# Patient Record
Sex: FEMALE | Race: WHITE | NOT HISPANIC OR LATINO | Employment: UNEMPLOYED | URBAN - METROPOLITAN AREA
[De-identification: names, ages, dates, MRNs, and addresses within clinical notes are randomized per-mention and may not be internally consistent; named-entity substitution may affect disease eponyms.]

---

## 2017-01-18 RX ORDER — LEVOTHYROXINE SODIUM 25 UG/1
TABLET ORAL
Qty: 90 TABLET | Refills: 3 | Status: SHIPPED | OUTPATIENT
Start: 2017-01-18 | End: 2018-02-16 | Stop reason: SDUPTHER

## 2017-03-24 ENCOUNTER — PATIENT MESSAGE (OUTPATIENT)
Dept: CARDIOLOGY | Facility: CLINIC | Age: 82
End: 2017-03-24

## 2017-03-24 DIAGNOSIS — Z00.00 PHYSICAL EXAM: Primary | ICD-10-CM

## 2017-04-05 ENCOUNTER — TELEPHONE (OUTPATIENT)
Dept: CARDIOLOGY | Facility: CLINIC | Age: 82
End: 2017-04-05

## 2017-04-05 DIAGNOSIS — E03.9 HYPOTHYROIDISM, UNSPECIFIED TYPE: ICD-10-CM

## 2017-04-05 DIAGNOSIS — E78.5 HYPERLIPIDEMIA, UNSPECIFIED HYPERLIPIDEMIA TYPE: Primary | ICD-10-CM

## 2017-04-07 ENCOUNTER — LAB VISIT (OUTPATIENT)
Dept: LAB | Facility: HOSPITAL | Age: 82
End: 2017-04-07
Attending: INTERNAL MEDICINE
Payer: MEDICARE

## 2017-04-07 DIAGNOSIS — E03.9 HYPOTHYROIDISM, UNSPECIFIED TYPE: ICD-10-CM

## 2017-04-07 DIAGNOSIS — E78.5 HYPERLIPIDEMIA, UNSPECIFIED HYPERLIPIDEMIA TYPE: ICD-10-CM

## 2017-04-07 LAB
ALBUMIN SERPL BCP-MCNC: 3.8 G/DL
ALP SERPL-CCNC: 99 U/L
ALT SERPL W/O P-5'-P-CCNC: 6 U/L
ANION GAP SERPL CALC-SCNC: 8 MMOL/L
AST SERPL-CCNC: 14 U/L
BILIRUB SERPL-MCNC: 0.7 MG/DL
BUN SERPL-MCNC: 21 MG/DL
CALCIUM SERPL-MCNC: 9 MG/DL
CHLORIDE SERPL-SCNC: 99 MMOL/L
CHOLEST/HDLC SERPL: 4.1 {RATIO}
CO2 SERPL-SCNC: 24 MMOL/L
CREAT SERPL-MCNC: 0.8 MG/DL
EST. GFR  (AFRICAN AMERICAN): >60 ML/MIN/1.73 M^2
EST. GFR  (NON AFRICAN AMERICAN): >60 ML/MIN/1.73 M^2
GLUCOSE SERPL-MCNC: 88 MG/DL
HDL/CHOLESTEROL RATIO: 24.5 %
HDLC SERPL-MCNC: 204 MG/DL
HDLC SERPL-MCNC: 50 MG/DL
LDLC SERPL CALC-MCNC: 131.8 MG/DL
NONHDLC SERPL-MCNC: 154 MG/DL
POTASSIUM SERPL-SCNC: 4.3 MMOL/L
PROT SERPL-MCNC: 6.6 G/DL
SODIUM SERPL-SCNC: 131 MMOL/L
T4 FREE SERPL-MCNC: 1 NG/DL
TRIGL SERPL-MCNC: 111 MG/DL
TSH SERPL DL<=0.005 MIU/L-ACNC: 1.75 UIU/ML

## 2017-04-07 PROCEDURE — 84443 ASSAY THYROID STIM HORMONE: CPT

## 2017-04-07 PROCEDURE — 36415 COLL VENOUS BLD VENIPUNCTURE: CPT | Mod: PO

## 2017-04-07 PROCEDURE — 80061 LIPID PANEL: CPT

## 2017-04-07 PROCEDURE — 84439 ASSAY OF FREE THYROXINE: CPT

## 2017-04-07 PROCEDURE — 80053 COMPREHEN METABOLIC PANEL: CPT

## 2017-04-12 ENCOUNTER — PATIENT MESSAGE (OUTPATIENT)
Dept: CARDIOLOGY | Facility: CLINIC | Age: 82
End: 2017-04-12

## 2017-04-12 ENCOUNTER — OFFICE VISIT (OUTPATIENT)
Dept: CARDIOLOGY | Facility: CLINIC | Age: 82
End: 2017-04-12
Payer: MEDICARE

## 2017-04-12 ENCOUNTER — TELEPHONE (OUTPATIENT)
Dept: CARDIOLOGY | Facility: CLINIC | Age: 82
End: 2017-04-12

## 2017-04-12 VITALS
DIASTOLIC BLOOD PRESSURE: 60 MMHG | SYSTOLIC BLOOD PRESSURE: 170 MMHG | BODY MASS INDEX: 24.97 KG/M2 | WEIGHT: 111 LBS | HEIGHT: 56 IN | HEART RATE: 80 BPM

## 2017-04-12 DIAGNOSIS — E55.9 VITAMIN D INSUFFICIENCY: ICD-10-CM

## 2017-04-12 DIAGNOSIS — I25.10 CORONARY ARTERY DISEASE INVOLVING NATIVE CORONARY ARTERY OF NATIVE HEART WITHOUT ANGINA PECTORIS: Primary | ICD-10-CM

## 2017-04-12 DIAGNOSIS — I87.2 VENOUS INSUFFICIENCY OF LOWER EXTREMITY, UNSPECIFIED LATERALITY: ICD-10-CM

## 2017-04-12 DIAGNOSIS — F32.89 OTHER DEPRESSION: ICD-10-CM

## 2017-04-12 DIAGNOSIS — I10 ESSENTIAL HYPERTENSION: ICD-10-CM

## 2017-04-12 DIAGNOSIS — E03.4 HYPOTHYROIDISM DUE TO ACQUIRED ATROPHY OF THYROID: ICD-10-CM

## 2017-04-12 DIAGNOSIS — I95.1 ORTHOSTATIC HYPOTENSION: ICD-10-CM

## 2017-04-12 DIAGNOSIS — I67.9 CEREBRAL ARTERIAL DISEASE: ICD-10-CM

## 2017-04-12 DIAGNOSIS — I35.1 NONRHEUMATIC AORTIC VALVE INSUFFICIENCY: ICD-10-CM

## 2017-04-12 DIAGNOSIS — I73.9 PAD (PERIPHERAL ARTERY DISEASE): ICD-10-CM

## 2017-04-12 DIAGNOSIS — K21.9 GASTROESOPHAGEAL REFLUX DISEASE WITHOUT ESOPHAGITIS: ICD-10-CM

## 2017-04-12 DIAGNOSIS — Z00.00 PHYSICAL EXAM: ICD-10-CM

## 2017-04-12 DIAGNOSIS — Z87.891 FORMER SMOKER: ICD-10-CM

## 2017-04-12 DIAGNOSIS — E78.2 MIXED HYPERLIPIDEMIA: ICD-10-CM

## 2017-04-12 DIAGNOSIS — K55.1 MESENTERIC ARTERY STENOSIS: ICD-10-CM

## 2017-04-12 DIAGNOSIS — I44.7 LBBB (LEFT BUNDLE BRANCH BLOCK): ICD-10-CM

## 2017-04-12 DIAGNOSIS — M51.9 LUMBAR DISC DISEASE: ICD-10-CM

## 2017-04-12 PROCEDURE — 93005 ELECTROCARDIOGRAM TRACING: CPT | Mod: PBBFAC,PO | Performed by: INTERNAL MEDICINE

## 2017-04-12 PROCEDURE — 99215 OFFICE O/P EST HI 40 MIN: CPT | Mod: S$PBB,,, | Performed by: INTERNAL MEDICINE

## 2017-04-12 PROCEDURE — 99214 OFFICE O/P EST MOD 30 MIN: CPT | Mod: PBBFAC,PO,25 | Performed by: INTERNAL MEDICINE

## 2017-04-12 PROCEDURE — 93010 ELECTROCARDIOGRAM REPORT: CPT | Mod: ,,, | Performed by: INTERNAL MEDICINE

## 2017-04-12 PROCEDURE — 99999 PR PBB SHADOW E&M-EST. PATIENT-LVL IV: CPT | Mod: PBBFAC,,, | Performed by: INTERNAL MEDICINE

## 2017-04-12 RX ORDER — EZETIMIBE 10 MG/1
10 TABLET ORAL DAILY
Qty: 30 TABLET | Refills: 12 | Status: SHIPPED | OUTPATIENT
Start: 2017-04-12 | End: 2017-11-03

## 2017-04-12 RX ORDER — ATORVASTATIN CALCIUM 80 MG/1
TABLET, FILM COATED ORAL
Qty: 45 TABLET | Refills: 3 | Status: SHIPPED | OUTPATIENT
Start: 2017-04-12 | End: 2018-12-13 | Stop reason: SDUPTHER

## 2017-04-12 RX ORDER — CLONAZEPAM 0.5 MG/1
0.5 TABLET ORAL 2 TIMES DAILY PRN
COMMUNITY
End: 2019-10-11

## 2017-04-12 NOTE — TELEPHONE ENCOUNTER
Pt's daughter, Deyanira called stating that pt is not taking Atorvastatin 80mg. Dr. Saab aware. Per Dr. Saab pt is to take 1/2 of Atorvastatin 80mg once a day and not to start taking Zetia. Deyanira verbalized understanding.

## 2017-04-12 NOTE — MR AVS SNAPSHOT
[facility]   [facility]  [place] [address]  Phone: [phone]                  Arti Olsen   2017 9:30 AM   Office Visit    Description:  Female : 1928   Provider:  Andrew Saab MD   Department:  [facility]           Reason for Visit     Hypertension           Diagnoses this Visit        Comments    Coronary artery disease involving native coronary artery of native heart without angina pectoris    -  Primary     Cerebral arterial disease         Nonrheumatic aortic valve insufficiency         LBBB (left bundle branch block)         Mesenteric artery stenosis         Mixed hyperlipidemia         PAD (peripheral artery disease)         Venous insufficiency of lower extremity, unspecified laterality         Vitamin D insufficiency         Orthostatic hypotension         Lumbar disc disease         Hypothyroidism due to acquired atrophy of thyroid         Gastroesophageal reflux disease without esophagitis         Former smoker         Essential hypertension         Other depression         Physical exam                To Do List           Goals (5 Years of Data)     None      [facility]     [facility] Care [facility] -  Assistance  Unless otherwise directed by your provider, please contact [facility], our nurse care line that is available for  assistance.     Registered nurses in the [facility] provide: appointment scheduling, clinical advisement, health education, and other advisory services.  Call: [phone] (toll free)               Medications           Message regarding Medications     Verify the changes and/or additions to your medication regime listed below are the same as discussed with your clinician today.  If any of these changes or additions are incorrect, please notify your healthcare provider.        STOP taking these medications     ferrous sulfate 325 mg (65 mg iron) Tab tablet Take 1 tablet (325  "mg total) by mouth every 12 (twelve) hours.           Verify that the below list of medications is an accurate representation of the medications you are currently taking.  If none reported, the list may be blank. If incorrect, please contact your healthcare provider. Carry this list with you in case of emergency.           Current Medications     amlodipine (NORVASC) 5 MG tablet Take 1 tablet (5 mg total) by mouth once daily.    aspirin (ECOTRIN) 81 MG EC tablet Take 81 mg by mouth once daily.      atorvastatin (LIPITOR) 80 MG tablet TAKE ONE DAILY    celecoxib (CELEBREX) 200 MG capsule TAKE ONE DAILY    clonazePAM (KLONOPIN) 0.5 MG tablet Take 0.5 mg by mouth 2 (two) times daily as needed for Anxiety. Pt taking prn.    ergocalciferol (VITAMIN D2) 50,000 unit Cap Take 50,000 Units by mouth every 7 days.    FIBER CHOICE ORAL Take by mouth once daily.    gabapentin (NEURONTIN) 100 MG capsule TAKE ONE BY MOUTH THREE TIMES DAILY    irbesartan (AVAPRO) 300 MG tablet TAKE ONE BY MOUTH EVERY EVENING    levothyroxine (SYNTHROID) 25 MCG tablet TAKE ONE DAILY    nitroGLYCERIN (NITROSTAT) 0.4 MG SL tablet Place 1 tablet (0.4 mg total) under the tongue every 5 (five) minutes as needed.    omeprazole (PRILOSEC) 20 MG capsule Take 20 mg by mouth once daily.     oxycodone-acetaminophen (PERCOCET) 7.5-325 mg per tablet Take 1 tablet by mouth. Pt taking prn.    PREMARIN vaginal cream Pt using prn.    sertraline (ZOLOFT) 100 MG tablet Take 50 mg by mouth once daily.     zolpidem (AMBIEN) 5 MG Tab Take one half per night as needed           Clinical Reference Information           Your Vitals Were     BP Pulse Height Weight BMI    170/60 80 4' 8" (1.422 m) 50.4 kg (111 lb) 24.89 kg/m2      Blood Pressure          Most Recent Value    BP  (!)  170/60      Allergies as of 4/12/2017     Iodinated Contrast Media - Iv Dye    Augmentin [Amoxicillin-pot Clavulanate]    Reglan [Metoclopramide]    Sulfa (Sulfonamide Antibiotics)    "   Immunizations Administered on Date of Encounter - 4/12/2017     None      Orders Placed During Today's Visit      Normal Orders This Visit    EKG 12-lead       Instructions    Discussed diet , achieving and maintaining ideal body weight, and exercise.   We reviewed meds in detail.  Reassured  Discussed goals         Language Assistance Services     ATTENTION: Language assistance services are available, free of charge. Please call [phone].      ATENCIÓN: Si habla español, tiene a stinson disposición servicios gratuitos de asistencia lingüística. Llame al [phone].     CHÚ Ý: N?u b?n nói Ti?ng Vi?t, có các d?ch v? h? tr? ngôn ng? mi?n phí dành cho b?n. G?i s? [phone].         [facility] complies with applicable Federal civil rights laws and does not discriminate on the basis of race, color, national origin, age, disability, or sex.

## 2017-04-12 NOTE — PATIENT INSTRUCTIONS
Discussed diet , achieving and maintaining ideal body weight, and exercise.   We reviewed meds in detail.  Reassured  Discussed goals  If BP not typically < 140, need more meds so let me know  If on atorvastatin 80 mg, add Zetia half pill, but if not on this, will do differently  Cleared for the Neurosurgery

## 2017-04-12 NOTE — PROGRESS NOTES
Subjective:   Patient ID:  Arti Olsen is a 89 y.o. female who presents for follow-up of CVD    HPI: The patient is here for CAD/CVD.     The patient has no chest pain, SOB, TIA, palpitations, syncope or pre-syncope.Patient does not exercise.Pt did not take meds this am and thinks BP lower. Not sure about lipid med??        Review of Systems   Constitution: Positive for weakness and malaise/fatigue. Negative for chills, decreased appetite, diaphoresis, fever, night sweats, weight gain and weight loss.   HENT: Negative for congestion, headaches, hoarse voice, nosebleeds, sore throat and tinnitus.    Eyes: Negative for blurred vision, double vision, vision loss in left eye, vision loss in right eye, visual disturbance and visual halos.   Cardiovascular: Negative for chest pain, claudication, cyanosis, dyspnea on exertion, irregular heartbeat, leg swelling, near-syncope, orthopnea, palpitations, paroxysmal nocturnal dyspnea and syncope.   Respiratory: Negative for cough, hemoptysis, shortness of breath, sleep disturbances due to breathing, snoring, sputum production and wheezing.    Endocrine: Negative for cold intolerance, heat intolerance, polydipsia, polyphagia and polyuria.   Hematologic/Lymphatic: Negative for adenopathy and bleeding problem. Does not bruise/bleed easily.   Skin: Negative for color change, dry skin, flushing, itching, nail changes, poor wound healing, rash, skin cancer, suspicious lesions and unusual hair distribution.   Musculoskeletal: Positive for back pain. Negative for arthritis, falls, gout, joint pain, joint swelling, muscle cramps, muscle weakness, myalgias and stiffness.   Gastrointestinal: Negative for abdominal pain, anorexia, change in bowel habit, constipation, diarrhea, dysphagia, heartburn, hematemesis, hematochezia, melena and vomiting.   Genitourinary: Negative for decreased libido, dysuria, hematuria, hesitancy and urgency.   Neurological: Positive for light-headedness.  "Negative for excessive daytime sleepiness, dizziness, focal weakness, loss of balance, numbness, paresthesias, seizures, sensory change, tremors and vertigo.   Psychiatric/Behavioral: Negative for altered mental status, depression, hallucinations, memory loss, substance abuse and suicidal ideas. The patient does not have insomnia and is not nervous/anxious.    Allergic/Immunologic: Negative for environmental allergies and hives.       Objective: BP (!) 170/60  Pulse 80  Ht 4' 8" (1.422 m)  Wt 50.4 kg (111 lb)  BMI 24.89 kg/m2     Physical Exam   Constitutional: She is oriented to person, place, and time. She appears well-developed and well-nourished.   HENT:   Head: Normocephalic.   Eyes: EOM are normal. Pupils are equal, round, and reactive to light.   Neck: Normal range of motion. Normal carotid pulses, no hepatojugular reflux and no JVD present. Carotid bruit is not present. No thyromegaly present.   Cardiovascular: Normal rate, regular rhythm and intact distal pulses.  Exam reveals no gallop and no friction rub.    Murmur heard.   Systolic murmur is present with a grade of 2/6   Pulses:       Carotid pulses are 2+ on the right side, and 2+ on the left side.       Popliteal pulses are 2+ on the right side, and 2+ on the left side.        Dorsalis pedis pulses are 1+ on the right side, and 1+ on the left side.        Posterior tibial pulses are 1+ on the right side, and 1+ on the left side.   Pulmonary/Chest: Effort normal and breath sounds normal. No tachypnea. No respiratory distress. She has no wheezes. She has no rales. She exhibits no tenderness.   Abdominal: Soft. Bowel sounds are normal. She exhibits no distension and no mass. There is no tenderness. There is no rebound and no guarding.   Musculoskeletal: Normal range of motion. She exhibits no edema or tenderness.   Lymphadenopathy:     She has no cervical adenopathy.   Neurological: She is alert and oriented to person, place, and time. No cranial " nerve deficit. Coordination normal.   Skin: Skin is warm. No rash noted. No erythema.   Psychiatric: She has a normal mood and affect. Her behavior is normal. Judgment and thought content normal.       Assessment:     1. Coronary artery disease involving native coronary artery of native heart without angina pectoris    2. Cerebral arterial disease    3. Nonrheumatic aortic valve insufficiency    4. LBBB (left bundle branch block)    5. Mesenteric artery stenosis    6. Mixed hyperlipidemia    7. PAD (peripheral artery disease)    8. Venous insufficiency of lower extremity, unspecified laterality    9. Vitamin D insufficiency    10. Orthostatic hypotension    11. Lumbar disc disease    12. Hypothyroidism due to acquired atrophy of thyroid    13. Gastroesophageal reflux disease without esophagitis    14. Former smoker    15. Essential hypertension    16. Other depression    17. Physical exam        Plan:   Discussed diet , achieving and maintaining ideal body weight, and exercise.   We reviewed meds in detail.  Reassured  Discussed goals  If BP not typically < 140, need more meds so let me know  If on atorvastatin 80 mg, add Zetia half pill, but if not on this, will do differently  Cleared for the Neurosurgery    Arti was seen today for hypertension.    Diagnoses and all orders for this visit:    Coronary artery disease involving native coronary artery of native heart without angina pectoris  -     ezetimibe (ZETIA) 10 mg tablet; Take 1 tablet (10 mg total) by mouth once daily.  -     Lipid panel; Standing  -     Comprehensive metabolic panel; Standing  -     TSH; Future; Expected date: 4/12/18  -     EKG 12-lead; Future; Expected date: 4/12/18    Cerebral arterial disease  -     Comprehensive metabolic panel; Standing    Nonrheumatic aortic valve insufficiency    LBBB (left bundle branch block)  -     EKG 12-lead; Future; Expected date: 4/12/18    Mesenteric artery stenosis    Mixed hyperlipidemia  -     ezetimibe  (ZETIA) 10 mg tablet; Take 1 tablet (10 mg total) by mouth once daily.  -     Lipid panel; Standing  -     Comprehensive metabolic panel; Standing  -     TSH; Future; Expected date: 4/12/18    PAD (peripheral artery disease)    Venous insufficiency of lower extremity, unspecified laterality    Vitamin D insufficiency  -     Vitamin D; Future; Expected date: 4/12/18    Orthostatic hypotension    Lumbar disc disease    Hypothyroidism due to acquired atrophy of thyroid    Gastroesophageal reflux disease without esophagitis    Former smoker    Essential hypertension  -     Comprehensive metabolic panel; Standing  -     TSH; Future; Expected date: 4/12/18  -     EKG 12-lead; Future; Expected date: 4/12/18    Other depression  -     TSH; Future; Expected date: 4/12/18    Physical exam  -     EKG 12-lead    Other orders  -     clonazePAM (KLONOPIN) 0.5 MG tablet; Take 0.5 mg by mouth 2 (two) times daily as needed for Anxiety. Pt taking prn.            Return in about 1 year (around 4/12/2018) for with ECG and labs; comp and lipids in 4 months.

## 2017-05-04 RX ORDER — AMLODIPINE BESYLATE 5 MG/1
TABLET ORAL
Qty: 90 TABLET | Refills: 3 | Status: SHIPPED | OUTPATIENT
Start: 2017-05-04 | End: 2018-06-06 | Stop reason: SDUPTHER

## 2017-05-05 RX ORDER — GABAPENTIN 100 MG/1
CAPSULE ORAL
Qty: 90 CAPSULE | Refills: 3 | Status: SHIPPED | OUTPATIENT
Start: 2017-05-05 | End: 2018-07-06 | Stop reason: SDUPTHER

## 2017-05-11 ENCOUNTER — TELEPHONE (OUTPATIENT)
Dept: CARDIOLOGY | Facility: CLINIC | Age: 82
End: 2017-05-11

## 2017-05-11 NOTE — TELEPHONE ENCOUNTER
----- Message from Angelique Almonte sent at 5/11/2017  1:38 PM CDT -----  Contact: Cheryle [phone] [facility] Neuro Surgery Dept  Please call Cheryle at the number listed in ref to the pt listed, and she states the pt is at their office now.    Thanks

## 2017-05-11 NOTE — TELEPHONE ENCOUNTER
Returned call from Cheryle at [facility] neurosurgery. She wanted a fax on Dr. Saab's last note from 4/12/17 where he clears her for neurosurgery procedure. Notes were faxed.

## 2017-08-09 ENCOUNTER — LAB VISIT (OUTPATIENT)
Dept: LAB | Facility: HOSPITAL | Age: 82
End: 2017-08-09
Attending: INTERNAL MEDICINE
Payer: MEDICARE

## 2017-08-09 DIAGNOSIS — I25.10 CORONARY ARTERY DISEASE INVOLVING NATIVE CORONARY ARTERY OF NATIVE HEART WITHOUT ANGINA PECTORIS: ICD-10-CM

## 2017-08-09 DIAGNOSIS — I10 ESSENTIAL HYPERTENSION: ICD-10-CM

## 2017-08-09 DIAGNOSIS — E78.2 MIXED HYPERLIPIDEMIA: ICD-10-CM

## 2017-08-09 DIAGNOSIS — I67.9 CEREBRAL ARTERIAL DISEASE: ICD-10-CM

## 2017-08-09 LAB
ALBUMIN SERPL BCP-MCNC: 3.7 G/DL
ALP SERPL-CCNC: 106 U/L
ALT SERPL W/O P-5'-P-CCNC: 9 U/L
ANION GAP SERPL CALC-SCNC: 8 MMOL/L
AST SERPL-CCNC: 15 U/L
BILIRUB SERPL-MCNC: 0.5 MG/DL
BUN SERPL-MCNC: 18 MG/DL
CALCIUM SERPL-MCNC: 9.2 MG/DL
CHLORIDE SERPL-SCNC: 100 MMOL/L
CHOLEST/HDLC SERPL: 2.1 {RATIO}
CO2 SERPL-SCNC: 26 MMOL/L
CREAT SERPL-MCNC: 0.8 MG/DL
EST. GFR  (AFRICAN AMERICAN): >60 ML/MIN/1.73 M^2
EST. GFR  (NON AFRICAN AMERICAN): >60 ML/MIN/1.73 M^2
GLUCOSE SERPL-MCNC: 86 MG/DL
HDL/CHOLESTEROL RATIO: 47.8 %
HDLC SERPL-MCNC: 113 MG/DL
HDLC SERPL-MCNC: 54 MG/DL
LDLC SERPL CALC-MCNC: 45.2 MG/DL
NONHDLC SERPL-MCNC: 59 MG/DL
POTASSIUM SERPL-SCNC: 4.6 MMOL/L
PROT SERPL-MCNC: 6.5 G/DL
SODIUM SERPL-SCNC: 134 MMOL/L
TRIGL SERPL-MCNC: 69 MG/DL

## 2017-08-09 PROCEDURE — 36415 COLL VENOUS BLD VENIPUNCTURE: CPT | Mod: PO

## 2017-08-09 PROCEDURE — 80061 LIPID PANEL: CPT

## 2017-08-09 PROCEDURE — 80053 COMPREHEN METABOLIC PANEL: CPT

## 2017-08-11 ENCOUNTER — PATIENT MESSAGE (OUTPATIENT)
Dept: CARDIOLOGY | Facility: CLINIC | Age: 82
End: 2017-08-11

## 2017-11-03 ENCOUNTER — LAB VISIT (OUTPATIENT)
Dept: LAB | Facility: HOSPITAL | Age: 82
End: 2017-11-03
Attending: INTERNAL MEDICINE
Payer: MEDICARE

## 2017-11-03 ENCOUNTER — OFFICE VISIT (OUTPATIENT)
Dept: CARDIOLOGY | Facility: CLINIC | Age: 82
End: 2017-11-03
Payer: MEDICARE

## 2017-11-03 ENCOUNTER — TELEPHONE (OUTPATIENT)
Dept: CARDIOLOGY | Facility: CLINIC | Age: 82
End: 2017-11-03

## 2017-11-03 VITALS
WEIGHT: 111.31 LBS | BODY MASS INDEX: 23.37 KG/M2 | HEIGHT: 58 IN | DIASTOLIC BLOOD PRESSURE: 74 MMHG | HEART RATE: 80 BPM | SYSTOLIC BLOOD PRESSURE: 164 MMHG

## 2017-11-03 DIAGNOSIS — E87.1 HYPONATREMIA: ICD-10-CM

## 2017-11-03 DIAGNOSIS — D50.9 IRON DEFICIENCY ANEMIA, UNSPECIFIED IRON DEFICIENCY ANEMIA TYPE: ICD-10-CM

## 2017-11-03 DIAGNOSIS — R07.9 CHEST PAIN, UNSPECIFIED TYPE: ICD-10-CM

## 2017-11-03 DIAGNOSIS — I87.2 VENOUS INSUFFICIENCY OF BOTH LOWER EXTREMITIES: ICD-10-CM

## 2017-11-03 DIAGNOSIS — K55.1 MESENTERIC ARTERY STENOSIS: ICD-10-CM

## 2017-11-03 DIAGNOSIS — I73.9 PAD (PERIPHERAL ARTERY DISEASE): ICD-10-CM

## 2017-11-03 DIAGNOSIS — I25.119 CORONARY ARTERY DISEASE INVOLVING NATIVE CORONARY ARTERY OF NATIVE HEART WITH ANGINA PECTORIS: Primary | Chronic | ICD-10-CM

## 2017-11-03 DIAGNOSIS — I35.1 NONRHEUMATIC AORTIC VALVE INSUFFICIENCY: ICD-10-CM

## 2017-11-03 DIAGNOSIS — I10 ESSENTIAL HYPERTENSION: Chronic | ICD-10-CM

## 2017-11-03 DIAGNOSIS — I70.0 ATHEROSCLEROSIS OF AORTA: ICD-10-CM

## 2017-11-03 DIAGNOSIS — I44.7 LBBB (LEFT BUNDLE BRANCH BLOCK): ICD-10-CM

## 2017-11-03 DIAGNOSIS — E03.9 HYPOTHYROIDISM, UNSPECIFIED TYPE: ICD-10-CM

## 2017-11-03 DIAGNOSIS — E78.2 MIXED HYPERLIPIDEMIA: ICD-10-CM

## 2017-11-03 DIAGNOSIS — I25.119 CORONARY ARTERY DISEASE INVOLVING NATIVE CORONARY ARTERY OF NATIVE HEART WITH ANGINA PECTORIS: ICD-10-CM

## 2017-11-03 DIAGNOSIS — I70.1 RENAL ARTERY ATHEROSCLEROSIS: ICD-10-CM

## 2017-11-03 LAB — TROPONIN I SERPL DL<=0.01 NG/ML-MCNC: <0.006 NG/ML

## 2017-11-03 PROCEDURE — 99214 OFFICE O/P EST MOD 30 MIN: CPT | Mod: S$PBB,,, | Performed by: NURSE PRACTITIONER

## 2017-11-03 PROCEDURE — 84484 ASSAY OF TROPONIN QUANT: CPT

## 2017-11-03 PROCEDURE — 99213 OFFICE O/P EST LOW 20 MIN: CPT | Mod: PBBFAC,PO | Performed by: NURSE PRACTITIONER

## 2017-11-03 PROCEDURE — 93005 ELECTROCARDIOGRAM TRACING: CPT | Mod: PBBFAC,PO | Performed by: NURSE PRACTITIONER

## 2017-11-03 PROCEDURE — 36415 COLL VENOUS BLD VENIPUNCTURE: CPT | Mod: PO

## 2017-11-03 PROCEDURE — 99999 PR PBB SHADOW E&M-EST. PATIENT-LVL III: CPT | Mod: PBBFAC,,, | Performed by: NURSE PRACTITIONER

## 2017-11-03 PROCEDURE — 93010 ELECTROCARDIOGRAM REPORT: CPT | Mod: ,,, | Performed by: INTERNAL MEDICINE

## 2017-11-03 NOTE — PROGRESS NOTES
Ms. Olsen is a patient of Dr. Li and was last seen in [facility] Cardiology Visit     Subjective:   Patient ID:  Arti Olsen is a 89 y.o. female who presents for follow-up of Chest Pain    Problems:  CAD (non-obstructive per [facility] in 2012, mid LAD 40%)  HLD  HTN  PAD  Renal artery atherosclerosis  Mesenteric artery stenosis, 70% (US 6/2016)  29 pack year h/o smoking, quit in 1987  Diastolic dysfunction, EF 60-65% in 9/2013    HPI  Ms. Olsen is in clinic today for chest pain for the last 2 weeks with the last episode being last night.  The chest pressure is located on the left anterior chest wall that is not associated with exertion.  The pressure radiates to the shoulder.  Chest pressure not associated with sweating, nausea, or jaw pain.  Pressure lasts for hours at times.  The pressure makes her anxious and she took a Klonopin last night that helped some.  She did not take NTG because the last time she took it a number of years ago her pressure dropped and she passed out.  Ischemia can not be determined by ECG because of LBBB. Patient denies  palpitations, SOB, DOVER, dizziness, syncope, edema, orthopnea, PND, or claudication.  Reports routine exercise.  She is treated with low dose ASA and high intensity statin.  Patient is taking atorvastatin 80mg and zetia 10mg. LDL is 45.   SPECT 11/2015: Equivocal myocardial perfusion. A trivial to mild mostly reversible small apical wall defect associated with soft tissue shadow of uncertain significance.  Hypertension is treated with CCB (amlodipine 5mg) and ARB (irbesartan 300mg).  Reports following a low salt diet. Home blood pressure readings are 140-150s.    CT chest 11/2014: areas of air trapping, 3 stable pulmonary nodules    ECG today: SR with 1st degree AVB and LBBB.     Review of Systems   Constitution: Negative for decreased appetite, diaphoresis, weakness, malaise/fatigue, weight gain and weight loss.   Eyes: Negative for visual disturbance.   Cardiovascular:  "Negative for chest pain, claudication, dyspnea on exertion, irregular heartbeat, leg swelling, near-syncope, orthopnea, palpitations, paroxysmal nocturnal dyspnea and syncope.        Reports chest pressure   Respiratory: Negative for cough, hemoptysis, shortness of breath, sleep disturbances due to breathing and snoring.    Endocrine: Negative for cold intolerance and heat intolerance.   Hematologic/Lymphatic: Negative for bleeding problem. Does not bruise/bleed easily.   Musculoskeletal: Negative for myalgias.   Gastrointestinal: Negative for bloating, abdominal pain, anorexia, change in bowel habit, constipation, diarrhea, nausea and vomiting.   Neurological: Negative for difficulty with concentration, disturbances in coordination, excessive daytime sleepiness, dizziness, headaches, light-headedness, loss of balance and numbness.   Psychiatric/Behavioral: The patient does not have insomnia.      Allergies and current medications updated and reviewed:  Review of patient's allergies indicates:   Allergen Reactions    Iodinated contrast- oral and iv dye Swelling    Augmentin [amoxicillin-pot clavulanate] Diarrhea    Reglan [metoclopramide] Other (See Comments)     "Body shaking"    Sulfa (sulfonamide antibiotics)      Yrs ago     Current Outpatient Prescriptions   Medication Sig    amlodipine (NORVASC) 5 MG tablet TAKE 1 TO 2 PER DAY OR AS DIRECTED    aspirin (ECOTRIN) 81 MG EC tablet Take 81 mg by mouth once daily.      atorvastatin (LIPITOR) 80 MG tablet Pt to take 1/2 tablet once a day.    celecoxib (CELEBREX) 200 MG capsule TAKE ONE DAILY    clonazePAM (KLONOPIN) 0.5 MG tablet Take 0.5 mg by mouth 2 (two) times daily as needed for Anxiety. Pt taking prn.    ezetimibe (ZETIA) 10 mg tablet Take 1 tablet (10 mg total) by mouth once daily.    gabapentin (NEURONTIN) 100 MG capsule TAKE ONE BY MOUTH THREE TIMES DAILY    irbesartan (AVAPRO) 300 MG tablet TAKE ONE BY MOUTH EVERY EVENING    levothyroxine " "(SYNTHROID) 25 MCG tablet TAKE ONE DAILY    nitroGLYCERIN (NITROSTAT) 0.4 MG SL tablet Place 1 tablet (0.4 mg total) under the tongue every 5 (five) minutes as needed.    omeprazole (PRILOSEC) 20 MG capsule Take 20 mg by mouth once daily.     oxycodone-acetaminophen (PERCOCET) 7.5-325 mg per tablet Take 1 tablet by mouth. Pt taking prn.    PREMARIN vaginal cream Pt using prn.    sertraline (ZOLOFT) 100 MG tablet Take 50 mg by mouth once daily.     zolpidem (AMBIEN) 5 MG Tab Take one half per night as needed (Patient taking differently: every evening. )     No current facility-administered medications for this visit.      Objective:      BP (!) 164/74   Pulse 80   Ht 4' 10" (1.473 m)   Wt 50.5 kg (111 lb 5.3 oz)   BMI 23.27 kg/m²     Physical Exam   Constitutional: She is oriented to person, place, and time. Vital signs are normal. She appears well-developed and well-nourished. She is active. No distress.   HENT:   Head: Normocephalic and atraumatic.   Eyes: Conjunctivae and lids are normal. No scleral icterus.   Neck: Neck supple. Normal carotid pulses, no hepatojugular reflux and no JVD present. Carotid bruit is not present.   Cardiovascular: Normal rate, regular rhythm, S1 normal, S2 normal and intact distal pulses.  PMI is not displaced.  Exam reveals no gallop and no friction rub.    No murmur heard.  Pulses:       Carotid pulses are 2+ on the right side, and 2+ on the left side.       Radial pulses are 2+ on the right side, and 2+ on the left side.        Dorsalis pedis pulses are 2+ on the right side, and 2+ on the left side.        Posterior tibial pulses are 1+ on the right side, and 1+ on the left side.   Pulmonary/Chest: Effort normal and breath sounds normal. No respiratory distress. She has no decreased breath sounds. She has no wheezes. She has no rhonchi. She has no rales. She exhibits no tenderness.   Abdominal: Soft. Normal appearance and bowel sounds are normal. She exhibits no distension, " no fluid wave, no abdominal bruit, no ascites and no pulsatile midline mass. There is no hepatosplenomegaly. There is no tenderness.   Musculoskeletal: She exhibits no edema.   Neurological: She is alert and oriented to person, place, and time. Gait normal.   Skin: Skin is warm, dry and intact. No rash noted. She is not diaphoretic. Nails show no clubbing.   Psychiatric: She has a normal mood and affect. Her speech is normal and behavior is normal. Judgment and thought content normal. Cognition and memory are normal.   Nursing note and vitals reviewed.      Chemistry        Component Value Date/Time     (L) 08/09/2017 0754    K 4.6 08/09/2017 0754     08/09/2017 0754    CO2 26 08/09/2017 0754    BUN 18 08/09/2017 0754    CREATININE 0.8 08/09/2017 0754    GLU 86 08/09/2017 0754        Component Value Date/Time    CALCIUM 9.2 08/09/2017 0754    ALKPHOS 106 08/09/2017 0754    AST 15 08/09/2017 0754    ALT 9 (L) 08/09/2017 0754    BILITOT 0.5 08/09/2017 0754    ESTGFRAFRICA >60.0 08/09/2017 0754    EGFRNONAA >60.0 08/09/2017 0754          Recent Labs  Lab 11/09/16  1541  12/12/16  1659 04/07/17  0743 08/09/17  0754   WHITE BLOOD CELL COUNT 6.74  < > 5.64  --   --    HEMOGLOBIN 10.6 L  < > 11.0 L  --   --    HEMATOCRIT 31.6 L  < > 34.0 L  --   --    MCV 80 L  < > 84  --   --    PLATELETS 173  < > 178  --   --     H  --   --   --   --    TSH 1.230  --   --  1.750  --    CHOLESTEROL  --   --   --  204 H 113 L   HDL  --   --   --  50 54   LDL CHOLESTEROL  --   --   --  131.8 45.2 L   TRIGLYCERIDES  --   --   --  111 69   HDL/CHOLESTEROL RATIO  --   --   --  24.5 47.8   < > = values in this interval not displayed.  Lab Results   Component Value Date    IRON 95 11/19/2010    TIBC 347 11/19/2010    FERRITIN 20 12/12/2016       Recent Labs  Lab 11/09/16  1541   INR 1.0        Test(s) Reviewed  I have reviewed the following in detail:  [] Stress test   [] Angiography   [x] Echocardiogram   [x] Labs   [] Other:          Assessment/Plan:     Coronary artery disease involving native coronary artery of native heart with angina pectoris  Comments:  Symptomatic. Taking low ASA and high intensity statin therapy  Orders:  -     IN OFFICE EKG 12-LEAD (to Muse); Future  -     TROPONIN I; Future; Expected date: 11/17/2017    Mixed hyperlipidemia  Comments:  LDL at goal <70. Continue atorvastatin 80mg and Zetia 10mg    LBBB (left bundle branch block)  -     EKG 12-lead    PAD (peripheral artery disease)    Essential hypertension  Comments:  Not well controlled in office. Reports BP is better at home.  2gm sodium diet. Instructed to call [facility] with BP in 2 weeks.   Orders:  -     EKG 12-lead    Renal artery atherosclerosis    Nonrheumatic aortic valve insufficiency    Venous insufficiency of both lower extremities    Hyponatremia    Hypothyroidism, unspecified type  Comments:  TSH wnl 4/2017    Mesenteric artery stenosis    Iron deficiency anemia, unspecified iron deficiency anemia type    Atherosclerosis of aorta    Chest pain, unspecified type  -     IN OFFICE EKG 12-LEAD (to Muse); Future  -     TROPONIN I; Future; Expected date: 11/17/2017      Return in about 6 months (around 5/3/2018).

## 2017-11-03 NOTE — TELEPHONE ENCOUNTER
Returned message from patient's daughter Deyanira. She's concerned because patient said she's been having chest pains off and on which she thought was indigestion but is not relieved with antiacid. Has not taken NTG. The bottle she has is old , so we will call in fresh Rx to [facility]. Offered appt today with Hannah Patiño NP at [place] and daughter accepted.

## 2017-11-06 ENCOUNTER — PATIENT MESSAGE (OUTPATIENT)
Dept: CARDIOLOGY | Facility: CLINIC | Age: 82
End: 2017-11-06

## 2017-12-11 RX ORDER — CELECOXIB 200 MG/1
CAPSULE ORAL
Qty: 60 CAPSULE | Refills: 12 | Status: SHIPPED | OUTPATIENT
Start: 2017-12-11 | End: 2019-01-08 | Stop reason: SDUPTHER

## 2018-02-14 ENCOUNTER — TELEPHONE (OUTPATIENT)
Dept: INTERNAL MEDICINE | Facility: CLINIC | Age: 83
End: 2018-02-14

## 2018-02-14 NOTE — TELEPHONE ENCOUNTER
----- Message from Jackie Franz sent at 2/12/2018  3:54 PM CST -----  Contact: Pt [email or web address] portal  Appointment Request From: Arti Olsen    With Provider: Reji Arguello Jr, MD [-Primary Care Physician-]    Would Accept With:Only the person I've selected    Preferred Date Range: From 3/1/2018 To 8/31/2018    Preferred Times: Any    Reason for visit: Request an Appt    Comments:  We had to cancel an appointment with Dr. Arguello several weeks ago even though my mother is overdue seeing him. She received his letter and would like to have an appointment with him before he retires. Please contact me if you have any questions. Gratefully, Deyanira Olsen (daughter, [phone])

## 2018-02-15 ENCOUNTER — PATIENT MESSAGE (OUTPATIENT)
Dept: INTERNAL MEDICINE | Facility: CLINIC | Age: 83
End: 2018-02-15

## 2018-02-19 RX ORDER — LEVOTHYROXINE SODIUM 25 UG/1
TABLET ORAL
Qty: 90 TABLET | Refills: 3 | Status: SHIPPED | OUTPATIENT
Start: 2018-02-19 | End: 2019-09-20 | Stop reason: SDUPTHER

## 2018-03-08 ENCOUNTER — OFFICE VISIT (OUTPATIENT)
Dept: CARDIOLOGY | Facility: CLINIC | Age: 83
End: 2018-03-08
Payer: MEDICARE

## 2018-03-08 VITALS
WEIGHT: 111.31 LBS | HEIGHT: 59 IN | HEART RATE: 73 BPM | SYSTOLIC BLOOD PRESSURE: 176 MMHG | BODY MASS INDEX: 22.44 KG/M2 | DIASTOLIC BLOOD PRESSURE: 71 MMHG

## 2018-03-08 DIAGNOSIS — I10 ESSENTIAL HYPERTENSION: ICD-10-CM

## 2018-03-08 DIAGNOSIS — F32.89 OTHER DEPRESSION: ICD-10-CM

## 2018-03-08 DIAGNOSIS — E87.1 HYPONATREMIA: ICD-10-CM

## 2018-03-08 DIAGNOSIS — K55.1 MESENTERIC ARTERY STENOSIS: ICD-10-CM

## 2018-03-08 DIAGNOSIS — K21.9 GASTROESOPHAGEAL REFLUX DISEASE WITHOUT ESOPHAGITIS: ICD-10-CM

## 2018-03-08 DIAGNOSIS — E55.9 VITAMIN D INSUFFICIENCY: ICD-10-CM

## 2018-03-08 DIAGNOSIS — I67.9 CEREBRAL ARTERIAL DISEASE: ICD-10-CM

## 2018-03-08 DIAGNOSIS — I73.9 PAD (PERIPHERAL ARTERY DISEASE): ICD-10-CM

## 2018-03-08 DIAGNOSIS — E03.4 HYPOTHYROIDISM DUE TO ACQUIRED ATROPHY OF THYROID: ICD-10-CM

## 2018-03-08 DIAGNOSIS — I44.7 LBBB (LEFT BUNDLE BRANCH BLOCK): ICD-10-CM

## 2018-03-08 DIAGNOSIS — M51.9 LUMBAR DISC DISEASE: ICD-10-CM

## 2018-03-08 DIAGNOSIS — I70.1 RENAL ARTERY ATHEROSCLEROSIS: ICD-10-CM

## 2018-03-08 DIAGNOSIS — I25.10 CORONARY ARTERY DISEASE INVOLVING NATIVE CORONARY ARTERY OF NATIVE HEART WITHOUT ANGINA PECTORIS: Primary | ICD-10-CM

## 2018-03-08 DIAGNOSIS — Z87.891 HISTORY OF SMOKING 25-50 PACK YEARS: ICD-10-CM

## 2018-03-08 DIAGNOSIS — E78.2 MIXED HYPERLIPIDEMIA: ICD-10-CM

## 2018-03-08 DIAGNOSIS — I87.2 VENOUS INSUFFICIENCY OF BOTH LOWER EXTREMITIES: ICD-10-CM

## 2018-03-08 PROCEDURE — 99999 PR PBB SHADOW E&M-EST. PATIENT-LVL III: CPT | Mod: PBBFAC,,, | Performed by: INTERNAL MEDICINE

## 2018-03-08 PROCEDURE — 99213 OFFICE O/P EST LOW 20 MIN: CPT | Mod: PBBFAC | Performed by: INTERNAL MEDICINE

## 2018-03-08 PROCEDURE — 99215 OFFICE O/P EST HI 40 MIN: CPT | Mod: S$PBB,,, | Performed by: INTERNAL MEDICINE

## 2018-03-08 NOTE — PATIENT INSTRUCTIONS
Discussed diet , achieving and maintaining ideal body weight, and exercise.   We reviewed meds in detail.  Reassured-discussed goals, options plan   Increase amlodipine to twice but if bad ankle swelling , try to take whole 10 mg at nite  If BP still over 130-135, will add low dose diuretic or spironolactone

## 2018-03-16 RX ORDER — IRBESARTAN 300 MG/1
TABLET ORAL
Qty: 90 TABLET | Refills: 3 | Status: SHIPPED | OUTPATIENT
Start: 2018-03-16 | End: 2019-04-29 | Stop reason: SDUPTHER

## 2018-03-28 ENCOUNTER — OFFICE VISIT (OUTPATIENT)
Dept: INTERNAL MEDICINE | Facility: CLINIC | Age: 83
End: 2018-03-28
Payer: MEDICARE

## 2018-03-28 VITALS
OXYGEN SATURATION: 96 % | SYSTOLIC BLOOD PRESSURE: 142 MMHG | BODY MASS INDEX: 23.11 KG/M2 | HEART RATE: 73 BPM | DIASTOLIC BLOOD PRESSURE: 50 MMHG | HEIGHT: 59 IN | WEIGHT: 114.63 LBS

## 2018-03-28 DIAGNOSIS — L03.116 CELLULITIS OF LEFT LOWER EXTREMITY: ICD-10-CM

## 2018-03-28 DIAGNOSIS — L97.222 VENOUS STASIS ULCER OF LEFT CALF WITH FAT LAYER EXPOSED WITH VARICOSE VEINS: Primary | ICD-10-CM

## 2018-03-28 DIAGNOSIS — I83.022 VENOUS STASIS ULCER OF LEFT CALF WITH FAT LAYER EXPOSED WITH VARICOSE VEINS: Primary | ICD-10-CM

## 2018-03-28 PROCEDURE — 99999 PR PBB SHADOW E&M-EST. PATIENT-LVL V: CPT | Mod: PBBFAC,,, | Performed by: NURSE PRACTITIONER

## 2018-03-28 PROCEDURE — 99214 OFFICE O/P EST MOD 30 MIN: CPT | Mod: S$PBB,,, | Performed by: NURSE PRACTITIONER

## 2018-03-28 PROCEDURE — 99215 OFFICE O/P EST HI 40 MIN: CPT | Mod: PBBFAC | Performed by: NURSE PRACTITIONER

## 2018-03-28 RX ORDER — CEPHALEXIN 250 MG/1
250 CAPSULE ORAL 4 TIMES DAILY
Qty: 40 CAPSULE | Refills: 0 | Status: SHIPPED | OUTPATIENT
Start: 2018-03-28 | End: 2018-04-12 | Stop reason: ALTCHOICE

## 2018-03-28 NOTE — PATIENT INSTRUCTIONS
Schedule follow up with wound care, NP Luz Marina    Keep leg elevated    Wear compression stockings    Take antibiotic 4 times a day with food for 10 days    Dressing changes as discussed    Follow up for any worsening of redness, pain, fever , chills or worsening of any symptoms that concerns you

## 2018-03-28 NOTE — PROGRESS NOTES
Subjective:       Patient ID: Arti Olsen is a 89 y.o. female.    Chief Complaint: Leg Pain    Pt here with daughter .  Pt c/o ulcer to left lower leg x 2 weeks.  Pain started this week and redness.  Pt denies fever or chills, no purulent drainage.  Pt not wearing compression stockings.          Leg Pain    There was no injury mechanism.     Past Medical History:   Diagnosis Date    Acid reflux     chronic    Anemia 6/24/2014    Arthritis     osetoarthritis    Clotting disorder     Colon polyp     Coronary artery disease     Nonobstructive CAD per [facility]    History of colonoscopy     1 polyp noted in 2009    Hyperlipemia     Hypertension     Migraine headache     chronic    Peripheral vascular disease     PONV (postoperative nausea and vomiting)     Renal artery atherosclerosis 11/11/2012    Spinal stenosis     Thyroid disease      Past Surgical History:   Procedure Laterality Date    acf      ANTERIOR CERVICAL DISCECTOMY W/ FUSION      APPENDECTOMY  1942    BLADDER SURGERY  1958    suspension    COLONOSCOPY W/ POLYPECTOMY      CYST REMOVAL  1983    removed from scalp    DISC REMOVAL  1983    EYE SURGERY      cataracts removed    HYSTERECTOMY  1959    COMPLETE    JOINT REPLACEMENT  2006    repair 3rd toe    JOINT REPLACEMENT  2007    right/left rotator    SPINE SURGERY  02/2014    relieve pressure on nerves    TONSILLECTOMY, ADENOIDECTOMY  1930's    UTERINE FIBROID SURGERY  1958    VARICOSE VEIN SURGERY  1958     Social History     Social History Narrative    No narrative on file     Family History   Problem Relation Age of Onset    Heart disease Father      aorta burst    Heart disease Mother      stroke    Kidney disease Brother     Cancer Brother     Lung cancer Brother     Cancer Brother     Leukemia Brother     Heart disease Brother     Cancer Brother     Heart disease Maternal Grandmother     Kidney disease Maternal Grandfather     Uterine cancer Paternal Grandmother   "   Anesthesia problems Neg Hx     Clotting disorder Neg Hx      problems Neg Hx     Hypertension Neg Hx     Kidney cancer Neg Hx     Malignant hyperthermia Neg Hx     Prostate cancer Neg Hx     Sickle cell trait Neg Hx     Lupus Neg Hx     Sudden death Neg Hx     Urolithiasis Neg Hx     Colon cancer Neg Hx     Esophageal cancer Neg Hx     Irritable bowel syndrome Neg Hx     Rectal cancer Neg Hx     Stomach cancer Neg Hx     Ulcerative colitis Neg Hx     Celiac disease Neg Hx     Cystic fibrosis Neg Hx      Vitals:    03/28/18 0810   BP: (!) 142/50   Pulse: 73   SpO2: 96%   Weight: 52 kg (114 lb 10.2 oz)   Height: 4' 11" (1.499 m)   PainSc:   8   PainLoc: Leg     Outpatient Encounter Prescriptions as of 3/28/2018   Medication Sig Dispense Refill    amlodipine (NORVASC) 5 MG tablet TAKE 1 TO 2 PER DAY OR AS DIRECTED 90 tablet 3    aspirin (ECOTRIN) 81 MG EC tablet Take 81 mg by mouth once daily.        atorvastatin (LIPITOR) 80 MG tablet Pt to take 1/2 tablet once a day. (Patient taking differently: Pt to take 1/2 tablet 5 days per week.) 45 tablet 3    celecoxib (CELEBREX) 200 MG capsule TAKE ONE DAILY 60 capsule 12    clonazePAM (KLONOPIN) 0.5 MG tablet Take 0.5 mg by mouth 2 (two) times daily as needed for Anxiety. Pt taking prn.      ERGOCALCIFEROL, VITAMIN D2, (VITAMIN D ORAL) Take 2 tablets by mouth once daily.      gabapentin (NEURONTIN) 100 MG capsule TAKE ONE BY MOUTH THREE TIMES DAILY 90 capsule 3    irbesartan (AVAPRO) 300 MG tablet TAKE ONE BY MOUTH EVERY EVENING 90 tablet 3    levothyroxine (SYNTHROID) 25 MCG tablet TAKE ONE DAILY 90 tablet 3    nitroGLYCERIN (NITROSTAT) 0.4 MG SL tablet Place 1 tablet (0.4 mg total) under the tongue every 5 (five) minutes as needed. 25 tablet 4    omeprazole (PRILOSEC) 20 MG capsule Take 20 mg by mouth once daily.       oxycodone-acetaminophen (PERCOCET) 7.5-325 mg per tablet Take 1 tablet by mouth. Pt taking prn.      PREMARIN vaginal " "cream Pt using prn.      sertraline (ZOLOFT) 100 MG tablet Take 50 mg by mouth once daily.       VIT C/VIT E/LUTEIN/MIN/OMEGA-3 (OCUVITE ORAL) Take 1 tablet by mouth once daily.      zolpidem (AMBIEN) 5 MG Tab Take one half per night as needed (Patient taking differently: every evening. ) 30 tablet 0    cephALEXin (KEFLEX) 250 MG capsule Take 1 capsule (250 mg total) by mouth 4 (four) times daily. 40 capsule 0     No facility-administered encounter medications on file as of 3/28/2018.      Wt Readings from Last 3 Encounters:   03/28/18 52 kg (114 lb 10.2 oz)   03/08/18 50.5 kg (111 lb 5.3 oz)   11/03/17 50.5 kg (111 lb 5.3 oz)     Last 3 sets of Vitals    Vitals - 1 value per visit 11/3/2017 3/8/2018 3/28/2018   SYSTOLIC 164 176 142   DIASTOLIC 74 71 50   PULSE 80 73 73   TEMPERATURE - - -   RESPIRATIONS - - -   SPO2 - - 96   Weight (lb) 111.33 111.33 114.64   Weight (kg) 50.5 50.5 52   HEIGHT 4' 10" 4' 11" 4' 11"   BODY MASS INDEX 23.27 22.49 23.15   VISIT REPORT - - -   Pain Score  0 6 8   Some recent data might be hidden     No results found for: CBC  Review of Systems   Constitutional: Negative for activity change and unexpected weight change.   HENT: Negative for hearing loss, rhinorrhea and trouble swallowing.    Eyes: Negative for discharge and visual disturbance.   Respiratory: Negative for chest tightness and wheezing.    Cardiovascular: Negative for chest pain and palpitations.   Gastrointestinal: Negative for blood in stool, constipation, diarrhea and vomiting.   Endocrine: Negative for polydipsia and polyuria.   Genitourinary: Negative for difficulty urinating, dysuria, hematuria and menstrual problem.   Musculoskeletal: Negative for arthralgias, joint swelling and neck pain.   Neurological: Negative for weakness and headaches.   Psychiatric/Behavioral: Negative for confusion and dysphoric mood.       Objective:      Physical Exam   Constitutional: She is oriented to person, place, and time. She " appears well-developed and well-nourished. No distress.   Pulmonary/Chest: Effort normal.   Musculoskeletal: She exhibits tenderness. She exhibits no edema.   Neurological: She is alert and oriented to person, place, and time.   Skin: She is not diaphoretic.        Ulcer noted to left lower leg 4cm x 3cm irregular with surrounding erythema extending to medial malleolus.  NO purulent drainage or foul odor  See pictures under media tab     Nursing note and vitals reviewed.      Assessment:       1. Venous stasis ulcer of left calf with fat layer exposed with varicose veins    2. Cellulitis of left lower extremity        Plan:       Dressing applied to left lower leg- Xeroforn with telfa, bulky gauze and stockinet over .  Pictures in chart of wound see media tab  Arti was seen today for leg pain.    Diagnoses and all orders for this visit:    Venous stasis ulcer of left calf with fat layer exposed with varicose veins  -     Ambulatory consult to Wound Clinic    Cellulitis of left lower extremity  -     cephALEXin (KEFLEX) 250 MG capsule; Take 1 capsule (250 mg total) by mouth 4 (four) times daily.      Patient Instructions   Schedule follow up with wound care, NP Luz Marina    Keep leg elevated    Wear compression stockings    Take antibiotic 4 times a day with food for 10 days    Dressing changes as discussed    Follow up for any worsening of redness, pain, fever , chills or worsening of any symptoms that concerns you

## 2018-03-30 ENCOUNTER — OFFICE VISIT (OUTPATIENT)
Dept: URGENT CARE | Facility: CLINIC | Age: 83
End: 2018-03-30
Payer: MEDICARE

## 2018-03-30 VITALS
DIASTOLIC BLOOD PRESSURE: 77 MMHG | RESPIRATION RATE: 18 BRPM | OXYGEN SATURATION: 99 % | HEART RATE: 81 BPM | HEIGHT: 59 IN | TEMPERATURE: 98 F | BODY MASS INDEX: 22.98 KG/M2 | SYSTOLIC BLOOD PRESSURE: 181 MMHG | WEIGHT: 114 LBS

## 2018-03-30 DIAGNOSIS — L03.116 CELLULITIS OF LEFT LOWER EXTREMITY: Primary | ICD-10-CM

## 2018-03-30 PROCEDURE — 99213 OFFICE O/P EST LOW 20 MIN: CPT | Mod: S$GLB,,, | Performed by: NURSE PRACTITIONER

## 2018-03-30 RX ORDER — CLINDAMYCIN HYDROCHLORIDE 300 MG/1
300 CAPSULE ORAL 3 TIMES DAILY
Qty: 30 CAPSULE | Refills: 0 | Status: SHIPPED | OUTPATIENT
Start: 2018-03-30 | End: 2018-04-04

## 2018-03-30 NOTE — PATIENT INSTRUCTIONS
Cellulitis  Cellulitis is an infection of the deep layers of skin. A break in the skin, such as a cut or scratch, can let bacteria under the skin. If the bacteria get to deep layers of the skin, it can be serious. If not treated, cellulitis can get into the bloodstream and lymph nodes. The infection can then spread throughout the body. This causes serious illness.  Cellulitis causes the affected skin to become red, swollen, warm, and sore. The reddened areas have a visible border. An open sore may leak fluid (pus). You may have a fever, chills, and pain.  Cellulitis is treated with antibiotics taken for 7 to 10 days. An open sore may be cleaned and covered with cool wet gauze. Symptoms should get better 1 to 2 days after treatment is started. Make sure to take all the antibiotics for the full number of days until they are gone. Keep taking the medicine even if your symptoms go away.  Home care  Follow these tips:  · Limit the use of the part of your body with cellulitis.   · If the infection is on your leg, keep your leg raised while sitting. This will help to reduce swelling.  · Take all of the antibiotic medicine exactly as directed until it is gone. Do not miss any doses, especially during the first 7 days. Dont stop taking the medicine when your symptoms get better.  · Keep the affected area clean and dry.  · Wash your hands with soap and warm water before and after touching your skin. Anyone else who touches your skin should also wash his or her hands. Don't share towels.  Follow-up care  Follow up with your healthcare provider, or as advised. If your infection does not go away on the first antibiotic, your healthcare provider will prescribe a different one.  When to seek medical advice  Call your healthcare provider right away if any of these occur:  · Red areas that spread  · Swelling or pain that gets worse  · Fluid leaking from the skin (pus)  · Fever higher of 100.4º F (38.0º C) or higher after 2 days  on antibiotics  Date Last Reviewed: 9/1/2016  © 8202-1202 [employer] [address] [place] [address], [place] [address]. All rights reserved. This information is not intended as a substitute for professional medical care. Always follow your healthcare professional's instructions.

## 2018-03-30 NOTE — PROGRESS NOTES
"Subjective:       Patient ID: Arti Olsen is a 89 y.o. female.    Vitals:  height is 4' 11" (1.499 m) and weight is 51.7 kg (114 lb). Her temperature is 98 °F (36.7 °C). Her blood pressure is 181/77 (abnormal) and her pulse is 81. Her respiration is 18 and oxygen saturation is 99%.     Chief Complaint: Rash (left leg)    Pt states that she saw an NP at her PCP's office and was prescribed keflex      Rash   This is a new problem. The current episode started 1 to 4 weeks ago. The problem has been gradually worsening since onset. The affected locations include the left ankle, left lower leg and left foot. The rash is characterized by redness, swelling, blistering and pain. She was exposed to nothing. Pertinent negatives include no fever, joint pain, shortness of breath or sore throat. Past treatments include antibiotics and antibiotic cream. The treatment provided no relief.     Review of Systems   Constitution: Negative for chills and fever.   HENT: Negative for sore throat.    Respiratory: Negative for shortness of breath.    Skin: Positive for color change, poor wound healing and rash. Negative for itching.   Musculoskeletal: Positive for joint swelling. Negative for joint pain.       Objective:      Physical Exam   Constitutional: She is oriented to person, place, and time. She appears well-developed and well-nourished. She is cooperative.  Non-toxic appearance. She does not appear ill. No distress.   HENT:   Head: Normocephalic and atraumatic.   Right Ear: Hearing, tympanic membrane and ear canal normal.   Left Ear: Hearing, tympanic membrane and ear canal normal.   Nose: Nose normal. No mucosal edema, rhinorrhea or nasal deformity. No epistaxis. Right sinus exhibits no maxillary sinus tenderness and no frontal sinus tenderness. Left sinus exhibits no maxillary sinus tenderness and no frontal sinus tenderness.   Mouth/Throat: Uvula is midline and mucous membranes are normal. No trismus in the jaw. Normal " dentition. No uvula swelling. No posterior oropharyngeal erythema.   Eyes: Conjunctivae and lids are normal. Right eye exhibits no discharge. Left eye exhibits no discharge. No scleral icterus.   Sclera clear bilat   Neck: Trachea normal, normal range of motion, full passive range of motion without pain and phonation normal. Neck supple.   Cardiovascular: Normal pulses.    Pulmonary/Chest: Effort normal. No respiratory distress.   Abdominal: Soft. Normal appearance and bowel sounds are normal. She exhibits no distension, no pulsatile midline mass and no mass. There is no tenderness.   Musculoskeletal: Normal range of motion. She exhibits no edema or deformity.   Neurological: She is alert and oriented to person, place, and time. She exhibits normal muscle tone. Coordination normal.   Skin: Skin is warm, dry and intact. Rash noted. She is not diaphoretic. There is erythema. No pallor.        Psychiatric: She has a normal mood and affect. Her speech is normal and behavior is normal. Judgment and thought content normal. Cognition and memory are normal.   Nursing note and vitals reviewed.      Assessment:       1. Cellulitis of left lower extremity        Plan:     Stop Keflex--start Clindamycin take probiotic 2-3 hrs after each antibiotic dose--IF FOR ANY REASON YOU START WITH DIARRHEA GO TO ER  Patient Instructions     Cellulitis  Cellulitis is an infection of the deep layers of skin. A break in the skin, such as a cut or scratch, can let bacteria under the skin. If the bacteria get to deep layers of the skin, it can be serious. If not treated, cellulitis can get into the bloodstream and lymph nodes. The infection can then spread throughout the body. This causes serious illness.  Cellulitis causes the affected skin to become red, swollen, warm, and sore. The reddened areas have a visible border. An open sore may leak fluid (pus). You may have a fever, chills, and pain.  Cellulitis is treated with antibiotics taken for  7 to 10 days. An open sore may be cleaned and covered with cool wet gauze. Symptoms should get better 1 to 2 days after treatment is started. Make sure to take all the antibiotics for the full number of days until they are gone. Keep taking the medicine even if your symptoms go away.  Home care  Follow these tips:  · Limit the use of the part of your body with cellulitis.   · If the infection is on your leg, keep your leg raised while sitting. This will help to reduce swelling.  · Take all of the antibiotic medicine exactly as directed until it is gone. Do not miss any doses, especially during the first 7 days. Dont stop taking the medicine when your symptoms get better.  · Keep the affected area clean and dry.  · Wash your hands with soap and warm water before and after touching your skin. Anyone else who touches your skin should also wash his or her hands. Don't share towels.  Follow-up care  Follow up with your healthcare provider, or as advised. If your infection does not go away on the first antibiotic, your healthcare provider will prescribe a different one.  When to seek medical advice  Call your healthcare provider right away if any of these occur:  · Red areas that spread  · Swelling or pain that gets worse  · Fluid leaking from the skin (pus)  · Fever higher of 100.4º F (38.0º C) or higher after 2 days on antibiotics  Date Last Reviewed: 9/1/2016  © 7001-1204 [employer]. [address] [place] [address], [place] [address]. All rights reserved. This information is not intended as a substitute for professional medical care. Always follow your healthcare professional's instructions.              Cellulitis of left lower extremity    Other orders  -     clindamycin (CLEOCIN) 300 MG capsule; Take 1 capsule (300 mg total) by mouth 3 (three) times daily.  Dispense: 30 capsule; Refill: 0

## 2018-04-03 ENCOUNTER — LAB VISIT (OUTPATIENT)
Dept: LAB | Facility: HOSPITAL | Age: 83
End: 2018-04-03
Attending: INTERNAL MEDICINE
Payer: MEDICARE

## 2018-04-03 ENCOUNTER — OFFICE VISIT (OUTPATIENT)
Dept: INTERNAL MEDICINE | Facility: CLINIC | Age: 83
End: 2018-04-03
Payer: MEDICARE

## 2018-04-03 VITALS — HEART RATE: 80 BPM | SYSTOLIC BLOOD PRESSURE: 176 MMHG | DIASTOLIC BLOOD PRESSURE: 78 MMHG

## 2018-04-03 DIAGNOSIS — I10 ESSENTIAL HYPERTENSION: Primary | ICD-10-CM

## 2018-04-03 DIAGNOSIS — I10 ESSENTIAL HYPERTENSION: ICD-10-CM

## 2018-04-03 DIAGNOSIS — E78.2 MIXED HYPERLIPIDEMIA: ICD-10-CM

## 2018-04-03 DIAGNOSIS — I87.2 VENOUS INSUFFICIENCY OF LEFT LOWER EXTREMITY: ICD-10-CM

## 2018-04-03 DIAGNOSIS — I73.9 PAD (PERIPHERAL ARTERY DISEASE): ICD-10-CM

## 2018-04-03 LAB
ANION GAP SERPL CALC-SCNC: 7 MMOL/L
BUN SERPL-MCNC: 17 MG/DL
CALCIUM SERPL-MCNC: 9 MG/DL
CHLORIDE SERPL-SCNC: 98 MMOL/L
CO2 SERPL-SCNC: 26 MMOL/L
CREAT SERPL-MCNC: 0.7 MG/DL
EST. GFR  (AFRICAN AMERICAN): >60 ML/MIN/1.73 M^2
EST. GFR  (NON AFRICAN AMERICAN): >60 ML/MIN/1.73 M^2
GLUCOSE SERPL-MCNC: 101 MG/DL
POTASSIUM SERPL-SCNC: 4.6 MMOL/L
SODIUM SERPL-SCNC: 131 MMOL/L

## 2018-04-03 PROCEDURE — 99213 OFFICE O/P EST LOW 20 MIN: CPT | Mod: PBBFAC | Performed by: INTERNAL MEDICINE

## 2018-04-03 PROCEDURE — 36415 COLL VENOUS BLD VENIPUNCTURE: CPT

## 2018-04-03 PROCEDURE — 80048 BASIC METABOLIC PNL TOTAL CA: CPT

## 2018-04-03 PROCEDURE — 99213 OFFICE O/P EST LOW 20 MIN: CPT | Mod: S$PBB,,, | Performed by: INTERNAL MEDICINE

## 2018-04-03 PROCEDURE — 99999 PR PBB SHADOW E&M-EST. PATIENT-LVL III: CPT | Mod: PBBFAC,,, | Performed by: INTERNAL MEDICINE

## 2018-04-03 RX ORDER — SPIRONOLACTONE 25 MG/1
25 TABLET ORAL DAILY
Qty: 30 TABLET | Refills: 11 | Status: SHIPPED | OUTPATIENT
Start: 2018-04-03 | End: 2018-12-27 | Stop reason: SDUPTHER

## 2018-04-03 NOTE — PROGRESS NOTES
Subjective:       Patient ID: Arti Olsen is a 89 y.o. female.    Chief Complaint: Follow-up    HPI the patient is an 89-year-old female who comes in for follow-up a a venous ulcer her left lower extremity is also blood pressure.  She reports that she was intolerant of Cleocin.  This tended to cause some nausea and upset stomach.  She has resumed Keflex by her report.  She has noticed some improvement in the ulceration.  There is been a slight bit of redness around the ulcer.  The patient is somewhat unsure of her medical regimen.  She and her daughter will be contacting us once they get home to report with the medical regimen is after they look at the prescription bottles.  She is not noticing any chest pain or shortness of breath at this time.  Review of Systems   Constitutional: Positive for activity change. Negative for appetite change, fatigue and unexpected weight change.   HENT: Negative for hearing loss, rhinorrhea and trouble swallowing.    Eyes: Negative for discharge and visual disturbance.   Respiratory: Positive for chest tightness. Negative for cough, shortness of breath and wheezing.    Cardiovascular: Positive for chest pain. Negative for palpitations.   Gastrointestinal: Positive for constipation and diarrhea. Negative for blood in stool and vomiting.   Endocrine: Negative for polydipsia and polyuria.   Genitourinary: Positive for difficulty urinating. Negative for dysuria, hematuria and menstrual problem.   Musculoskeletal: Positive for arthralgias, joint swelling and neck pain.   Neurological: Positive for weakness and headaches.   Psychiatric/Behavioral: Positive for confusion. Negative for dysphoric mood.       Objective:      Physical Exam   Constitutional: She appears well-developed and well-nourished. No distress.   Cardiovascular: Normal rate, regular rhythm and normal heart sounds.  Exam reveals no gallop and no friction rub.    No murmur heard.  Pulmonary/Chest: Effort normal and breath  sounds normal. No respiratory distress. She has no wheezes. She has no rales.   Skin: She is not diaphoretic.   The wound in the patient's left lower extremity was undressed.  She has a superficial ulcer present with no signs of cellulitis.  There is minimal discharge present.  There is no significant tenderness.  The wound was redressed.  She was told to elevate the leg and perform flexion-extension of her ankle in order to help improve lymphatic circulation and venous circulation.       Assessment:       1. Essential hypertension    2. Mixed hyperlipidemia    3. PAD (peripheral artery disease)    4. Venous insufficiency of left lower extremity        Plan:       1.  The patient will contact us with the results of her medicines at home for further assessment of her medical regimen  2.  Add spironolactone 25 mg by mouth daily to her regimen  3.  Return to clinic in 2 weeks for follow-up of hypertension as well as for previsit BMP  4.  Continue Keflex 3 times a day

## 2018-04-04 ENCOUNTER — DOCUMENTATION ONLY (OUTPATIENT)
Dept: INTERNAL MEDICINE | Facility: CLINIC | Age: 83
End: 2018-04-04

## 2018-04-04 ENCOUNTER — PATIENT MESSAGE (OUTPATIENT)
Dept: INTERNAL MEDICINE | Facility: CLINIC | Age: 83
End: 2018-04-04

## 2018-04-12 ENCOUNTER — OFFICE VISIT (OUTPATIENT)
Dept: WOUND CARE | Facility: CLINIC | Age: 83
End: 2018-04-12
Payer: MEDICARE

## 2018-04-12 VITALS
HEIGHT: 59 IN | DIASTOLIC BLOOD PRESSURE: 65 MMHG | TEMPERATURE: 97 F | SYSTOLIC BLOOD PRESSURE: 132 MMHG | WEIGHT: 113.13 LBS | BODY MASS INDEX: 22.8 KG/M2 | HEART RATE: 77 BPM

## 2018-04-12 DIAGNOSIS — I87.2 VENOUS INSUFFICIENCY OF BOTH LOWER EXTREMITIES: ICD-10-CM

## 2018-04-12 DIAGNOSIS — L97.211 SKIN ULCER OF RIGHT CALF, LIMITED TO BREAKDOWN OF SKIN: Primary | ICD-10-CM

## 2018-04-12 PROBLEM — L97.221 SKIN ULCER OF LEFT CALF, LIMITED TO BREAKDOWN OF SKIN: Status: ACTIVE | Noted: 2018-04-12

## 2018-04-12 PROCEDURE — 99213 OFFICE O/P EST LOW 20 MIN: CPT | Mod: 25,S$PBB,, | Performed by: NURSE PRACTITIONER

## 2018-04-12 PROCEDURE — 99999 PR PBB SHADOW E&M-EST. PATIENT-LVL V: CPT | Mod: PBBFAC,,, | Performed by: NURSE PRACTITIONER

## 2018-04-12 PROCEDURE — 99215 OFFICE O/P EST HI 40 MIN: CPT | Mod: PBBFAC | Performed by: NURSE PRACTITIONER

## 2018-04-12 NOTE — PROGRESS NOTES
Subjective:       Patient ID: Arti Olsen is a 89 y.o. female.    Chief Complaint: Wound Check    HPI   This is a 89 year old female referred by Dr. Arguello for evaluation and management of an ulcer to the right medial lower leg.  On Good Friday she developed a wound to the left medial lower leg that has now healed.  A few days ago she noticed a new wound to the right medial leg.  She has been keeping it dry but not using anything topically. She has a history of venous insufficiency.  She is not using any form of compression. She is afebrile. She denies increased redness, swelling or purulent drainage. Her pain level is 4/10.   Review of Systems   Constitutional: Negative for chills, diaphoresis and fever.   HENT: Positive for sinus pressure. Negative for hearing loss, postnasal drip, rhinorrhea, sneezing, sore throat, tinnitus and trouble swallowing.    Eyes: Negative for visual disturbance.   Respiratory: Negative for apnea, cough, shortness of breath and wheezing.    Cardiovascular: Positive for palpitations. Negative for chest pain and leg swelling.   Gastrointestinal: Positive for constipation. Negative for diarrhea, nausea and vomiting.   Genitourinary: Negative for difficulty urinating, dysuria, frequency and hematuria.   Musculoskeletal: Positive for back pain and neck pain. Negative for arthralgias, joint swelling and neck stiffness.   Skin: Positive for wound.   Neurological: Positive for headaches. Negative for dizziness, weakness and light-headedness.   Hematological: Does not bruise/bleed easily.   Psychiatric/Behavioral: Positive for dysphoric mood and sleep disturbance. Negative for confusion and decreased concentration. The patient is nervous/anxious.        Objective:      Physical Exam   Constitutional: She is oriented to person, place, and time. She appears well-developed and well-nourished. No distress.   HENT:   Head: Normocephalic and atraumatic.   Mouth/Throat: Oropharynx is clear and  moist.   Eyes: Conjunctivae and EOM are normal. Pupils are equal, round, and reactive to light. Right eye exhibits no discharge. Left eye exhibits no discharge. No scleral icterus.   Neck: Neck supple. No JVD present. No tracheal deviation present. No thyromegaly present.   Cardiovascular: Normal rate, regular rhythm, normal heart sounds and intact distal pulses.  Exam reveals no gallop and no friction rub.    No murmur heard.  Pulmonary/Chest: Effort normal and breath sounds normal. No respiratory distress. She has no wheezes. She has no rales.   Abdominal: Soft. Bowel sounds are normal. She exhibits no distension. There is no tenderness.   Musculoskeletal: She exhibits no edema or tenderness.        Legs:  Neurological: She is alert and oriented to person, place, and time.   Skin: Skin is warm and dry. No rash noted. She is not diaphoretic. No erythema.   Psychiatric: She has a normal mood and affect. Her behavior is normal. Judgment and thought content normal.   Nursing note and vitals reviewed.      Arti was seen in the clinic room and placed in the supine position on the treatment table.  The right leg was cleansed with Easi-clense sponges and dried thoroughly.  Eucerin cream was applied to the lower legs. Medihoney gel and a hydrofiber dressing was applied to the wound.  The patient's foot was positioned at a 90 degree angle.  The coflex was applied per package instructions.  A two layered application was performed using a spiral technique beginning with the foam layer followed by the cohesive bandage avoiding creases or folds.  The wrap was started behind the first metatarsal and ended below the tibial tubercle of the knee.  There was overlap of each turn half the width of the previous turn.  The compression wrap will be changed every 7 days.    Assessment:       1. Skin ulcer of right calf, limited to breakdown of skin    2. Venous insufficiency of both lower extremities        Plan:           Coflex  compression wrap right lower leg as detailed above.  Patient was warned not to get the dressings wet and to use cast covers for showering.  Should the dressing become wet, she is to remove it, place a wet-to-dry dressing over the wound, cover with gauze and roll gauze and use ace wraps for compression and to secure bandages.  She should then notify this office as soon as possible to have a new dressing applied.  Return to clinic in one week.

## 2018-04-12 NOTE — LETTER
April 12, 2018      Nesha Alston, FNP-C  [address] [facility]  [address]  [facility] [address]           [facility]  [address]  [facility] [address]  Phone: [phone]          Patient: Arti Olsen   MR Number: [id number]   YOB: 1928   Date of Visit: 4/12/2018       Dear Nesha Alston:    Thank you for referring Arti Olsen to me for evaluation. Attached you will find relevant portions of my assessment and plan of care.    If you have questions, please do not hesitate to call me. I look forward to following Arti Olsen along with you.    Sincerely,    Kylah Raza, NP    Enclosure  CC:  No Recipients    If you would like to receive this communication electronically, please contact [email or web address] or [phone] to request more information on [employer] access.    For providers and/or their staff who would like to refer a patient to [facility], please contact us through our one-stop-shop provider referral line, [facility], at [phone].    If you feel you have received this communication in error or would no longer like to receive these types of communications, please e-mail [email or web address]

## 2018-04-17 ENCOUNTER — OFFICE VISIT (OUTPATIENT)
Dept: INTERNAL MEDICINE | Facility: CLINIC | Age: 83
End: 2018-04-17
Payer: MEDICARE

## 2018-04-17 ENCOUNTER — PATIENT MESSAGE (OUTPATIENT)
Dept: INTERNAL MEDICINE | Facility: CLINIC | Age: 83
End: 2018-04-17

## 2018-04-17 VITALS
SYSTOLIC BLOOD PRESSURE: 146 MMHG | DIASTOLIC BLOOD PRESSURE: 56 MMHG | OXYGEN SATURATION: 98 % | HEIGHT: 59 IN | HEART RATE: 69 BPM | WEIGHT: 113.31 LBS | BODY MASS INDEX: 22.84 KG/M2

## 2018-04-17 DIAGNOSIS — I10 ESSENTIAL HYPERTENSION: Primary | ICD-10-CM

## 2018-04-17 PROCEDURE — 99213 OFFICE O/P EST LOW 20 MIN: CPT | Mod: PBBFAC | Performed by: INTERNAL MEDICINE

## 2018-04-17 PROCEDURE — 99213 OFFICE O/P EST LOW 20 MIN: CPT | Mod: S$PBB,,, | Performed by: INTERNAL MEDICINE

## 2018-04-17 PROCEDURE — 99999 PR PBB SHADOW E&M-EST. PATIENT-LVL III: CPT | Mod: PBBFAC,,, | Performed by: INTERNAL MEDICINE

## 2018-04-17 NOTE — PROGRESS NOTES
Subjective:       Patient ID: Arti Olsen is a 89 y.o. female.    Chief Complaint: Follow-up    HPI the patient is a 89-year-old female comes in for follow-up of her hypertension.  She is tolerating spironolactone well.  She is not noticing any shortness of breath or chest pain.  Review of Systems   Constitutional: Negative.  Negative for activity change, appetite change and fatigue.   Respiratory: Negative for cough, chest tightness and shortness of breath.    Cardiovascular: Negative for chest pain and palpitations.   Musculoskeletal: Positive for arthralgias and myalgias.       Objective:      Physical Exam   Constitutional: She appears well-developed and well-nourished. No distress.   Cardiovascular: Normal rate and regular rhythm.  Exam reveals no gallop and no friction rub.    Murmur heard.  1/6 systolic murmur.   Pulmonary/Chest: Effort normal and breath sounds normal. No respiratory distress. She has no wheezes. She has no rales.   Skin: She is not diaphoretic.   Right lower extremity with a compression bandage in place for treatment of ulcer.       Assessment:       1. Essential hypertension      under adequate control at this time  Plan:       1.  BMP for further evaluation while on spironolactone  2.  Return to clinic in 3 months

## 2018-04-18 ENCOUNTER — OFFICE VISIT (OUTPATIENT)
Dept: WOUND CARE | Facility: CLINIC | Age: 83
End: 2018-04-18
Payer: MEDICARE

## 2018-04-18 VITALS
SYSTOLIC BLOOD PRESSURE: 137 MMHG | HEIGHT: 59 IN | BODY MASS INDEX: 23.16 KG/M2 | HEART RATE: 70 BPM | WEIGHT: 114.88 LBS | TEMPERATURE: 98 F | DIASTOLIC BLOOD PRESSURE: 60 MMHG

## 2018-04-18 DIAGNOSIS — I87.2 VENOUS INSUFFICIENCY OF BOTH LOWER EXTREMITIES: Primary | ICD-10-CM

## 2018-04-18 PROBLEM — L97.211: Status: RESOLVED | Noted: 2018-04-12 | Resolved: 2018-04-18

## 2018-04-18 PROCEDURE — 99214 OFFICE O/P EST MOD 30 MIN: CPT | Mod: PBBFAC | Performed by: NURSE PRACTITIONER

## 2018-04-18 PROCEDURE — 99999 PR PBB SHADOW E&M-EST. PATIENT-LVL IV: CPT | Mod: PBBFAC,,, | Performed by: NURSE PRACTITIONER

## 2018-04-18 PROCEDURE — 99211 OFF/OP EST MAY X REQ PHY/QHP: CPT | Mod: S$PBB,,, | Performed by: NURSE PRACTITIONER

## 2018-04-18 NOTE — PATIENT INSTRUCTIONS
Apply compression hose first thing in the morning and remove at bedtime.  Do not sleep in your stockings.

## 2018-04-18 NOTE — PROGRESS NOTES
Subjective:       Patient ID: Arti Olsen is a 89 y.o. female.    Chief Complaint: Wound Check    HPI   This patient is seen today for reevaluation of an ulcer to the right medial lower leg.  On Good Friday she developed a wound to the left medial lower leg that has now healed.  She then noticed a wound to the right medial leg.  A coflex compression wrap was placed on the leg last week and it is now healed. She has a history of venous insufficiency.  She does not use any form of compression. She is afebrile. She denies increased redness, swelling or purulent drainage. She has no pain.   Review of Systems   Constitutional: Negative for chills, diaphoresis and fever.   HENT: Positive for sinus pressure. Negative for hearing loss, postnasal drip, rhinorrhea, sneezing, sore throat, tinnitus and trouble swallowing.    Eyes: Negative for visual disturbance.   Respiratory: Negative for apnea, cough, shortness of breath and wheezing.    Cardiovascular: Positive for palpitations. Negative for chest pain and leg swelling.   Gastrointestinal: Positive for constipation. Negative for diarrhea, nausea and vomiting.   Genitourinary: Negative for difficulty urinating, dysuria, frequency and hematuria.   Musculoskeletal: Positive for back pain and neck pain. Negative for arthralgias, joint swelling and neck stiffness.   Skin: Negative for wound.   Neurological: Positive for headaches. Negative for dizziness, weakness and light-headedness.   Hematological: Does not bruise/bleed easily.   Psychiatric/Behavioral: Positive for dysphoric mood and sleep disturbance. Negative for confusion and decreased concentration. The patient is nervous/anxious.        Objective:      Physical Exam   Constitutional: She is oriented to person, place, and time. She appears well-developed and well-nourished. No distress.   HENT:   Head: Normocephalic and atraumatic.   Mouth/Throat: Oropharynx is clear and moist.   Eyes: Conjunctivae and EOM are normal.  Pupils are equal, round, and reactive to light. Right eye exhibits no discharge. Left eye exhibits no discharge. No scleral icterus.   Neck: Neck supple. No JVD present. No tracheal deviation present. No thyromegaly present.   Cardiovascular: Normal rate, regular rhythm, normal heart sounds and intact distal pulses.  Exam reveals no gallop and no friction rub.    No murmur heard.  Pulmonary/Chest: Effort normal and breath sounds normal. No respiratory distress. She has no wheezes. She has no rales.   Abdominal: Soft. Bowel sounds are normal. She exhibits no distension. There is no tenderness.   Musculoskeletal: She exhibits no edema or tenderness.        Legs:  Neurological: She is alert and oriented to person, place, and time.   Skin: Skin is warm and dry. No rash noted. She is not diaphoretic. No erythema.   Psychiatric: She has a normal mood and affect. Her behavior is normal. Judgment and thought content normal.   Nursing note and vitals reviewed.    Assessment:       1. Venous insufficiency of both lower extremities        Plan:           Over the counter knee high compression hose.  Return to this clinic as needed.

## 2018-04-22 ENCOUNTER — PATIENT MESSAGE (OUTPATIENT)
Dept: WOUND CARE | Facility: CLINIC | Age: 83
End: 2018-04-22

## 2018-05-30 ENCOUNTER — PATIENT MESSAGE (OUTPATIENT)
Dept: CARDIOLOGY | Facility: CLINIC | Age: 83
End: 2018-05-30

## 2018-06-05 ENCOUNTER — LAB VISIT (OUTPATIENT)
Dept: LAB | Facility: HOSPITAL | Age: 83
End: 2018-06-05
Attending: INTERNAL MEDICINE
Payer: MEDICARE

## 2018-06-05 DIAGNOSIS — E78.2 MIXED HYPERLIPIDEMIA: ICD-10-CM

## 2018-06-05 DIAGNOSIS — E87.1 HYPONATREMIA: ICD-10-CM

## 2018-06-05 DIAGNOSIS — I10 ESSENTIAL HYPERTENSION: ICD-10-CM

## 2018-06-05 DIAGNOSIS — I25.10 CORONARY ARTERY DISEASE INVOLVING NATIVE CORONARY ARTERY OF NATIVE HEART WITHOUT ANGINA PECTORIS: ICD-10-CM

## 2018-06-05 DIAGNOSIS — I67.9 CEREBRAL ARTERIAL DISEASE: ICD-10-CM

## 2018-06-05 DIAGNOSIS — I70.1 RENAL ARTERY ATHEROSCLEROSIS: ICD-10-CM

## 2018-06-05 DIAGNOSIS — E55.9 VITAMIN D INSUFFICIENCY: ICD-10-CM

## 2018-06-05 DIAGNOSIS — I73.9 PAD (PERIPHERAL ARTERY DISEASE): ICD-10-CM

## 2018-06-05 DIAGNOSIS — E03.4 HYPOTHYROIDISM DUE TO ACQUIRED ATROPHY OF THYROID: ICD-10-CM

## 2018-06-05 LAB
25(OH)D3+25(OH)D2 SERPL-MCNC: 83 NG/ML
25(OH)D3+25(OH)D2 SERPL-MCNC: 83 NG/ML
ALBUMIN SERPL BCP-MCNC: 3.9 G/DL
ALP SERPL-CCNC: 102 U/L
ALT SERPL W/O P-5'-P-CCNC: 10 U/L
ANION GAP SERPL CALC-SCNC: 7 MMOL/L
AST SERPL-CCNC: 15 U/L
BILIRUB SERPL-MCNC: 0.5 MG/DL
BUN SERPL-MCNC: 24 MG/DL
CALCIUM SERPL-MCNC: 9.6 MG/DL
CHLORIDE SERPL-SCNC: 103 MMOL/L
CHOLEST SERPL-MCNC: 130 MG/DL
CHOLEST/HDLC SERPL: 2.7 {RATIO}
CO2 SERPL-SCNC: 24 MMOL/L
CREAT SERPL-MCNC: 0.9 MG/DL
EST. GFR  (AFRICAN AMERICAN): >60 ML/MIN/1.73 M^2
EST. GFR  (NON AFRICAN AMERICAN): 56.5 ML/MIN/1.73 M^2
GLUCOSE SERPL-MCNC: 78 MG/DL
HDLC SERPL-MCNC: 48 MG/DL
HDLC SERPL: 36.9 %
LDLC SERPL CALC-MCNC: 59 MG/DL
NONHDLC SERPL-MCNC: 82 MG/DL
POTASSIUM SERPL-SCNC: 5.1 MMOL/L
PROT SERPL-MCNC: 6.9 G/DL
SODIUM SERPL-SCNC: 134 MMOL/L
T4 FREE SERPL-MCNC: 1 NG/DL
TRIGL SERPL-MCNC: 115 MG/DL
TSH SERPL DL<=0.005 MIU/L-ACNC: 3.35 UIU/ML

## 2018-06-05 PROCEDURE — 80053 COMPREHEN METABOLIC PANEL: CPT

## 2018-06-05 PROCEDURE — 84443 ASSAY THYROID STIM HORMONE: CPT

## 2018-06-05 PROCEDURE — 80061 LIPID PANEL: CPT

## 2018-06-05 PROCEDURE — 84439 ASSAY OF FREE THYROXINE: CPT

## 2018-06-05 PROCEDURE — 82306 VITAMIN D 25 HYDROXY: CPT

## 2018-06-05 PROCEDURE — 36415 COLL VENOUS BLD VENIPUNCTURE: CPT | Mod: PO

## 2018-06-06 RX ORDER — AMLODIPINE BESYLATE 5 MG/1
TABLET ORAL
Qty: 90 TABLET | Refills: 3 | Status: ON HOLD | OUTPATIENT
Start: 2018-06-06 | End: 2019-02-27 | Stop reason: HOSPADM

## 2018-06-08 ENCOUNTER — PATIENT MESSAGE (OUTPATIENT)
Dept: CARDIOLOGY | Facility: CLINIC | Age: 83
End: 2018-06-08

## 2018-06-11 ENCOUNTER — PATIENT MESSAGE (OUTPATIENT)
Dept: INTERNAL MEDICINE | Facility: CLINIC | Age: 83
End: 2018-06-11

## 2018-06-11 ENCOUNTER — OFFICE VISIT (OUTPATIENT)
Dept: INTERNAL MEDICINE | Facility: CLINIC | Age: 83
End: 2018-06-11
Payer: MEDICARE

## 2018-06-11 ENCOUNTER — HOSPITAL ENCOUNTER (OUTPATIENT)
Dept: RADIOLOGY | Facility: HOSPITAL | Age: 83
Discharge: HOME | End: 2018-06-11
Attending: INTERNAL MEDICINE
Payer: MEDICARE

## 2018-06-11 VITALS
SYSTOLIC BLOOD PRESSURE: 140 MMHG | DIASTOLIC BLOOD PRESSURE: 60 MMHG | BODY MASS INDEX: 23.14 KG/M2 | TEMPERATURE: 98 F | HEIGHT: 58 IN | HEART RATE: 86 BPM | OXYGEN SATURATION: 97 % | RESPIRATION RATE: 16 BRPM | WEIGHT: 110.25 LBS

## 2018-06-11 DIAGNOSIS — M25.551 PAIN OF RIGHT HIP JOINT: ICD-10-CM
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
## 2018-06-11 DIAGNOSIS — S05.11XA CONTUSION OF RIGHT ORBITAL TISSUES, INITIAL ENCOUNTER: ICD-10-CM
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
## 2018-06-11 PROCEDURE — 73502 X-RAY EXAM HIP UNI 2-3 VIEWS: CPT | Mod: TC,PO,RT

## 2018-06-11 PROCEDURE — 99999 PR PBB SHADOW E&M-EST. PATIENT-LVL V: CPT | Mod: PBBFAC,,, | Performed by: INTERNAL MEDICINE

## 2018-06-11 PROCEDURE — 99213 OFFICE O/P EST LOW 20 MIN: CPT | Mod: S$PBB,,, | Performed by: INTERNAL MEDICINE

## 2018-06-11 PROCEDURE — 99215 OFFICE O/P EST HI 40 MIN: CPT | Mod: PBBFAC,25,PO | Performed by: INTERNAL MEDICINE

## 2018-06-11 PROCEDURE — 73502 X-RAY EXAM HIP UNI 2-3 VIEWS: CPT | Mod: 26,RT,, | Performed by: RADIOLOGY

## 2018-06-11 RX ORDER — CEPHALEXIN 500 MG/1
CAPSULE ORAL
Refills: 0 | COMMUNITY
Start: 2018-06-01 | End: 2018-12-14

## 2018-06-11 RX ORDER — ISOSORBIDE MONONITRATE 30 MG/1
TABLET, EXTENDED RELEASE ORAL
COMMUNITY
Start: 2018-05-30 | End: 2018-06-26 | Stop reason: SDUPTHER

## 2018-06-11 NOTE — PATIENT INSTRUCTIONS
1. Xray of right hip and I will notify you of the results.  2. Continue pain med as now.  3. Will take 3-4 weeks for the bruising on the face to resolve.  4. Continue all regular meds.

## 2018-06-11 NOTE — PROGRESS NOTES
"Subjective:       Patient ID: Arti Olsen is a 89 y.o. female.    Chief Complaint: Fall (bruising after fall; pt slipped at residence; RT face severely bruised; RT side of body 8/10 pain; "delayed pain" -per pt )    Patient presents accompanied by her daughter for evaluation of bruising from recent fall patient had at her house.  The fall occurred on Thursday, 6/7/18.  Early morning, the patient was awaken by a noise and as she was going to investigate, she apparently tripped/slipped on carpet and fell hitting the right side of her face and the right hip/leg area.  She began to have obvious bruising of the right face within 24 hours.  No loss of consciousness.  No headaches; no dizziness.    She comes in today because of pain which is now bothering her more in the right hip/upper leg area.  No signs of bruising.  She has chronic back problems for which she sees a specialist at [facility].  The right hip/leg pain is new for her.  She can walk/stand and support her weight on the right hip/leg with no problem.  She has pain meds from Dr. Arceo at [facility] to use.      Fall   Pertinent negatives include no fever or headaches.     Review of Systems   Constitutional: Negative for chills and fever.   HENT: Negative for facial swelling.         Bruise on right side of face.   Respiratory: Negative for cough, choking, chest tightness and shortness of breath.    Cardiovascular: Negative for chest pain, palpitations and leg swelling.   Gastrointestinal: Negative.    Musculoskeletal: Positive for back pain.        Right hip/leg pain.   Skin:        Bruise on right side of face.   Neurological: Negative for dizziness, light-headedness and headaches.       Objective:      Physical Exam   Constitutional: She is oriented to person, place, and time. She appears well-developed and well-nourished. No distress.   HENT:   Head: Normocephalic.   Right Ear: External ear normal.   Left Ear: External ear normal.   Mouth/Throat: Oropharynx is " clear and moist.   Bruise from above right side of orbit now side of face to lower jaw.  Tender over the lateral aspect of the right orbital bone.     Eyes: Conjunctivae and EOM are normal. Pupils are equal, round, and reactive to light. No scleral icterus.   Cardiovascular: Normal rate, regular rhythm and normal heart sounds.    Pulmonary/Chest: Effort normal and breath sounds normal. No respiratory distress.   Musculoskeletal:   Ambulates with no problem.  Able to step up and sit on the exam table with no problem.  Complains of pain with direct palpation over the right SI joint and over the trochanter of the right hip.  No bruising to the skin.  Good strength/stability testing of the legs.   Neurological: She is alert and oriented to person, place, and time.   Skin:   Bruising of right side of face as above.   Vitals reviewed.      Assessment:       1. Fall in home, initial encounter    2. Contusion of right orbital tissues, initial encounter    3. Pain of right hip joint        Plan:   1. Will xray right hip and let patient know results via email.  2. Continue all regular meds.  3. Advised that it may take 3-4 weeks for the bruising to resolve on the face.  4. Followup with Dr. Arceo at  regarding the back pain.

## 2018-06-12 ENCOUNTER — PATIENT MESSAGE (OUTPATIENT)
Dept: INTERNAL MEDICINE | Facility: CLINIC | Age: 83
End: 2018-06-12

## 2018-06-17 ENCOUNTER — PATIENT MESSAGE (OUTPATIENT)
Dept: CARDIOLOGY | Facility: CLINIC | Age: 83
End: 2018-06-17

## 2018-06-25 ENCOUNTER — PATIENT MESSAGE (OUTPATIENT)
Dept: CARDIOLOGY | Facility: CLINIC | Age: 83
End: 2018-06-25

## 2018-06-26 ENCOUNTER — PATIENT MESSAGE (OUTPATIENT)
Dept: CARDIOLOGY | Facility: CLINIC | Age: 83
End: 2018-06-26

## 2018-06-27 RX ORDER — ISOSORBIDE MONONITRATE 30 MG/1
30 TABLET, EXTENDED RELEASE ORAL DAILY
Qty: 30 TABLET | Refills: 11 | Status: SHIPPED | OUTPATIENT
Start: 2018-06-27 | End: 2019-02-19

## 2018-07-09 RX ORDER — GABAPENTIN 100 MG/1
CAPSULE ORAL
Qty: 90 CAPSULE | Refills: 3 | Status: SHIPPED | OUTPATIENT
Start: 2018-07-09 | End: 2019-07-05

## 2018-07-31 ENCOUNTER — OFFICE VISIT (OUTPATIENT)
Dept: URGENT CARE | Facility: CLINIC | Age: 83
End: 2018-07-31
Payer: MEDICARE

## 2018-07-31 VITALS
SYSTOLIC BLOOD PRESSURE: 139 MMHG | HEIGHT: 58 IN | RESPIRATION RATE: 17 BRPM | BODY MASS INDEX: 23.09 KG/M2 | OXYGEN SATURATION: 98 % | HEART RATE: 81 BPM | TEMPERATURE: 99 F | WEIGHT: 110 LBS | DIASTOLIC BLOOD PRESSURE: 64 MMHG

## 2018-07-31 DIAGNOSIS — S51.012A SKIN TEAR OF LEFT ELBOW WITHOUT COMPLICATION, INITIAL ENCOUNTER: Primary | ICD-10-CM

## 2018-07-31 PROCEDURE — 99214 OFFICE O/P EST MOD 30 MIN: CPT | Mod: S$GLB,,, | Performed by: NURSE PRACTITIONER

## 2018-08-01 NOTE — PROGRESS NOTES
"Subjective:       Patient ID: Arti Olsen is a 89 y.o. female.    Vitals:  height is 4' 10" (1.473 m) and weight is 49.9 kg (110 lb). Her oral temperature is 98.6 °F (37 °C). Her blood pressure is 139/64 and her pulse is 81. Her respiration is 17 and oxygen saturation is 98%.     Chief Complaint: Skin tear    Patient to clinic complaining of a skin tear on left forearm. She states she fell this morning and hit her arm on the corner of the night stand. She went to [facility] and had areas steri-striped but this evening while doing dishes the steri-strips near her left wrist got wet & are now coming off      Review of Systems   Constitution: Negative for weakness and malaise/fatigue.   HENT: Negative for nosebleeds.    Cardiovascular: Negative for chest pain and syncope.   Respiratory: Negative for shortness of breath.    Musculoskeletal: Negative for back pain, joint pain and neck pain.   Gastrointestinal: Negative for abdominal pain.   Genitourinary: Negative for hematuria.   Neurological: Negative for dizziness and numbness.       Objective:      Physical Exam   Constitutional: She is oriented to person, place, and time. She appears well-developed and well-nourished. She is cooperative.  Non-toxic appearance. She does not appear ill. No distress.   HENT:   Head: Normocephalic and atraumatic.   Right Ear: Hearing, tympanic membrane and ear canal normal.   Left Ear: Hearing, tympanic membrane and ear canal normal.   Nose: No mucosal edema, rhinorrhea or nasal deformity. No epistaxis. Right sinus exhibits no maxillary sinus tenderness and no frontal sinus tenderness. Left sinus exhibits no maxillary sinus tenderness and no frontal sinus tenderness.   Mouth/Throat: Uvula is midline and mucous membranes are normal. No trismus in the jaw. Normal dentition. No uvula swelling. No posterior oropharyngeal erythema.   Eyes: Conjunctivae and lids are normal. Right eye exhibits no discharge. Left eye exhibits no discharge. No " scleral icterus.   Sclera clear bilat   Neck: Trachea normal, normal range of motion, full passive range of motion without pain and phonation normal. Neck supple.   Cardiovascular: Normal rate, regular rhythm and normal pulses.    Pulmonary/Chest: Effort normal. No respiratory distress.   Abdominal: Normal appearance. She exhibits no distension, no pulsatile midline mass and no mass. There is no tenderness.   Musculoskeletal: Normal range of motion. She exhibits no edema or deformity.        Arms:  Neurological: She is alert and oriented to person, place, and time. She exhibits normal muscle tone. Coordination normal.   Skin: Skin is warm, dry and intact. She is not diaphoretic. No pallor.   Psychiatric: She has a normal mood and affect. Her speech is normal and behavior is normal. Judgment and thought content normal. Cognition and memory are normal.   Nursing note and vitals reviewed.        Removed old dressing to left forearm--cleaned area with normal saline and applied new steri-strips to abrasion near left wrist, vaseline gauze & bactroban to small abrasion...then wrapped entire left forearm in gauze and kerlix  Assessment:       1. Skin tear of left elbow without complication, initial encounter        Plan:       Patient Instructions     Laceration: All Closures  A laceration is a cut through the skin. This will usually require stitches (sutures) or staples if it is deep. Minor cuts may be treated with a surgical tape closure or skin glue.    Home care  · Your healthcare provider may prescribe an antibiotic. This is to help prevent infection. Follow all instructions for taking this medicine. Take the medicine every day until it is gone or you are told to stop. You should not have any left over.  · The healthcare provider may prescribe medicines for pain. Follow instructions for taking them.  · Follow the healthcare providers instructions on how to care for the cut.  · Keep the wound clean and dry. Do not get  the wound wet until you are told it is okay to do so. If the area gets wet, gently pat it dry with a clean cloth. Replace the wet bandage with a dry one.  · If a bandage was applied and it becomes wet or dirty, replace it. Otherwise, leave it in place for the first 24 hours.  · Caring for sutures or staples: Once you no longer need to keep them dry, clean the wound daily. First, remove the bandage. Then wash the area gently with soap and warm water, or as directed by the health care provider. Use a wet cotton swab to loosen and remove any blood or crust that forms. After cleaning, apply a thin layer of antibiotic ointment if advised. Then put on a new bandage unless you are told not to.  · Caring for skin glue: Dont put apply liquid, ointment, or cream on the wound while the glue is in place. Avoid activities that cause heavy sweating. Protect the wound from sunlight. Do not scratch, rub, or pick at the adhesive film. Do not place tape directly over the film. The glue should peel off within 5 to 10 days.   · Caring for surgical tape: Keep the area dry. If it gets wet, blot it dry with a clean towel. Surgical tape usually falls off within 7 to 10 days. If it has not fallen off after 10 days, you can take it off yourself. Put mineral oil or petroleum jelly on a cotton ball and gently rub the tape until it is removed.  · Once you can get the wound wet, you may shower as usual but do not soak the wound in water (no tub baths or swimming)  · Even with proper treatment, a wound infection may sometimes occur. Check the wound daily for signs of infection listed below.  Scalp wounds  During the first two days, you may carefully rinse your hair in the shower to remove blood, glass or dirt particles. After two days, you may shower and shampoo your hair normally. Do not soak your scalp in the tub or go swimming until the stitches or staples have been removed. Talk with your healthcare provider before applying any antibiotic  ointment to the wound.  Mouth wounds  Eat soft foods to reduce pain. If the cut is inside of your mouth, clean by rinsing after each meal and at bedtime with a mixture of equal parts water and hydrogen peroxide (do not swallow!). Or, you can use a cotton swab to directly apply hydrogen peroxide onto the cut. Mouth wounds can be painful when eating. You may use an over-the-counter local numbing solution for pain relief. If this is not available, you may use any numbing solution intended for teething babies. You may apply this directly to the sores with a cotton-tip swab or with your finger.  Follow-up care  Follow up with your healthcare provider as advised. Ask your healthcare provider how long sutures should be left in place. Be sure to return for suture removal as directed. If dissolving stitches were used in the mouth, these should fall out or dissolve without the need for removal. If tape closures were used, remove them yourself when your provider recommends if they have not fallen off on their own. If skin glue was used, the film will wear off by itself.  When to seek medical advice  Call your healthcare provider right away if any of these occur:  · Wound bleeding not controlled by direct pressure  · Signs of infection, including increasing pain in the wound, increasing wound redness or swelling, or pus or bad odor coming from the wound  · Fever of 100.4°F (38.ºC) or higher or as directed by your healthcare provider  · Stitches or staples come apart or fall out or surgical tape falls off before 7 days  · Wound edges re-open  · Wound changes colors  · Numbness around the wound   · Decreased movement around the injured area  Date Last Reviewed: 6/14/2015 © 2000-2017 [employer]. [address] [place] [address], [place] [address]. All rights reserved. This information is not intended as a substitute for professional medical care. Always follow your healthcare professional's instructions.            Skin tear  of left elbow without complication, initial encounter

## 2018-08-01 NOTE — PATIENT INSTRUCTIONS
Laceration: All Closures  A laceration is a cut through the skin. This will usually require stitches (sutures) or staples if it is deep. Minor cuts may be treated with a surgical tape closure or skin glue.    Home care  · Your healthcare provider may prescribe an antibiotic. This is to help prevent infection. Follow all instructions for taking this medicine. Take the medicine every day until it is gone or you are told to stop. You should not have any left over.  · The healthcare provider may prescribe medicines for pain. Follow instructions for taking them.  · Follow the healthcare providers instructions on how to care for the cut.  · Keep the wound clean and dry. Do not get the wound wet until you are told it is okay to do so. If the area gets wet, gently pat it dry with a clean cloth. Replace the wet bandage with a dry one.  · If a bandage was applied and it becomes wet or dirty, replace it. Otherwise, leave it in place for the first 24 hours.  · Caring for sutures or staples: Once you no longer need to keep them dry, clean the wound daily. First, remove the bandage. Then wash the area gently with soap and warm water, or as directed by the health care provider. Use a wet cotton swab to loosen and remove any blood or crust that forms. After cleaning, apply a thin layer of antibiotic ointment if advised. Then put on a new bandage unless you are told not to.  · Caring for skin glue: Dont put apply liquid, ointment, or cream on the wound while the glue is in place. Avoid activities that cause heavy sweating. Protect the wound from sunlight. Do not scratch, rub, or pick at the adhesive film. Do not place tape directly over the film. The glue should peel off within 5 to 10 days.   · Caring for surgical tape: Keep the area dry. If it gets wet, blot it dry with a clean towel. Surgical tape usually falls off within 7 to 10 days. If it has not fallen off after 10 days, you can take it off yourself. Put mineral oil or  petroleum jelly on a cotton ball and gently rub the tape until it is removed.  · Once you can get the wound wet, you may shower as usual but do not soak the wound in water (no tub baths or swimming)  · Even with proper treatment, a wound infection may sometimes occur. Check the wound daily for signs of infection listed below.  Scalp wounds  During the first two days, you may carefully rinse your hair in the shower to remove blood, glass or dirt particles. After two days, you may shower and shampoo your hair normally. Do not soak your scalp in the tub or go swimming until the stitches or staples have been removed. Talk with your healthcare provider before applying any antibiotic ointment to the wound.  Mouth wounds  Eat soft foods to reduce pain. If the cut is inside of your mouth, clean by rinsing after each meal and at bedtime with a mixture of equal parts water and hydrogen peroxide (do not swallow!). Or, you can use a cotton swab to directly apply hydrogen peroxide onto the cut. Mouth wounds can be painful when eating. You may use an over-the-counter local numbing solution for pain relief. If this is not available, you may use any numbing solution intended for teething babies. You may apply this directly to the sores with a cotton-tip swab or with your finger.  Follow-up care  Follow up with your healthcare provider as advised. Ask your healthcare provider how long sutures should be left in place. Be sure to return for suture removal as directed. If dissolving stitches were used in the mouth, these should fall out or dissolve without the need for removal. If tape closures were used, remove them yourself when your provider recommends if they have not fallen off on their own. If skin glue was used, the film will wear off by itself.  When to seek medical advice  Call your healthcare provider right away if any of these occur:  · Wound bleeding not controlled by direct pressure  · Signs of infection, including  increasing pain in the wound, increasing wound redness or swelling, or pus or bad odor coming from the wound  · Fever of 100.4°F (38.ºC) or higher or as directed by your healthcare provider  · Stitches or staples come apart or fall out or surgical tape falls off before 7 days  · Wound edges re-open  · Wound changes colors  · Numbness around the wound   · Decreased movement around the injured area  Date Last Reviewed: 6/14/2015 © 2000-2017 [employer]. [address] [place] [address]. All rights reserved. This information is not intended as a substitute for professional medical care. Always follow your healthcare professional's instructions.

## 2018-08-02 ENCOUNTER — TELEPHONE (OUTPATIENT)
Dept: WOUND CARE | Facility: CLINIC | Age: 83
End: 2018-08-02

## 2018-08-02 NOTE — TELEPHONE ENCOUNTER
----- Message from Tony Tran sent at 8/2/2018  2:08 PM CDT -----  Contact: Deyanira Williamson stated her mom has wounds on left arm that are infected and would like to speak to nurse regarding this.     She says she's been to the ER and Urgent care and wounds has gotten worse.     Please call [phone] regarding this.    Thanks

## 2018-08-02 NOTE — TELEPHONE ENCOUNTER
----- Message from Etienne Cruz sent at 8/2/2018 11:17 AM CDT -----  Contact: Pt   Pt would like a call back regarding being seen today if possible pt has new wound that look infected pt care giver insist on message being sent     Pt can be reached at [phone]

## 2018-08-03 ENCOUNTER — OFFICE VISIT (OUTPATIENT)
Dept: WOUND CARE | Facility: CLINIC | Age: 83
End: 2018-08-03
Payer: MEDICARE

## 2018-08-03 VITALS
SYSTOLIC BLOOD PRESSURE: 126 MMHG | WEIGHT: 112.63 LBS | TEMPERATURE: 97 F | BODY MASS INDEX: 23.64 KG/M2 | HEIGHT: 58 IN | DIASTOLIC BLOOD PRESSURE: 62 MMHG | HEART RATE: 91 BPM

## 2018-08-03 DIAGNOSIS — T14.8XXA MULTIPLE SKIN TEARS: ICD-10-CM

## 2018-08-03 PROCEDURE — 99999 PR PBB SHADOW E&M-EST. PATIENT-LVL III: CPT | Mod: PBBFAC,,, | Performed by: NURSE PRACTITIONER

## 2018-08-03 PROCEDURE — 99212 OFFICE O/P EST SF 10 MIN: CPT | Mod: S$PBB,,, | Performed by: NURSE PRACTITIONER

## 2018-08-03 PROCEDURE — 99213 OFFICE O/P EST LOW 20 MIN: CPT | Mod: PBBFAC | Performed by: NURSE PRACTITIONER

## 2018-08-03 NOTE — PROGRESS NOTES
Subjective:       Patient ID: Arti Olsen is a 89 y.o. female.    Chief Complaint: Wound Check    Ms. Olsen presents today after a fall out of bed a week ago. She suffered multiple skin tears to the left arm with significant ecchymosis. Her daughter brought her to urgent care where the wounds were cleansed and re-approximated with steri-strips. She presents today for evaluation. She lives alone however her daughter is her primary caregiver. She presents today for wound care recommendations.       Review of Systems   Constitutional: Negative for chills, diaphoresis, fatigue and fever.   HENT: Negative for ear pain, nosebleeds, sinus pain, sore throat and trouble swallowing.    Eyes: Negative for pain and redness.   Respiratory: Negative for cough, shortness of breath and wheezing.    Cardiovascular: Positive for chest pain. Negative for palpitations and leg swelling.   Gastrointestinal: Negative for abdominal distention, abdominal pain, blood in stool, nausea and vomiting.   Endocrine: Negative for polydipsia, polyphagia and polyuria.   Genitourinary: Negative for flank pain and hematuria.        Urinary incontinence   Wears a pessary, follows GYN for removal and cleansing.   Musculoskeletal: Positive for back pain and neck pain. Negative for joint swelling and myalgias.   Skin: Positive for wound.   Neurological: Positive for headaches. Negative for seizures, facial asymmetry and weakness.   Hematological: Negative for adenopathy. Bruises/bleeds easily.   Psychiatric/Behavioral: Negative for agitation, dysphoric mood and sleep disturbance. The patient is not nervous/anxious.        Objective:      Physical Exam   Constitutional: She is oriented to person, place, and time.   HENT:   Head: Normocephalic.   Eyes: EOM are normal. Pupils are equal, round, and reactive to light.   Neck: Normal range of motion.   Cardiovascular:   Murmur heard.  Pulmonary/Chest: Effort normal. She has no wheezes. She has no rales.    Abdominal: Soft. Bowel sounds are normal. She exhibits no distension.   Musculoskeletal:        Arms:  Age related ROM changes   Neurological: She is alert and oriented to person, place, and time.   Skin: Skin is warm and dry. Capillary refill takes less than 2 seconds.       Left arm and wrist        Assessment:       1. Multiple skin tears        Plan:            Apply Medihoney gel to the wound, cover with vaseline gauze followed by cotton gauze,  and secure with roll gauze.  Use flex net to secure dressing.  Change dressing daily.  Follow up in three weeks or as needed if healed.

## 2018-08-03 NOTE — PATIENT INSTRUCTIONS
You may shower using a mild soap such as Dove.  Irrigate the wound with lukewarm water for 5 minutes and dry thoroughly.  Apply Medihoney gel to the wound, cover with vaseline gauze followed by cotton gauze,  and secure with roll gauze.  Use flex net to secure dressing.  Change dressing daily.  Report any signs of infection.

## 2018-08-06 ENCOUNTER — PATIENT MESSAGE (OUTPATIENT)
Dept: WOUND CARE | Facility: CLINIC | Age: 83
End: 2018-08-06

## 2018-08-07 ENCOUNTER — TELEPHONE (OUTPATIENT)
Dept: WOUND CARE | Facility: CLINIC | Age: 83
End: 2018-08-07

## 2018-08-12 ENCOUNTER — PATIENT MESSAGE (OUTPATIENT)
Dept: WOUND CARE | Facility: CLINIC | Age: 83
End: 2018-08-12

## 2018-08-13 ENCOUNTER — PATIENT MESSAGE (OUTPATIENT)
Dept: INTERNAL MEDICINE | Facility: CLINIC | Age: 83
End: 2018-08-13

## 2018-08-14 ENCOUNTER — PATIENT MESSAGE (OUTPATIENT)
Dept: WOUND CARE | Facility: CLINIC | Age: 83
End: 2018-08-14

## 2018-08-17 ENCOUNTER — PATIENT MESSAGE (OUTPATIENT)
Dept: WOUND CARE | Facility: CLINIC | Age: 83
End: 2018-08-17

## 2018-08-20 ENCOUNTER — PATIENT MESSAGE (OUTPATIENT)
Dept: WOUND CARE | Facility: CLINIC | Age: 83
End: 2018-08-20

## 2018-08-21 ENCOUNTER — TELEPHONE (OUTPATIENT)
Dept: WOUND CARE | Facility: CLINIC | Age: 83
End: 2018-08-21

## 2018-08-21 NOTE — TELEPHONE ENCOUNTER
Spoke with patient's daughter who advised mother fell last week at home and was admit to [facility] and is now going to the skilled nursing unit - she is requesting that we cancel her scheduled appointment for 8/24/18 with wound care clinic.  Appointment cancelled per request

## 2018-08-31 ENCOUNTER — PATIENT MESSAGE (OUTPATIENT)
Dept: INTERNAL MEDICINE | Facility: CLINIC | Age: 83
End: 2018-08-31

## 2018-09-04 ENCOUNTER — TELEPHONE (OUTPATIENT)
Dept: INTERNAL MEDICINE | Facility: CLINIC | Age: 83
End: 2018-09-04

## 2018-09-04 NOTE — TELEPHONE ENCOUNTER
Received records from outside hospital.  Patient was admitted for debility.  Patient is a 89-year-old female with medical history of chronic neck and back pain, hypertension, hyponatremia, and hypothyroidism.  Prior to admission patient was previously very functional and was living alone with assistance from family.  She presented to the emergency department on August 17, 2018 after mechanical fall in her bathroom.  It is reported she slipped and fell hitting her head in side of the bathtub on her way down.  She sustained 2 small lacerations 1 of which was sutured and the other staple to the right side of her head.  She was found to have a small subdural hematoma.  Initially, patient went to the intensive care unit repeat CT scans of her head were stable.  A consult was obtained for Neurosurgery.  No surgical intervention was warranted at that time.  After patient was stabilized in the acute care setting, she was transferred to skilled nursing unit for physical therapy, occupational therapy, and general medical management.

## 2018-09-05 ENCOUNTER — OFFICE VISIT (OUTPATIENT)
Dept: INTERNAL MEDICINE | Facility: CLINIC | Age: 83
End: 2018-09-05
Payer: MEDICARE

## 2018-09-05 VITALS
HEART RATE: 79 BPM | RESPIRATION RATE: 16 BRPM | SYSTOLIC BLOOD PRESSURE: 146 MMHG | TEMPERATURE: 98 F | HEIGHT: 59 IN | DIASTOLIC BLOOD PRESSURE: 51 MMHG | BODY MASS INDEX: 22.58 KG/M2 | WEIGHT: 112 LBS

## 2018-09-05 DIAGNOSIS — F32.89 OTHER DEPRESSION: Chronic | ICD-10-CM

## 2018-09-05 DIAGNOSIS — I25.10 CORONARY ARTERY DISEASE INVOLVING NATIVE CORONARY ARTERY OF NATIVE HEART WITHOUT ANGINA PECTORIS: Chronic | ICD-10-CM

## 2018-09-05 DIAGNOSIS — Z09 HOSPITAL DISCHARGE FOLLOW-UP: ICD-10-CM

## 2018-09-05 DIAGNOSIS — F41.9 ANXIETY: Chronic | ICD-10-CM

## 2018-09-05 DIAGNOSIS — I73.9 PAD (PERIPHERAL ARTERY DISEASE): ICD-10-CM

## 2018-09-05 DIAGNOSIS — R15.9 INCONTINENCE OF FECES, UNSPECIFIED FECAL INCONTINENCE TYPE: ICD-10-CM

## 2018-09-05 DIAGNOSIS — Z00.00 ANNUAL PHYSICAL EXAM: ICD-10-CM

## 2018-09-05 DIAGNOSIS — K21.9 GASTROESOPHAGEAL REFLUX DISEASE WITHOUT ESOPHAGITIS: Chronic | ICD-10-CM

## 2018-09-05 DIAGNOSIS — E03.4 HYPOTHYROIDISM DUE TO ACQUIRED ATROPHY OF THYROID: Chronic | ICD-10-CM

## 2018-09-05 DIAGNOSIS — E78.2 MIXED HYPERLIPIDEMIA: ICD-10-CM

## 2018-09-05 DIAGNOSIS — I10 ESSENTIAL HYPERTENSION: Primary | ICD-10-CM

## 2018-09-05 DIAGNOSIS — I70.1 RENAL ARTERY ATHEROSCLEROSIS: Chronic | ICD-10-CM

## 2018-09-05 DIAGNOSIS — R29.6 RECURRENT FALLS: Chronic | ICD-10-CM

## 2018-09-05 DIAGNOSIS — K59.00 CONSTIPATION, UNSPECIFIED CONSTIPATION TYPE: ICD-10-CM

## 2018-09-05 PROCEDURE — 99215 OFFICE O/P EST HI 40 MIN: CPT | Mod: S$PBB,,, | Performed by: INTERNAL MEDICINE

## 2018-09-05 PROCEDURE — 99213 OFFICE O/P EST LOW 20 MIN: CPT | Mod: PBBFAC,PO,25 | Performed by: INTERNAL MEDICINE

## 2018-09-05 PROCEDURE — 99999 PR PBB SHADOW E&M-EST. PATIENT-LVL III: CPT | Mod: PBBFAC,,, | Performed by: INTERNAL MEDICINE

## 2018-09-05 PROCEDURE — G0009 ADMIN PNEUMOCOCCAL VACCINE: HCPCS | Mod: PBBFAC,PO

## 2018-09-05 NOTE — PROGRESS NOTES
Subjective:       Patient ID: Arti Olsen is a 89 y.o. female.    Chief Complaint: Establish Care    HPI     89 y.o. female here for follow-up of chronic medical conditions.     HTN - Patient is currently on Norvasc 5 mg, Avapro 300 mg, Aldactone 25 mg (holding currently). She does not check her BP at home. Side effects of medications note: none. Denies headaches, blurred vision, chest pain, shortness of breath, nausea.    HLD - Patient is currently on lipitor 80 mg.  Her last lipid panel was   Cholesterol   Date Value Ref Range Status   06/05/2018 130 120 - 199 mg/dL Final     Comment:     The National Cholesterol Education Program (NCEP) has set the  following guidelines (reference ranges) for Cholesterol:  Optimal.....................<200 mg/dL  Borderline High.............200-239 mg/dL  High........................> or = 240 mg/dL       Triglycerides   Date Value Ref Range Status   06/05/2018 115 30 - 150 mg/dL Final     Comment:     The National Cholesterol Education Program (NCEP) has set the  following guidelines (reference values) for triglycerides:  Normal......................<150 mg/dL  Borderline High.............150-199 mg/dL  High........................200-499 mg/dL       HDL   Date Value Ref Range Status   06/05/2018 48 40 - 75 mg/dL Final     Comment:     The National Cholesterol Education Program (NCEP) has set the  following guidelines (reference values) for HDL Cholesterol:  Low...............<40 mg/dL  Optimal...........>60 mg/dL       LDL Cholesterol   Date Value Ref Range Status   06/05/2018 59.0 (L) 63.0 - 159.0 mg/dL Final     Comment:     The National Cholesterol Education Program (NCEP) has set the  following guidelines (reference values) for LDL Cholesterol:  Optimal.......................<130 mg/dL  Borderline High...............130-159 mg/dL  High..........................160-189 mg/dL  Very High.....................>190 mg/dL     .  Side effects of the medication: none.    Patient  has hypothyroid and is on Synthroid 25 mcg daily.    Patient has depression and takes Zoloft 100 mg daily.    Hospital discharge summary from :   Received records from outside hospital.  Patient was admitted for debility.  Patient is a 89-year-old female with medical history of chronic neck and back pain, hypertension, hyponatremia, and hypothyroidism.  Prior to admission patient was previously very functional and was living alone with assistance from family.  She presented to the emergency department on August 17, 2018 after mechanical fall in her bathroom.  It is reported she slipped and fell hitting her head in side of the bathtub on her way down.  She sustained 2 small lacerations 1 of which was sutured and the other staple to the right side of her head.  She was found to have a small subdural hematoma.  Initially, patient went to the intensive care unit repeat CT scans of her head were stable.  A consult was obtained for Neurosurgery.  No surgical intervention was warranted at that time.  After patient was stabilized in the acute care setting, she was transferred to skilled nursing unit for physical therapy, occupational therapy, and general medical management.    She is doing better with home health since she was discharged.  She has pain and uses ice to take care of this.  She had a safety assessment of her house and is following recommendations.  She does not have to follow-up with neurosurgery at .  There was no progression of the bleed after 8 hours.  She has a walker at home.    She has constipation and diarrhea all her life.  She has seen Dr. Mccoy for this.  She has fecal incontinence and this had resolved in the past on metamucil wafers two a day.    Cholesterol: needs  Vaccines: Influenza - not done; Tetanus - 2018; Pneumovax - needs; Prevnar - needs; Zoster - needs  Eye exam: done within the last year.  Mammogram: no longer indicated  Gyn exam: no longer indicated  Colonoscopy: no longer  indicated  DEXA: declined    Past Medical History:   Diagnosis Date    Acid reflux     chronic    Anemia 2014    Arthritis     osetoarthritis    Clotting disorder     Colon polyp     Coronary artery disease     Nonobstructive CAD per [facility]    History of colonoscopy     1 polyp noted in     Hyperlipemia     Hypertension     Migraine headache     chronic    Peripheral vascular disease     PONV (postoperative nausea and vomiting)     Renal artery atherosclerosis 2012    Spinal stenosis     Thyroid disease      Past Surgical History:   Procedure Laterality Date    acf      ANTERIOR CERVICAL DISCECTOMY W/ FUSION      APPENDECTOMY      BLADDER SURGERY      suspension    COLONOSCOPY W/ POLYPECTOMY      CYST REMOVAL      removed from scalp    DISC REMOVAL      EYE SURGERY      cataracts removed    HYSTERECTOMY      COMPLETE    JOINT REPLACEMENT  2006    repair 3rd toe    JOINT REPLACEMENT      right/left rotator    SPINE SURGERY  2014    relieve pressure on nerves    TONSILLECTOMY, ADENOIDECTOMY  0's    UTERINE FIBROID SURGERY      VARICOSE VEIN SURGERY       Social History     Socioeconomic History    Marital status:      Spouse name: Not on file    Number of children: Not on file    Years of education: Not on file    Highest education level: Not on file   Social Needs    Financial resource strain: Not on file    Food insecurity - worry: Not on file    Food insecurity - inability: Not on file    Transportation needs - medical: Not on file    Transportation needs - non-medical: Not on file   Occupational History    Not on file   Tobacco Use    Smoking status: Former Smoker     Packs/day: 1.00     Years: 29.00     Pack years: 29.00     Last attempt to quit: 9/10/1969     Years since quittin.0    Smokeless tobacco: Never Used    Tobacco comment: The patient lives alone and is able to accomplish a usual activities of daily  "living.  She is not that active due to the severity of her back pain.   Substance and Sexual Activity    Alcohol use: No    Drug use: No    Sexual activity: No   Other Topics Concern    Not on file   Social History Narrative    Not on file     Review of patient's allergies indicates:   Allergen Reactions    Iodinated contrast- oral and iv dye Swelling    Augmentin [amoxicillin-pot clavulanate] Diarrhea    Reglan [metoclopramide] Other (See Comments)     "Body shaking"    Sulfa (sulfonamide antibiotics)      Yrs ago     Arti Olsen had no medications administered during this visit.    Review of Systems   Constitutional: Negative for activity change and unexpected weight change.   HENT: Negative for hearing loss, rhinorrhea and trouble swallowing.    Eyes: Negative for discharge and visual disturbance.   Respiratory: Negative for chest tightness and wheezing.    Cardiovascular: Negative for chest pain and palpitations.   Gastrointestinal: Positive for constipation and diarrhea. Negative for blood in stool and vomiting.   Endocrine: Negative for polydipsia and polyuria.   Genitourinary: Negative for difficulty urinating, dysuria, hematuria and menstrual problem.   Musculoskeletal: Positive for arthralgias and neck pain. Negative for joint swelling.   Neurological: Positive for weakness and headaches.   Psychiatric/Behavioral: Positive for dysphoric mood. Negative for confusion.       Objective:      Physical Exam   Constitutional: She is oriented to person, place, and time. She appears well-developed and well-nourished.   HENT:   Head: Normocephalic and atraumatic.   Mouth/Throat: No oropharyngeal exudate.   Eyes: EOM are normal. Pupils are equal, round, and reactive to light. Right eye exhibits no discharge. Left eye exhibits no discharge. No scleral icterus.   Neck: Normal range of motion. Neck supple. No tracheal deviation present. No thyromegaly present.   Cardiovascular: Normal rate, regular rhythm " and normal heart sounds. Exam reveals no gallop and no friction rub.   No murmur heard.  Pulmonary/Chest: Effort normal and breath sounds normal. No respiratory distress. She has no wheezes. She has no rales. She exhibits no tenderness.   Abdominal: Soft. Bowel sounds are normal. She exhibits no distension and no mass. There is no tenderness. There is no rebound and no guarding.   Musculoskeletal: Normal range of motion. She exhibits no edema or tenderness.   Neurological: She is alert and oriented to person, place, and time.   Skin: Skin is warm and dry. No rash noted. No erythema. No pallor.   Psychiatric: She has a normal mood and affect. Her behavior is normal.   Vitals reviewed.      Assessment:       1. Essential hypertension    2. Mixed hyperlipidemia    3. PAD (peripheral artery disease)    4. Coronary artery disease involving native coronary artery of native heart without angina pectoris    5. Renal artery atherosclerosis    6. Anxiety    7. Other depression    8. Hypothyroidism due to acquired atrophy of thyroid    9. Gastroesophageal reflux disease without esophagitis    10. Recurrent falls    11. Incontinence of feces, unspecified fecal incontinence type    12. Constipation, unspecified constipation type    13. Hospital discharge follow-up    14. Annual physical exam        Plan:       1.  Continue Norvasc 5 mg, Avapro 300 mg, Aldactone 25 mg (holding currently)  2.  Continue Lipitor 80 mg daily.  3.  Continue Lipitor 80 mg daily, aspirin 81 mg daily.    4.  Continue aspirin 81 mg, Lipitor 80 mg  5. Continue Avapro 300 mg, Lipitor 80 mg, aspirin 81 mg.    6/7.  Continue Zoloft 100 mg.    8/9. Continue Prilosec 20 mg as needed.    10.  Patient to follow up with home health and use mobility aids to decrease risk of falls.  11/12.  Restart Metamucil wafers.  13.  No longer needs to follow with Neurosurgery, because they were told that the subdural was stable.    14. Check CBC and lipids.  Reviewed other lab  work.  Prevnar given today.  Discussed shingrix.      40 minute appointment (face to face time) with greater than half spent counseling patient on above.

## 2018-09-09 ENCOUNTER — PATIENT MESSAGE (OUTPATIENT)
Dept: GASTROENTEROLOGY | Facility: CLINIC | Age: 83
End: 2018-09-09

## 2018-09-10 ENCOUNTER — DOCUMENTATION ONLY (OUTPATIENT)
Dept: ENDOSCOPY | Facility: HOSPITAL | Age: 83
End: 2018-09-10

## 2018-09-10 NOTE — PROGRESS NOTES
Deyanira I Recommend your mom see her primary care MD Today for stool C-diff testing and to access if you need IVF or admission to observation. Please keep me posted.  ===View-only below this line===      ----- Message -----     From: Arti KINSEY Raúl     Sent: 9/9/2018  1:01 PM CDT       To: Deshaun Mccann MD  Subject: Non-Urgent Medical    Dr. Mccann, my mom was in [facility] a couple of weeks ago as the result of a fall. She went from ER to ICU to the floor to SNF. While in the hospital, she had not had a BM in 8 or 9+ days. In SNF, I know they tried Magnesia, stool softeners and miralax. They may have tried something else. It eventually worked but we now have the opposite issue. We have tried immodium, keopectate, pepto-bismal liquid and now pepto-bismal tablets. I think it is slowing down with the tablets but I am concerned about the loss of fluid. She passes stool without knowing it and the texture is more like paste than formed waste. Is there something else she could try or you could prescribe? I can bring her in to see you if you think that is best. It's getting her down and she wants to make the most of home therapy while it lasts. Thanks, Deyanira Olsen. My cell is [phone].

## 2018-10-02 ENCOUNTER — PATIENT MESSAGE (OUTPATIENT)
Dept: CARDIOLOGY | Facility: CLINIC | Age: 83
End: 2018-10-02

## 2018-10-11 ENCOUNTER — PATIENT MESSAGE (OUTPATIENT)
Dept: INTERNAL MEDICINE | Facility: CLINIC | Age: 83
End: 2018-10-11

## 2018-11-09 ENCOUNTER — TELEPHONE (OUTPATIENT)
Dept: ADMINISTRATIVE | Facility: CLINIC | Age: 83
End: 2018-11-09

## 2018-12-13 RX ORDER — ATORVASTATIN CALCIUM 80 MG/1
TABLET, FILM COATED ORAL
Qty: 90 TABLET | Refills: 3 | Status: SHIPPED | OUTPATIENT
Start: 2018-12-13 | End: 2019-10-11

## 2018-12-14 ENCOUNTER — OFFICE VISIT (OUTPATIENT)
Dept: INTERNAL MEDICINE | Facility: CLINIC | Age: 83
End: 2018-12-14
Payer: MEDICARE

## 2018-12-14 VITALS
HEIGHT: 58 IN | SYSTOLIC BLOOD PRESSURE: 140 MMHG | DIASTOLIC BLOOD PRESSURE: 48 MMHG | WEIGHT: 110 LBS | BODY MASS INDEX: 23.09 KG/M2 | HEART RATE: 66 BPM | OXYGEN SATURATION: 98 %

## 2018-12-14 DIAGNOSIS — I95.1 ORTHOSTATIC HYPOTENSION: ICD-10-CM

## 2018-12-14 DIAGNOSIS — Z76.89 ENCOUNTER TO ESTABLISH CARE WITH NEW DOCTOR: Primary | ICD-10-CM

## 2018-12-14 DIAGNOSIS — I70.0 ATHEROSCLEROSIS OF AORTA: ICD-10-CM

## 2018-12-14 DIAGNOSIS — I10 ESSENTIAL HYPERTENSION: Chronic | ICD-10-CM

## 2018-12-14 DIAGNOSIS — E03.4 HYPOTHYROIDISM DUE TO ACQUIRED ATROPHY OF THYROID: Chronic | ICD-10-CM

## 2018-12-14 DIAGNOSIS — I73.9 PAD (PERIPHERAL ARTERY DISEASE): Chronic | ICD-10-CM

## 2018-12-14 DIAGNOSIS — E78.2 MIXED HYPERLIPIDEMIA: Chronic | ICD-10-CM

## 2018-12-14 DIAGNOSIS — I70.1 RENAL ARTERY ATHEROSCLEROSIS: Chronic | ICD-10-CM

## 2018-12-14 PROCEDURE — 99214 OFFICE O/P EST MOD 30 MIN: CPT | Mod: PBBFAC | Performed by: FAMILY MEDICINE

## 2018-12-14 PROCEDURE — 99999 PR PBB SHADOW E&M-EST. PATIENT-LVL IV: CPT | Mod: PBBFAC,,, | Performed by: FAMILY MEDICINE

## 2018-12-14 PROCEDURE — 99214 OFFICE O/P EST MOD 30 MIN: CPT | Mod: S$PBB,,, | Performed by: FAMILY MEDICINE

## 2018-12-14 NOTE — PROGRESS NOTES
Subjective:       Patient ID: Arti Olsen is a 89 y.o. female.    Chief Complaint: Establish Care (establishing care as a new patient. transfer care patient of Dr Barrow. )    Patient is here to establish care with new PCP  89yo WF grew up in [place], is with her daughter who is a nun.  She has no living will, but is very clear in our conversation today that if anyhting bad happens to her with little chance of recovery to a good life she would want to allow a natural death/let nature take its course and not be aggressively kept alive with machines and interventions.  She sees some specialists (podiatry, wound care, cardiology, GI, Derm) but prefers not getting routine blood tests and flu shots. She is interested in the new shingles vaccine but we are out of stock. Earlier this year she had a DPT and Prevnar-13. Almost certainly she has the Pneumovax-23 when she was 65 or soon after 25 years ago but those records are gone.     She has fallen in the past including earlier this year that caused a subdural hematoma that did not require neurosurgical intervention. She does not remember feeling lightheaded as the cause of her fall.  Her BP today is 140/48 and she has arteriosclerosis of many vessels (aorta, renal, PCD) - so almost certainly the systolic BP is a false reading due to brachial artery arteriosclerosis. Her DBP=48 likely indicates that she is over medicated on hypertensive drugs.  She reports she is seieng her cardiologist soon, and will defer this issue to him. However we did discuss that one reasonable approach in this setting would be to titrate emds to aim for a DBP=65 as the risks of falls goes up as she gets hypotensive ,a nd thae BP cuff may not be accurate for SBP given her PVD - she will discuss with her cardiologist    Review of Systems   Constitutional: Negative for chills and fever.   HENT: Negative for ear pain.    Eyes: Negative for pain.   Respiratory: Negative for chest tightness.     Cardiovascular: Negative for chest pain.   Gastrointestinal: Negative for abdominal pain.   Genitourinary: Negative for flank pain.   Musculoskeletal: Positive for arthralgias and myalgias.   Neurological: Negative for syncope.   Psychiatric/Behavioral: Negative for behavioral problems.       Objective:      Physical Exam   Constitutional: She appears well-developed and well-nourished.   HENT:   Head: Normocephalic and atraumatic.   Eyes: EOM are normal.   Neck: Neck supple.   Cardiovascular: Normal rate.   Murmur heard.  Pulmonary/Chest: Breath sounds normal. No respiratory distress. She has no wheezes. She has no rales.   Abdominal: Soft.   Musculoskeletal: She exhibits no edema.   Neurological: She is alert.   Skin: Skin is dry.   Psychiatric: Her behavior is normal.   Nursing note and vitals reviewed.      Assessment:       1. Encounter to establish care with new doctor    2. Essential hypertension    3. Mixed hyperlipidemia    4. Atherosclerosis of aorta    5. Orthostatic hypotension    6. PAD (peripheral artery disease)    7. Renal artery atherosclerosis    8. 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    Diagnoses and all orders for this visit:    Encounter to establish care with new doctor    Essential hypertension  Orthostatic hypotension  -probably overmedicated given low DBP=48 and her brachial artery atherosclerosis - will defer to her cardiologist    Mixed hyperlipidemia  Atherosclerosis of aorta  PAD (peripheral artery disease)  Renal artery atherosclerosis  -continue lipitor 80    Hypothyroidism due to acquired atrophy of thyroid  continue synthroid    As she would like ot avoid routine testing unless essential and to focus on QoL, f/u with me annually and prn

## 2018-12-27 RX ORDER — SPIRONOLACTONE 25 MG/1
TABLET ORAL
Qty: 30 TABLET | Refills: 0 | Status: SHIPPED | OUTPATIENT
Start: 2018-12-27 | End: 2019-02-06 | Stop reason: SDUPTHER

## 2018-12-30 ENCOUNTER — PATIENT MESSAGE (OUTPATIENT)
Dept: CARDIOLOGY | Facility: CLINIC | Age: 83
End: 2018-12-30

## 2019-01-04 ENCOUNTER — LAB VISIT (OUTPATIENT)
Dept: LAB | Facility: HOSPITAL | Age: 84
End: 2019-01-04
Attending: INTERNAL MEDICINE
Payer: MEDICARE

## 2019-01-04 DIAGNOSIS — K21.9 GASTROESOPHAGEAL REFLUX DISEASE WITHOUT ESOPHAGITIS: Chronic | ICD-10-CM

## 2019-01-04 DIAGNOSIS — I67.9 CEREBRAL ARTERIAL DISEASE: ICD-10-CM

## 2019-01-04 DIAGNOSIS — I10 ESSENTIAL HYPERTENSION: ICD-10-CM

## 2019-01-04 DIAGNOSIS — R15.9 INCONTINENCE OF FECES, UNSPECIFIED FECAL INCONTINENCE TYPE: ICD-10-CM

## 2019-01-04 DIAGNOSIS — E87.1 HYPONATREMIA: ICD-10-CM

## 2019-01-04 DIAGNOSIS — Z00.00 ANNUAL PHYSICAL EXAM: ICD-10-CM

## 2019-01-04 DIAGNOSIS — I73.9 PAD (PERIPHERAL ARTERY DISEASE): ICD-10-CM

## 2019-01-04 DIAGNOSIS — E78.2 MIXED HYPERLIPIDEMIA: ICD-10-CM

## 2019-01-04 DIAGNOSIS — R29.6 RECURRENT FALLS: Chronic | ICD-10-CM

## 2019-01-04 DIAGNOSIS — E03.4 HYPOTHYROIDISM DUE TO ACQUIRED ATROPHY OF THYROID: ICD-10-CM

## 2019-01-04 DIAGNOSIS — I25.10 CORONARY ARTERY DISEASE INVOLVING NATIVE CORONARY ARTERY OF NATIVE HEART WITHOUT ANGINA PECTORIS: ICD-10-CM

## 2019-01-04 DIAGNOSIS — Z09 HOSPITAL DISCHARGE FOLLOW-UP: ICD-10-CM

## 2019-01-04 DIAGNOSIS — F41.9 ANXIETY: Chronic | ICD-10-CM

## 2019-01-04 DIAGNOSIS — K59.00 CONSTIPATION, UNSPECIFIED CONSTIPATION TYPE: ICD-10-CM

## 2019-01-04 DIAGNOSIS — F32.89 OTHER DEPRESSION: Chronic | ICD-10-CM

## 2019-01-04 DIAGNOSIS — I70.1 RENAL ARTERY ATHEROSCLEROSIS: ICD-10-CM

## 2019-01-04 LAB
ALBUMIN SERPL BCP-MCNC: 3.8 G/DL
ALP SERPL-CCNC: 84 U/L
ALT SERPL W/O P-5'-P-CCNC: 5 U/L
ANION GAP SERPL CALC-SCNC: 8 MMOL/L
AST SERPL-CCNC: 12 U/L
BILIRUB SERPL-MCNC: 0.6 MG/DL
BUN SERPL-MCNC: 30 MG/DL
CALCIUM SERPL-MCNC: 8.9 MG/DL
CHLORIDE SERPL-SCNC: 106 MMOL/L
CHOLEST SERPL-MCNC: 140 MG/DL
CHOLEST SERPL-MCNC: 140 MG/DL
CHOLEST/HDLC SERPL: 3 {RATIO}
CHOLEST/HDLC SERPL: 3 {RATIO}
CO2 SERPL-SCNC: 22 MMOL/L
CREAT SERPL-MCNC: 1.1 MG/DL
EST. GFR  (AFRICAN AMERICAN): 51.1 ML/MIN/1.73 M^2
EST. GFR  (NON AFRICAN AMERICAN): 44.3 ML/MIN/1.73 M^2
GLUCOSE SERPL-MCNC: 80 MG/DL
HDLC SERPL-MCNC: 46 MG/DL
HDLC SERPL-MCNC: 46 MG/DL
HDLC SERPL: 32.9 %
HDLC SERPL: 32.9 %
LDLC SERPL CALC-MCNC: 76 MG/DL
LDLC SERPL CALC-MCNC: 76 MG/DL
NONHDLC SERPL-MCNC: 94 MG/DL
NONHDLC SERPL-MCNC: 94 MG/DL
POTASSIUM SERPL-SCNC: 5.1 MMOL/L
PROT SERPL-MCNC: 6.7 G/DL
SODIUM SERPL-SCNC: 136 MMOL/L
T4 FREE SERPL-MCNC: 0.87 NG/DL
TRIGL SERPL-MCNC: 90 MG/DL
TRIGL SERPL-MCNC: 90 MG/DL
TSH SERPL DL<=0.005 MIU/L-ACNC: 2.22 UIU/ML

## 2019-01-04 PROCEDURE — 80053 COMPREHEN METABOLIC PANEL: CPT

## 2019-01-04 PROCEDURE — 84439 ASSAY OF FREE THYROXINE: CPT

## 2019-01-04 PROCEDURE — 80061 LIPID PANEL: CPT

## 2019-01-04 PROCEDURE — 36415 COLL VENOUS BLD VENIPUNCTURE: CPT | Mod: PO

## 2019-01-04 PROCEDURE — 84443 ASSAY THYROID STIM HORMONE: CPT

## 2019-01-08 ENCOUNTER — PATIENT MESSAGE (OUTPATIENT)
Dept: CARDIOLOGY | Facility: CLINIC | Age: 84
End: 2019-01-08

## 2019-01-08 RX ORDER — CELECOXIB 200 MG/1
CAPSULE ORAL
Qty: 30 CAPSULE | Refills: 6 | Status: SHIPPED | OUTPATIENT
Start: 2019-01-08 | End: 2019-02-19

## 2019-01-24 ENCOUNTER — PATIENT MESSAGE (OUTPATIENT)
Dept: CARDIOLOGY | Facility: CLINIC | Age: 84
End: 2019-01-24

## 2019-02-06 RX ORDER — SPIRONOLACTONE 25 MG/1
25 TABLET ORAL DAILY
Qty: 30 TABLET | Refills: 11 | Status: SHIPPED | OUTPATIENT
Start: 2019-02-06 | End: 2019-10-11 | Stop reason: SDUPTHER

## 2019-02-12 ENCOUNTER — PATIENT MESSAGE (OUTPATIENT)
Dept: CARDIOLOGY | Facility: CLINIC | Age: 84
End: 2019-02-12

## 2019-02-18 ENCOUNTER — PATIENT MESSAGE (OUTPATIENT)
Dept: CARDIOLOGY | Facility: CLINIC | Age: 84
End: 2019-02-18

## 2019-02-19 ENCOUNTER — HOSPITAL ENCOUNTER (OUTPATIENT)
Dept: CARDIOLOGY | Facility: CLINIC | Age: 84
Discharge: HOME | End: 2019-02-19
Payer: MEDICARE

## 2019-02-19 ENCOUNTER — OFFICE VISIT (OUTPATIENT)
Dept: CARDIOLOGY | Facility: CLINIC | Age: 84
End: 2019-02-19
Payer: MEDICARE

## 2019-02-19 VITALS
BODY MASS INDEX: 21.38 KG/M2 | SYSTOLIC BLOOD PRESSURE: 155 MMHG | HEIGHT: 59 IN | HEART RATE: 65 BPM | WEIGHT: 106.06 LBS | DIASTOLIC BLOOD PRESSURE: 65 MMHG

## 2019-02-19 DIAGNOSIS — K21.9 GASTROESOPHAGEAL REFLUX DISEASE WITHOUT ESOPHAGITIS: Chronic | ICD-10-CM

## 2019-02-19 DIAGNOSIS — F32.89 OTHER DEPRESSION: Chronic | ICD-10-CM

## 2019-02-19 DIAGNOSIS — I10 ESSENTIAL HYPERTENSION: Chronic | ICD-10-CM

## 2019-02-19 DIAGNOSIS — I70.0 ATHEROSCLEROSIS OF AORTA: ICD-10-CM

## 2019-02-19 DIAGNOSIS — E55.9 VITAMIN D INSUFFICIENCY: ICD-10-CM

## 2019-02-19 DIAGNOSIS — I70.1 RENAL ARTERY ATHEROSCLEROSIS: Chronic | ICD-10-CM

## 2019-02-19 DIAGNOSIS — M51.9 LUMBAR DISC DISEASE: ICD-10-CM

## 2019-02-19 DIAGNOSIS — I73.9 PAD (PERIPHERAL ARTERY DISEASE): Chronic | ICD-10-CM

## 2019-02-19 DIAGNOSIS — R79.89 ABNORMAL LIVER FUNCTION TESTS: ICD-10-CM

## 2019-02-19 DIAGNOSIS — E78.2 MIXED HYPERLIPIDEMIA: Chronic | ICD-10-CM

## 2019-02-19 DIAGNOSIS — I10 ESSENTIAL HYPERTENSION: ICD-10-CM

## 2019-02-19 DIAGNOSIS — I87.2 VENOUS INSUFFICIENCY OF BOTH LOWER EXTREMITIES: ICD-10-CM

## 2019-02-19 DIAGNOSIS — I44.7 LBBB (LEFT BUNDLE BRANCH BLOCK): ICD-10-CM

## 2019-02-19 DIAGNOSIS — K55.1 MESENTERIC ARTERY STENOSIS: ICD-10-CM

## 2019-02-19 DIAGNOSIS — I25.10 CORONARY ARTERY DISEASE INVOLVING NATIVE CORONARY ARTERY OF NATIVE HEART WITHOUT ANGINA PECTORIS: Primary | Chronic | ICD-10-CM

## 2019-02-19 DIAGNOSIS — I67.9 CEREBRAL ARTERIAL DISEASE: ICD-10-CM

## 2019-02-19 DIAGNOSIS — I25.10 CORONARY ARTERY DISEASE INVOLVING NATIVE CORONARY ARTERY OF NATIVE HEART WITHOUT ANGINA PECTORIS: ICD-10-CM

## 2019-02-19 DIAGNOSIS — I35.1 NONRHEUMATIC AORTIC VALVE INSUFFICIENCY: ICD-10-CM

## 2019-02-19 DIAGNOSIS — Z87.891 HISTORY OF SMOKING 25-50 PACK YEARS: ICD-10-CM

## 2019-02-19 PROCEDURE — 99999 PR PBB SHADOW E&M-EST. PATIENT-LVL III: ICD-10-PCS | Mod: PBBFAC,,, | Performed by: INTERNAL MEDICINE

## 2019-02-19 PROCEDURE — 93010 ELECTROCARDIOGRAM REPORT: CPT | Mod: S$PBB,,, | Performed by: INTERNAL MEDICINE

## 2019-02-19 PROCEDURE — 93010 EKG 12-LEAD: ICD-10-PCS | Mod: S$PBB,,, | Performed by: INTERNAL MEDICINE

## 2019-02-19 PROCEDURE — 99215 OFFICE O/P EST HI 40 MIN: CPT | Mod: S$PBB,,, | Performed by: INTERNAL MEDICINE

## 2019-02-19 PROCEDURE — 93005 ELECTROCARDIOGRAM TRACING: CPT | Mod: PBBFAC | Performed by: INTERNAL MEDICINE

## 2019-02-19 PROCEDURE — 99999 PR PBB SHADOW E&M-EST. PATIENT-LVL III: CPT | Mod: PBBFAC,,, | Performed by: INTERNAL MEDICINE

## 2019-02-19 PROCEDURE — 99215 PR OFFICE/OUTPT VISIT, EST, LEVL V, 40-54 MIN: ICD-10-PCS | Mod: S$PBB,,, | Performed by: INTERNAL MEDICINE

## 2019-02-19 PROCEDURE — 99213 OFFICE O/P EST LOW 20 MIN: CPT | Mod: PBBFAC,25 | Performed by: INTERNAL MEDICINE

## 2019-02-19 RX ORDER — NITROGLYCERIN 0.4 MG/1
0.4 TABLET SUBLINGUAL EVERY 5 MIN PRN
Qty: 25 TABLET | Refills: 4 | Status: SHIPPED | OUTPATIENT
Start: 2019-02-19 | End: 2019-10-11 | Stop reason: SDUPTHER

## 2019-02-19 RX ORDER — CELECOXIB 200 MG/1
CAPSULE ORAL
Qty: 60 CAPSULE | Refills: 12 | Status: SHIPPED | OUTPATIENT
Start: 2019-02-19 | End: 2019-10-11

## 2019-02-19 RX ORDER — ISOSORBIDE MONONITRATE 60 MG/1
60 TABLET, EXTENDED RELEASE ORAL DAILY
Qty: 90 TABLET | Refills: 3 | Status: SHIPPED | OUTPATIENT
Start: 2019-02-19 | End: 2019-10-11 | Stop reason: SDUPTHER

## 2019-02-19 RX ORDER — CELECOXIB 200 MG/1
200 CAPSULE ORAL 2 TIMES DAILY
Qty: 60 CAPSULE | Refills: 12 | Status: SHIPPED | OUTPATIENT
Start: 2019-02-19 | End: 2019-02-19 | Stop reason: SDUPTHER

## 2019-02-19 NOTE — PATIENT INSTRUCTIONS
Discussed diet , achieving and maintaining ideal body weight, and exercise.   We reviewed meds in detail.  Reassured-discussed goals, options plan  Try antacids for pain but NTG if not clear that it is GO-need to keep fresh and refill every 8-9 months  Increase isosorbide to 60 in am -2 30s or one 60  Increase amlodipine to 1.5 pill but if BP > 135-140, increase to one twice  OK to occasionally use a second Celecoxib (Celebrex)

## 2019-02-19 NOTE — PROGRESS NOTES
Subjective:   Patient ID:  Arti Olsen is a 89 y.o. female who presents for follow-up of CVD    HPI:The patient is here for CAD/PAD/VHD and other CVD.      The patient has no chest pain, SOB, TIA, palpitations, syncope or pre-syncope.Patient does not exercise.Occasionally BP low but most are high.Occasional upper abdomen or low chest pain but she thinks it is GI related.        Review of Systems   Constitution: Negative for chills, decreased appetite, diaphoresis, fever, weakness, malaise/fatigue, night sweats, weight gain and weight loss.   HENT: Negative for congestion, hoarse voice, nosebleeds, sore throat and tinnitus.    Eyes: Negative for blurred vision, double vision, vision loss in left eye, vision loss in right eye, visual disturbance and visual halos.   Cardiovascular: Negative for chest pain, claudication, cyanosis, dyspnea on exertion, irregular heartbeat, leg swelling, near-syncope, orthopnea, palpitations, paroxysmal nocturnal dyspnea and syncope.   Respiratory: Negative for cough, hemoptysis, shortness of breath, sleep disturbances due to breathing, snoring, sputum production and wheezing.    Endocrine: Negative for cold intolerance, heat intolerance, polydipsia, polyphagia and polyuria.   Hematologic/Lymphatic: Negative for adenopathy and bleeding problem. Does not bruise/bleed easily.   Skin: Negative for color change, dry skin, flushing, itching, nail changes, poor wound healing, rash, skin cancer, suspicious lesions and unusual hair distribution.   Musculoskeletal: Positive for arthritis, back pain, joint pain, myalgias, neck pain and stiffness. Negative for falls, gout, joint swelling, muscle cramps and muscle weakness.   Gastrointestinal: Negative for abdominal pain, anorexia, change in bowel habit, constipation, diarrhea, dysphagia, heartburn, hematemesis, hematochezia, melena and vomiting.   Genitourinary: Negative for decreased libido, dysuria, hematuria, hesitancy and urgency.  "  Neurological: Negative for excessive daytime sleepiness, dizziness, focal weakness, headaches, light-headedness, loss of balance, numbness, paresthesias, seizures, sensory change, tremors and vertigo.   Psychiatric/Behavioral: Negative for altered mental status, depression, hallucinations, memory loss, substance abuse and suicidal ideas. The patient does not have insomnia and is not nervous/anxious.    Allergic/Immunologic: Negative for environmental allergies and hives.       Objective: BP (!) 155/65 (BP Location: Left arm, Patient Position: Sitting, BP Method: Medium (Automatic))   Pulse 65   Ht 4' 11" (1.499 m)   Wt 48.1 kg (106 lb 0.7 oz)   BMI 21.42 kg/m²      Physical Exam   Constitutional: She is oriented to person, place, and time. She appears well-developed and well-nourished.   HENT:   Head: Normocephalic.   Eyes: EOM are normal. Pupils are equal, round, and reactive to light.   Neck: Normal range of motion. Normal carotid pulses, no hepatojugular reflux and no JVD present. Carotid bruit is not present. No thyromegaly present.   Cardiovascular: Normal rate, regular rhythm and intact distal pulses. Exam reveals no gallop and no friction rub.   Murmur heard.   Systolic murmur is present with a grade of 2/6.  Pulses:       Carotid pulses are 2+ on the right side, and 2+ on the left side.       Popliteal pulses are 2+ on the right side, and 2+ on the left side.        Dorsalis pedis pulses are 1+ on the right side, and 1+ on the left side.        Posterior tibial pulses are 1+ on the right side, and 1+ on the left side.   Pulmonary/Chest: Effort normal and breath sounds normal. No tachypnea. No respiratory distress. She has no wheezes. She has no rales. She exhibits no tenderness.   Abdominal: Soft. Bowel sounds are normal. She exhibits no distension and no mass. There is no tenderness. There is no rebound and no guarding.   Musculoskeletal: Normal range of motion. She exhibits no edema or tenderness. "   Lymphadenopathy:     She has no cervical adenopathy.   Neurological: She is alert and oriented to person, place, and time. No cranial nerve deficit. Coordination normal.   Skin: Skin is warm. No rash noted. No erythema.   Psychiatric: She has a normal mood and affect. Her behavior is normal. Judgment and thought content normal.       Assessment:     1. Coronary artery disease involving native coronary artery of native heart without angina pectoris    2. PAD (peripheral artery disease)    3. Renal artery atherosclerosis    4. Venous insufficiency of both lower extremities    5. Vitamin D insufficiency    6. Mixed hyperlipidemia    7. Mesenteric artery stenosis    8. Lumbar disc disease    9. LBBB (left bundle branch block)    10. History of smoking 25-50 pack years    11. Gastroesophageal reflux disease without esophagitis    12. Essential hypertension    13. Other depression    14. Atherosclerosis of aorta    15. Nonrheumatic aortic valve insufficiency    16. Abnormal liver function tests        Plan:   Discussed diet , achieving and maintaining ideal body weight, and exercise.   We reviewed meds in detail.  Reassured-discussed goals, options plan  Try antacids for pain but NTG if not clear that it is GO-need to keep fresh and refill every 8-9 months  Increase isosorbide to 60 in am -2 30s or one 60  Increase amlodipine to 1.5 pill but if BP > 135-140, increase to one twice  OK to occasionally use a second Celecoxib (Celebrex)  Arti was seen today for coronary artery disease and chest pain.    Diagnoses and all orders for this visit:    Coronary artery disease involving native coronary artery of native heart without angina pectoris  -     Lipid panel; Future; Expected date: 12/19/2019  -     Comprehensive metabolic panel; Future; Expected date: 12/19/2019  -     TSH; Future; Expected date: 12/19/2019  -     Transthoracic echo (TTE) complete (Cupid Only); Future; Expected date: 12/19/2019  -     nitroGLYCERIN  (NITROSTAT) 0.4 MG SL tablet; Place 1 tablet (0.4 mg total) under the tongue every 5 (five) minutes as needed.  -     isosorbide mononitrate (IMDUR) 60 MG 24 hr tablet; Take 1 tablet (60 mg total) by mouth once daily.    PAD (peripheral artery disease)  -     Lipid panel; Future; Expected date: 12/19/2019  -     Comprehensive metabolic panel; Future; Expected date: 12/19/2019  -     Transthoracic echo (TTE) complete (Cupid Only); Future; Expected date: 12/19/2019    Renal artery atherosclerosis  -     Lipid panel; Future; Expected date: 12/19/2019  -     Comprehensive metabolic panel; Future; Expected date: 12/19/2019  -     Transthoracic echo (TTE) complete (Cupid Only); Future; Expected date: 12/19/2019    Venous insufficiency of both lower extremities    Vitamin D insufficiency    Mixed hyperlipidemia  -     Lipid panel; Future; Expected date: 12/19/2019  -     Comprehensive metabolic panel; Future; Expected date: 12/19/2019  -     TSH; Future; Expected date: 12/19/2019    Mesenteric artery stenosis  -     Lipid panel; Future; Expected date: 12/19/2019    Lumbar disc disease  -     celecoxib (CELEBREX) 200 MG capsule; Take 1 capsule (200 mg total) by mouth 2 (two) times daily. TAKE ONE DAILY    LBBB (left bundle branch block)  -     Comprehensive metabolic panel; Future; Expected date: 12/19/2019  -     EKG 12-lead; Future; Expected date: 12/19/2019  -     Transthoracic echo (TTE) complete (Cupid Only); Future; Expected date: 12/19/2019    History of smoking 25-50 pack years    Gastroesophageal reflux disease without esophagitis    Essential hypertension  -     Comprehensive metabolic panel; Future; Expected date: 12/19/2019  -     TSH; Future; Expected date: 12/19/2019  -     EKG 12-lead; Future; Expected date: 12/19/2019  -     Transthoracic echo (TTE) complete (Cupid Only); Future; Expected date: 12/19/2019    Other depression    Atherosclerosis of aorta  -     Lipid panel; Future; Expected date: 12/19/2019  -      Comprehensive metabolic panel; Future; Expected date: 12/19/2019  -     TSH; Future; Expected date: 12/19/2019    Nonrheumatic aortic valve insufficiency  -     Transthoracic echo (TTE) complete (Cupid Only); Future; Expected date: 12/19/2019    Abnormal liver function tests  -     Comprehensive metabolic panel; Future; Expected date: 12/19/2019  -     TSH; Future; Expected date: 12/19/2019            Follow-up in about 10 months (around 12/19/2019) for with lab,ECG,CFD Katiana Saab to read.

## 2019-02-25 ENCOUNTER — PATIENT MESSAGE (OUTPATIENT)
Dept: WOUND CARE | Facility: CLINIC | Age: 84
End: 2019-02-25

## 2019-02-26 ENCOUNTER — TELEPHONE (OUTPATIENT)
Dept: CARDIOLOGY | Facility: CLINIC | Age: 84
End: 2019-02-26

## 2019-02-26 ENCOUNTER — HOSPITAL ENCOUNTER (INPATIENT)
Facility: HOSPITAL | Age: 84
LOS: 1 days | Discharge: HOME | DRG: 303 | End: 2019-02-27
Attending: EMERGENCY MEDICINE | Admitting: HOSPITALIST
Payer: MEDICARE

## 2019-02-26 DIAGNOSIS — R07.9 CHEST PAIN: Primary | ICD-10-CM

## 2019-02-26 DIAGNOSIS — E78.5 HYPERLIPIDEMIA, UNSPECIFIED HYPERLIPIDEMIA TYPE: ICD-10-CM

## 2019-02-26 DIAGNOSIS — L97.909 PERIPHERAL VASCULAR DISEASE OF LOWER EXTREMITY WITH ULCERATION: ICD-10-CM

## 2019-02-26 DIAGNOSIS — I21.4 NSTEMI (NON-ST ELEVATION MYOCARDIAL INFARCTION): ICD-10-CM

## 2019-02-26 DIAGNOSIS — I21.4 NON-ST ELEVATION MYOCARDIAL INFARCTION (NSTEMI): ICD-10-CM

## 2019-02-26 DIAGNOSIS — I20.0 UNSTABLE ANGINA: ICD-10-CM

## 2019-02-26 DIAGNOSIS — K21.00 GASTROESOPHAGEAL REFLUX DISEASE WITH ESOPHAGITIS: ICD-10-CM

## 2019-02-26 DIAGNOSIS — I25.10 CORONARY ARTERY DISEASE, ANGINA PRESENCE UNSPECIFIED, UNSPECIFIED VESSEL OR LESION TYPE, UNSPECIFIED WHETHER NATIVE OR TRANSPLANTED HEART: ICD-10-CM

## 2019-02-26 DIAGNOSIS — I73.9 PERIPHERAL VASCULAR DISEASE OF LOWER EXTREMITY WITH ULCERATION: ICD-10-CM

## 2019-02-26 PROBLEM — E87.5 HYPERKALEMIA: Status: ACTIVE | Noted: 2019-02-26

## 2019-02-26 LAB
ABO + RH BLD: NORMAL
ALBUMIN SERPL BCP-MCNC: 3.8 G/DL
ALP SERPL-CCNC: 89 U/L
ALT SERPL W/O P-5'-P-CCNC: 5 U/L
ANION GAP SERPL CALC-SCNC: 8 MMOL/L
APTT BLDCRRT: 118.6 SEC
AST SERPL-CCNC: 11 U/L
BASOPHILS # BLD AUTO: 0.02 K/UL
BASOPHILS # BLD AUTO: 0.03 K/UL
BASOPHILS NFR BLD: 0.4 %
BASOPHILS NFR BLD: 0.6 %
BILIRUB SERPL-MCNC: 0.4 MG/DL
BLD GP AB SCN CELLS X3 SERPL QL: NORMAL
BNP SERPL-MCNC: 118 PG/ML
BUN SERPL-MCNC: 29 MG/DL (ref 6–30)
BUN SERPL-MCNC: 30 MG/DL
CALCIUM SERPL-MCNC: 8.9 MG/DL
CHLORIDE SERPL-SCNC: 104 MMOL/L
CHLORIDE SERPL-SCNC: 104 MMOL/L (ref 95–110)
CO2 SERPL-SCNC: 22 MMOL/L
CREAT SERPL-MCNC: 0.9 MG/DL (ref 0.5–1.4)
CREAT SERPL-MCNC: 1.1 MG/DL
DIFFERENTIAL METHOD: ABNORMAL
DIFFERENTIAL METHOD: ABNORMAL
EOSINOPHIL # BLD AUTO: 0.1 K/UL
EOSINOPHIL # BLD AUTO: 0.2 K/UL
EOSINOPHIL NFR BLD: 1.1 %
EOSINOPHIL NFR BLD: 3 %
ERYTHROCYTE [DISTWIDTH] IN BLOOD BY AUTOMATED COUNT: 14.7 %
ERYTHROCYTE [DISTWIDTH] IN BLOOD BY AUTOMATED COUNT: 14.9 %
EST. GFR  (AFRICAN AMERICAN): 51.1 ML/MIN/1.73 M^2
EST. GFR  (NON AFRICAN AMERICAN): 44.3 ML/MIN/1.73 M^2
ESTIMATED AVG GLUCOSE: 117 MG/DL
GLUCOSE SERPL-MCNC: 131 MG/DL (ref 70–110)
GLUCOSE SERPL-MCNC: 93 MG/DL
HBA1C MFR BLD HPLC: 5.7 %
HCT VFR BLD AUTO: 27.8 %
HCT VFR BLD AUTO: 30.7 %
HCT VFR BLD CALC: 29 %PCV (ref 36–54)
HGB BLD-MCNC: 10.1 G/DL
HGB BLD-MCNC: 9.3 G/DL
IMM GRANULOCYTES # BLD AUTO: 0.01 K/UL
IMM GRANULOCYTES # BLD AUTO: 0.03 K/UL
IMM GRANULOCYTES NFR BLD AUTO: 0.2 %
IMM GRANULOCYTES NFR BLD AUTO: 0.5 %
INR PPP: 1.1
LYMPHOCYTES # BLD AUTO: 0.8 K/UL
LYMPHOCYTES # BLD AUTO: 0.9 K/UL
LYMPHOCYTES NFR BLD: 14.1 %
LYMPHOCYTES NFR BLD: 17.5 %
MCH RBC QN AUTO: 29.4 PG
MCH RBC QN AUTO: 29.8 PG
MCHC RBC AUTO-ENTMCNC: 32.9 G/DL
MCHC RBC AUTO-ENTMCNC: 33.5 G/DL
MCV RBC AUTO: 89 FL
MCV RBC AUTO: 89 FL
MONOCYTES # BLD AUTO: 0.2 K/UL
MONOCYTES # BLD AUTO: 0.4 K/UL
MONOCYTES NFR BLD: 3.4 %
MONOCYTES NFR BLD: 7 %
NEUTROPHILS # BLD AUTO: 3.8 K/UL
NEUTROPHILS # BLD AUTO: 4.4 K/UL
NEUTROPHILS NFR BLD: 71.7 %
NEUTROPHILS NFR BLD: 80.5 %
NRBC BLD-RTO: 0 /100 WBC
NRBC BLD-RTO: 0 /100 WBC
PLATELET # BLD AUTO: 142 K/UL
PLATELET # BLD AUTO: 159 K/UL
PMV BLD AUTO: 11.2 FL
PMV BLD AUTO: 11.3 FL
POC IONIZED CALCIUM: 1.14 MMOL/L (ref 1.06–1.42)
POC TCO2 (MEASURED): 22 MMOL/L (ref 23–29)
POCT GLUCOSE: 132 MG/DL (ref 70–110)
POTASSIUM BLD-SCNC: 5.7 MMOL/L (ref 3.5–5.1)
POTASSIUM SERPL-SCNC: 5.2 MMOL/L
PROT SERPL-MCNC: 6.5 G/DL
PROTHROMBIN TIME: 11.4 SEC
RBC # BLD AUTO: 3.12 M/UL
RBC # BLD AUTO: 3.44 M/UL
SAMPLE: ABNORMAL
SODIUM BLD-SCNC: 135 MMOL/L (ref 136–145)
SODIUM SERPL-SCNC: 134 MMOL/L
TROPONIN I SERPL DL<=0.01 NG/ML-MCNC: 0.01 NG/ML
TROPONIN I SERPL DL<=0.01 NG/ML-MCNC: <0.006 NG/ML
WBC # BLD AUTO: 5.32 K/UL
WBC # BLD AUTO: 5.52 K/UL

## 2019-02-26 PROCEDURE — 25000003 PHARM REV CODE 250: Performed by: PHYSICIAN ASSISTANT

## 2019-02-26 PROCEDURE — 99285 EMERGENCY DEPT VISIT HI MDM: CPT | Mod: ,,, | Performed by: PHYSICIAN ASSISTANT

## 2019-02-26 PROCEDURE — 84484 ASSAY OF TROPONIN QUANT: CPT | Mod: 91

## 2019-02-26 PROCEDURE — 85610 PROTHROMBIN TIME: CPT

## 2019-02-26 PROCEDURE — 99285 EMERGENCY DEPT VISIT HI MDM: CPT | Mod: 25

## 2019-02-26 PROCEDURE — 80053 COMPREHEN METABOLIC PANEL: CPT

## 2019-02-26 PROCEDURE — 99223 PR INITIAL HOSPITAL CARE,LEVL III: ICD-10-PCS | Mod: AI,,, | Performed by: HOSPITALIST

## 2019-02-26 PROCEDURE — 99285 PR EMERGENCY DEPT VISIT,LEVEL V: ICD-10-PCS | Mod: ,,, | Performed by: PHYSICIAN ASSISTANT

## 2019-02-26 PROCEDURE — 93005 ELECTROCARDIOGRAM TRACING: CPT

## 2019-02-26 PROCEDURE — 85025 COMPLETE CBC W/AUTO DIFF WBC: CPT

## 2019-02-26 PROCEDURE — 96375 TX/PRO/DX INJ NEW DRUG ADDON: CPT | Mod: 59

## 2019-02-26 PROCEDURE — 83036 HEMOGLOBIN GLYCOSYLATED A1C: CPT

## 2019-02-26 PROCEDURE — 93010 ELECTROCARDIOGRAM REPORT: CPT | Mod: 76,,, | Performed by: INTERNAL MEDICINE

## 2019-02-26 PROCEDURE — 63600175 PHARM REV CODE 636 W HCPCS: Performed by: HOSPITALIST

## 2019-02-26 PROCEDURE — 93010 EKG 12-LEAD: ICD-10-PCS | Mod: 76,,, | Performed by: INTERNAL MEDICINE

## 2019-02-26 PROCEDURE — 96374 THER/PROPH/DIAG INJ IV PUSH: CPT

## 2019-02-26 PROCEDURE — 85730 THROMBOPLASTIN TIME PARTIAL: CPT

## 2019-02-26 PROCEDURE — 85730 THROMBOPLASTIN TIME PARTIAL: CPT | Mod: 91

## 2019-02-26 PROCEDURE — 86901 BLOOD TYPING SEROLOGIC RH(D): CPT

## 2019-02-26 PROCEDURE — 83880 ASSAY OF NATRIURETIC PEPTIDE: CPT

## 2019-02-26 PROCEDURE — 20600001 HC STEP DOWN PRIVATE ROOM

## 2019-02-26 PROCEDURE — 93010 ELECTROCARDIOGRAM REPORT: CPT | Mod: ,,, | Performed by: INTERNAL MEDICINE

## 2019-02-26 PROCEDURE — 84484 ASSAY OF TROPONIN QUANT: CPT

## 2019-02-26 PROCEDURE — 96361 HYDRATE IV INFUSION ADD-ON: CPT

## 2019-02-26 PROCEDURE — 36415 COLL VENOUS BLD VENIPUNCTURE: CPT

## 2019-02-26 PROCEDURE — 82962 GLUCOSE BLOOD TEST: CPT

## 2019-02-26 PROCEDURE — 25000003 PHARM REV CODE 250: Performed by: HOSPITALIST

## 2019-02-26 PROCEDURE — 99223 1ST HOSP IP/OBS HIGH 75: CPT | Mod: AI,,, | Performed by: HOSPITALIST

## 2019-02-26 RX ORDER — CLONAZEPAM 0.5 MG/1
0.5 TABLET ORAL
Status: COMPLETED | OUTPATIENT
Start: 2019-02-26 | End: 2019-02-26

## 2019-02-26 RX ORDER — SERTRALINE HYDROCHLORIDE 50 MG/1
50 TABLET, FILM COATED ORAL DAILY
Status: DISCONTINUED | OUTPATIENT
Start: 2019-02-27 | End: 2019-02-27 | Stop reason: HOSPADM

## 2019-02-26 RX ORDER — METOPROLOL TARTRATE 25 MG/1
25 TABLET, FILM COATED ORAL 2 TIMES DAILY
Status: DISCONTINUED | OUTPATIENT
Start: 2019-02-26 | End: 2019-02-27

## 2019-02-26 RX ORDER — LIDOCAINE 50 MG/G
2 PATCH TOPICAL
Status: DISCONTINUED | OUTPATIENT
Start: 2019-02-27 | End: 2019-02-27 | Stop reason: HOSPADM

## 2019-02-26 RX ORDER — CELECOXIB 100 MG/1
100 CAPSULE ORAL 2 TIMES DAILY
Status: DISCONTINUED | OUTPATIENT
Start: 2019-02-26 | End: 2019-02-26

## 2019-02-26 RX ORDER — GABAPENTIN 100 MG/1
100 CAPSULE ORAL 3 TIMES DAILY
Status: DISCONTINUED | OUTPATIENT
Start: 2019-02-26 | End: 2019-02-27 | Stop reason: HOSPADM

## 2019-02-26 RX ORDER — ASPIRIN 81 MG/1
81 TABLET ORAL DAILY
Status: DISCONTINUED | OUTPATIENT
Start: 2019-02-27 | End: 2019-02-27 | Stop reason: HOSPADM

## 2019-02-26 RX ORDER — SODIUM CHLORIDE 0.9 % (FLUSH) 0.9 %
5 SYRINGE (ML) INJECTION
Status: DISCONTINUED | OUTPATIENT
Start: 2019-02-26 | End: 2019-02-27 | Stop reason: HOSPADM

## 2019-02-26 RX ORDER — LIDOCAINE 50 MG/G
1 PATCH TOPICAL
Status: COMPLETED | OUTPATIENT
Start: 2019-02-26 | End: 2019-02-27

## 2019-02-26 RX ORDER — CALCIUM CARBONATE 200(500)MG
500 TABLET,CHEWABLE ORAL 2 TIMES DAILY PRN
Status: DISCONTINUED | OUTPATIENT
Start: 2019-02-26 | End: 2019-02-27 | Stop reason: HOSPADM

## 2019-02-26 RX ORDER — ASPIRIN 325 MG
325 TABLET ORAL
Status: DISCONTINUED | OUTPATIENT
Start: 2019-02-26 | End: 2019-02-26

## 2019-02-26 RX ORDER — SPIRONOLACTONE 25 MG/1
25 TABLET ORAL EVERY OTHER DAY
Status: DISCONTINUED | OUTPATIENT
Start: 2019-02-27 | End: 2019-02-27 | Stop reason: HOSPADM

## 2019-02-26 RX ORDER — DIPHENHYDRAMINE HCL 25 MG
50 CAPSULE ORAL
Status: DISCONTINUED | OUTPATIENT
Start: 2019-02-26 | End: 2019-02-27 | Stop reason: HOSPADM

## 2019-02-26 RX ORDER — CELECOXIB 100 MG/1
100 CAPSULE ORAL DAILY
Status: DISCONTINUED | OUTPATIENT
Start: 2019-02-27 | End: 2019-02-27 | Stop reason: HOSPADM

## 2019-02-26 RX ORDER — ACETAMINOPHEN 500 MG
1000 TABLET ORAL
Status: COMPLETED | OUTPATIENT
Start: 2019-02-26 | End: 2019-02-26

## 2019-02-26 RX ORDER — IRBESARTAN 150 MG/1
300 TABLET ORAL NIGHTLY
Status: DISCONTINUED | OUTPATIENT
Start: 2019-02-26 | End: 2019-02-27 | Stop reason: HOSPADM

## 2019-02-26 RX ORDER — LEVOTHYROXINE SODIUM 25 UG/1
25 TABLET ORAL DAILY
Status: DISCONTINUED | OUTPATIENT
Start: 2019-02-27 | End: 2019-02-27 | Stop reason: HOSPADM

## 2019-02-26 RX ORDER — ISOSORBIDE MONONITRATE 30 MG/1
60 TABLET, EXTENDED RELEASE ORAL DAILY
Status: DISCONTINUED | OUTPATIENT
Start: 2019-02-27 | End: 2019-02-27 | Stop reason: HOSPADM

## 2019-02-26 RX ORDER — ATORVASTATIN CALCIUM 20 MG/1
80 TABLET, FILM COATED ORAL DAILY
Status: DISCONTINUED | OUTPATIENT
Start: 2019-02-27 | End: 2019-02-27

## 2019-02-26 RX ORDER — ATORVASTATIN CALCIUM 10 MG/1
40 TABLET, FILM COATED ORAL DAILY
Status: DISCONTINUED | OUTPATIENT
Start: 2019-02-26 | End: 2019-02-26

## 2019-02-26 RX ORDER — HEPARIN SODIUM,PORCINE/D5W 25000/250
12 INTRAVENOUS SOLUTION INTRAVENOUS CONTINUOUS
Status: DISCONTINUED | OUTPATIENT
Start: 2019-02-26 | End: 2019-02-27

## 2019-02-26 RX ORDER — DIPHENHYDRAMINE HYDROCHLORIDE 50 MG/ML
50 INJECTION INTRAMUSCULAR; INTRAVENOUS
Status: DISCONTINUED | OUTPATIENT
Start: 2019-02-26 | End: 2019-02-27 | Stop reason: HOSPADM

## 2019-02-26 RX ORDER — NAPROXEN SODIUM 220 MG/1
81 TABLET, FILM COATED ORAL DAILY
Status: DISCONTINUED | OUTPATIENT
Start: 2019-02-27 | End: 2019-02-26 | Stop reason: SDUPTHER

## 2019-02-26 RX ORDER — NITROGLYCERIN 0.4 MG/1
0.4 TABLET SUBLINGUAL EVERY 5 MIN PRN
Status: DISCONTINUED | OUTPATIENT
Start: 2019-02-26 | End: 2019-02-27 | Stop reason: HOSPADM

## 2019-02-26 RX ORDER — CLONAZEPAM 0.5 MG/1
0.5 TABLET ORAL 2 TIMES DAILY PRN
Status: DISCONTINUED | OUTPATIENT
Start: 2019-02-26 | End: 2019-02-27 | Stop reason: HOSPADM

## 2019-02-26 RX ORDER — PANTOPRAZOLE SODIUM 40 MG/1
40 TABLET, DELAYED RELEASE ORAL DAILY
Status: DISCONTINUED | OUTPATIENT
Start: 2019-02-27 | End: 2019-02-27 | Stop reason: HOSPADM

## 2019-02-26 RX ORDER — OXYCODONE AND ACETAMINOPHEN 5; 325 MG/1; MG/1
1 TABLET ORAL EVERY 4 HOURS PRN
Status: DISCONTINUED | OUTPATIENT
Start: 2019-02-26 | End: 2019-02-27 | Stop reason: HOSPADM

## 2019-02-26 RX ADMIN — OXYCODONE HYDROCHLORIDE AND ACETAMINOPHEN 1 TABLET: 5; 325 TABLET ORAL at 08:02

## 2019-02-26 RX ADMIN — SODIUM CHLORIDE 500 ML: 0.9 INJECTION, SOLUTION INTRAVENOUS at 02:02

## 2019-02-26 RX ADMIN — CLONAZEPAM 0.5 MG: 0.5 TABLET ORAL at 02:02

## 2019-02-26 RX ADMIN — TICAGRELOR 180 MG: 90 TABLET ORAL at 06:02

## 2019-02-26 RX ADMIN — DEXTROSE MONOHYDRATE 50 G: 500 INJECTION PARENTERAL at 07:02

## 2019-02-26 RX ADMIN — METOPROLOL TARTRATE 25 MG: 25 TABLET, FILM COATED ORAL at 06:02

## 2019-02-26 RX ADMIN — GABAPENTIN 100 MG: 100 CAPSULE ORAL at 09:02

## 2019-02-26 RX ADMIN — ACETAMINOPHEN 1000 MG: 500 TABLET ORAL at 02:02

## 2019-02-26 RX ADMIN — Medication 12 UNITS/KG/HR: at 06:02

## 2019-02-26 RX ADMIN — LIDOCAINE 1 PATCH: 50 PATCH CUTANEOUS at 02:02

## 2019-02-26 RX ADMIN — IRBESARTAN 300 MG: 300 TABLET ORAL at 09:02

## 2019-02-26 RX ADMIN — INSULIN HUMAN 10 UNITS: 100 INJECTION, SOLUTION PARENTERAL at 07:02

## 2019-02-26 NOTE — ED NOTES
LOC: The patient is awake, alert and aware of environment with an appropriate affect, the patient is oriented x 3 and speaking appropriately.  APPEARANCE: Patient resting comfortably and in no acute distress, patient is clean and well groomed, patient's clothing is properly fastened.  SKIN: The skin is warm and dry, patient has normal skin turgor and moist mucus membranes, skin intact, no breakdown or brusing noted.  MUSCULOSKELETAL: Patient moving all extremities well, no obvious swelling or deformities noted.  RESPIRATORY: Airway is open and patent, respirations are spontaneous, patient has a normal effort and rate. Breath sounds are clear and equal bilaterally. Denies shortness of breath or cough.   CARDIAC: Normal heart sounds. No peripheral edema. Patient denies chest pain at this time.   ABDOMEN: Soft and non tender to palpation, no distention noted. Bowel sounds present.

## 2019-02-26 NOTE — ED PROVIDER NOTES
"Encounter Date: 2/26/2019       History     Chief Complaint   Patient presents with    Chest Pain     left side chest pain that started one hour ago. SL nitro x 3 PTA with minimal relief.      Ms Olsen is a 89YOWF who presents for chest pain since this morning; pertinent PMHx GERD w/ esophagitis, CAD, HTN, HLD, PVD, spinal stenosis. Patient was dusting her windowsill around 1000 when she began experiencing left substernal chest pain described as "twisting". No radiation, no associated symptoms. Pain has been constant since and is present on arrival; unchanged with SL NG x 3. Patient had 325mg ASA total since this morning. Patient describes similar pain nightly on occasion when she lays down; this is sometimes alleviated with Pepto Bismol. Otherwise, she has been in usual state of health over the past few days and denies cough, fever/chills, abdominal pain, new back pain (experiences chronic back pain at baseline), dizziness, headache, confusion, chest trauma, leg swelling.           Review of patient's allergies indicates:   Allergen Reactions    Iodinated contrast- oral and iv dye Swelling    Augmentin [amoxicillin-pot clavulanate] Diarrhea    Reglan [metoclopramide] Other (See Comments)     "Body shaking"    Sulfa (sulfonamide antibiotics)      Yrs ago     Past Medical History:   Diagnosis Date    Acid reflux     chronic    Anemia 6/24/2014    Arthritis     osetoarthritis    Clotting disorder     Colon polyp     Coronary artery disease     Nonobstructive CAD per [facility]    History of colonoscopy     1 polyp noted in 2009    Hyperlipemia     Hypertension     Migraine headache     chronic    Peripheral vascular disease     PONV (postoperative nausea and vomiting)     Renal artery atherosclerosis 11/11/2012    Spinal stenosis     Thyroid disease      Past Surgical History:   Procedure Laterality Date    acf      ANTERIOR CERVICAL DISCECTOMY W/ FUSION      APPENDECTOMY  1942    BLADDER SURGERY  " 1958    suspension    COLONOSCOPY N/A 10/1/2014    Performed [facility]ann [facility] at [facility] (4TH FLR)    COLONOSCOPY W/ POLYPECTOMY      CYST REMOVAL  1983    removed from scalp    DISC REMOVAL  1983    ESOPHAGOGASTRODUODENOSCOPY (EGD) N/A 10/1/2014    Performed by [facility]un [facility]ccann [facility] at [facility] (4TH FLR)    EYE SURGERY      cataracts removed    HYSTERECTOMY  1959    COMPLETE    JOINT REPLACEMENT  2006    repair 3rd toe    JOINT REPLACEMENT  2007    right/left rotator    PLACEMENT, TRANSOBTURATOR TAPE N/A 11/20/2012    Performed by Concetta Kirk [facility] at [facility] OR 2ND FLR    REPAIR, CYSTOCELE N/A 11/20/2012    Performed [facility]etta Kirk [facility] at [facility] OR 2ND FLR    SPINE SURGERY  02/2014    relieve pressure on nerves    TONSILLECTOMY, ADENOIDECTOMY  1930's    UTERINE FIBROID SURGERY  1958    VARICOSE VEIN SURGERY  1958     Family History   Problem Relation Age of Onset    Heart disease Father         aorta burst    Heart disease Mother         stroke    Kidney disease Brother     Cancer Brother     Lung cancer Brother     Cancer Brother     Leukemia Brother     Heart disease Brother     Cancer Brother     Heart disease Maternal Grandmother     Kidney disease Maternal Grandfather     Uterine cancer Paternal Grandmother     Anesthesia problems Neg Hx     Clotting disorder Neg Hx      problems Neg Hx     Hypertension Neg Hx     Kidney cancer Neg Hx     Malignant hyperthermia Neg Hx     Prostate cancer Neg Hx     Sickle cell trait Neg Hx     Lupus Neg Hx     Sudden death Neg Hx     Urolithiasis Neg Hx     Colon cancer Neg Hx     Esophageal cancer Neg Hx     Irritable bowel syndrome Neg Hx     Rectal cancer Neg Hx     Stomach cancer Neg Hx     Ulcerative colitis Neg Hx     Celiac disease Neg Hx     Cystic fibrosis Neg Hx      Social History     Tobacco Use    Smoking status: Former Smoker     Packs/day: 1.00     Years: 29.00     Pack years: 29.00     Last attempt  to quit: 9/10/1969     Years since quittin.5    Smokeless tobacco: Never Used    Tobacco comment: The patient lives alone and is able to accomplish a usual activities of daily living.  She is not that active due to the severity of her back pain.   Substance Use Topics    Alcohol use: No    Drug use: No     Review of Systems   Constitutional: Negative for chills and fever.   HENT: Negative for congestion, sinus pressure, sore throat and trouble swallowing.    Eyes: Negative for visual disturbance.   Respiratory: Negative for cough and shortness of breath.    Cardiovascular: Positive for chest pain. Negative for palpitations and leg swelling.   Gastrointestinal: Negative for abdominal pain, constipation, diarrhea, nausea and vomiting.   Genitourinary: Negative for flank pain and pelvic pain.   Musculoskeletal: Negative for back pain and neck pain.   Skin: Negative for pallor and rash.   Neurological: Negative for dizziness, syncope, weakness, numbness and headaches.   Psychiatric/Behavioral: Negative for agitation and confusion.       Physical Exam     Initial Vitals [19 1132]   BP Pulse Resp Temp SpO2   (!) 160/70 76 14 98.4 °F (36.9 °C) 98 %      MAP       --         Physical Exam    Vitals reviewed.  Constitutional: She appears well-developed. She is not diaphoretic. No distress.   Non-toxic appearing female in NAD, VSS, afebrile, 98% on RA.     HENT:   Head: Normocephalic and atraumatic.   Right Ear: External ear normal.   Left Ear: External ear normal.   Nose: Nose normal.   Mouth/Throat: Oropharynx is clear and moist. No oropharyngeal exudate.   Eyes: Conjunctivae and EOM are normal. Pupils are equal, round, and reactive to light. No scleral icterus.   Neck: Normal range of motion. Neck supple. JVD present.   Cardiovascular: Normal rate, regular rhythm and intact distal pulses.   Distant heart sounds  Radial pulses equal B/L   Pulmonary/Chest: Breath sounds normal. No respiratory distress. She has  no wheezes. She has no rhonchi. She has no rales. She exhibits no tenderness (not worsened with palpation).   nitropatch applied to chest   Abdominal: Soft. She exhibits no distension. There is no tenderness. There is no rebound and no guarding.   Musculoskeletal: Normal range of motion. She exhibits no edema.   Neurological: She is alert and oriented to person, place, and time. She has normal strength. No cranial nerve deficit or sensory deficit.   No focal nor global neuro deficits, strength 5/5 throughout   Skin: Skin is warm and dry. Capillary refill takes less than 2 seconds. No rash noted. No erythema. No pallor.   Psychiatric: She has a normal mood and affect. Her behavior is normal. Judgment and thought content normal.         ED Course   Procedures  Labs Reviewed   CBC W/ AUTO DIFFERENTIAL - Abnormal; Notable for the following components:       Result Value    RBC 3.44 (*)     Hemoglobin 10.1 (*)     Hematocrit 30.7 (*)     RDW 14.9 (*)     Lymph # 0.9 (*)     Lymph% 17.5 (*)     All other components within normal limits   COMPREHENSIVE METABOLIC PANEL - Abnormal; Notable for the following components:    Sodium 134 (*)     Potassium 5.2 (*)     CO2 22 (*)     BUN, Bld 30 (*)     ALT 5 (*)     eGFR if  51.1 (*)     eGFR if non  44.3 (*)     All other components within normal limits   B-TYPE NATRIURETIC PEPTIDE - Abnormal; Notable for the following components:     (*)     All other components within normal limits   CBC W/ AUTO DIFFERENTIAL - Abnormal; Notable for the following components:    RBC 3.12 (*)     Hemoglobin 9.3 (*)     Hematocrit 27.8 (*)     RDW 14.7 (*)     Platelets 142 (*)     Lymph # 0.8 (*)     Mono # 0.2 (*)     Gran% 80.5 (*)     Lymph% 14.1 (*)     Mono% 3.4 (*)     All other components within normal limits    Narrative:     ADD-ON [facility] #[id number] PER HIEN MELVIN MD 19:25  02/26/2019    HEMOGLOBIN A1C - Abnormal; Notable for the following  components:    Hemoglobin A1C 5.7 (*)     All other components within normal limits    Narrative:     ADD-ON [facility] #[id number] PER HIEN MELVIN MD 19:25  02/26/2019    APTT - Abnormal; Notable for the following components:    aPTT 118.6 (*)     All other components within normal limits   ISTAT PROCEDURE - Abnormal; Notable for the following components:    POC Glucose 131 (*)     POC Sodium 135 (*)     POC Potassium 5.7 (*)     POC TCO2 (MEASURED) 22 (*)     POC Hematocrit 29 (*)     All other components within normal limits   POCT GLUCOSE - Abnormal; Notable for the following components:    POCT Glucose 132 (*)     All other components within normal limits   TROPONIN I   TROPONIN I   TROPONIN I    Narrative:     ADD-ON [facility] #[id number] PER HIEN MELVIN MD 19:25  02/26/2019    HEMOGLOBIN A1C   TROPONIN I    Narrative:     ADD-ON [facility] #[id number] PER HIEN MELVIN MD 19:25  02/26/2019    TROPONIN I    Narrative:     ADD-ON [facility] #[id number] PER HIEN MELVIN MD 19:25  02/26/2019    PROTIME-INR   TYPE & SCREEN     EKG Readings: (Independently Interpreted)   EKG:  Sinus rhythm at 73, first-degree AV block, left axis deviation, left bundle branch block, no ischemic changes       Imaging Results          X-Ray Chest PA And Lateral (Final result)  Result time 02/26/19 13:05:39    Final result by Adryan Oliva MD (02/26/19 13:05:39)                 Impression:      See above      Electronically signed by: Adryan Oliva MD  Date:    02/26/2019  Time:    13:05             Narrative:    EXAMINATION:  XR CHEST PA AND LATERAL    CLINICAL HISTORY:  Chest Pain;    TECHNIQUE:  PA and lateral views of the chest were performed.    COMPARISON:  Non 09/09/2015 CT scan of the chest e    FINDINGS:  Postoperative changes in the cervical spine.  Heart size normal.  The lungs are clear.  No pleural effusion..  Old fracture of the right humeral neck identified                              X-Rays:   Independently Interpreted Readings:    Chest X-Ray: Normal heart size.  No infiltrates.  No acute abnormalities.     Medical Decision Making:   History:   Old Medical Records: I decided to obtain old medical records.  Old Records Summarized: records from clinic visits and records from previous admission(s).  Initial Assessment:   Patient presents for exertional chest pain not relieved with SL NG x 3. VSS, afebrile, no concurrent symptoms.   Differential Diagnosis:   DDX includes ACS, MSK chest wall pain, GERD. Physical exam and history taking lower clinical suspicion for PNA, pleurisy, PE, pericarditis.   Independently Interpreted Test(s):   I have ordered and independently interpreted X-rays - see prior notes.  I have ordered and independently interpreted EKG Reading(s) - see prior notes  Clinical Tests:   Lab Tests: Ordered and Reviewed  Radiological Study: Ordered and Reviewed  Medical Tests: Ordered and Reviewed  ED Management:  EKG is largely unchanged from prior, LBBB still presents with downsloping ST depressions in lateral leads, present from prior. Initial trop negative, will trend. Labs are largely unchanged from baseline, mild hyperkalemia at 5.2, given 500NS bolus. BNP near baseline. [place] films of chest negative for acute findings. HEART score 7, I suspect patient should come in for observation for troponin trend with known 40% nonobstructive mid LAD stenosis seen on 2012 cath.     Dejah Patiño PA-C  02/26/2019    I discussed the following case, diagnosis and plan of care with attending physician.      Additional MDM:   Heart Score:    History:          Highly suspicious.  ECG:             Nonspecific repolarisation disturbance  Age:               >65 years  Risk factors: >= 3 risk factors or history of atherosclerotic disease  Troponin:       Less than or equal to normal limit  Final Score: 7               Attending Attestation:     Physician Attestation Statement for NP/PA:   I discussed this assessment and plan of this patient with  the NP/PA, but I did not personally examine the patient. The face to face encounter was performed by the NP/PA.    Other NP/PA Attestation Additions:    History of Present Illness: 89 F CAD, HTN, HLD, PVD here with left-sided chest pain on exertion.    Medical Decision Making: Initial EKG with left bundle, does not meet [place] criteria.  Initial troponin negative.  Will place in observation for continued troponin trend.  Heart score 7                    Clinical Impression:       ICD-10-CM ICD-9-CM   1. Chest pain R07.9 786.50   2. Coronary artery disease, angina presence unspecified, unspecified vessel or lesion type, unspecified whether native or transplanted heart I25.10 414.00   3. Hyperlipidemia, unspecified hyperlipidemia type E78.5 272.4   4. Gastroesophageal reflux disease with esophagitis K21.0 530.11   5. Unstable angina I20.0 411.1   6. Non-ST elevation myocardial infarction (NSTEMI) I21.4 410.70   7. NSTEMI (non-ST elevation myocardial infarction) I21.4 410.70   8. Peripheral vascular disease of lower extremity with ulceration I73.9 443.9    L97.909 707.10         Disposition:   Disposition: Placed in Observation  Condition: Stable                        Dejah Patiño PA-C  02/26/19 1418       Brigitte Calderón MD  02/26/19 1427       Brigitte Calderón MD  03/09/19 0104

## 2019-02-26 NOTE — TELEPHONE ENCOUNTER
----- Message from Thania Lambert sent at 2/26/2019 10:38 AM CST -----  Contact: Pt daughter Viviana   Pt daughter stated her mom is having chest pain and wondering  is she should continue taking nitroGLYCERIN (NITROSTAT) 0.4 MG SL tablet. Last visit 2/19/19 . Please call pt daughter Viviana @ [phone]. Thank you.

## 2019-02-26 NOTE — PROVIDER PROGRESS NOTES - EMERGENCY DEPT.
Encounter Date: 2/26/2019    ED Physician Progress Notes       SCRIBE NOTE: I, Yuri Mon, am scribing for, and in the presence of,  Dr. Neff.  Physician Statement: I, Dr. Neff, personally performed the services described in this documentation as scribed by Yuri Mon in my presence, and it is both accurate and complete.     Physician Note:   Left bundle branch block present on prior.    EKG - STEMI Decision  Initial Reading: No STEMI present.

## 2019-02-26 NOTE — TELEPHONE ENCOUNTER
Pt's daughter ,Viviana,called to report that her mother has been having c/p on and off this morning.She took 3 Ntg tabs and she reports she is still having the c/p.Instructed pt's daughter to call 911 and she will have her mother take her daily baby ASA.Pt and Viviana report understanding of these instructions.

## 2019-02-27 ENCOUNTER — CLINICAL SUPPORT (OUTPATIENT)
Dept: CARDIOLOGY | Facility: CLINIC | Age: 84
DRG: 303 | End: 2019-02-27
Payer: MEDICARE

## 2019-02-27 VITALS
DIASTOLIC BLOOD PRESSURE: 70 MMHG | RESPIRATION RATE: 16 BRPM | OXYGEN SATURATION: 98 % | SYSTOLIC BLOOD PRESSURE: 156 MMHG | HEIGHT: 59 IN | TEMPERATURE: 98 F | BODY MASS INDEX: 21.57 KG/M2 | WEIGHT: 107 LBS | HEART RATE: 60 BPM

## 2019-02-27 LAB
ANION GAP SERPL CALC-SCNC: 8 MMOL/L
APTT BLDCRRT: 52.7 SEC
APTT BLDCRRT: 57.8 SEC
BASOPHILS # BLD AUTO: 0.03 K/UL
BASOPHILS NFR BLD: 0.7 %
BUN SERPL-MCNC: 22 MG/DL
CALCIUM SERPL-MCNC: 8.9 MG/DL
CHLORIDE SERPL-SCNC: 107 MMOL/L
CHOLEST SERPL-MCNC: 125 MG/DL
CHOLEST/HDLC SERPL: 3 {RATIO}
CO2 SERPL-SCNC: 19 MMOL/L
CREAT SERPL-MCNC: 0.9 MG/DL
DIFFERENTIAL METHOD: ABNORMAL
EOSINOPHIL # BLD AUTO: 0.1 K/UL
EOSINOPHIL NFR BLD: 3.1 %
ERYTHROCYTE [DISTWIDTH] IN BLOOD BY AUTOMATED COUNT: 14.6 %
EST. GFR  (AFRICAN AMERICAN): >60 ML/MIN/1.73 M^2
EST. GFR  (NON AFRICAN AMERICAN): 56.5 ML/MIN/1.73 M^2
GLUCOSE SERPL-MCNC: 139 MG/DL
HCT VFR BLD AUTO: 29 %
HDLC SERPL-MCNC: 42 MG/DL
HDLC SERPL: 33.6 %
HGB BLD-MCNC: 9.3 G/DL
IMM GRANULOCYTES # BLD AUTO: 0.02 K/UL
IMM GRANULOCYTES NFR BLD AUTO: 0.4 %
LDLC SERPL CALC-MCNC: 71.6 MG/DL
LYMPHOCYTES # BLD AUTO: 1.3 K/UL
LYMPHOCYTES NFR BLD: 29.8 %
MCH RBC QN AUTO: 28.7 PG
MCHC RBC AUTO-ENTMCNC: 32.1 G/DL
MCV RBC AUTO: 90 FL
MONOCYTES # BLD AUTO: 0.3 K/UL
MONOCYTES NFR BLD: 6.5 %
NEUTROPHILS # BLD AUTO: 2.7 K/UL
NEUTROPHILS NFR BLD: 59.5 %
NONHDLC SERPL-MCNC: 83 MG/DL
NRBC BLD-RTO: 0 /100 WBC
PLATELET # BLD AUTO: 144 K/UL
PMV BLD AUTO: 11 FL
POTASSIUM SERPL-SCNC: 4.7 MMOL/L
RBC # BLD AUTO: 3.24 M/UL
SODIUM SERPL-SCNC: 134 MMOL/L
TRIGL SERPL-MCNC: 57 MG/DL
TROPONIN I SERPL DL<=0.01 NG/ML-MCNC: <0.006 NG/ML
WBC # BLD AUTO: 4.46 K/UL

## 2019-02-27 PROCEDURE — 99223 1ST HOSP IP/OBS HIGH 75: CPT | Mod: GC,,, | Performed by: INTERNAL MEDICINE

## 2019-02-27 PROCEDURE — 85730 THROMBOPLASTIN TIME PARTIAL: CPT

## 2019-02-27 PROCEDURE — 85025 COMPLETE CBC W/AUTO DIFF WBC: CPT

## 2019-02-27 PROCEDURE — 99239 HOSP IP/OBS DSCHRG MGMT >30: CPT | Mod: ,,, | Performed by: HOSPITALIST

## 2019-02-27 PROCEDURE — 99223 PR INITIAL HOSPITAL CARE,LEVL III: ICD-10-PCS | Mod: GC,,, | Performed by: INTERNAL MEDICINE

## 2019-02-27 PROCEDURE — 93010 EKG 12-LEAD: ICD-10-PCS | Mod: ,,, | Performed by: INTERNAL MEDICINE

## 2019-02-27 PROCEDURE — 93926 LOWER EXTREMITY STUDY: CPT | Mod: 26,LT,, | Performed by: INTERNAL MEDICINE

## 2019-02-27 PROCEDURE — 36415 COLL VENOUS BLD VENIPUNCTURE: CPT

## 2019-02-27 PROCEDURE — 93010 ELECTROCARDIOGRAM REPORT: CPT | Mod: ,,, | Performed by: INTERNAL MEDICINE

## 2019-02-27 PROCEDURE — 99239 PR HOSPITAL DISCHARGE DAY,>30 MIN: ICD-10-PCS | Mod: ,,, | Performed by: HOSPITALIST

## 2019-02-27 PROCEDURE — 25000003 PHARM REV CODE 250: Performed by: HOSPITALIST

## 2019-02-27 PROCEDURE — 80061 LIPID PANEL: CPT

## 2019-02-27 PROCEDURE — 93926 LOWER EXTREMITY STUDY: CPT | Mod: LT

## 2019-02-27 PROCEDURE — 93005 ELECTROCARDIOGRAM TRACING: CPT

## 2019-02-27 PROCEDURE — 80048 BASIC METABOLIC PNL TOTAL CA: CPT

## 2019-02-27 PROCEDURE — 93926 CV US DOPPLER ARTERIAL LEG LEFT (CUPID ONLY): ICD-10-PCS | Mod: 26,LT,, | Performed by: INTERNAL MEDICINE

## 2019-02-27 RX ORDER — ATORVASTATIN CALCIUM 20 MG/1
40 TABLET, FILM COATED ORAL DAILY
Status: DISCONTINUED | OUTPATIENT
Start: 2019-02-27 | End: 2019-02-27 | Stop reason: HOSPADM

## 2019-02-27 RX ORDER — METOPROLOL SUCCINATE 25 MG/1
25 TABLET, EXTENDED RELEASE ORAL DAILY
Qty: 30 TABLET | Refills: 11 | Status: SHIPPED | OUTPATIENT
Start: 2019-02-27 | End: 2019-10-11 | Stop reason: SDUPTHER

## 2019-02-27 RX ORDER — AMLODIPINE BESYLATE 10 MG/1
10 TABLET ORAL DAILY
Qty: 30 TABLET | Refills: 11 | Status: SHIPPED | OUTPATIENT
Start: 2019-02-28 | End: 2019-10-11 | Stop reason: SDUPTHER

## 2019-02-27 RX ORDER — AMLODIPINE BESYLATE 10 MG/1
10 TABLET ORAL DAILY
Status: DISCONTINUED | OUTPATIENT
Start: 2019-02-27 | End: 2019-02-27 | Stop reason: HOSPADM

## 2019-02-27 RX ADMIN — OXYCODONE HYDROCHLORIDE AND ACETAMINOPHEN 1 TABLET: 5; 325 TABLET ORAL at 02:02

## 2019-02-27 RX ADMIN — LIDOCAINE 2 PATCH: 50 PATCH TOPICAL at 02:02

## 2019-02-27 RX ADMIN — SERTRALINE HYDROCHLORIDE 50 MG: 50 TABLET ORAL at 09:02

## 2019-02-27 RX ADMIN — TICAGRELOR 90 MG: 90 TABLET ORAL at 06:02

## 2019-02-27 RX ADMIN — OXYCODONE HYDROCHLORIDE AND ACETAMINOPHEN 1 TABLET: 5; 325 TABLET ORAL at 07:02

## 2019-02-27 RX ADMIN — SPIRONOLACTONE 25 MG: 25 TABLET, FILM COATED ORAL at 10:02

## 2019-02-27 RX ADMIN — ISOSORBIDE MONONITRATE 60 MG: 30 TABLET, EXTENDED RELEASE ORAL at 09:02

## 2019-02-27 RX ADMIN — PANTOPRAZOLE SODIUM 40 MG: 40 TABLET, DELAYED RELEASE ORAL at 09:02

## 2019-02-27 RX ADMIN — ATORVASTATIN CALCIUM 40 MG: 20 TABLET, FILM COATED ORAL at 10:02

## 2019-02-27 RX ADMIN — GABAPENTIN 100 MG: 100 CAPSULE ORAL at 10:02

## 2019-02-27 RX ADMIN — CLONAZEPAM 0.5 MG: 0.5 TABLET ORAL at 12:02

## 2019-02-27 RX ADMIN — ASPIRIN 81 MG: 81 TABLET, COATED ORAL at 10:02

## 2019-02-27 RX ADMIN — CELECOXIB 100 MG: 100 CAPSULE ORAL at 11:02

## 2019-02-27 RX ADMIN — OXYCODONE HYDROCHLORIDE AND ACETAMINOPHEN 1 TABLET: 5; 325 TABLET ORAL at 01:02

## 2019-02-27 RX ADMIN — AMLODIPINE BESYLATE 10 MG: 10 TABLET ORAL at 09:02

## 2019-02-27 RX ADMIN — LEVOTHYROXINE SODIUM 25 MCG: 25 TABLET ORAL at 07:02

## 2019-02-27 NOTE — SUBJECTIVE & OBJECTIVE
"Past Medical History:   Diagnosis Date    Acid reflux     chronic    Anemia 6/24/2014    Arthritis     osetoarthritis    Clotting disorder     Colon polyp     Coronary artery disease     Nonobstructive CAD per [facility]    History of colonoscopy     1 polyp noted in 2009    Hyperlipemia     Hypertension     Migraine headache     chronic    Peripheral vascular disease     PONV (postoperative nausea and vomiting)     Renal artery atherosclerosis 11/11/2012    Spinal stenosis     Thyroid disease        Past Surgical History:   Procedure Laterality Date    acf      ANTERIOR CERVICAL DISCECTOMY W/ FUSION      APPENDECTOMY  1942    BLADDER SURGERY  1958    suspension    COLONOSCOPY N/A 10/1/2014    Performed by Deshaun Mccann MD at [facility] (4TH FLR)    COLONOSCOPY W/ POLYPECTOMY      CYST REMOVAL  1983    removed from scalp    DISC REMOVAL  1983    ESOPHAGOGASTRODUODENOSCOPY (EGD) N/A 10/1/2014    Per[facility] at [facility] (4TH FLR)    EYE SURGERY      cataracts removed    HYSTERECTOMY  1959    COMPLETE    JOINT REPLACEMENT  2006    repair 3rd toe    JOINT REPLACEMENT  2007    right/left rotator    PLACEMENT, TRANSOBTURATOR TAPE N/A 11/20/2012    Performed by Concetta Kirk MD at [facility] 2ND FLR    REPAIR, CYSTOCELE N/A 11/20/2012    Performed by Concetta Kirk MD at [facility] 2ND FLR    SPINE SURGERY  02/2014    relieve pressure on nerves    TONSILLECTOMY, ADENOIDECTOMY  1930's    UTERINE FIBROID SURGERY  1958    VARICOSE VEIN SURGERY  1958       Review of patient's allergies indicates:   Allergen Reactions    Iodinated contrast- oral and iv dye Swelling    Augmentin [amoxicillin-pot clavulanate] Diarrhea    Reglan [metoclopramide] Other (See Comments)     "Body shaking"    Sulfa (sulfonamide antibiotics)      Yrs ago       PTA Medications   Medication Sig    amLODIPine (NORVASC) 5 MG tablet TAKE 1 TO 2 PER DAY OR AS DIRECTED    aspirin (ECOTRIN) 81 MG EC " tablet Take 81 mg by mouth once daily.      atorvastatin (LIPITOR) 80 MG tablet TAKE (1/2) HALF BY MOUTH ONCE A DAY    celecoxib (CELEBREX) 200 MG capsule Take 1-2 capsules by mouth daily as needed.    clonazePAM (KLONOPIN) 0.5 MG tablet Take 0.5 mg by mouth 2 (two) times daily as needed for Anxiety. Pt taking prn.    ERGOCALCIFEROL, VITAMIN D2, (VITAMIN D ORAL) Take 6,000 mg by mouth once daily.     gabapentin (NEURONTIN) 100 MG capsule TAKE ONE BY MOUTH THREE TIMES DAILY    irbesartan (AVAPRO) 300 MG tablet TAKE ONE BY MOUTH EVERY EVENING    isosorbide mononitrate (IMDUR) 60 MG 24 hr tablet Take 1 tablet (60 mg total) by mouth once daily.    levothyroxine (SYNTHROID) 25 MCG tablet TAKE ONE DAILY    nitroGLYCERIN (NITROSTAT) 0.4 MG SL tablet Place 1 tablet (0.4 mg total) under the tongue every 5 (five) minutes as needed.    omeprazole (PRILOSEC) 20 MG capsule Take 20 mg by mouth once daily.     PREMARIN vaginal cream Pt using prn.    sertraline (ZOLOFT) 100 MG tablet Take 50 mg by mouth once daily.     spironolactone (ALDACTONE) 25 MG tablet Take 1 tablet (25 mg total) by mouth once daily.    VIT C/VIT E/LUTEIN/MIN/OMEGA-3 (OCUVITE ORAL) Take 1 tablet by mouth once daily.    zolpidem (AMBIEN) 5 MG Tab Take one half per night as needed (Patient taking differently: every evening. )     Family History     Problem Relation (Age of Onset)    Cancer Brother, Brother, Brother    Heart disease Father, Mother, Brother, Maternal Grandmother    Kidney disease Brother, Maternal Grandfather    Leukemia Brother    Lung cancer Brother    Uterine cancer Paternal Grandmother        Tobacco Use    Smoking status: Former Smoker     Packs/day: 1.00     Years: 29.00     Pack years: 29.00     Last attempt to quit: 9/10/1969     Years since quittin.4    Smokeless tobacco: Never Used    Tobacco comment: The patient lives alone and is able to accomplish a usual activities of daily living.  She is not that active due  to the severity of her back pain.   Substance and Sexual Activity    Alcohol use: No    Drug use: No    Sexual activity: No     Review of Systems   Constitution: Negative for chills and fever.   Cardiovascular: Negative for chest pain, dyspnea on exertion, palpitations and syncope.   Respiratory: Negative for shortness of breath.    Musculoskeletal: Positive for arthritis and back pain.   Gastrointestinal: Negative for abdominal pain, hematochezia, nausea and vomiting.   Genitourinary: Negative for dysuria.   Neurological: Positive for headaches.     Objective:     Vital Signs (Most Recent):  Temp: 97.7 °F (36.5 °C) (02/27/19 0455)  Pulse: 65 (02/27/19 0455)  Resp: 18 (02/27/19 0455)  BP: 135/62 (02/27/19 0455)  SpO2: 95 % (02/27/19 0455) Vital Signs (24h Range):  Temp:  [96.6 °F (35.9 °C)-98.5 °F (36.9 °C)] 97.7 °F (36.5 °C)  Pulse:  [62-84] 65  Resp:  [14-19] 18  SpO2:  [95 %-98 %] 95 %  BP: (135-171)/(62-79) 135/62     Weight: 48.1 kg (106 lb)  Body mass index is 21.41 kg/m².    SpO2: 95 %  O2 Device (Oxygen Therapy): room air      Intake/Output Summary (Last 24 hours) at 2/27/2019 0712  Last data filed at 2/27/2019 0000  Gross per 24 hour   Intake 30 ml   Output --   Net 30 ml       Lines/Drains/Airways     Peripheral Intravenous Line                 Peripheral IV - Single Lumen 02/26/19 Left Forearm 1 day         Peripheral IV - Single Lumen 02/26/19 1949 Left Antecubital less than 1 day                Physical Exam   Constitutional: She is oriented to person, place, and time. She appears well-developed and well-nourished.   HENT:   Head: Normocephalic and atraumatic.   Eyes: EOM are normal. Pupils are equal, round, and reactive to light.   Neck: Normal range of motion. Neck supple. No JVD present.   Cardiovascular: Normal rate, regular rhythm and normal heart sounds.   Right sided carotid bruits  DP on left foot is 2+, PT on left is 1+  On right PT is 1+ and DP 2+  Femoral and pop bilaterally 2+    Pulmonary/Chest: Effort normal and breath sounds normal.   Abdominal: Soft. Bowel sounds are normal.   Musculoskeletal: Normal range of motion. She exhibits no edema.   Has a anterior lesion noted on her left shin. Not infected   Neurological: She is alert and oriented to person, place, and time.   Vitals reviewed.      Significant Labs:   CMP   Recent Labs   Lab 02/26/19  1205   *   K 5.2*      CO2 22*   GLU 93   BUN 30*   CREATININE 1.1   CALCIUM 8.9   PROT 6.5   ALBUMIN 3.8   BILITOT 0.4   ALKPHOS 89   AST 11   ALT 5*   ANIONGAP 8   ESTGFRAFRICA 51.1*   EGFRNONAA 44.3*   , CBC   Recent Labs   Lab 02/26/19  1205 02/26/19  1829  02/27/19  0531   WBC 5.32 5.52  --  4.46   HGB 10.1* 9.3*  --  9.3*   HCT 30.7* 27.8*   < > 29.0*    142*  --  144*    < > = values in this interval not displayed.    and All pertinent lab results from the last 24 hours have been reviewed.    Significant Imaging: Echocardiogram: Transthoracic echo (TTE) complete (Cupid Only): No results found for this or any previous visit. and X-Ray: CXR: X-Ray Chest 1 View (CXR): No results found for this visit on 02/26/19.

## 2019-02-27 NOTE — DISCHARGE SUMMARY
[facility] Hospital Medicine  Discharge Summary      Patient Name: Arti Olsen  MRN: [id number]  Admission Date: 2/26/2019  Hospital Length of Stay: 1 days  Discharge Date and Time:  02/27/2019 4:36 PM  Attending Physician: Geoffrey Miner, *   Discharging Provider: Geoffrey Miner MD  Primary Care Provider: Wong Le MD  [facility] Medicine Team: [facility]  Geoffrey Miner MD    HPI:   Ms. Arti Olsen is a 89 y.o. female with CAD s/p PCI, hyperlipidemia, and GERD who presents the emergency department for evaluation of chest pain. She mentions that on the day of admission, she was cleaning her base boards when she developed sudden onset left chest pressure.  She took 3 nitroglycerin with no improvement in her pain.  She mentions that the pain radiated up to her neck, but did not go to her back or down her arm.  She specifically mentions that this pain is different than her pain from her acid reflux.  She denies any shortness of breath, nausea, vomiting, or dizziness with this episodes.  She called EMS, where she was given 3 Aspirin.  Despite this, her pain persisted.  She mentions that she has been having more episodes of chest pain over the last few months.  Since the beginning of the year, she endorses having a few episodes a week.  She saw her cardiologist Dr. Saab, on 2/19, where her Imdur was increased to 60 mg, from 30 mg.  However, she has not started the higher dose.  She is accompanied by her daughter, who mentions that the patient is extremely independent.  She lives alone and ambulates without DME.     In the emergency department, EKG showed a chronic left bundle and troponin was negative.  Her case was discussed with Cardiology, who suggested admission to [facility] for unstable angina - with Ticagrelor, Heparin drip, and Interventional Cardiology consult for the morning.    * No surgery found *      Hospital Course:   Ms. Olsen presented with episode of  atypical chest pain. Troponin negative and EKG without acute changes. Now chest pain free. Interventional cardiology consulted and recommend uptitrating current medical therapy with possible outpatient stress test if symptoms persist. Will increase amlodipine to 10mg daily and imdur 60mg daily. Metoprolol tartrate 25mg bid initiated for antianginal therapy. For problem based discussion, see below.      CAD with Unstable Angina  · EKG with chronic LBBB; troponins negative x3  · Initially started on heparin gtt and brilinta; now discontinued per Interventional Cards  · Continue Aspirin 81mg PO daily  · Continue Lipitor 40mg PO daily  · Increase home imdur to 60mg daily  · Increase home amlodipine to 10mg daily  · Add on metoprolol tartrate 25mg bid  · Prn Nitro for chest pain  · Interventional Cards consulted, appreciate assistance     CAD  · Cath 2012 with non-obstructive CAD  · Continue Aspirin 81mg PO daily and statin     PAD  · Chronic and stable  · Continue Aspirin 81mg PO daily  · Continue Lipitor 40mg PO daily  · Wound care clinic referral     HLD  · Chronic and stable  · Continue Lipitor 40mg PO daily     Essential HTN  · Hypertensive in the ED  · Increase amlodipine to 10mg daily  · Continue Avapro 300mg PO qHS  · Continue Aldactone 25mg PO daily  · Continue Imdur 60mg PO daily     Anxiety and Depression  · Chronic and stable  · Continue Zoloft 50mg PO daily  · Continue Klonopin 0.5mg PO BID prn     Hyperkalemia  · Resolved  · Continue to monitor     Hyponatremia  · Sodium 134, monitor     GERD  · Chronic and stable  · Continue PPI  · Tums prn     Lumbar Disc Disease  · Chronic and stable  · Continue Gabapentin 100mg PO TID  · Continue Percocet prn     Hypothyroidism  · Chronic and stable  · Continue Synthroid 25mcg PO daily    Vitals:    02/27/19 0739 02/27/19 0755 02/27/19 1130 02/27/19 1141   BP:  (!) 159/68 (!) 144/64    BP Location:  Right arm Right arm    Patient Position:  Lying Lying    Pulse: 68 60  65 63   Resp:  16 16    Temp:  97.7 °F (36.5 °C) 97.7 °F (36.5 °C)    TempSrc:  Oral Oral    SpO2:  99% 99%    Weight:       Height:         Constitutional: Appears stated age.  No acute distress.  Head: Normocephalic and atraumatic.   Mouth/Throat: Oropharynx is clear and moist.   Eyes: EOM are normal. Pupils are equal, round, and reactive to light. No scleral icterus.   Neck: Normal range of motion. Neck supple.   Cardiovascular: Normal heart rate.  Regular heart rhythm.  Systolic murmur.  Pulmonary/Chest: Effort normal. No respiratory distress. No wheezes, rales, or rhonchi  Abdominal: Soft. Bowel sounds are normal.  No distension.  No tenderness  Musculoskeletal: Normal range of motion. No edema.   Neurological: Alert and oriented to person, place, and time.   Skin: Skin is warm and dry.   Psychiatric: Normal mood and affect. Behavior is normal.     Consults:   Consults (From admission, onward)        Status Ordering Provider     Inpatient consult to Cardiac Rehab  Once     Provider:  (Not yet assigned)    Acknowledged HIEN MELVIN     Inpatient consult to Interventional Cardiology  Once     Provider:  (Not yet assigned)    Completed HIEN MELVIN     Inpatient consult to Registered Dietitian/Nutritionist  Once     Provider:  (Not yet assigned)    Completed HIEN MELVIN     Inpatient consult to Social Work/Case Management  Once     Provider:  (Not yet assigned)    Acknowledged HIEN MELVIN          No new Assessment & Plan notes have been filed under this hospital service since the last note was generated.  Service: Hospital Medicine    Final Active Diagnoses:    Diagnosis Date Noted POA    PRINCIPAL PROBLEM:  Unstable angina [I20.0] 02/26/2019 Yes    Hyperkalemia [E87.5] 02/26/2019 Yes    Essential hypertension [I10] 11/15/2016 Yes     Chronic    Mixed hyperlipidemia [E78.2] 11/15/2016 Yes     Chronic    Lumbar disc disease [M51.9] 01/20/2014 Yes    LBBB (left bundle branch block) [I44.7]  11/13/2012 Yes    CAD (coronary artery disease) [I25.10] 11/11/2012 Yes     Chronic    Depression [F32.9] 11/11/2012 Yes     Chronic    Anxiety [F41.9] 11/11/2012 Yes     Chronic    Gastroesophageal reflux disease without esophagitis [K21.9] 11/11/2012 Yes    Hyponatremia [E87.1] 11/11/2012 Yes    PAD (peripheral artery disease) [I73.9] 11/11/2012 Yes     Chronic    Hypothyroid [E03.9] 10/30/2012 Yes     Chronic      Problems Resolved During this Admission:       Discharged Condition: good    Disposition: Home or Self Care    Follow Up:  Follow-up Information     Andrew Saab MD In 1 week.    Specialty:  Cardiology  Contact information[facility]  [facility] [id number]  [phone]                 Patient Instructions:      Ambulatory Referral to Cardiology   Referral Priority: Routine Referral Type: Consultation   Referral Reason: Specialty Services Required   Requested Specialty: Cardiology   Number of Visits Requested: 1     Ambulatory consult to Wound Clinic   Referral Priority: Routine Referral Type: Consultation   Referral Reason: Specialty Services Required   Requested Specialty: Wound Care   Number of Visits Requested: 1     Diet Cardiac     Notify your health care provider if you experience any of the following:  difficulty breathing or increased cough     Notify your health care provider if you experience any of the following:  severe uncontrolled pain     Activity as tolerated       Significant Diagnostic Studies: Labs:   BMP:   Recent Labs   Lab 02/26/19  1205 02/27/19  0821   GLU 93 139*   * 134*   K 5.2* 4.7    107   CO2 22* 19*   BUN 30* 22   CREATININE 1.1 0.9   CALCIUM 8.9 8.9   , CMP   Recent Labs   Lab 02/26/19  1205 02/27/19  0821   * 134*   K 5.2* 4.7    107   CO2 22* 19*   GLU 93 139*   BUN 30* 22   CREATININE 1.1 0.9   CALCIUM 8.9 8.9   PROT 6.5  --    ALBUMIN 3.8  --    BILITOT 0.4  --    ALKPHOS 89  --    AST 11  --    ALT 5*  --    ANIONGAP 8 8    ESTGFRAFRICA 51.1* >60.0   EGFRNONAA 44.3* 56.5*   , CBC   Recent Labs   Lab 02/26/19  1205 02/26/19  1829  02/27/19  0531   WBC 5.32 5.52  --  4.46   HGB 10.1* 9.3*  --  9.3*   HCT 30.7* 27.8*   < > 29.0*    142*  --  144*    < > = values in this interval not displayed.   , INR   Lab Results   Component Value Date    INR 1.1 02/26/2019    INR 1.0 11/09/2016    INR 1.1 04/09/2012   , Lipid Panel   Lab Results   Component Value Date    CHOL 125 02/27/2019    HDL 42 02/27/2019    LDLCALC 71.6 02/27/2019    TRIG 57 02/27/2019    CHOLHDL 33.6 02/27/2019   , Troponin   Recent Labs   Lab 02/26/19  2344   TROPONINI <0.006   , A1C:   Recent Labs   Lab 02/26/19  1829   HGBA1C 5.7*    and All labs within the past 24 hours have been reviewed  Cardiac Graphics: Echocardiogram: Transthoracic echo (TTE) complete (Cupid Only): No results found for this or any previous visit.    Pending Diagnostic Studies:     None         Medications:  Reconciled Home Medications:      Medication List      START taking these medications    metoprolol succinate 25 MG 24 hr tablet  Commonly known as:  TOPROL-XL  Take 1 tablet (25 mg total) by mouth once daily.        CHANGE how you take these medications    amLODIPine 10 MG tablet  Commonly known as:  NORVASC  Take 1 tablet (10 mg total) by mouth once daily.  Start taking on:  2/28/2019  What changed:    · medication strength  · how much to take  · how to take this  · when to take this  · additional instructions     zolpidem 5 MG Tab  Commonly known as:  AMBIEN  Take one half per night as needed  What changed:    · when to take this  · additional instructions        CONTINUE taking these medications    aspirin 81 MG EC tablet  Commonly known as:  ECOTRIN  Take 81 mg by mouth once daily.     atorvastatin 80 MG tablet  Commonly known as:  LIPITOR  TAKE (1/2) HALF BY MOUTH ONCE A DAY     celecoxib 200 MG capsule  Commonly known as:  CeleBREX  Take 1-2 capsules by mouth daily as needed.      clonazePAM 0.5 MG tablet  Commonly known as:  KLONOPIN  Take 0.5 mg by mouth 2 (two) times daily as needed for Anxiety. Pt taking prn.     gabapentin 100 MG capsule  Commonly known as:  NEURONTIN  TAKE ONE BY MOUTH THREE TIMES DAILY     irbesartan 300 MG tablet  Commonly known as:  AVAPRO  TAKE ONE BY MOUTH EVERY EVENING     isosorbide mononitrate 60 MG 24 hr tablet  Commonly known as:  IMDUR  Take 1 tablet (60 mg total) by mouth once daily.     levothyroxine 25 MCG tablet  Commonly known as:  SYNTHROID  TAKE ONE DAILY     nitroGLYCERIN 0.4 MG SL tablet  Commonly known as:  NITROSTAT  Place 1 tablet (0.4 mg total) under the tongue every 5 (five) minutes as needed.     OCUVITE ORAL  Take 1 tablet by mouth once daily.     omeprazole 20 MG capsule  Commonly known as:  PRILOSEC  Take 20 mg by mouth once daily.     PREMARIN vaginal cream  Generic drug:  conjugated estrogens  Pt using prn.     sertraline 100 MG tablet  Commonly known as:  ZOLOFT  Take 50 mg by mouth once daily.     spironolactone 25 MG tablet  Commonly known as:  ALDACTONE  Take 1 tablet (25 mg total) by mouth once daily.     VITAMIN D ORAL  Take 6,000 mg by mouth once daily.            Indwelling Lines/Drains at time of discharge:   Lines/Drains/Airways          None          Time spent on the discharge of patient: 42 minutes  Patient was seen and examined on the date of discharge and determined to be suitable for discharge.         Geoffrey Miner MD  Department of Hospital Medicine  [facility]

## 2019-02-27 NOTE — NURSING
aPtt @ 2345 -57.8/no change  aPtt @ 0531 -52.7/no change    Therapeutic x2, next aPtt on  2/28/19 with am labs.

## 2019-02-27 NOTE — NURSING
Called in for aptt to be drawn 0100 but lab arrived about an hour and 30 min early.  Instructions were specific when called in that pt is on heparin drip so lab is strictly timed.  Was not able to put in order manually due to  Epic downtime.

## 2019-02-27 NOTE — NURSING
VSS. PIVs removed. Discharge instructions reviewed with pt and daughter. Verbalized understanding.

## 2019-02-27 NOTE — H&P
[facility] Medicine  History and Physical      Patient Name: Arti Olsen  MRN:  [id number]  [facility] Medicine Team: [facility] Domi Sumner MD  Date of Admission:  2/26/2019     Principal Problem:  Unstable angina   Primary Care Physician: Wong Le MD       History of Present Illness:    Ms. Arti Olsen is a 89 y.o. female with CAD s/p PCI, hyperlipidemia, and GERD who presents the emergency department for evaluation of chest pain. She mentions that on the day of admission, she was cleaning her base boards when she developed sudden onset left chest pressure.  She took 3 nitroglycerin with no improvement in her pain.  She mentions that the pain radiated up to her neck, but did not go to her back or down her arm.  She specifically mentions that this pain is different than her pain from her acid reflux.  She denies any shortness of breath, nausea, vomiting, or dizziness with this episodes.  She called EMS, where she was given 3 Aspirin.  Despite this, her pain persisted.  She mentions that she has been having more episodes of chest pain over the last few months.  Since the beginning of the year, she endorses having a few episodes a week.  She saw her cardiologist Dr. Saab, on 2/19, where her Imdur was increased to 60 mg, from 30 mg.  However, she has not started the higher dose.  She is accompanied by her daughter, who mentions that the patient is extremely independent.  She lives alone and ambulates without DME.    In the emergency department, EKG showed a chronic left bundle and troponin was negative.  Her case was discussed with Cardiology, who suggested admission to [facility] for unstable angina - with Ticagrelor, Heparin drip, and Interventional Cardiology consult for the morning.      Review of Systems:  Constitutional: Negative for chills, fatigue, fever.   HENT: Negative for sore throat, trouble swallowing.    Eyes: Negative for photophobia, visual disturbance.   Respiratory: Negative for cough,  shortness of breath.    Cardiovascular: + chest pain  Gastrointestinal: Negative for abdominal pain, constipation, diarrhea, nausea, vomiting.   Endocrine: Negative for cold intolerance, heat intolerance.   Genitourinary: Negative for dysuria, frequency.   Musculoskeletal: Negative for arthralgias, myalgias.   Skin: Negative for rash, wound, erythema   Neurological: Negative for dizziness, syncope, weakness, light-headedness.   Psychiatric/Behavioral: Negative for confusion, hallucinations, anxiety  All other systems reviewed and are negative.      Past Medical History: Patient has a past medical history of Acid reflux, Anemia (6/24/2014), Arthritis, Clotting disorder, Colon polyp, Coronary artery disease, History of colonoscopy, Hyperlipemia, Hypertension, Migraine headache, Peripheral vascular disease, PONV (postoperative nausea and vomiting), Renal artery atherosclerosis (11/11/2012), Spinal stenosis, and Thyroid disease.      Past Surgical History: Patient has a past surgical history that includes Appendectomy (1942); TONSILLECTOMY, ADENOIDECTOMY (1930's); Bladder surgery (1958); Uterine fibroid surgery (1958); Varicose vein surgery (1958); Disc removal (1983); Cyst Removal (1983); Joint replacement (2006); Joint replacement (2007); Colonoscopy w/ polypectomy; Hysterectomy (1959); acf; Anterior cervical discectomy w/ fusion; Eye surgery; and Spine surgery (02/2014).      Social History: Patient reports that she quit smoking about 49 years ago. She has a 29.00 pack-year smoking history. she has never used smokeless tobacco. She reports that she does not drink alcohol or use drugs.      Family History: Patient's family history includes Cancer in her brother, brother, and brother; Heart disease in her brother, father, maternal grandmother, and mother; Kidney disease in her brother and maternal grandfather; Leukemia in her brother; Lung cancer in her brother; Uterine cancer in her paternal  grandmother.      Medications: Scheduled Meds:   [START ON 2/27/2019] aspirin  81 mg Oral Daily    atorvastatin  40 mg Oral Daily    celecoxib  100 mg Oral BID    dextrose 50%  50 g Intravenous Once    gabapentin  100 mg Oral TID    heparin (PORCINE)  60 Units/kg (Order-Specific) Intravenous Once    insulin regular  10 Units Intravenous Once    irbesartan  300 mg Oral QHS    [START ON 2/27/2019] isosorbide mononitrate  60 mg Oral Daily    [START ON 2/27/2019] levothyroxine  25 mcg Oral Daily    lidocaine  1 patch Transdermal ED 1 Time    metoprolol tartrate  25 mg Oral BID    [START ON 2/27/2019] pantoprazole  40 mg Oral Daily    [START ON 2/27/2019] sertraline  50 mg Oral Daily    [START ON 2/27/2019] spironolactone  25 mg Oral Daily    ticagrelor  180 mg Oral Once    [START ON 2/27/2019] ticagrelor  90 mg Oral BID     Continuous Infusions:   heparin (porcine) in D5W       PRN Meds:.calcium carbonate, clonazePAM, heparin (PORCINE), heparin (PORCINE), nitroGLYCERIN, oxyCODONE-acetaminophen      Allergies: Patient is allergic to iodinated contrast- oral and iv dye; augmentin [amoxicillin-pot clavulanate]; reglan [metoclopramide]; and sulfa (sulfonamide antibiotics).      Physical Exam:    Temp:  [98.4 °F (36.9 °C)-98.5 °F (36.9 °C)]   Pulse:  [73-79]   Resp:  [14-18]   BP: (150-171)/(63-73)   SpO2:  [96 %-98 %]     Constitutional: Appears stated age.  No acute distress.  Head: Normocephalic and atraumatic.   Mouth/Throat: Oropharynx is clear and moist.   Eyes: EOM are normal. Pupils are equal, round, and reactive to light. No scleral icterus.   Neck: Normal range of motion. Neck supple.   Cardiovascular: Normal heart rate.  Regular heart rhythm.  Systolic murmur.  Pulmonary/Chest: Effort normal. No respiratory distress. No wheezes, rales, or rhonchi  Abdominal: Soft. Bowel sounds are normal.  No distension.  No tenderness  Musculoskeletal: Normal range of motion. No edema.   Neurological: Alert and  oriented to person, place, and time.   Skin: Skin is warm and dry.   Psychiatric: Normal mood and affect. Behavior is normal.       No intake or output data in the 24 hours ending 02/26/19 1819  Recent Labs   Lab 02/26/19  1205   WBC 5.32   HGB 10.1*   HCT 30.7*        Recent Labs   Lab 02/26/19  1205   *   K 5.2*      CO2 22*   BUN 30*   CREATININE 1.1   GLU 93   CALCIUM 8.9     Recent Labs   Lab 02/26/19  1205   ALKPHOS 89   ALT 5*   AST 11   ALBUMIN 3.8   PROT 6.5   BILITOT 0.4      No results for input(s): POCTGLUCOSE in the last 168 hours.  Recent Labs     02/26/19  1205 02/26/19  1517   TROPONINI 0.009 <0.006       No results for input(s): LACTATE in the last 72 hours.       Assessment and Plan:    Ms. Arti Olsen is a 89 y.o. female who presented to [facility] on 2/26/2019 with unstable angina.    Unstable Angina  · EKG with chronic LBBB  · Initial troponin negative, will trend until it peaks  · Start Heparin gtt  · S/p Aspirin 324mg x 1.  Continue Aspirin 81mg PO daily  · S/p Brilinta 180mg PO x 1.  Start Brilinta 90mg PO BID  · Continue Lipitor but will increase to 80mg PO daily  · Start Metoprolol 25mg PO BID  · Prn Nitro for chest pain  · Continue tele  · Check 2D echo with CFD  · Check lipid panel and HbA1c  · Interventional Cards consulted, appreciate assistance    CAD  · Cath 2012 with non-obstructive CAD  · Continue Aspirin 81mg PO daily    PAD  · Chronic and stable  · Continue Aspirin 81mg PO daily  · Continue Lipitor but will increase to 80mg PO daily    HLD  · Chronic and stable  · Continue Lipitor but will increase to 80mg PO daily    Essential HTN  · Hypertensive in the ED  · Hold Norvasc with initiation of Metoprolol 25mg PO BID  · Continue Avapro 300mg PO qHS  · Continue Aldactone 25mg PO daily  · Continue Imdur 60mg PO daily    Anxiety and Depression  · Chronic and stable  · Continue Zoloft 50mg PO daily  · Continue Klonopin 0.5mg PO BID prn    Hyperkalemia  · K 5.2 likely  2/2 Aldactone  · Shift with insulin/D50    Hyponatremia  · Sodium 134, monitor    GERD  · Chronic and stable  · Continue PPI  · Tums prn    Lumbar Disc Disease  · Chronic and stable  · Continue Gabapentin 100mg PO TID  · Continue Percocet prn    Hypothyroidism  · Chronic and stable  · Continue Synthroid 25mcg PO daily     Diet:  Cardiac then NPO at midnight  GI PPx:  PPI  DVT PPx:  Heparin gtt  Goals of Care:  Full    High Risk Conditions:   Patient is currently on drug therapy requiring intensive monitoring for toxicity: Heparin gtt       Disposition: Pending Cards recs    Discharge Needs:  TBRAFAEL Sumner MD  [facility] Medicine  Cell:  [phone]  Spectra:  [phone]  Pager:  [phone]

## 2019-02-27 NOTE — CONSULTS
"  Nutrition consult received regarding "NSTEMI". Do not feel it is appropriate to enforce diet restrictions given the patient's age, diet hx, labs WNL. If MD adamant for pt to be on restricted diet, please re-consult. All questions and concerns answered.    Thanks! Maryan, RD [id number]  "

## 2019-02-27 NOTE — ED NOTES
Assigned to care for patient at this time.  Report received from Melissa Navarro RN.  Patient does not appear to be in any acute distress.  Patient denies any chest pain, SOB, N/V/D or generalized pain.  Patient pending inpatient admission to inpatient (med-surgical) team.  Will continue to monitor patient for any acute changes or distress.

## 2019-02-27 NOTE — ASSESSMENT & PLAN NOTE
Patient is a 89 yo female with prior non-obstructive CAD on [facility] in 2012 here with atypical chest pain and not medically optimized as far as BP is concerned. Her chest pain is related to her diastolic dysfunction and would recommend medical optimization at this point    Recommendations:  - Please uptitrate her BP medications to keep her BP <130/80. Please restart her norvasc. Keep HR <70.  - If still have this pain after medically optimized then would recommend stress test at that point as outpatient.  - can discontinue ACS protocol.     Staff Attestation to follow,

## 2019-02-27 NOTE — HOSPITAL COURSE
Ms. Olsen presented with episode of atypical chest pain. Troponin negative and EKG without acute changes. Now chest pain free. Interventional cardiology consulted and recommend uptitrating current medical therapy with possible outpatient stress test if symptoms persist. Will increase amlodipine to 10mg daily and imdur 60mg daily. Metoprolol tartrate 25mg bid initiated for antianginal therapy. For problem based discussion, see below.      CAD with Unstable Angina  · EKG with chronic LBBB; troponins negative x3  · Initially started on heparin gtt and brilinta; now discontinued per Interventional Cards  · Continue Aspirin 81mg PO daily  · Continue Lipitor 40mg PO daily  · Increase home imdur to 60mg daily  · Increase home amlodipine to 10mg daily  · Add on metoprolol tartrate 25mg bid  · Prn Nitro for chest pain  · Interventional Cards consulted, appreciate assistance     CAD  · Cath 2012 with non-obstructive CAD  · Continue Aspirin 81mg PO daily and statin     PAD  · Chronic and stable  · Continue Aspirin 81mg PO daily  · Continue Lipitor 40mg PO daily  · Wound care clinic referral     HLD  · Chronic and stable  · Continue Lipitor 40mg PO daily     Essential HTN  · Hypertensive in the ED  · Increase amlodipine to 10mg daily  · Continue Avapro 300mg PO qHS  · Continue Aldactone 25mg PO daily  · Continue Imdur 60mg PO daily     Anxiety and Depression  · Chronic and stable  · Continue Zoloft 50mg PO daily  · Continue Klonopin 0.5mg PO BID prn     Hyperkalemia  · Resolved  · Continue to monitor     Hyponatremia  · Sodium 134, monitor     GERD  · Chronic and stable  · Continue PPI  · Tums prn     Lumbar Disc Disease  · Chronic and stable  · Continue Gabapentin 100mg PO TID  · Continue Percocet prn     Hypothyroidism  · Chronic and stable  · Continue Synthroid 25mcg PO daily    Vitals:    02/27/19 0739 02/27/19 0755 02/27/19 1130 02/27/19 1141   BP:  (!) 159/68 (!) 144/64    BP Location:  Right arm Right arm    Patient  Position:  Lying Lying    Pulse: 68 60 65 63   Resp:  16 16    Temp:  97.7 °F (36.5 °C) 97.7 °F (36.5 °C)    TempSrc:  Oral Oral    SpO2:  99% 99%    Weight:       Height:         Constitutional: Appears stated age.  No acute distress.  Head: Normocephalic and atraumatic.   Mouth/Throat: Oropharynx is clear and moist.   Eyes: EOM are normal. Pupils are equal, round, and reactive to light. No scleral icterus.   Neck: Normal range of motion. Neck supple.   Cardiovascular: Normal heart rate.  Regular heart rhythm.  Systolic murmur.  Pulmonary/Chest: Effort normal. No respiratory distress. No wheezes, rales, or rhonchi  Abdominal: Soft. Bowel sounds are normal.  No distension.  No tenderness  Musculoskeletal: Normal range of motion. No edema.   Neurological: Alert and oriented to person, place, and time.   Skin: Skin is warm and dry.   Psychiatric: Normal mood and affect. Behavior is normal.

## 2019-02-27 NOTE — HPI
Ms. Arti Olsen is a 89 y.o. female with CAD s/p PCI, hyperlipidemia, and GERD who presents the emergency department for evaluation of chest pain. She mentions that on the day of admission, she was cleaning her base boards when she developed sudden onset left chest pressure.  She took 3 nitroglycerin with no improvement in her pain.  She mentions that the pain radiated up to her neck, but did not go to her back or down her arm.  She specifically mentions that this pain is different than her pain from her acid reflux.  She denies any shortness of breath, nausea, vomiting, or dizziness with this episodes.  She called EMS, where she was given 3 Aspirin.  Despite this, her pain persisted.  She mentions that she has been having more episodes of chest pain over the last few months.  Since the beginning of the year, she endorses having a few episodes a week.  She saw her cardiologist Dr. Saab, on 2/19, where her Imdur was increased to 60 mg, from 30 mg.  However, she has not started the higher dose.  She is accompanied by her daughter, who mentions that the patient is extremely independent.  She lives alone and ambulates without DME.     In the emergency department, EKG showed a chronic left bundle and troponin was negative.  Her case was discussed with Cardiology, who suggested admission to [facility] for unstable angina - with Ticagrelor, Heparin drip, and Interventional Cardiology consult for the morning.

## 2019-02-27 NOTE — PLAN OF CARE
02/27/19 0929   Discharge Assessment   Assessment Type Discharge Planning Assessment   Confirmed/corrected address and phone number on facesheet? Yes   Assessment information obtained from? Patient;Medical Record   Expected Length of Stay (days) 2   Communicated expected length of stay with patient/caregiver yes   Prior to hospitilization cognitive status: Alert/Oriented   Prior to hospitalization functional status: Independent   Current cognitive status: Alert/Oriented   Current Functional Status: Independent   Lives With alone   Able to Return to Prior Arrangements yes   Is patient able to care for self after discharge? Yes   Patient's perception of discharge disposition home or selfcare   Readmission Within the Last 30 Days no previous admission in last 30 days   Patient currently being followed by outpatient case management? No   Patient currently receives any other outside agency services? No   Equipment Currently Used at Home walker, rolling;bedside commode;shower chair;cane, straight;grab bar   Do you have any problems affording any of your prescribed medications? TBD   Is the patient taking medications as prescribed? yes   Does the patient have transportation home? Yes   Transportation Anticipated family or friend will provide   Does the patient receive services at the Coumadin Clinic? No   Discharge Plan A Home   DME Needed Upon Discharge  none   Patient/Family in Agreement with Plan yes   Admitted with [facility]. Lives alone and is independent in her ADLs. Plan is to DC home. No DC needs identified.

## 2019-02-27 NOTE — HPI
CC: UA    Patient is a 89 female admitted here last night when she felt a chest pressure over her left chest which she states 1-3/10 in terms of pressure when she started to clean. No radiation was noted. States that her pressure over her chest was similar to her episodes previously at night (describes to Dr. Saab). She denies any SOB or diaphoresis afterwards. It stayed with rest. She took NTG x 3 without any relief. When EMS arrived 3 aspirin did not relieve her chest pain. She reports that it went away spontaneously in the afternoon. She reports that she has been having these episodes more frequently and she has not taken her increase in her Imdur from 30 to 60 mg daily. The patient is extremely independent.  She lives alone and ambulates without DME.     In the ED, her cardiac workup revealed a pre-existing LBBB and troponin x 3 were negative. Cr is 1.1 and her H/H is 9's (baseline 9-10). She was loaded with aspirin/brilinta and started on a heparin gtt.      Prior Ischemic Hx:  S/p LHC in 2012 which showed mid LAD stenosis of 40%. Had a SPECT in 2015 which showed a trivial to mild defect in the apex of her heart (which reported could have been artifactual due to overlying soft tissue).

## 2019-02-27 NOTE — CONSULTS
[facility]  Interventional Cardiology  Consult Note    Patient Name: Arti Olsen  MRN: [id number]  Admission Date: 2/26/2019  Hospital Length of Stay: 1 days  Code Status: Full Code   Attending Provider: Geoffrey Miner, *  Consulting Provider: Ricardo Davidson MD  Primary Care Physician: Wong Le MD  Principal Problem:Unstable angina    Patient information was obtained from patient, past medical records and ER records.     Inpatient consult to Interventional Cardiology  Consult performed by: Ricardo Davidson MD  Consult ordered by: Domi Sumner MD  Reason for consult: atypical chest pain        Subjective:     Chief Complaint:  Chest pressure    HPI:  CC: UA    Patient is a 89 female admitted here last night when she felt a chest pressure over her left chest which she states 1-3/10 in terms of pressure when she started to clean. No radiation was noted. States that her pressure over her chest was similar to her episodes previously at night (describes to Dr. Saab). She denies any SOB or diaphoresis afterwards. It stayed with rest. She took NTG x 3 without any relief. When EMS arrived 3 aspirin did not relieve her chest pain. She reports that it went away spontaneously in the afternoon. She reports that she has been having these episodes more frequently and she has not taken her increase in her Imdur from 30 to 60 mg daily. The patient is extremely independent.  She lives alone and ambulates without DME.     In the ED, her cardiac workup revealed a pre-existing LBBB and troponin x 3 were negative. Cr is 1.1 and her H/H is 9's (baseline 9-10). She was loaded with aspirin/brilinta and started on a heparin gtt.      Prior Ischemic Hx:  S/p LHC in 2012 which showed mid LAD stenosis of 40%. Had a SPECT in 2015 which showed a trivial to mild defect in the apex of her heart (which reported could have been artifactual due to overlying soft tissue).           Past Medical History:  "  Diagnosis Date    Acid reflux     chronic    Anemia 6/24/2014    Arthritis     osetoarthritis    Clotting disorder     Colon polyp     Coronary artery disease     Nonobstructive CAD [facility]    History of colonoscopy     1 polyp noted in 2009    Hyperlipemia     Hypertension     Migraine headache     chronic    Peripheral vascular disease     PONV (postoperative nausea and vomiting)     Renal artery atherosclerosis 11/11/2012    Spinal stenosis     Thyroid disease        Past Surgical History:   Procedure Laterality Date    acf      ANTERIOR CERVICAL DISCECTOMY W/ FUSION      APPENDECTOMY  1942    BLADDER SURGERY  1958    suspension    COLONOSCOPY N/A 10/1/2014    Performed by Deshaun [facility]ccann [facility] at [facility] ENDO (4TH FLR)    COLONOSCOPY W/ POLYPECTOMY      CYST REMOVAL  1983    removed from scalp    DISC REMOVAL  1983    ESOPHAGOGASTRODUODENOSCOPY (EGD) N/A 10/1/2014    Performed by [facility] at [facility] (4TH FLR)    EYE SURGERY      cataracts removed    HYSTERECTOMY  1959    COMPLETE    JOINT REPLACEMENT  2006    repair 3rd toe    JOINT REPLACEMENT  2007    right/left rotator    PLACEMENT, TRANSOBTURATOR TAPE N/A 11/20/2012    Performed by Concetta Kirk MD at [facility] OR 2ND FLR    REPAIR, CYSTOCELE N/A 11/20/2012    Performed by Con[facility]etta Kirk [facility] at [facility] OR 2ND FLR    SPINE SURGERY  02/2014    relieve pressure on nerves    TONSILLECTOMY, ADENOIDECTOMY  1930's    UTERINE FIBROID SURGERY  1958    VARICOSE VEIN SURGERY  1958       Review of patient's allergies indicates:   Allergen Reactions    Iodinated contrast- oral and iv dye Swelling    Augmentin [amoxicillin-pot clavulanate] Diarrhea    Reglan [metoclopramide] Other (See Comments)     "Body shaking"    Sulfa (sulfonamide antibiotics)      Yrs ago       PTA Medications   Medication Sig    amLODIPine (NORVASC) 5 MG tablet TAKE 1 TO 2 PER DAY OR AS DIRECTED    aspirin (ECOTRIN) 81 MG EC tablet Take 81 mg by " mouth once daily.      atorvastatin (LIPITOR) 80 MG tablet TAKE (1/2) HALF BY MOUTH ONCE A DAY    celecoxib (CELEBREX) 200 MG capsule Take 1-2 capsules by mouth daily as needed.    clonazePAM (KLONOPIN) 0.5 MG tablet Take 0.5 mg by mouth 2 (two) times daily as needed for Anxiety. Pt taking prn.    ERGOCALCIFEROL, VITAMIN D2, (VITAMIN D ORAL) Take 6,000 mg by mouth once daily.     gabapentin (NEURONTIN) 100 MG capsule TAKE ONE BY MOUTH THREE TIMES DAILY    irbesartan (AVAPRO) 300 MG tablet TAKE ONE BY MOUTH EVERY EVENING    isosorbide mononitrate (IMDUR) 60 MG 24 hr tablet Take 1 tablet (60 mg total) by mouth once daily.    levothyroxine (SYNTHROID) 25 MCG tablet TAKE ONE DAILY    nitroGLYCERIN (NITROSTAT) 0.4 MG SL tablet Place 1 tablet (0.4 mg total) under the tongue every 5 (five) minutes as needed.    omeprazole (PRILOSEC) 20 MG capsule Take 20 mg by mouth once daily.     PREMARIN vaginal cream Pt using prn.    sertraline (ZOLOFT) 100 MG tablet Take 50 mg by mouth once daily.     spironolactone (ALDACTONE) 25 MG tablet Take 1 tablet (25 mg total) by mouth once daily.    VIT C/VIT E/LUTEIN/MIN/OMEGA-3 (OCUVITE ORAL) Take 1 tablet by mouth once daily.    zolpidem (AMBIEN) 5 MG Tab Take one half per night as needed (Patient taking differently: every evening. )     Family History     Problem Relation (Age of Onset)    Cancer Brother, Brother, Brother    Heart disease Father, Mother, Brother, Maternal Grandmother    Kidney disease Brother, Maternal Grandfather    Leukemia Brother    Lung cancer Brother    Uterine cancer Paternal Grandmother        Tobacco Use    Smoking status: Former Smoker     Packs/day: 1.00     Years: 29.00     Pack years: 29.00     Last attempt to quit: 9/10/1969     Years since quittin.4    Smokeless tobacco: Never Used    Tobacco comment: The patient lives alone and is able to accomplish a usual activities of daily living.  She is not that active due to the severity of  her back pain.   Substance and Sexual Activity    Alcohol use: No    Drug use: No    Sexual activity: No     Review of Systems   Constitution: Negative for chills and fever.   Cardiovascular: Negative for chest pain, dyspnea on exertion, palpitations and syncope.   Respiratory: Negative for shortness of breath.    Musculoskeletal: Positive for arthritis and back pain.   Gastrointestinal: Negative for abdominal pain, hematochezia, nausea and vomiting.   Genitourinary: Negative for dysuria.   Neurological: Positive for headaches.     Objective:     Vital Signs (Most Recent):  Temp: 97.7 °F (36.5 °C) (02/27/19 0455)  Pulse: 65 (02/27/19 0455)  Resp: 18 (02/27/19 0455)  BP: 135/62 (02/27/19 0455)  SpO2: 95 % (02/27/19 0455) Vital Signs (24h Range):  Temp:  [96.6 °F (35.9 °C)-98.5 °F (36.9 °C)] 97.7 °F (36.5 °C)  Pulse:  [62-84] 65  Resp:  [14-19] 18  SpO2:  [95 %-98 %] 95 %  BP: (135-171)/(62-79) 135/62     Weight: 48.1 kg (106 lb)  Body mass index is 21.41 kg/m².    SpO2: 95 %  O2 Device (Oxygen Therapy): room air      Intake/Output Summary (Last 24 hours) at 2/27/2019 0712  Last data filed at 2/27/2019 0000  Gross per 24 hour   Intake 30 ml   Output --   Net 30 ml       Lines/Drains/Airways     Peripheral Intravenous Line                 Peripheral IV - Single Lumen 02/26/19 Left Forearm 1 day         Peripheral IV - Single Lumen 02/26/19 1949 Left Antecubital less than 1 day                Physical Exam   Constitutional: She is oriented to person, place, and time. She appears well-developed and well-nourished.   HENT:   Head: Normocephalic and atraumatic.   Eyes: EOM are normal. Pupils are equal, round, and reactive to light.   Neck: Normal range of motion. Neck supple. No JVD present.   Cardiovascular: Normal rate, regular rhythm and normal heart sounds.   Right sided carotid bruits  DP on left foot is 2+, PT on left is 1+  On right PT is 1+ and DP 2+  Femoral and pop bilaterally 2+   Pulmonary/Chest: Effort  normal and breath sounds normal.   Abdominal: Soft. Bowel sounds are normal.   Musculoskeletal: Normal range of motion. She exhibits no edema.   Has a anterior lesion noted on her left shin. Not infected   Neurological: She is alert and oriented to person, place, and time.   Vitals reviewed.      Significant Labs:   CMP   Recent Labs   Lab 02/26/19  1205   *   K 5.2*      CO2 22*   GLU 93   BUN 30*   CREATININE 1.1   CALCIUM 8.9   PROT 6.5   ALBUMIN 3.8   BILITOT 0.4   ALKPHOS 89   AST 11   ALT 5*   ANIONGAP 8   ESTGFRAFRICA 51.1*   EGFRNONAA 44.3*   , CBC   Recent Labs   Lab 02/26/19  1205 02/26/19  1829  02/27/19  0531   WBC 5.32 5.52  --  4.46   HGB 10.1* 9.3*  --  9.3*   HCT 30.7* 27.8*   < > 29.0*    142*  --  144*    < > = values in this interval not displayed.    and All pertinent lab results from the last 24 hours have been reviewed.    Significant Imaging: Echocardiogram: Transthoracic echo (TTE) complete (Cupid Only): No results found for this or any previous visit. and X-Ray: CXR: X-Ray Chest 1 View (CXR): No results found for this visit on 02/26/19.    Assessment and Plan:     * Unstable angina    Patient is a 89 yo female with prior non-obstructive CAD on [facility] in 2012 here with atypical chest pain and not medically optimized as far as BP is concerned. Her chest pain is related to her diastolic dysfunction and would recommend medical optimization at this point    Recommendations:  - Please uptitrate her BP medications to keep her BP <130/80. Please restart her norvasc. Keep HR <70.  - If still have this pain after medically optimized then would recommend stress test at that point as outpatient.  - can discontinue ACS protocol.     Staff Attestation to follow,         VTE Risk Mitigation (From admission, onward)        Ordered     heparin 25,000 units in dextrose 5% (100 units/ml) IV bolus from bag - ADDITIONAL PRN BOLUS - 60 units/kg (max bolus 4000 units)  As needed (PRN)      02/26/19  1719     heparin 25,000 units in dextrose 5% (100 units/ml) IV bolus from bag - ADDITIONAL PRN BOLUS - 30 units/kg (max bolus 4000 units)  As needed (PRN)      02/26/19 1719     IP VTE HIGH RISK PATIENT  Once      02/26/19 1910     Place sequential compression device  Until discontinued      02/26/19 1910     Place CUONG hose  Until discontinued      02/26/19 1910     heparin 25,000 units in dextrose 5% 250 mL (100 units/mL) infusion LOW INTENSITY nomogram - [facility]  Continuous      02/26/19 1719          Thank you for your consult. I will sign off. Please contact us if you have any additional questions.    Ricardo Davidson MD  Interventional Cardiology   [facility]

## 2019-02-28 LAB
LEFT ANT TIBIAL SYS PSV: 95 CM/S
LEFT CFA PSV: 139 CM/S
LEFT EXTERNAL ILIAC PSV: 211 CM/S
LEFT PERONEAL SYS PSV: 48 CM/S
LEFT POPLITEAL PSV: 90 CM/S
LEFT POST TIBIAL SYS PSV: 42 CM/S
LEFT PROFUNDA SYS PSV: 146 CM/S
LEFT SUPER FEMORAL DIST SYS PSV: 119 CM/S
LEFT SUPER FEMORAL MID SYS PSV: 128 CM/S
LEFT SUPER FEMORAL OSTIAL SYS PSV: 153 CM/S
LEFT SUPER FEMORAL PROX SYS PSV: 169 CM/S
LEFT TIB/PER TRUNK SYS PSV: 156 CM/S
OHS CV LEFT LOWER EXTREMITY ABI (NO CALC): 1.7

## 2019-02-28 NOTE — PLAN OF CARE
02/28/19 0709   Final Note   Assessment Type Final Discharge Note   Anticipated Discharge Disposition Home   Hospital Follow Up  Appt(s) scheduled? Yes

## 2019-04-02 PROBLEM — R07.89 OTHER CHEST PAIN: Status: ACTIVE | Noted: 2019-04-02

## 2019-04-03 ENCOUNTER — PATIENT MESSAGE (OUTPATIENT)
Dept: CARDIOLOGY | Facility: CLINIC | Age: 84
End: 2019-04-03

## 2019-04-29 ENCOUNTER — PES CALL (OUTPATIENT)
Dept: ADMINISTRATIVE | Facility: CLINIC | Age: 84
End: 2019-04-29

## 2019-04-29 RX ORDER — IRBESARTAN 300 MG/1
TABLET ORAL
Qty: 90 TABLET | Refills: 0 | Status: SHIPPED | OUTPATIENT
Start: 2019-04-29 | End: 2019-08-31 | Stop reason: SDUPTHER

## 2019-05-13 NOTE — PROGRESS NOTES
Subjective:      Patient ID: Josafat Patel is a 61 y.o. male. Visit Information    Have you changed or started any medications since your last visit including any over-the-counter medicines, vitamins, or herbal medicines? no   Are you having any side effects from any of your medications? -  no  Have you stopped taking any of your medications? Is so, why? -  no    Have you seen any other physician or provider since your last visit? No  Have you had any other diagnostic tests since your last visit? No  Have you been seen in the emergency room and/or had an admission to a hospital since we last saw you? No  Have you had your routine dental cleaning in the past 6 months? no    Have you activated your [employer] account? If not, what are your barriers?  Yes     Patient Care Team:  Chiara Casey MD as PCP - Lorena Daniels MD as PCP - S Attributed Provider  Jovanny Torres MD as Consulting Physician (Infectious Diseases)    Medical History Review  Past Medical, Family, and Social History reviewed and does not contribute to the patient presenting condition    Health Maintenance   Topic Date Due    HIV screen  01/07/1975    Hepatitis B Vaccine (1 of 3 - Risk 3-dose series) 01/07/1979    DTaP/Tdap/Td vaccine (1 - Tdap) 01/07/1979    Shingles Vaccine (1 of 2) 01/07/2010    Diabetic retinal exam  01/04/2017    Lipid screen  03/09/2019    Potassium monitoring  10/18/2019    Creatinine monitoring  10/18/2019    Low dose CT lung screening  11/01/2019    Diabetic microalbuminuria test  01/15/2020    TSH testing  01/16/2020    Diabetic foot exam  04/26/2020    A1C test (Diabetic or Prediabetic)  05/01/2020    Colon cancer screen colonoscopy  01/23/2027    Flu vaccine  Completed    Pneumococcal 0-64 years Vaccine  Completed    Hepatitis C screen  Completed     Chief Complaint   Patient presents with    Hypertension     managment    Diabetes     05/01/19 7.1     Other     has infectious lump that Subjective:   Patient ID:  Arti Olsen is a 89 y.o. female who presents for follow-up of CAD/CVD    HPI:The patient is here for CAD/PAD/LBBB etc.        The patient has no chest pain, SOB, TIA, palpitations, syncope or pre-syncope.Patient does not exercise.Has not been taking BP much but thinks it is 140-160        Review of Systems   Constitution: Positive for malaise/fatigue. Negative for chills, decreased appetite, diaphoresis, fever, weakness, night sweats, weight gain and weight loss.   HENT: Negative for congestion, hoarse voice, nosebleeds, sore throat and tinnitus.    Eyes: Negative for blurred vision, double vision, vision loss in left eye, vision loss in right eye, visual disturbance and visual halos.   Cardiovascular: Negative for chest pain, claudication, cyanosis, dyspnea on exertion, irregular heartbeat, leg swelling, near-syncope, orthopnea, palpitations, paroxysmal nocturnal dyspnea and syncope.   Respiratory: Negative for cough, hemoptysis, shortness of breath, sleep disturbances due to breathing, snoring, sputum production and wheezing.    Endocrine: Negative for cold intolerance, heat intolerance, polydipsia, polyphagia and polyuria.   Hematologic/Lymphatic: Negative for adenopathy and bleeding problem. Does not bruise/bleed easily.   Skin: Negative for color change, dry skin, flushing, itching, nail changes, poor wound healing, rash, skin cancer, suspicious lesions and unusual hair distribution.   Musculoskeletal: Positive for arthritis, back pain, myalgias, neck pain and stiffness. Negative for falls, gout, joint pain, joint swelling, muscle cramps and muscle weakness.   Gastrointestinal: Negative for abdominal pain, anorexia, change in bowel habit, constipation, diarrhea, dysphagia, heartburn, hematemesis, hematochezia, melena and vomiting.   Genitourinary: Negative for decreased libido, dysuria, hematuria, hesitancy and urgency.   Neurological: Negative for excessive daytime sleepiness,  "dizziness, focal weakness, headaches, light-headedness, loss of balance, numbness, paresthesias, seizures, sensory change, tremors and vertigo.   Psychiatric/Behavioral: Negative for altered mental status, depression, hallucinations, memory loss, substance abuse and suicidal ideas. The patient does not have insomnia and is not nervous/anxious.    Allergic/Immunologic: Negative for environmental allergies and hives.       Objective: BP (!) 176/71 (BP Location: Left arm, Patient Position: Sitting, BP Method: Pediatric (Automatic))   Pulse 73   Ht 4' 11" (1.499 m)   Wt 50.5 kg (111 lb 5.3 oz)   BMI 22.49 kg/m²      Physical Exam   Constitutional: She is oriented to person, place, and time. She appears well-developed and well-nourished.   HENT:   Head: Normocephalic.   Eyes: EOM are normal. Pupils are equal, round, and reactive to light.   Neck: Normal range of motion. Normal carotid pulses, no hepatojugular reflux and no JVD present. Carotid bruit is not present. No thyromegaly present.   Cardiovascular: Normal rate, regular rhythm and intact distal pulses.  Exam reveals no gallop and no friction rub.    Murmur heard.   Systolic murmur is present with a grade of 1/6   Pulses:       Popliteal pulses are 2+ on the right side, and 2+ on the left side.        Dorsalis pedis pulses are 1+ on the right side, and 1+ on the left side.        Posterior tibial pulses are 1+ on the right side, and 1+ on the left side.   Pulmonary/Chest: Effort normal and breath sounds normal. No tachypnea. No respiratory distress. She has no wheezes. She has no rales. She exhibits no tenderness.   Abdominal: Soft. Bowel sounds are normal. She exhibits no distension and no mass. There is no tenderness. There is no rebound and no guarding.   Musculoskeletal: Normal range of motion. She exhibits no edema or tenderness.   Lymphadenopathy:     She has no cervical adenopathy.   Neurological: She is alert and oriented to person, place, and time. No " cranial nerve deficit. Coordination normal.   Skin: Skin is warm. No rash noted. No erythema.   Psychiatric: She has a normal mood and affect. Her behavior is normal. Judgment and thought content normal.       Assessment:     1. Coronary artery disease involving native coronary artery of native heart without angina pectoris    2. Cerebral arterial disease    3. Renal artery atherosclerosis    4. PAD (peripheral artery disease)    5. Venous insufficiency of both lower extremities    6. Vitamin D insufficiency    7. Mixed hyperlipidemia    8. Mesenteric artery stenosis    9. Lumbar disc disease    10. LBBB (left bundle branch block)    11. Hypothyroidism due to acquired atrophy of thyroid    12. Hyponatremia    13. History of smoking 25-50 pack years    14. Gastroesophageal reflux disease without esophagitis    15. Essential hypertension    16. Other depression        Plan:   Discussed diet , achieving and maintaining ideal body weight, and exercise.   We reviewed meds in detail.  Reassured-discussed goals, options plan   Increase amlodipine to twice but if bad ankle swelling , try to take whole 10 mg at nite  If BP still over 130-135, will add low dose diuretic or spironolactone  Arti was seen today for coronary artery disease and essential hypertension.    Diagnoses and all orders for this visit:    Coronary artery disease involving native coronary artery of native heart without angina pectoris  -     Lipid panel; Standing  -     Comprehensive metabolic panel; Standing  -     TSH; Standing  -     EKG 12-lead; Future; Expected date: 01/08/2019    Cerebral arterial disease  -     Lipid panel; Standing  -     EKG 12-lead; Future; Expected date: 01/08/2019    Renal artery atherosclerosis  -     Lipid panel; Standing  -     Comprehensive metabolic panel; Standing    PAD (peripheral artery disease)  -     Lipid panel; Standing    Venous insufficiency of both lower extremities    Vitamin D insufficiency  -     Vitamin D;  present. No thyroid mass and no thyromegaly present. Cardiovascular: Normal rate and regular rhythm. Exam reveals no friction rub. No murmur heard. Pulmonary/Chest: Effort normal and breath sounds normal. No accessory muscle usage. No tachypnea and no bradypnea. No respiratory distress. He has no wheezes. He has no rales. Abdominal: Soft. Bowel sounds are normal. He exhibits no distension. There is no tenderness. There is no rebound. Musculoskeletal: Normal range of motion. He exhibits no edema or tenderness. Left bka     Lymphadenopathy:        Head (right side): No submental and no submandibular adenopathy present. Head (left side): No submental and no submandibular adenopathy present. He has no cervical adenopathy. Neurological: He is alert and oriented to person, place, and time. He displays no atrophy. No cranial nerve deficit or sensory deficit. He exhibits normal muscle tone. Coordination normal.   Skin: Skin is warm. No bruising, no ecchymosis and no rash noted. He is not diaphoretic. No pallor. Psychiatric: He has a normal mood and affect. His behavior is normal. His mood appears not anxious. His affect is not angry. His speech is not slurred. He is not aggressive. Cognition and memory are not impaired. He expresses no homicidal ideation. I have personally reviewed and agree with the patient GUIDO, HPI and Ul. Maddie Chinchilla is a 61 y.o. male who presents today for follow up on his chronic medical conditions as noted below.   Mae Duane is c/o of   Chief Complaint   Patient presents with    Hypertension     managment    Diabetes     05/01/19 7.1     Other     has infectious lump that come out woundering if hydronitis        Patient Active Problem List:     Anemia     Neuropathy in diabetes ([address]     Charcot ankle     Hypothyroid     PVD (peripheral vascular disease) ([address]     Below knee amputation status ([facility]     Type 2 diabetes mellitus with diabetic Future; Expected date: 06/08/2018    Mixed hyperlipidemia  -     Vitamin D; Future; Expected date: 06/08/2018    Mesenteric artery stenosis    Lumbar disc disease    LBBB (left bundle branch block)  -     EKG 12-lead; Future; Expected date: 01/08/2019    Hypothyroidism due to acquired atrophy of thyroid  -     TSH; Standing  -     T4, free; Standing    Hyponatremia  -     Comprehensive metabolic panel; Standing    History of smoking 25-50 pack years    Gastroesophageal reflux disease without esophagitis    Essential hypertension  -     Lipid panel; Standing  -     Comprehensive metabolic panel; Standing  -     EKG 12-lead; Future; Expected date: 01/08/2019  -     Vitamin D; Future; Expected date: 06/08/2018    Other depression            Follow-up in about 10 months (around 1/8/2019) for with ECG and labs; labs 3 months.   Gerson Carpio MD at [address] Bilateral     TOE AMPUTATION      Right 2 and 4     Family History   Problem Relation Age of Onset    Diabetes Mother     Hypertension Mother     Cancer Mother         Stomach    Hypertension Father     Cancer Father         Kidney    Alcohol Abuse Father     Diabetes Brother      Current Outpatient Medications   Medication Sig Dispense Refill    glucosamine-chondroitin 500-400 MG tablet Take 1 tablet by mouth      pregabalin (LYRICA) 100 MG capsule Take 1 capsule by mouth 3 times daily for 120 days. 90 capsule 3    gemfibrozil (LOPID) 600 MG tablet TAKE 1 TABLET BY MOUTH ONCE DAILY 30 tablet 10    rOPINIRole (REQUIP) 2 MG tablet TAKE 1 TABLET BY MOUTH ONCE DAILY 30 tablet 10    losartan (COZAAR) 100 MG tablet TAKE 1 TABLET BY MOUTH ONCE DAILY 30 tablet 10    JANUMET  MG per tablet TAKE (1) TABLET TWICE A DAY WITH MEALS 60 tablet 11    HUMULIN N 100 UNIT/ML injection vial INJECT 50 UNITS EVERY MORNING AND 70 UNITS AT BEDTIME 30 mL 11    ULTICARE INSULIN SYRINGE 31G X 5/16\" 1 ML MISC USE AS DIRECTED WITH HUMULIN AND HUMALOG INSULIN 5 TIMES DAILY 100 each 3    isosorbide mononitrate (IMDUR) 30 MG extended release tablet Take 1 tablet by mouth daily 90 tablet 3    tamsulosin (FLOMAX) 0.4 MG capsule Take 1 capsule by mouth daily 90 capsule 3    atorvastatin (LIPITOR) 20 MG tablet Take 1 tablet by mouth daily 90 tablet 3    budesonide-formoterol (SYMBICORT) 160-4.5 MCG/ACT AERO Inhale 2 puffs into the lungs 2 times daily 3 Inhaler 3    blood glucose test strips (ASCENSIA AUTODISC VI;ONE TOUCH ULTRA TEST VI) strip 1 each by In Vitro route 4 times daily As needed.  400 each 3    HUMALOG 100 UNIT/ML injection vial INJECT 12 UNITS UNDER THE SKIN 3 TIMES DAILY + SLIDING SCALE (TAKE 5 UNITS IF SUGAR IS OVER 150) 20 mL 3    insulin NPH (HUMULIN N;NOVOLIN N) 100 UNIT/ML injection vial Inject 70 Units into the skin nightly      insulin lispro (HUMALOG KWIKPEN) 100 UNIT/ML pen Blood Sugar - 151 to 199 = 2 Units , Blood Sugar - 200 to 249 = 4 units , Blood Sugar - 250  to 299 = 6 Units , Blood Sugar - 300 to 349 = 8 Units , Blood Sugar - 350 to 399 = 10 Units , Blood Sugar - 400 to 449 = 12 Units , Blood Sugar - > 450 - 15 Units and call Physician or go to ER 5 pen 3    insulin NPH (HUMULIN N) 100 UNIT/ML injection vial Inject 20 Units into the skin 2 times daily (before meals) (Patient taking differently: Inject 50 Units into the skin every morning Taking 60 units qam and 70 unites qpm) 30 mL 1    urea (CARMOL) 20 % cream Apply topically as needed. 480 g 3    Omega-3 Fatty Acids (FISH OIL CONCENTRATE) 300 MG CAPS Take 300 mg by mouth daily      Glucosamine Sulfate 500 MG TABS Take 500 mg by mouth 3 times daily      aspirin 81 MG tablet Take 81 mg by mouth daily      albuterol (PROVENTIL HFA;VENTOLIN HFA) 108 (90 BASE) MCG/ACT inhaler Inhale 2 puffs into the lungs every 6 hours as needed.  levothyroxine (SYNTHROID) 150 MCG tablet Take 1 tablet by mouth daily 90 tablet 1     No current facility-administered medications for this visit. ALLERGIES:  No Known Allergies    Social History     Tobacco Use    Smoking status: Former Smoker     Packs/day: 2.00     Years: 25.00     Pack years: 50.00     Types: Cigars     Last attempt to quit: 2011     Years since quittin.9    Smokeless tobacco: Never Used   Substance Use Topics    Alcohol use: Yes     Alcohol/week: 0.0 oz     Comment: social and not that often      Body mass index is 33.85 kg/m².   /70   Ht 6' 3.98\" (1.93 m)   Wt 278 lb (126.1 kg)   BMI 33.85 kg/m²     Lab Results   Component Value Date     10/18/2018    K 4.9 10/18/2018     10/18/2018    CO2 20 10/18/2018    BUN 23 10/18/2018    CREATININE 1.18 10/18/2018    GLUCOSE 236 10/18/2018    CALCIUM 8.9 10/18/2018    PROT 7.5 2016    LABALBU 4.0 2016    BILITOT 0.41 2016    ALKPHOS 166 09/19/2016    AST 21 09/19/2016    ALT 19 09/19/2016       Lab Results   Component Value Date    WBC 6.6 06/01/2018    RBC 4.67 06/01/2018    HGB 14.4 06/01/2018    HCT 42.5 06/01/2018    MCV 91.1 06/01/2018    MCH 30.9 06/01/2018    MCHC 33.9 06/01/2018    RDW 15.1 06/01/2018     06/01/2018    MPV 9.7 06/01/2018       Lab Results   Component Value Date    LABA1C 7.0 05/01/2019       Lab Results   Component Value Date    HDL 47 03/09/2018    LDLCHOLESTEROL 56 03/09/2018       Assessment / Plan:      Diagnosis Orders   1. Type 2 diabetes mellitus with left diabetic foot infection (HCC)  VL DUP CAROTID BILATERAL   2. Pure hypercholesterolemia  VL DUP CAROTID BILATERAL    Lipid Panel   3. Diabetic polyneuropathy associated with diabetes mellitus due to underlying condition ([facility] [address]  VL DUP CAROTID BILATERAL   4. Acquired hypothyroidism  TSH With Reflex Ft4     No follow-ups on file. Orders Placed This Encounter   Procedures    VL DUP CAROTID BILATERAL     Standing Status:   Future     Standing Expiration Date:   5/13/2020    TSH With Reflex Ft4     Standing Status:   Future     Standing Expiration Date:   5/13/2020    Lipid Panel     Standing Status:   Future     Standing Expiration Date:   8/11/2019     Order Specific Question:   Is Patient Fasting?/# of Hours     Answer:   10 to 12     No orders of the defined types were placed in this encounter.      Carotid scan  screeing  Risk factor  Dm ,bka  Ex smoker    Asa   lipitr on  check tsh last one less than  0.7      Catalina;ck ldl

## 2019-06-23 ENCOUNTER — PATIENT MESSAGE (OUTPATIENT)
Dept: INTERNAL MEDICINE | Facility: CLINIC | Age: 84
End: 2019-06-23

## 2019-06-24 NOTE — TELEPHONE ENCOUNTER
Let daughter know that yes, I can see her and evaluate her. Please se tup appt and best if her daughter can come with her    Would recommend getting off all opioids such as percocet - try to taper off them, Can use tylenol per bottle instructions

## 2019-06-24 NOTE — TELEPHONE ENCOUNTER
From: Arti Olsen     Sent: 6/23/2019  4:59 PM CDT       To: Wong Le MD  Subject: Non-Urgent Medical    My mom Arti Olsen, had an appointment with you on  Dec 14. I would like to schedule another appointment. She is 89 and still lives alone. She refuses to have someone assist her in the home and [facility] [place] is out of the question. I honestly don't think an assessment would place her in independent/assisted living. I prepare her medications for the week. She doesn't always take them. She is also on Percocet for back/head/neck, etc. pain. I am also concerned about her mental state. Would you be able to do some assessment of her? I find her weak, unsteady, disoriented, confused. Please feel free to call me for more information. Deyanira Olsen [phone]. Thank you.      Please advise  Thank you

## 2019-06-25 ENCOUNTER — PATIENT MESSAGE (OUTPATIENT)
Dept: INTERNAL MEDICINE | Facility: CLINIC | Age: 84
End: 2019-06-25

## 2019-07-01 ENCOUNTER — PATIENT OUTREACH (OUTPATIENT)
Dept: ADMINISTRATIVE | Facility: HOSPITAL | Age: 84
End: 2019-07-01

## 2019-07-05 ENCOUNTER — OFFICE VISIT (OUTPATIENT)
Dept: INTERNAL MEDICINE | Facility: CLINIC | Age: 84
End: 2019-07-05
Payer: MEDICARE

## 2019-07-05 VITALS
BODY MASS INDEX: 20.84 KG/M2 | OXYGEN SATURATION: 98 % | HEIGHT: 59 IN | DIASTOLIC BLOOD PRESSURE: 50 MMHG | WEIGHT: 103.38 LBS | HEART RATE: 52 BPM | SYSTOLIC BLOOD PRESSURE: 140 MMHG

## 2019-07-05 DIAGNOSIS — M20.10 VALGUS DEFORMITY OF GREAT TOE, UNSPECIFIED LATERALITY: Primary | ICD-10-CM

## 2019-07-05 DIAGNOSIS — M20.40 HAMMER TOE, UNSPECIFIED LATERALITY: ICD-10-CM

## 2019-07-05 DIAGNOSIS — G89.29 CHRONIC RIGHT-SIDED LOW BACK PAIN WITH RIGHT-SIDED SCIATICA: ICD-10-CM

## 2019-07-05 DIAGNOSIS — M54.41 CHRONIC RIGHT-SIDED LOW BACK PAIN WITH RIGHT-SIDED SCIATICA: ICD-10-CM

## 2019-07-05 DIAGNOSIS — M54.2 NECK PAIN ON RIGHT SIDE: ICD-10-CM

## 2019-07-05 PROCEDURE — 99214 PR OFFICE/OUTPT VISIT, EST, LEVL IV, 30-39 MIN: ICD-10-PCS | Mod: S$PBB,,, | Performed by: FAMILY MEDICINE

## 2019-07-05 PROCEDURE — 99999 PR PBB SHADOW E&M-EST. PATIENT-LVL V: CPT | Mod: PBBFAC,,, | Performed by: FAMILY MEDICINE

## 2019-07-05 PROCEDURE — 99999 PR PBB SHADOW E&M-EST. PATIENT-LVL V: ICD-10-PCS | Mod: PBBFAC,,, | Performed by: FAMILY MEDICINE

## 2019-07-05 PROCEDURE — 99215 OFFICE O/P EST HI 40 MIN: CPT | Mod: PBBFAC | Performed by: FAMILY MEDICINE

## 2019-07-05 PROCEDURE — 99214 OFFICE O/P EST MOD 30 MIN: CPT | Mod: S$PBB,,, | Performed by: FAMILY MEDICINE

## 2019-07-05 NOTE — PROGRESS NOTES
"S/  89yo WF here with daughter to get podiatry referral. SHe wa stold her insurance reqires seeing PCP within 3 months of the podiatry referral that is coming up soon.     They are also very concerned about her neck and back pain problems. She used ot use ice with great benefit. In the past tried gabapentin but it made her too sleepy. Her  neurologist is giving her percocet for it, and she also has klonopin form him for occasional use.   She was recently brought to  ER by daughter when she was found on floor poorly responsive with pinpoint pupils. The bottle of 6 klonopin was empty & she has some percocet for pain, but does not remember anything what pills she took.  They wanted to discuss options for her back and neck pain    We discussed the following at length and 100% of the 25 min visit was spent in pt counseling  1. Best to cut back and taper off opioids used for necka nd back pain problems  2. Continue ice, and also get [employer] Treat Your Own Back Book. Daughter took photo of it on [employer]'s site and will get it for advice. Could also consider PT or healthy back program, but since it seems so severe use next step instead  3. Pain clinic for further evaluation & advice, possible injecitons    O/  BP (!) 140/50 (BP Location: Right arm, Patient Position: Sitting, BP Method: Small (Manual))   Pulse (!) 52   Ht 4' 11" (1.499 m)   Wt 46.9 kg (103 lb 6.3 oz)   LMP  (LMP Unknown)   SpO2 98%   BMI 20.88 kg/m²   No exam today    A/P  Arti was seen today for follow-up.    Diagnoses and all orders for this visit:    Valgus deformity of great toe, unspecified laterality  Hammer toe, unspecified laterality  -     Ambulatory consult to Podiatry    Chronic right-sided low back pain with right-sided sciatica  Neck pain on right side  -     Ambulatory consult to Pain Clinic  - ice prn  - [employer] treat your own back book for further home exercise and lifestyle changes        "

## 2019-07-09 ENCOUNTER — TELEPHONE (OUTPATIENT)
Dept: WOUND CARE | Facility: CLINIC | Age: 84
End: 2019-07-09

## 2019-07-09 NOTE — TELEPHONE ENCOUNTER
Patient's daughter will send photo through the [facility] portal for Raysa to review and I will discuss with Raysa about possibly placing patient on for Friday 7/12/19 or Wednesday, 7/17/19 after 4pm per request of daughter.

## 2019-07-10 ENCOUNTER — PATIENT MESSAGE (OUTPATIENT)
Dept: WOUND CARE | Facility: CLINIC | Age: 84
End: 2019-07-10

## 2019-07-16 ENCOUNTER — PATIENT MESSAGE (OUTPATIENT)
Dept: WOUND CARE | Facility: CLINIC | Age: 84
End: 2019-07-16

## 2019-07-21 ENCOUNTER — PATIENT MESSAGE (OUTPATIENT)
Dept: WOUND CARE | Facility: CLINIC | Age: 84
End: 2019-07-21

## 2019-07-24 ENCOUNTER — PATIENT MESSAGE (OUTPATIENT)
Dept: WOUND CARE | Facility: CLINIC | Age: 84
End: 2019-07-24

## 2019-07-26 ENCOUNTER — TELEPHONE (OUTPATIENT)
Dept: PAIN MEDICINE | Facility: CLINIC | Age: 84
End: 2019-07-26

## 2019-07-26 NOTE — TELEPHONE ENCOUNTER
Staff contacted the patient to confirm her 7/29/19 3 pm Consult appointment with Dr. Gómez and review IPM and insurance. Patient can contact our office at [phone] to reschedule or cancel if needed.     Patient did not answer therefore staff left a detailed voice message informing the patient of the above information and instructed the patient to give our office a call back at [phone] to go over IPM and verify insurance.

## 2019-09-01 RX ORDER — IRBESARTAN 300 MG/1
TABLET ORAL
Qty: 90 TABLET | Refills: 0 | Status: SHIPPED | OUTPATIENT
Start: 2019-09-01 | End: 2019-10-11 | Stop reason: SDUPTHER

## 2019-09-04 ENCOUNTER — TELEPHONE (OUTPATIENT)
Dept: CARDIOLOGY | Facility: CLINIC | Age: 84
End: 2019-09-04

## 2019-09-05 ENCOUNTER — TELEPHONE (OUTPATIENT)
Dept: CARDIOLOGY | Facility: CLINIC | Age: 84
End: 2019-09-05

## 2019-09-05 NOTE — TELEPHONE ENCOUNTER
----- Message from Angelique Almonte sent at 9/4/2019  8:41 AM CDT -----  Regarding: Call about her daughter  Contact: [phone]  Pt would like a call from Dr. Saab to talk about her daughter's illness. She states she had stroke and it's not looking good for her. I offered her an appt but she refused stating he would know her from coming with her for her visits.    Thanks

## 2019-09-05 NOTE — TELEPHONE ENCOUNTER
Called patient again today to check on her and to let her know we got her message about her daughter and that Dr. saab [place] but again got no answer. Left another message on her recorder (left two yesterday ).    From: Angelique Almonte   Sent: 9/4/2019   8:41 AM   To: Katiana YEUNG Staff   Subject: Call about her daughter                           Pt would like a call from Dr. Saab to talk about her daughter's illness. She states she had stroke and it's not looking good for her. I offered her an appt but she refused stating he would know her from coming with her for her visits.     Thanks

## 2019-09-05 NOTE — TELEPHONE ENCOUNTER
Tried calling patient twice but no answer. Left messag on recorder.    Angelique YEUNG Staff   Phone Number: [phone]             Pt would like a call from Dr. Saab to talk about her daughter's illness. She states she had stroke and it's not looking good for her. I offered her an appt but she refused stating he would know her from coming with her for her visits.     Thanks

## 2019-09-10 ENCOUNTER — TELEPHONE (OUTPATIENT)
Dept: INTERNAL MEDICINE | Facility: CLINIC | Age: 84
End: 2019-09-10

## 2019-09-10 ENCOUNTER — TELEPHONE (OUTPATIENT)
Dept: CARDIOLOGY | Facility: CLINIC | Age: 84
End: 2019-09-10

## 2019-09-10 RX ORDER — ALPRAZOLAM 0.25 MG/1
0.12 TABLET ORAL DAILY PRN
COMMUNITY
Start: 2019-09-10 | End: 2019-10-11 | Stop reason: SDUPTHER

## 2019-09-10 NOTE — TELEPHONE ENCOUNTER
----- Message from Dejah Hu MA sent at 9/10/2019  5:54 PM CDT -----  Regarding: FW:  - Please advise      ----- Message -----  From: Andrew Saab MD  Sent: 9/10/2019   5:34 PM  To: Dejah Hu MA  Subject: RE:  - Please advise                     We can give 15 generic Xanax .25 mg and just take half ,CJL  ----- Message -----  From: Dejah Hu MA  Sent: 9/10/2019   5:18 PM  To: Andrew Saab MD  Subject: FW:  - Please advise                     Patient hasn't tried anything and her PCP is out.  ----- Message -----  From: Andrew Saab MD  Sent: 9/10/2019   4:57 PM  To: Dejah Hu MA  Subject: RE:  - Please advise                     Normally they do not want us in cardiology prescribing these kinds of meds but should be the PCP-However, if she has taken something before and she knows the dose and name , I could give a few but these meds are not supposed to come from cardiology,CJL  ----- Message -----  From: Dejah Hu MA  Sent: 9/10/2019   3:40 PM  To: Andrew Saab MD  Subject:  - Please advise                         Mariza Meyers MA 2 hours ago (1:38 PM)     Spoke with patient and she really meant for her message to be sent to Dr. Saab's office. Patient is requesting some medication for calm her upset nerves, and insomnia. Patient stated that her daughter is in a coma and she is only allowed to visit or see her once a week.   Informed patient that I have forwarded a message to Dr. Saab's office requesting the backup physician or the nurse please return call to the patient. She will be home the remainder of the day.

## 2019-09-10 NOTE — TELEPHONE ENCOUNTER
----- Message from Concetta Patel sent at 9/10/2019 12:26 PM CDT -----  Contact: [phone]  Patient is requesting a call from the office. She stated her daughter is very ill and she is requesting for a prescription to take to relax her. She stated she is to worried about her daughter.  Please advise, thanks .

## 2019-09-10 NOTE — TELEPHONE ENCOUNTER
Patient advised and prescription called into [facility] as per Dr. Saab.

## 2019-09-10 NOTE — TELEPHONE ENCOUNTER
Spoke with patient and she really meant for her message to be sent to Dr. Saab's office. Patient is requesting some medication for calm her upset nerves, and insomnia. Patient stated that her daughter is in a coma and she is only allowed to visit or see her once a week.   Informed patient that I have forwarded a message to Dr. Saab's office requesting the backup physician or the nurse please return call to the patient. She will be home the remainder of the day.

## 2019-09-11 ENCOUNTER — CLINICAL SUPPORT (OUTPATIENT)
Dept: URGENT CARE | Facility: CLINIC | Age: 84
End: 2019-09-11

## 2019-09-11 DIAGNOSIS — Z11.1 ENCOUNTER FOR PPD TEST: Primary | ICD-10-CM

## 2019-09-11 PROCEDURE — 86580 TB INTRADERMAL TEST: CPT | Mod: S$GLB,,, | Performed by: NURSE PRACTITIONER

## 2019-09-11 PROCEDURE — 86580 POCT TB SKIN TEST: ICD-10-PCS | Mod: S$GLB,,, | Performed by: NURSE PRACTITIONER

## 2019-09-13 LAB
TB INDURATION - 48 HR READ: 0 MM
TB INDURATION - 72 HR READ: NORMAL MM
TB SKIN TEST - 48 HR READ: NEGATIVE
TB SKIN TEST - 72 HR READ: NORMAL

## 2019-09-20 RX ORDER — LEVOTHYROXINE SODIUM 25 UG/1
25 TABLET ORAL DAILY
Qty: 90 TABLET | Refills: 3 | Status: SHIPPED | OUTPATIENT
Start: 2019-09-20 | End: 2019-10-11 | Stop reason: SDUPTHER

## 2019-09-20 NOTE — TELEPHONE ENCOUNTER
----- Message from Ninfa Orellana sent at 9/20/2019 12:26 PM CDT -----  Contact: /edward/  Pt son called in regards to getting a new Rx for     levothyroxine (SYNTHROID) 25 MCG tablet 90 tablet 3 2/19/2018  No TAKE ONE DAILY    Due to she was given this by dr Reji Arguello and would like to start getting it from her primary.     [facility]  [phone]   Please advise

## 2019-10-11 ENCOUNTER — OFFICE VISIT (OUTPATIENT)
Dept: INTERNAL MEDICINE | Facility: CLINIC | Age: 84
End: 2019-10-11
Payer: MEDICARE

## 2019-10-11 ENCOUNTER — LAB VISIT (OUTPATIENT)
Dept: LAB | Facility: HOSPITAL | Age: 84
End: 2019-10-11
Attending: FAMILY MEDICINE
Payer: MEDICARE

## 2019-10-11 ENCOUNTER — TELEPHONE (OUTPATIENT)
Dept: INTERNAL MEDICINE | Facility: CLINIC | Age: 84
End: 2019-10-11

## 2019-10-11 VITALS
BODY MASS INDEX: 20.13 KG/M2 | HEART RATE: 70 BPM | OXYGEN SATURATION: 98 % | HEIGHT: 59 IN | DIASTOLIC BLOOD PRESSURE: 56 MMHG | SYSTOLIC BLOOD PRESSURE: 138 MMHG | WEIGHT: 99.88 LBS

## 2019-10-11 DIAGNOSIS — E55.9 VITAMIN D INSUFFICIENCY: ICD-10-CM

## 2019-10-11 DIAGNOSIS — I70.0 ATHEROSCLEROSIS OF AORTA: ICD-10-CM

## 2019-10-11 DIAGNOSIS — E78.2 MIXED HYPERLIPIDEMIA: Chronic | ICD-10-CM

## 2019-10-11 DIAGNOSIS — I10 ESSENTIAL HYPERTENSION: Chronic | ICD-10-CM

## 2019-10-11 DIAGNOSIS — D64.9 ANEMIA, UNSPECIFIED TYPE: ICD-10-CM

## 2019-10-11 DIAGNOSIS — K31.83 ACHLORHYDRIA: ICD-10-CM

## 2019-10-11 DIAGNOSIS — F41.9 ANXIETY: ICD-10-CM

## 2019-10-11 DIAGNOSIS — E03.4 HYPOTHYROIDISM DUE TO ACQUIRED ATROPHY OF THYROID: Chronic | ICD-10-CM

## 2019-10-11 DIAGNOSIS — D50.9 IRON DEFICIENCY ANEMIA, UNSPECIFIED IRON DEFICIENCY ANEMIA TYPE: Primary | ICD-10-CM

## 2019-10-11 DIAGNOSIS — K92.2 GASTROINTESTINAL HEMORRHAGE, UNSPECIFIED GASTROINTESTINAL HEMORRHAGE TYPE: ICD-10-CM

## 2019-10-11 DIAGNOSIS — I70.1 RENAL ARTERY ATHEROSCLEROSIS: Chronic | ICD-10-CM

## 2019-10-11 DIAGNOSIS — F32.A DEPRESSION, UNSPECIFIED DEPRESSION TYPE: ICD-10-CM

## 2019-10-11 DIAGNOSIS — G47.00 INSOMNIA, UNSPECIFIED TYPE: ICD-10-CM

## 2019-10-11 DIAGNOSIS — I25.10 CORONARY ARTERY DISEASE INVOLVING NATIVE CORONARY ARTERY OF NATIVE HEART WITHOUT ANGINA PECTORIS: Chronic | ICD-10-CM

## 2019-10-11 DIAGNOSIS — I73.9 PAD (PERIPHERAL ARTERY DISEASE): Chronic | ICD-10-CM

## 2019-10-11 DIAGNOSIS — D50.9 IRON DEFICIENCY ANEMIA, UNSPECIFIED IRON DEFICIENCY ANEMIA TYPE: ICD-10-CM

## 2019-10-11 DIAGNOSIS — G89.29 OTHER CHRONIC PAIN: ICD-10-CM

## 2019-10-11 DIAGNOSIS — I20.0 UNSTABLE ANGINA: ICD-10-CM

## 2019-10-11 LAB
ALBUMIN SERPL BCP-MCNC: 4.1 G/DL (ref 3.5–5.2)
ALP SERPL-CCNC: 74 U/L (ref 55–135)
ALT SERPL W/O P-5'-P-CCNC: 24 U/L (ref 10–44)
ANION GAP SERPL CALC-SCNC: 8 MMOL/L (ref 8–16)
AST SERPL-CCNC: 11 U/L (ref 10–40)
BASOPHILS # BLD AUTO: 0.03 K/UL (ref 0–0.2)
BASOPHILS NFR BLD: 0.6 % (ref 0–1.9)
BILIRUB SERPL-MCNC: 0.4 MG/DL (ref 0.1–1)
BUN SERPL-MCNC: 34 MG/DL (ref 10–30)
CALCIUM SERPL-MCNC: 8.8 MG/DL (ref 8.7–10.5)
CHLORIDE SERPL-SCNC: 111 MMOL/L (ref 95–110)
CO2 SERPL-SCNC: 20 MMOL/L (ref 23–29)
CREAT SERPL-MCNC: 1.1 MG/DL (ref 0.5–1.4)
DIFFERENTIAL METHOD: ABNORMAL
EOSINOPHIL # BLD AUTO: 0.2 K/UL (ref 0–0.5)
EOSINOPHIL NFR BLD: 3.6 % (ref 0–8)
ERYTHROCYTE [DISTWIDTH] IN BLOOD BY AUTOMATED COUNT: 14.7 % (ref 11.5–14.5)
EST. GFR  (AFRICAN AMERICAN): 51 ML/MIN/1.73 M^2
EST. GFR  (NON AFRICAN AMERICAN): 44 ML/MIN/1.73 M^2
GLUCOSE SERPL-MCNC: 98 MG/DL (ref 70–110)
HCT VFR BLD AUTO: 28.7 % (ref 37–48.5)
HGB BLD-MCNC: 9 G/DL (ref 12–16)
LYMPHOCYTES # BLD AUTO: 1.1 K/UL (ref 1–4.8)
LYMPHOCYTES NFR BLD: 21 % (ref 18–48)
MCH RBC QN AUTO: 28 PG (ref 27–31)
MCHC RBC AUTO-ENTMCNC: 31.4 G/DL (ref 32–36)
MCV RBC AUTO: 89 FL (ref 82–98)
MONOCYTES # BLD AUTO: 0.3 K/UL (ref 0.3–1)
MONOCYTES NFR BLD: 6.2 % (ref 4–15)
NEUTROPHILS # BLD AUTO: 3.4 K/UL (ref 1.8–7.7)
NEUTROPHILS NFR BLD: 68.6 % (ref 38–73)
PLATELET # BLD AUTO: 168 K/UL (ref 150–350)
PMV BLD AUTO: 11.4 FL (ref 9.2–12.9)
POTASSIUM SERPL-SCNC: 5.3 MMOL/L (ref 3.5–5.1)
PROT SERPL-MCNC: 6.7 G/DL (ref 6–8.4)
RBC # BLD AUTO: 3.21 M/UL (ref 4–5.4)
SODIUM SERPL-SCNC: 139 MMOL/L (ref 136–145)
TSH SERPL DL<=0.005 MIU/L-ACNC: 2.29 UIU/ML (ref 0.4–4)
VIT B12 SERPL-MCNC: 367 PG/ML (ref 210–950)
WBC # BLD AUTO: 4.99 K/UL (ref 3.9–12.7)

## 2019-10-11 PROCEDURE — 85025 COMPLETE CBC W/AUTO DIFF WBC: CPT

## 2019-10-11 PROCEDURE — 99214 OFFICE O/P EST MOD 30 MIN: CPT | Mod: PBBFAC | Performed by: FAMILY MEDICINE

## 2019-10-11 PROCEDURE — 99214 PR OFFICE/OUTPT VISIT, EST, LEVL IV, 30-39 MIN: ICD-10-PCS | Mod: S$PBB,,, | Performed by: FAMILY MEDICINE

## 2019-10-11 PROCEDURE — 82607 VITAMIN B-12: CPT

## 2019-10-11 PROCEDURE — 99999 PR PBB SHADOW E&M-EST. PATIENT-LVL IV: ICD-10-PCS | Mod: PBBFAC,,, | Performed by: FAMILY MEDICINE

## 2019-10-11 PROCEDURE — 99999 PR PBB SHADOW E&M-EST. PATIENT-LVL IV: CPT | Mod: PBBFAC,,, | Performed by: FAMILY MEDICINE

## 2019-10-11 PROCEDURE — 99214 OFFICE O/P EST MOD 30 MIN: CPT | Mod: S$PBB,,, | Performed by: FAMILY MEDICINE

## 2019-10-11 PROCEDURE — 84443 ASSAY THYROID STIM HORMONE: CPT

## 2019-10-11 PROCEDURE — 36415 COLL VENOUS BLD VENIPUNCTURE: CPT

## 2019-10-11 PROCEDURE — 80053 COMPREHEN METABOLIC PANEL: CPT

## 2019-10-11 RX ORDER — VIT C/E/ZN/COPPR/LUTEIN/ZEAXAN 250MG-90MG
1000 CAPSULE ORAL DAILY
Qty: 90 CAPSULE | Refills: 3 | Status: SHIPPED | OUTPATIENT
Start: 2019-10-11

## 2019-10-11 RX ORDER — AMLODIPINE BESYLATE 10 MG/1
10 TABLET ORAL DAILY
Qty: 90 TABLET | Refills: 3 | Status: SHIPPED | OUTPATIENT
Start: 2019-10-11 | End: 2019-11-12

## 2019-10-11 RX ORDER — ISOSORBIDE MONONITRATE 60 MG/1
60 TABLET, EXTENDED RELEASE ORAL DAILY
Qty: 90 TABLET | Refills: 3 | Status: SHIPPED | OUTPATIENT
Start: 2019-10-11 | End: 2020-01-01

## 2019-10-11 RX ORDER — SERTRALINE HYDROCHLORIDE 100 MG/1
100 TABLET, FILM COATED ORAL DAILY
Qty: 90 TABLET | Refills: 3 | Status: SHIPPED | OUTPATIENT
Start: 2019-10-11 | End: 2020-01-01

## 2019-10-11 RX ORDER — PANTOPRAZOLE SODIUM 40 MG/1
40 TABLET, DELAYED RELEASE ORAL DAILY
Qty: 90 TABLET | Refills: 3 | Status: SHIPPED | OUTPATIENT
Start: 2019-10-11 | End: 2020-01-01

## 2019-10-11 RX ORDER — GABAPENTIN 300 MG/1
300 CAPSULE ORAL NIGHTLY
Qty: 90 CAPSULE | Refills: 3 | Status: SHIPPED | OUTPATIENT
Start: 2019-10-11 | End: 2020-01-01

## 2019-10-11 RX ORDER — QUETIAPINE FUMARATE 25 MG/1
25 TABLET, FILM COATED ORAL 2 TIMES DAILY PRN
Qty: 180 TABLET | Refills: 3 | Status: SHIPPED | OUTPATIENT
Start: 2019-10-11 | End: 2020-01-01

## 2019-10-11 RX ORDER — ATORVASTATIN CALCIUM 40 MG/1
40 TABLET, FILM COATED ORAL NIGHTLY
Qty: 90 TABLET | Refills: 3 | Status: SHIPPED | OUTPATIENT
Start: 2019-10-11

## 2019-10-11 RX ORDER — ALPRAZOLAM 0.25 MG/1
0.12 TABLET ORAL NIGHTLY PRN
Qty: 90 TABLET | Refills: 1 | Status: SHIPPED | OUTPATIENT
Start: 2019-10-11 | End: 2020-01-01

## 2019-10-11 RX ORDER — IRBESARTAN 300 MG/1
300 TABLET ORAL NIGHTLY
Qty: 90 TABLET | Refills: 3 | Status: SHIPPED | OUTPATIENT
Start: 2019-10-11 | End: 2021-01-01

## 2019-10-11 RX ORDER — LEVOTHYROXINE SODIUM 25 UG/1
25 TABLET ORAL DAILY
Qty: 90 TABLET | Refills: 3 | Status: SHIPPED | OUTPATIENT
Start: 2019-10-11 | End: 2020-01-01

## 2019-10-11 RX ORDER — SPIRONOLACTONE 25 MG/1
25 TABLET ORAL DAILY
Qty: 90 TABLET | Refills: 3 | Status: SHIPPED | OUTPATIENT
Start: 2019-10-11 | End: 2019-11-12

## 2019-10-11 RX ORDER — METOPROLOL SUCCINATE 25 MG/1
25 TABLET, EXTENDED RELEASE ORAL DAILY
Qty: 90 TABLET | Refills: 3 | Status: ON HOLD | OUTPATIENT
Start: 2019-10-11 | End: 2021-01-01 | Stop reason: HOSPADM

## 2019-10-11 RX ORDER — NITROGLYCERIN 0.4 MG/1
0.4 TABLET SUBLINGUAL EVERY 5 MIN PRN
Qty: 25 TABLET | Refills: 4 | Status: SHIPPED | OUTPATIENT
Start: 2019-10-11

## 2019-10-11 NOTE — TELEPHONE ENCOUNTER
Please call pt/caretake with lab report - she is in the [facility] I think - so may need to talk to charge nurse there    Lab reports mostly as expected except K=5.3 is a little elevated for potassium. She does have CKD3 which can raise potassium, and is on some meds that increase potasium (ibesartan 300, spironolactone 25).    Let's HOLD spironolactone for now. She has an appt with the cardiologist on 11/20/19 and they can decide on long term plan for spironolactone

## 2019-10-11 NOTE — MEDICAL/APP STUDENT
Subjective:       Patient ID: Arti Olsen is a 89 y.o. female.    Chief Complaint: Hospital Follow Up and Back Pain (pain rate 8)    Ms. Olsen is a 89yo F with PMHx HTN, chronic pain, and chronic anemia who presents for a hospital follow up. She presented to [facility] on 10/3/19 with left sided sharp chest pain that radiated to her left shoulder. She was noted to have a Hgb 9.8 and was diagnosed with PUD. She denies receiving a blood transfusion however her Hgb came up to 11 the following day. She denies receiving an EGD at [facility]. She was D/C on 10/4 and given Protonix which improved the pain. She denies any melena, hematochezia, nausea, vomiting, abdominal pain, chest pain, or SOB.  She was recently moved into a nursing home 3 weeks ago and has not been sleeping well since the transition. Her carer states she sleeps 30-40min/night. She has also been have tooth pain in her left upper back molar. She was due to see a dentist but that dentist moved and she has not yet scheduled a new appointment.     Review of Systems   Constitutional: Negative for diaphoresis, fatigue and fever.   HENT: Positive for dental problem (left upper molar pain).    Respiratory: Negative for cough, chest tightness and shortness of breath.    Cardiovascular: Negative for chest pain and leg swelling.   Gastrointestinal: Negative for abdominal pain, blood in stool, constipation, diarrhea, nausea and vomiting.   Musculoskeletal: Positive for back pain (chronic).   Neurological: Negative for light-headedness.       Objective:      Physical Exam   Constitutional: No distress.   HENT:   Head: Normocephalic and atraumatic.   Eyes: Conjunctivae and EOM are normal.   Cardiovascular: Normal rate, regular rhythm, normal heart sounds and intact distal pulses.   No murmur heard.  Pulmonary/Chest: Effort normal and breath sounds normal.   Abdominal: Soft. Bowel sounds are normal. There is no tenderness. There is no guarding.   Neurological: She is  alert.   Skin: Skin is warm and dry. She is not diaphoretic.       Assessment:       1. Iron deficiency anemia, unspecified iron deficiency anemia type    2. Anemia, unspecified type    3. Essential hypertension    4. Hypothyroidism due to acquired atrophy of thyroid    5. Achlorhydria    6. Gastrointestinal hemorrhage, unspecified gastrointestinal hemorrhage type      7. Insomnia  Plan:       Arti was seen today for hospital follow up and back pain.    Diagnoses and all orders for this visit:      Anemia, unspecified type  -     CBC auto differential; Future  -     Comprehensive metabolic panel; Future    Essential hypertension  -     Comprehensive metabolic panel; Future  -     irbesartan (AVAPRO) 300 MG tablet; Take 1 tablet (300 mg total) by mouth every evening.    Hypothyroidism due to acquired atrophy of thyroid  -     TSH; Future  -     levothyroxine (SYNTHROID) 25 MCG tablet; Take 1 tablet (25 mcg total) by mouth once daily.    Achlorhydria  -     Vitamin B12; Future    Gastrointestinal hemorrhage, unspecified gastrointestinal hemorrhage type  -     Ambulatory consult to Gastroenterology    Insomnia  -Gabapentin at night

## 2019-10-11 NOTE — PROGRESS NOTES
Subjective:       Patient ID: Arti Olsen is a 89 y.o. female.     Chief Complaint: Hospital Follow Up and Back Pain (pain rate 8)     Ms. Olsen is a 89yo F with PMHx HTN, chronic pain, and chronic anemia who presents for a hospital follow up. She presented to [facility] on 10/3/19 with left sided sharp chest pain that radiated to her left shoulder. She was noted to have a Hgb 9.8 and was diagnosed with PUD. She denies receiving a blood transfusion however her Hgb came up to 11 the following day. She denies receiving an EGD at [facility]. She was D/C on 10/4 and given Protonix which improved the pain. She denies any melena, hematochezia, nausea, vomiting, abdominal pain, chest pain, or SOB.  She was recently moved into a nursing home 3 weeks ago and has not been sleeping well since the transition. Her carer states she sleeps 30-40min/night. She has also been have tooth pain in her left upper back molar. She was due to see a dentist but that dentist moved and she has not yet scheduled a new appointment.      Review of Systems   Constitutional: Negative for diaphoresis, fatigue and fever.   HENT: Positive for dental problem (left upper molar pain).    Respiratory: Negative for cough, chest tightness and shortness of breath.    Cardiovascular: Negative for chest pain and leg swelling.   Gastrointestinal: Negative for abdominal pain, blood in stool, constipation, diarrhea, nausea and vomiting.   Musculoskeletal: Positive for back pain (chronic).   Neurological: Negative for light-headedness.       Objective:   Physical Exam   Constitutional: No distress.   HENT:   Head: Normocephalic and atraumatic.   Eyes: Conjunctivae and EOM are normal.   Cardiovascular: Normal rate, regular rhythm, normal heart sounds and intact distal pulses.   No murmur heard.  Pulmonary/Chest: Effort normal and breath sounds normal.   Abdominal: Soft. Bowel sounds are normal. There is no tenderness. There is no guarding.   Neurological: She is  "alert.   Skin: Skin is warm and dry. She is not diaphoretic.       Assessment:         Plan:          Arti was seen today for hospital follow up and back pain.    Diagnoses and all orders for this visit:    Iron deficiency anemia, unspecified iron deficiency anemia type  Anemia, unspecified type  -     CBC auto differential; Future  -     Comprehensive metabolic panel; Future    Essential hypertension  BP (!) 138/56 (BP Location: Left arm, Patient Position: Sitting, BP Method: Small (Manual))   Pulse 70   Ht 4' 11" (1.499 m)   Wt 45.3 kg (99 lb 13.9 oz)   LMP  (LMP Unknown)   SpO2 98%   BMI 20.17 kg/m²    -controlled, stable, no change in meds   -     Comprehensive metabolic panel; Future  -     irbesartan (AVAPRO) 300 MG tablet; Take 1 tablet (300 mg total) by mouth every evening.  -     metoprolol succinate (TOPROL-XL) 25 MG 24 hr tablet; Take 1 tablet (25 mg total) by mouth once daily.  -     amLODIPine (NORVASC) 10 MG tablet; Take 1 tablet (10 mg total) by mouth once daily.  -     spironolactone (ALDACTONE) 25 MG tablet; Take 1 tablet (25 mg total) by mouth once daily. [NOTE: K returned elevated, so will have nurse call her SNF to HOLD spironolactone for now. 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    Achlorhydria  -     Vitamin B12; Future    Gastrointestinal hemorrhage, unspecified gastrointestinal hemorrhage type  -     Ambulatory consult to Gastroenterology  -     pantoprazole (PROTONIX) 40 MG tablet; Take 1 tablet (40 mg total) by mouth once daily.    Atherosclerosis of aorta  -     atorvastatin (LIPITOR) 40 MG tablet; Take 1 tablet (40 mg total) by mouth every evening. TAKE (1/2) HALF BY MOUTH ONCE A DAY    Coronary artery disease involving native coronary artery of native " heart without angina pectoris  -     atorvastatin (LIPITOR) 40 MG tablet; Take 1 tablet (40 mg total) by mouth every evening. TAKE (1/2) HALF BY MOUTH ONCE A DAY  -     metoprolol succinate (TOPROL-XL) 25 MG 24 hr tablet; Take 1 tablet (25 mg total) by mouth once daily.  -     isosorbide mononitrate (IMDUR) 60 MG 24 hr tablet; Take 1 tablet (60 mg total) by mouth once daily.  -     nitroGLYCERIN (NITROSTAT) 0.4 MG SL tablet; Place 1 tablet (0.4 mg total) under the tongue every 5 (five) minutes as needed for Chest pain.    Mixed hyperlipidemia  -     atorvastatin (LIPITOR) 40 MG tablet; Take 1 tablet (40 mg total) by mouth every evening. TAKE (1/2) HALF BY MOUTH ONCE A DAY    PAD (peripheral artery disease)  -     atorvastatin (LIPITOR) 40 MG tablet; Take 1 tablet (40 mg total) by mouth every evening. TAKE (1/2) HALF BY MOUTH ONCE A DAY    Renal artery atherosclerosis  -     atorvastatin (LIPITOR) 40 MG tablet; Take 1 tablet (40 mg total) by mouth every evening. TAKE (1/2) HALF BY MOUTH ONCE A DAY    Unstable angina  -     atorvastatin (LIPITOR) 40 MG tablet; Take 1 tablet (40 mg total) by mouth every evening. TAKE (1/2) HALF BY MOUTH ONCE A DAY  -     metoprolol succinate (TOPROL-XL) 25 MG 24 hr tablet; Take 1 tablet (25 mg total) by mouth once daily.    Depression, unspecified depression type  -     sertraline (ZOLOFT) 100 MG tablet; Take 1 tablet (100 mg total) by mouth once daily.    Vitamin D insufficiency  -     cholecalciferol, vitamin D3, (VITAMIN D3) 1,000 unit capsule; Take 1 capsule (1,000 Units total) by mouth once daily.    Insomnia, unspecified type  -     ALPRAZolam (XANAX) 0.25 MG tablet; Take 0.5 tablets (0.125 mg total) by mouth nightly as needed for Insomnia or Anxiety.  -     gabapentin (NEURONTIN) 300 MG capsule; Take 1 capsule (300 mg total) by mouth every evening.  -     QUEtiapine (SEROQUEL) 25 MG Tab; Take 1 tablet (25 mg total) by mouth 2 (two) times daily as needed (anxiety,  insomnia).    Anxiety  -     ALPRAZolam (XANAX) 0.25 MG tablet; Take 0.5 tablets (0.125 mg total) by mouth nightly as needed for Insomnia or Anxiety.  -     QUEtiapine (SEROQUEL) 25 MG Tab; Take 1 tablet (25 mg total) by mouth 2 (two) times daily as needed (anxiety, insomnia).    Other chronic pain  -     gabapentin (NEURONTIN) 300 MG capsule; Take 1 capsule (300 mg total) by mouth every evening.      I hereby acknowledge that I am relying upon documentation authored by a medical student working under my supervision and further I hereby attest that I have verified the student documentation or findings by personally performing the physical exam and medical decision making activities of the Evaluation and Management service to be billed.  Wong Le

## 2019-10-14 NOTE — TELEPHONE ENCOUNTER
I do not see a direct number to [facility]. I left a message on her home recorder. The second number provided, did not have a message system set up?

## 2019-10-15 NOTE — TELEPHONE ENCOUNTER
Pt answered the phone through her home number. She was informed. Ace [facility]co [phone]. Message left on the nursing station/[place] recorder to return call.

## 2019-10-16 NOTE — TELEPHONE ENCOUNTER
Message left on resident clinic service for [facility] [place].  She may be in th assistant living portion.

## 2019-10-16 NOTE — TELEPHONE ENCOUNTER
Angely from [facility] called back. [phone]. She informed me that they do not accept verbal orders over the phone they need a written as to the exact medication change.  Faxed to [phone].

## 2019-10-28 ENCOUNTER — PATIENT OUTREACH (OUTPATIENT)
Dept: ADMINISTRATIVE | Facility: OTHER | Age: 84
End: 2019-10-28

## 2019-10-29 ENCOUNTER — OFFICE VISIT (OUTPATIENT)
Dept: CARDIOLOGY | Facility: CLINIC | Age: 84
End: 2019-10-29
Payer: MEDICARE

## 2019-10-29 VITALS
WEIGHT: 111.56 LBS | HEART RATE: 64 BPM | SYSTOLIC BLOOD PRESSURE: 156 MMHG | OXYGEN SATURATION: 92 % | DIASTOLIC BLOOD PRESSURE: 67 MMHG | HEIGHT: 59 IN | BODY MASS INDEX: 22.49 KG/M2

## 2019-10-29 DIAGNOSIS — I25.119 CORONARY ARTERY DISEASE INVOLVING NATIVE CORONARY ARTERY OF NATIVE HEART WITH ANGINA PECTORIS: ICD-10-CM

## 2019-10-29 DIAGNOSIS — I50.9 ACUTE DECOMPENSATED HEART FAILURE: Primary | ICD-10-CM

## 2019-10-29 DIAGNOSIS — I73.9 PAD (PERIPHERAL ARTERY DISEASE): ICD-10-CM

## 2019-10-29 DIAGNOSIS — E78.5 HYPERLIPIDEMIA, UNSPECIFIED HYPERLIPIDEMIA TYPE: ICD-10-CM

## 2019-10-29 DIAGNOSIS — I70.1 RENAL ARTERY ATHEROSCLEROSIS: ICD-10-CM

## 2019-10-29 PROCEDURE — 99214 OFFICE O/P EST MOD 30 MIN: CPT | Mod: PBBFAC | Performed by: INTERNAL MEDICINE

## 2019-10-29 PROCEDURE — 99214 OFFICE O/P EST MOD 30 MIN: CPT | Mod: S$PBB,GC,, | Performed by: INTERNAL MEDICINE

## 2019-10-29 PROCEDURE — 99999 PR PBB SHADOW E&M-EST. PATIENT-LVL IV: CPT | Mod: PBBFAC,GC,, | Performed by: INTERNAL MEDICINE

## 2019-10-29 PROCEDURE — 99214 PR OFFICE/OUTPT VISIT, EST, LEVL IV, 30-39 MIN: ICD-10-PCS | Mod: S$PBB,GC,, | Performed by: INTERNAL MEDICINE

## 2019-10-29 PROCEDURE — 99999 PR PBB SHADOW E&M-EST. PATIENT-LVL IV: ICD-10-PCS | Mod: PBBFAC,GC,, | Performed by: INTERNAL MEDICINE

## 2019-10-29 RX ORDER — FUROSEMIDE 40 MG/1
40 TABLET ORAL DAILY
Qty: 7 TABLET | Refills: 0 | Status: SHIPPED | OUTPATIENT
Start: 2019-10-29 | End: 2019-11-12

## 2019-10-29 NOTE — PROGRESS NOTES
Cardiology Clinic Note  Reason for Visit: bilateral edema; Fatigue; and Palpitations    HPI:   Ms. Arti Olsen is a 89 y.o. woman with history of CAD, resistant htn, hld, renal artery stenosis, mesenteric artery stenosis presenting with bilateral LE swelling worsening over the last week.  She notes no PND, orthopnea, weight gain, or decreased exercise tolerance.  Patient does not take a loop or thiazide diuretic.  No chest pain, fevers, chills, abdominal pain, nausea, vomiting, or early satiety.  No dietary restrictions followed.    ROS:    Review of Systems   Constitution: Negative.   HENT: Negative.    Eyes: Negative.    Cardiovascular: Positive for leg swelling.   Respiratory: Negative.    Endocrine: Negative.    Skin: Negative.    Musculoskeletal: Negative.    Gastrointestinal: Negative.    Genitourinary: Negative.    Neurological: Negative.      PMH:     Past Medical History:   Diagnosis Date    Acid reflux     chronic    Anemia 6/24/2014    Arthritis     osetoarthritis    Clotting disorder     Colon polyp     Coronary artery disease     Nonobstructive CAD per [facility]    History of colonoscopy     1 polyp noted in 2009    Hyperlipemia     Hypertension     Migraine headache     chronic    Peripheral vascular disease     PONV (postoperative nausea and vomiting)     Renal artery atherosclerosis 11/11/2012    Spinal stenosis     Thyroid disease      Past Surgical History:   Procedure Laterality Date    acf      ANTERIOR CERVICAL DISCECTOMY W/ FUSION      APPENDECTOMY  1942    BLADDER SURGERY  1958    suspension    COLONOSCOPY W/ POLYPECTOMY      CYST REMOVAL  1983    removed from scalp    DISC REMOVAL  1983    EYE SURGERY      cataracts removed    HYSTERECTOMY  1959    COMPLETE    JOINT REPLACEMENT  2006    repair 3rd toe    JOINT REPLACEMENT  2007    right/left rotator    SPINE SURGERY  02/2014    relieve pressure on nerves    TONSILLECTOMY, ADENOIDECTOMY  1930's    UTERINE FIBROID  "SURGERY  1958    VARICOSE VEIN SURGERY  1958     Allergies:     Review of patient's allergies indicates:   Allergen Reactions    Iodinated contrast media Swelling    Reglan [metoclopramide] Other (See Comments)     "Body shaking"    Sulfa (sulfonamide antibiotics)      Yrs ago    Augmentin [amoxicillin-pot clavulanate] Diarrhea     Medications:     Current Outpatient Medications on File Prior to Visit   Medication Sig Dispense Refill    ALPRAZolam (XANAX) 0.25 MG tablet Take 0.5 tablets (0.125 mg total) by mouth nightly as needed for Insomnia or Anxiety. 90 tablet 1    amLODIPine (NORVASC) 10 MG tablet Take 1 tablet (10 mg total) by mouth once daily. 90 tablet 3    atorvastatin (LIPITOR) 40 MG tablet Take 1 tablet (40 mg total) by mouth every evening. TAKE (1/2) HALF BY MOUTH ONCE A DAY 90 tablet 3    cholecalciferol, vitamin D3, (VITAMIN D3) 1,000 unit capsule Take 1 capsule (1,000 Units total) by mouth once daily. 90 capsule 3    gabapentin (NEURONTIN) 300 MG capsule Take 1 capsule (300 mg total) by mouth every evening. 90 capsule 3    irbesartan (AVAPRO) 300 MG tablet Take 1 tablet (300 mg total) by mouth every evening. 90 tablet 3    isosorbide mononitrate (IMDUR) 60 MG 24 hr tablet Take 1 tablet (60 mg total) by mouth once daily. 90 tablet 3    levothyroxine (SYNTHROID) 25 MCG tablet Take 1 tablet (25 mcg total) by mouth once daily. 90 tablet 3    metoprolol succinate (TOPROL-XL) 25 MG 24 hr tablet Take 1 tablet (25 mg total) by mouth once daily. 90 tablet 3    nitroGLYCERIN (NITROSTAT) 0.4 MG SL tablet Place 1 tablet (0.4 mg total) under the tongue every 5 (five) minutes as needed for Chest pain. 25 tablet 4    pantoprazole (PROTONIX) 40 MG tablet Take 1 tablet (40 mg total) by mouth once daily. 90 tablet 3    QUEtiapine (SEROQUEL) 25 MG Tab Take 1 tablet (25 mg total) by mouth 2 (two) times daily as needed (anxiety, insomnia). 180 tablet 3    sertraline (ZOLOFT) 100 MG tablet Take 1 tablet " (100 mg total) by mouth once daily. 90 tablet 3    spironolactone (ALDACTONE) 25 MG tablet Take 1 tablet (25 mg total) by mouth once daily. 90 tablet 3    VIT C/VIT E/LUTEIN/MIN/OMEGA-3 (OCUVITE ORAL) Take 1 tablet by mouth once daily.       No current facility-administered medications on file prior to visit.      Social History:     Social History     Tobacco Use    Smoking status: Former Smoker     Packs/day: 1.00     Years: 29.00     Pack years: 29.00     Last attempt to quit: 9/10/1969     Years since quittin.1    Smokeless tobacco: Never Used    Tobacco comment: The patient lives alone and is able to accomplish a usual activities of daily living.  She is not that active due to the severity of her back pain.   Substance Use Topics    Alcohol use: No     Frequency: Never     Drinks per session: Patient refused     Binge frequency: Never     Family History:     Family History   Problem Relation Age of Onset    Heart disease Father         aorta burst    Heart disease Mother         stroke    Kidney disease Brother     Cancer Brother     Lung cancer Brother     Cancer Brother     Leukemia Brother     Heart disease Brother     Cancer Brother     Heart disease Maternal Grandmother     Kidney disease Maternal Grandfather     Uterine cancer Paternal Grandmother     Anesthesia problems Neg Hx     Clotting disorder Neg Hx      problems Neg Hx     Hypertension Neg Hx     Kidney cancer Neg Hx     Malignant hyperthermia Neg Hx     Prostate cancer Neg Hx     Sickle cell trait Neg Hx     Lupus Neg Hx     Sudden death Neg Hx     Urolithiasis Neg Hx     Colon cancer Neg Hx     Esophageal cancer Neg Hx     Irritable bowel syndrome Neg Hx     Rectal cancer Neg Hx     Stomach cancer Neg Hx     Ulcerative colitis Neg Hx     Celiac disease Neg Hx     Cystic fibrosis Neg Hx      Physical Exam:   BP (!) 156/67 (BP Location: Left arm, Patient Position: Sitting, BP Method: Small (Automatic))   " Pulse 64   Ht 4' 11" (1.499 m)   Wt 50.6 kg (111 lb 8.8 oz)   LMP  (LMP Unknown)   SpO2 (!) 92%   BMI 22.53 kg/m²    Physical Exam   Constitutional: She is oriented to person, place, and time. She appears well-developed and well-nourished.   HENT:   Head: Normocephalic and atraumatic.   Eyes: Pupils are equal, round, and reactive to light. Conjunctivae and EOM are normal.   Neck: Normal range of motion. Neck supple. JVD (mid neck at 45 degrees) present.   Cardiovascular: Normal rate, regular rhythm and normal heart sounds. Exam reveals no gallop and no friction rub.   No murmur heard.  Pulmonary/Chest: Effort normal and breath sounds normal. No respiratory distress. She has no wheezes. She has no rales. She exhibits no tenderness.   Abdominal: Soft. Bowel sounds are normal. She exhibits no distension. There is no tenderness.   Musculoskeletal: Normal range of motion. She exhibits edema (bilateral, pitting to thigh, 3+). She exhibits no tenderness.   Neurological: She is alert and oriented to person, place, and time.   Skin: Skin is warm and dry. There is erythema (bilateral along shins). No pallor.       Labs:     Lab Results   Component Value Date     10/11/2019    K 5.3 (H) 10/11/2019     (H) 10/11/2019    CO2 20 (L) 10/11/2019    BUN 34 (H) 10/11/2019    CREATININE 1.1 10/11/2019    ANIONGAP 8 10/11/2019     Lab Results   Component Value Date    HGBA1C 5.7 (H) 02/26/2019     Lab Results   Component Value Date     (H) 02/26/2019     (H) 11/09/2016    BNP 61 04/08/2012    Lab Results   Component Value Date    WBC 4.99 10/11/2019    HGB 9.0 (L) 10/11/2019    HCT 28.7 (L) 10/11/2019    HCT 29 (L) 02/26/2019     10/11/2019    GRAN 3.4 10/11/2019    GRAN 68.6 10/11/2019     Lab Results   Component Value Date    CHOL 125 02/27/2019    HDL 42 02/27/2019    LDLCALC 71.6 02/27/2019    TRIG 57 02/27/2019          Imaging:   No recent echo noted  Arterial Ultrasound of legs: report " reviewed    EKG reviewed: sinus, 1st degree avb with LBBB  Assessment:     1. Acute decompensated heart failure    2. PAD (peripheral artery disease)    3. Renal artery atherosclerosis    4. Hyperlipidemia, unspecified hyperlipidemia type    5. Coronary artery disease involving native coronary artery of native heart with angina pectoris          Plan:   Acute decompensated heart failure  Unknown systolic/diastolic function  Ordered Echo Color Flow Doppler? Yes; Future  Lasix 40mg po daily x 7 days and then order repeat BMP to assess renal fctn  Blood pressure is currently uncontrolled likely in the setting of adhf and decreased gut absorption  Daily weights, daily bp, and 2gm na diet    PAD (peripheral artery disease)  Continue atorvastatin  No current symptoms    Renal artery atherosclerosis  Continue atorvastatin    Hyperlipidemia, unspecified hyperlipidemia type  Continue atorvastatin    Coronary artery disease involving native coronary artery of native heart with angina pectoris  Non-obstructive on last cath  No current anginal symptoms  Continue imdur, atorvastatin, irbesartan, toprol xl, aldactone, nitro sl prn      Discussed with Dr. Negro. RTC in 2 weeks with Dr Saab if possible.     Signed:  Greg Dempsey MD  10/29/2019 10:19 AM

## 2019-11-06 ENCOUNTER — HOSPITAL ENCOUNTER (OUTPATIENT)
Dept: CARDIOLOGY | Facility: CLINIC | Age: 84
Discharge: HOME | End: 2019-11-06
Attending: INTERNAL MEDICINE
Payer: MEDICARE

## 2019-11-06 VITALS
BODY MASS INDEX: 23.51 KG/M2 | WEIGHT: 112 LBS | HEART RATE: 63 BPM | HEIGHT: 58 IN | DIASTOLIC BLOOD PRESSURE: 85 MMHG | SYSTOLIC BLOOD PRESSURE: 145 MMHG

## 2019-11-06 DIAGNOSIS — I73.9 PAD (PERIPHERAL ARTERY DISEASE): ICD-10-CM

## 2019-11-06 DIAGNOSIS — I35.1 NONRHEUMATIC AORTIC VALVE INSUFFICIENCY: ICD-10-CM

## 2019-11-06 DIAGNOSIS — I44.7 LBBB (LEFT BUNDLE BRANCH BLOCK): ICD-10-CM

## 2019-11-06 DIAGNOSIS — I10 ESSENTIAL HYPERTENSION: ICD-10-CM

## 2019-11-06 DIAGNOSIS — I70.1 RENAL ARTERY ATHEROSCLEROSIS: ICD-10-CM

## 2019-11-06 DIAGNOSIS — I25.10 CORONARY ARTERY DISEASE INVOLVING NATIVE CORONARY ARTERY OF NATIVE HEART WITHOUT ANGINA PECTORIS: ICD-10-CM

## 2019-11-06 LAB
ASCENDING AORTA: 3.08 CM
AV INDEX (PROSTH): 0.81
AV MEAN GRADIENT: 5 MMHG
AV PEAK GRADIENT: 8 MMHG
AV VALVE AREA: 2.61 CM2
AV VELOCITY RATIO: 0.77
BSA FOR ECHO PROCEDURE: 1.44 M2
CV ECHO LV RWT: 0.45 CM
DOP CALC AO PEAK VEL: 1.42 M/S
DOP CALC AO VTI: 36.1 CM
DOP CALC LVOT AREA: 3.2 CM2
DOP CALC LVOT DIAMETER: 2.02 CM
DOP CALC LVOT PEAK VEL: 1.09 M/S
DOP CALC LVOT STROKE VOLUME: 94.08 CM3
DOP CALCLVOT PEAK VEL VTI: 29.37 CM
E WAVE DECELERATION TIME: 233.94 MSEC
E/A RATIO: 0.54
E/E' RATIO: 18 M/S
ECHO LV POSTERIOR WALL: 0.98 CM (ref 0.6–1.1)
FRACTIONAL SHORTENING: 36 % (ref 28–44)
INTERVENTRICULAR SEPTUM: 1.08 CM (ref 0.6–1.1)
LA MAJOR: 5.01 CM
LA MINOR: 5.06 CM
LA WIDTH: 3.8 CM
LEFT ATRIUM SIZE: 4.1 CM
LEFT ATRIUM VOLUME INDEX: 46.8 ML/M2
LEFT ATRIUM VOLUME: 66.68 CM3
LEFT INTERNAL DIMENSION IN SYSTOLE: 2.8 CM (ref 2.1–4)
LEFT VENTRICLE DIASTOLIC VOLUME INDEX: 61.26 ML/M2
LEFT VENTRICLE DIASTOLIC VOLUME: 87.21 ML
LEFT VENTRICLE MASS INDEX: 108 G/M2
LEFT VENTRICLE SYSTOLIC VOLUME INDEX: 20.8 ML/M2
LEFT VENTRICLE SYSTOLIC VOLUME: 29.66 ML
LEFT VENTRICULAR INTERNAL DIMENSION IN DIASTOLE: 4.39 CM (ref 3.5–6)
LEFT VENTRICULAR MASS: 153.46 G
LV LATERAL E/E' RATIO: 15 M/S
LV SEPTAL E/E' RATIO: 22.5 M/S
MV PEAK A VEL: 1.67 M/S
MV PEAK E VEL: 0.9 M/S
PISA TR MAX VEL: 3.07 M/S
PULM VEIN S/D RATIO: 1.98
PV PEAK D VEL: 0.47 M/S
PV PEAK S VEL: 0.93 M/S
RA MAJOR: 4.11 CM
RA PRESSURE: 8 MMHG
RA WIDTH: 3.25 CM
RV TISSUE DOPPLER FREE WALL SYSTOLIC VELOCITY 1 (APICAL 4 CHAMBER VIEW): 15.55 CM/S
SINUS: 3.05 CM
STJ: 2.81 CM
TDI LATERAL: 0.06 M/S
TDI SEPTAL: 0.04 M/S
TDI: 0.05 M/S
TR MAX PG: 38 MMHG
TRICUSPID ANNULAR PLANE SYSTOLIC EXCURSION: 1.89 CM
TV REST PULMONARY ARTERY PRESSURE: 46 MMHG

## 2019-11-06 PROCEDURE — 93306 TTE W/DOPPLER COMPLETE: CPT | Mod: PBBFAC | Performed by: INTERNAL MEDICINE

## 2019-11-06 PROCEDURE — 93306 TRANSTHORACIC ECHO (TTE) COMPLETE (CUPID ONLY): ICD-10-PCS | Mod: 26,S$PBB,, | Performed by: INTERNAL MEDICINE

## 2019-11-10 ENCOUNTER — PATIENT OUTREACH (OUTPATIENT)
Dept: ADMINISTRATIVE | Facility: OTHER | Age: 84
End: 2019-11-10

## 2019-11-12 ENCOUNTER — OFFICE VISIT (OUTPATIENT)
Dept: CARDIOLOGY | Facility: CLINIC | Age: 84
End: 2019-11-12
Payer: MEDICARE

## 2019-11-12 VITALS
WEIGHT: 103.38 LBS | SYSTOLIC BLOOD PRESSURE: 128 MMHG | DIASTOLIC BLOOD PRESSURE: 60 MMHG | HEIGHT: 59 IN | HEART RATE: 64 BPM | BODY MASS INDEX: 20.84 KG/M2

## 2019-11-12 DIAGNOSIS — I25.10 CORONARY ARTERY DISEASE INVOLVING NATIVE CORONARY ARTERY OF NATIVE HEART, ANGINA PRESENCE UNSPECIFIED: ICD-10-CM

## 2019-11-12 DIAGNOSIS — I73.9 PAD (PERIPHERAL ARTERY DISEASE): ICD-10-CM

## 2019-11-12 DIAGNOSIS — I10 HYPERTENSION, UNSPECIFIED TYPE: ICD-10-CM

## 2019-11-12 DIAGNOSIS — I70.1 RAS (RENAL ARTERY STENOSIS): ICD-10-CM

## 2019-11-12 DIAGNOSIS — I50.30 HEART FAILURE WITH PRESERVED EJECTION FRACTION, UNSPECIFIED HF CHRONICITY: Primary | ICD-10-CM

## 2019-11-12 PROCEDURE — 99999 PR PBB SHADOW E&M-EST. PATIENT-LVL III: ICD-10-PCS | Mod: PBBFAC,GC,, | Performed by: INTERNAL MEDICINE

## 2019-11-12 PROCEDURE — 99214 PR OFFICE/OUTPT VISIT, EST, LEVL IV, 30-39 MIN: ICD-10-PCS | Mod: S$PBB,GC,, | Performed by: INTERNAL MEDICINE

## 2019-11-12 PROCEDURE — 99999 PR PBB SHADOW E&M-EST. PATIENT-LVL III: CPT | Mod: PBBFAC,GC,, | Performed by: INTERNAL MEDICINE

## 2019-11-12 PROCEDURE — 99214 OFFICE O/P EST MOD 30 MIN: CPT | Mod: S$PBB,GC,, | Performed by: INTERNAL MEDICINE

## 2019-11-12 PROCEDURE — 99213 OFFICE O/P EST LOW 20 MIN: CPT | Mod: PBBFAC | Performed by: INTERNAL MEDICINE

## 2019-11-12 RX ORDER — OXYCODONE AND ACETAMINOPHEN 7.5; 325 MG/1; MG/1
1 TABLET ORAL EVERY 4 HOURS PRN
COMMUNITY
Start: 2019-09-20 | End: 2020-01-01

## 2019-11-12 RX ORDER — ZOLPIDEM TARTRATE 5 MG/1
5 TABLET ORAL NIGHTLY PRN
COMMUNITY
Start: 2013-05-03 | End: 2019-11-20

## 2019-11-12 RX ORDER — LOPERAMIDE HCL 2 MG
4 TABLET ORAL 3 TIMES DAILY PRN
Status: ON HOLD | COMMUNITY
Start: 2019-10-03 | End: 2020-01-26 | Stop reason: HOSPADM

## 2019-11-12 RX ORDER — BISACODYL 5 MG
5 TABLET, DELAYED RELEASE (ENTERIC COATED) ORAL DAILY PRN
COMMUNITY
Start: 2019-10-03 | End: 2019-11-20

## 2019-11-12 RX ORDER — DOCUSATE SODIUM 100 MG/1
100 CAPSULE, LIQUID FILLED ORAL 2 TIMES DAILY PRN
COMMUNITY
Start: 2019-10-03 | End: 2019-11-20

## 2019-11-12 RX ORDER — ACETAMINOPHEN 500 MG
500 TABLET ORAL DAILY PRN
COMMUNITY
Start: 2019-10-03

## 2019-11-12 RX ORDER — DEXTROMETHORPHAN HBR. AND GUAIFENESIN 10; 100 MG/5ML; MG/5ML
5 SOLUTION ORAL DAILY PRN
Status: ON HOLD | COMMUNITY
Start: 2019-10-03 | End: 2020-01-26 | Stop reason: HOSPADM

## 2019-11-12 NOTE — PROGRESS NOTES
I have personally taken the history and examined this patient and agree with the fellow's note as stated above. Left leg larger than the right. Obtaining venous doppler study discussed.

## 2019-11-12 NOTE — PROGRESS NOTES
Cardiology Clinic Note  Reason for Visit: Acute decompensated heart failure (2 weeks fu)    HPI:   Ms. Arti Olsen is a 89 y.o. woman with history of  CAD, resistant htn, hld, renal artery stenosis, mesenteric artery stenosis presenting as follow up from her last visit 11/6/19 when she presented with with bilateral LE swelling worsening over the prior week.  She noted no PND, orthopnea, weight gain, or decreased exercise tolerance.  She was not on any diuretics at that time.  No chest pain, fevers, chills, abdominal pain, nausea, vomiting, or early satiety.  No dietary restrictions followed.    We started her on lasix 40mg po daily x 7 days with repeat BMP done prior to her follow up with me this week.  We also got an echo which showed:  · Concentric left ventricular hypertrophy. Normal left ventricular systolic function. The estimated ejection fraction is 65%  · Moderate left atrial enlargement.  · Mild right atrial enlargement.  · Grade I (mild) left ventricular diastolic dysfunction consistent with impaired relaxation.  · Normal right ventricular systolic function.  · Mild tricuspid regurgitation.  · Mild-to-moderate mitral regurgitation.  · Mild aortic regurgitation.  · Intermediate central venous pressure (8 mm Hg).  · The estimated PA systolic pressure is 46 mm Hg  · Pulmonary hypertension present.      Today she reports no change in lower extremity swelling or redness but now with some mild scaling skin changes and continued tenderness to palpation.    ROS:    Review of Systems   Constitution: Negative.   HENT: Negative.    Eyes: Negative.    Cardiovascular: Negative.    Respiratory: Negative.    Endocrine: Negative.    Skin: Negative.    Musculoskeletal:        Bilateral le redness, tenderness, skin changes unchanged from prior in the area of redness   Gastrointestinal: Negative.    Genitourinary: Negative.    Neurological: Negative.      PMH:     Past Medical History:   Diagnosis Date    Acid reflux  "    chronic    Anemia 6/24/2014    Arthritis     osetoarthritis    Clotting disorder     Colon polyp     Coronary artery disease     Nonobstructive CAD per [facility]    History of colonoscopy     1 polyp noted in 2009    Hyperlipemia     Hypertension     Migraine headache     chronic    Peripheral vascular disease     PONV (postoperative nausea and vomiting)     Renal artery atherosclerosis 11/11/2012    Spinal stenosis     Thyroid disease      Past Surgical History:   Procedure Laterality Date    acf      ANTERIOR CERVICAL DISCECTOMY W/ FUSION      APPENDECTOMY  1942    BLADDER SURGERY  1958    suspension    COLONOSCOPY W/ POLYPECTOMY      CYST REMOVAL  1983    removed from scalp    DISC REMOVAL  1983    EYE SURGERY      cataracts removed    HYSTERECTOMY  1959    COMPLETE    JOINT REPLACEMENT  2006    repair 3rd toe    JOINT REPLACEMENT  2007    right/left rotator    SPINE SURGERY  02/2014    relieve pressure on nerves    TONSILLECTOMY, ADENOIDECTOMY  1930's    UTERINE FIBROID SURGERY  1958    VARICOSE VEIN SURGERY  1958     Allergies:     Review of patient's allergies indicates:   Allergen Reactions    Iodinated contrast media Swelling    Reglan [metoclopramide] Other (See Comments)     "Body shaking"    Sulfa (sulfonamide antibiotics)      Yrs ago    Augmentin [amoxicillin-pot clavulanate] Diarrhea     Medications:     Current Outpatient Medications on File Prior to Visit   Medication Sig Dispense Refill    acetaminophen (TYLENOL) 500 MG tablet Take 500 mg by mouth daily as needed.       ALPRAZolam (XANAX) 0.25 MG tablet Take 0.5 tablets (0.125 mg total) by mouth nightly as needed for Insomnia or Anxiety. 90 tablet 1    amLODIPine (NORVASC) 10 MG tablet Take 1 tablet (10 mg total) by mouth once daily. 90 tablet 3    atorvastatin (LIPITOR) 40 MG tablet Take 1 tablet (40 mg total) by mouth every evening. TAKE (1/2) HALF BY MOUTH ONCE A DAY 90 tablet 3    bisacodyl (DULCOLAX, " BISACODYL,) 5 mg EC tablet Take 5 mg by mouth daily as needed.       cholecalciferol, vitamin D3, (VITAMIN D3) 1,000 unit capsule Take 1 capsule (1,000 Units total) by mouth once daily. 90 capsule 3    dextromethorphan-guaifenesin (ADULT ROBITUSSIN PEAK COLD DM)  mg/5 mL Liqd Take 5 mLs by mouth daily as needed.       docusate sodium (COLACE) 100 MG capsule Take 100 mg by mouth 2 (two) times daily as needed.       gabapentin (NEURONTIN) 300 MG capsule Take 1 capsule (300 mg total) by mouth every evening. 90 capsule 3    irbesartan (AVAPRO) 300 MG tablet Take 1 tablet (300 mg total) by mouth every evening. 90 tablet 3    isosorbide mononitrate (IMDUR) 60 MG 24 hr tablet Take 1 tablet (60 mg total) by mouth once daily. 90 tablet 3    levothyroxine (SYNTHROID) 25 MCG tablet Take 1 tablet (25 mcg total) by mouth once daily. 90 tablet 3    loperamide (IMODIUM A-D) 2 mg Tab Take 4 mg by mouth 3 (three) times daily as needed.       mag hydrox/aluminum hyd/simeth (ALUM-MAG HYDROXIDE-SIMETH ORAL) Take by mouth daily as needed.       metoprolol succinate (TOPROL-XL) 25 MG 24 hr tablet Take 1 tablet (25 mg total) by mouth once daily. 90 tablet 3    nitroGLYCERIN (NITROSTAT) 0.4 MG SL tablet Place 1 tablet (0.4 mg total) under the tongue every 5 (five) minutes as needed for Chest pain. 25 tablet 4    oxyCODONE-acetaminophen (PERCOCET) 7.5-325 mg per tablet 1 tablet every 4 (four) hours as needed.       pantoprazole (PROTONIX) 40 MG tablet Take 1 tablet (40 mg total) by mouth once daily. 90 tablet 3    QUEtiapine (SEROQUEL) 25 MG Tab Take 1 tablet (25 mg total) by mouth 2 (two) times daily as needed (anxiety, insomnia). 180 tablet 3    sertraline (ZOLOFT) 100 MG tablet Take 1 tablet (100 mg total) by mouth once daily. 90 tablet 3    VIT C/VIT E/LUTEIN/MIN/OMEGA-3 (OCUVITE ORAL) Take 1 tablet by mouth once daily.      zolpidem (AMBIEN) 5 MG Tab Take 5 mg by mouth nightly as needed.       [DISCONTINUED]  furosemide (LASIX) 40 MG tablet Take 1 tablet (40 mg total) by mouth once daily. for 7 days 7 tablet 0    [DISCONTINUED] spironolactone (ALDACTONE) 25 MG tablet Take 1 tablet (25 mg total) by mouth once daily. 90 tablet 3     No current facility-administered medications on file prior to visit.      Social History:     Social History     Tobacco Use    Smoking status: Former Smoker     Packs/day: 1.00     Years: 29.00     Pack years: 29.00     Last attempt to quit: 9/10/1969     Years since quittin.2    Smokeless tobacco: Never Used    Tobacco comment: The patient lives alone and is able to accomplish a usual activities of daily living.  She is not that active due to the severity of her back pain.   Substance Use Topics    Alcohol use: No     Frequency: Never     Drinks per session: Patient refused     Binge frequency: Never     Family History:     Family History   Problem Relation Age of Onset    Heart disease Father         aorta burst    Heart disease Mother         stroke    Kidney disease Brother     Cancer Brother     Lung cancer Brother     Cancer Brother     Leukemia Brother     Heart disease Brother     Cancer Brother     Heart disease Maternal Grandmother     Kidney disease Maternal Grandfather     Uterine cancer Paternal Grandmother     Anesthesia problems Neg Hx     Clotting disorder Neg Hx      problems Neg Hx     Hypertension Neg Hx     Kidney cancer Neg Hx     Malignant hyperthermia Neg Hx     Prostate cancer Neg Hx     Sickle cell trait Neg Hx     Lupus Neg Hx     Sudden death Neg Hx     Urolithiasis Neg Hx     Colon cancer Neg Hx     Esophageal cancer Neg Hx     Irritable bowel syndrome Neg Hx     Rectal cancer Neg Hx     Stomach cancer Neg Hx     Ulcerative colitis Neg Hx     Celiac disease Neg Hx     Cystic fibrosis Neg Hx      Physical Exam:   /60 (BP Location: Left arm, Patient Position: Sitting, BP Method: Pediatric (Automatic))   Pulse 64   Ht  "4' 11" (1.499 m)   Wt 46.9 kg (103 lb 6.3 oz)   LMP  (LMP Unknown)   BMI 20.88 kg/m²    Physical Exam   Constitutional: She is oriented to person, place, and time. She appears well-developed and well-nourished.   HENT:   Head: Normocephalic and atraumatic.   Eyes: Pupils are equal, round, and reactive to light. Conjunctivae and EOM are normal.   Neck: Normal range of motion. Neck supple. No JVD present.   Cardiovascular: Normal rate and regular rhythm. Exam reveals no gallop and no friction rub.   Murmur (mid systolic murmur, av sclerosis) heard.  Pulmonary/Chest: Effort normal and breath sounds normal. No respiratory distress. She has no wheezes. She has no rales. She exhibits no tenderness.   Abdominal: Soft. Bowel sounds are normal. She exhibits no distension. There is no tenderness.   Musculoskeletal: Normal range of motion. She exhibits edema (2+) and tenderness (bilateral le with redness up shins unchanged, mild skin changes/scaling suggestive of venous stasis).   Neurological: She is alert and oriented to person, place, and time.   Skin: Skin is warm and dry. No erythema. No pallor.       Labs:     Lab Results   Component Value Date     11/06/2019    K 4.0 11/06/2019     11/06/2019    CO2 28 11/06/2019    BUN 24 11/06/2019    CREATININE 0.9 11/06/2019    ANIONGAP 8 11/06/2019     Lab Results   Component Value Date    HGBA1C 5.7 (H) 02/26/2019     Lab Results   Component Value Date     (H) 02/26/2019     (H) 11/09/2016    BNP 61 04/08/2012    Lab Results   Component Value Date    WBC 4.99 10/11/2019    HGB 9.0 (L) 10/11/2019    HCT 28.7 (L) 10/11/2019    HCT 29 (L) 02/26/2019     10/11/2019    GRAN 3.4 10/11/2019    GRAN 68.6 10/11/2019     Lab Results   Component Value Date    CHOL 119 (L) 11/06/2019    HDL 51 11/06/2019    LDLCALC 36.8 (L) 11/06/2019    TRIG 156 (H) 11/06/2019          Imaging:   Reviewed independently:  ekg  Echo  cxr      EKG: sinus, 1st degree avb with " LBBB  Assessment:     1. Heart failure with preserved ejection fraction, unspecified HF chronicity    2. Hypertension, unspecified type    3. ÁLVARO (renal artery stenosis)    4. PAD (peripheral artery disease)    5. Coronary artery disease involving native coronary artery of native heart, angina presence unspecified          Plan:   Heart failure with preserved ejection fraction, unspecified HF chronicity  Continue current medical regimen  No lasix needed  Compensated on exam  Continue bp control     Hypertension, unspecified type  Dc amlodipine and allow BP of 140s/90s  Continue other meds as above    ÁLVARO (renal artery stenosis)  Continue statin therapy  Creat stable    PAD (peripheral artery disease)  Continue statin therapy  Asymptomatic    Coronary artery disease involving native coronary artery of native heart, angina presence unspecified  Continue toprol/atorva/irbesartan/imdur  Nitro sl prn  Non-obstructive albeit present      Discussed with Dr. Rodrigues. RTC in 1 month with Dr Saab    Signed:  Greg Dempsey MD  11/12/2019 11:15 AM

## 2019-11-18 ENCOUNTER — PATIENT OUTREACH (OUTPATIENT)
Dept: ADMINISTRATIVE | Facility: OTHER | Age: 84
End: 2019-11-18

## 2019-11-19 NOTE — PROGRESS NOTES
Subjective:   Patient ID:  Arti Olsen is a 89 y.o. female who presents for follow-up of CVD    HPI: The patient is here for CAD/PAD/VHD -her daughter had major stroke recently.She was just seen by a partner last week as CHF hospital follow-up and they stopped amlodipine.     The patient has no chest pain, SOB, TIA, palpitations, syncope or pre-syncope.Patient does not exercise.Home BPs occasionally 160s but mostly 120s 130s        Review of Systems   Constitution: Positive for malaise/fatigue. Negative for chills, decreased appetite, diaphoresis, fever, night sweats, weight gain and weight loss.   HENT: Negative for congestion, hoarse voice, nosebleeds, sore throat and tinnitus.    Eyes: Negative for blurred vision, double vision, vision loss in left eye, vision loss in right eye, visual disturbance and visual halos.   Cardiovascular: Positive for leg swelling. Negative for chest pain, claudication, cyanosis, dyspnea on exertion, irregular heartbeat, near-syncope, orthopnea, palpitations, paroxysmal nocturnal dyspnea and syncope.   Respiratory: Negative for cough, hemoptysis, shortness of breath, sleep disturbances due to breathing, snoring, sputum production and wheezing.    Endocrine: Negative for cold intolerance, heat intolerance, polydipsia, polyphagia and polyuria.   Hematologic/Lymphatic: Negative for adenopathy and bleeding problem. Does not bruise/bleed easily.   Skin: Negative for color change, dry skin, flushing, itching, nail changes, poor wound healing, rash, skin cancer, suspicious lesions and unusual hair distribution.   Musculoskeletal: Positive for arthritis, back pain, muscle cramps, muscle weakness, neck pain and stiffness. Negative for falls, gout, joint pain, joint swelling and myalgias.   Gastrointestinal: Negative for abdominal pain, anorexia, change in bowel habit, constipation, diarrhea, dysphagia, heartburn, hematemesis, hematochezia, melena and vomiting.   Genitourinary: Negative for  "decreased libido, dysuria, hematuria, hesitancy and urgency.   Neurological: Positive for weakness. Negative for excessive daytime sleepiness, dizziness, focal weakness, headaches, light-headedness, loss of balance, numbness, paresthesias, seizures, sensory change, tremors and vertigo.   Psychiatric/Behavioral: Negative for altered mental status, depression, hallucinations, memory loss, substance abuse and suicidal ideas. The patient does not have insomnia and is not nervous/anxious.    Allergic/Immunologic: Negative for environmental allergies and hives.       Objective: BP (!) 167/74 (BP Location: Left arm, Patient Position: Sitting, BP Method: Pediatric (Automatic))   Pulse 66   Ht 4' 11" (1.499 m)   Wt 46.6 kg (102 lb 11.8 oz)   LMP  (LMP Unknown)   BMI 20.75 kg/m²      Physical Exam   Constitutional: She is oriented to person, place, and time. She appears well-developed and well-nourished.   HENT:   Head: Normocephalic.   Eyes: Pupils are equal, round, and reactive to light. EOM are normal.   Neck: Normal range of motion. Normal carotid pulses, no hepatojugular reflux and no JVD present. Carotid bruit is not present. No thyromegaly present.   Cardiovascular: Normal rate, regular rhythm and intact distal pulses. Exam reveals no gallop and no friction rub.   Murmur heard.   Systolic murmur is present with a grade of 1/6.  Pulses:       Carotid pulses are 2+ on the right side, and 2+ on the left side.       Femoral pulses are 1+ on the right side, and 1+ on the left side.       Popliteal pulses are 1+ on the right side, and 1+ on the left side.   Pulmonary/Chest: Effort normal and breath sounds normal. No tachypnea. No respiratory distress. She has no wheezes. She has no rales. She exhibits no tenderness.   Abdominal: Soft. Bowel sounds are normal. She exhibits no distension and no mass. There is no tenderness. There is no rebound and no guarding.   Musculoskeletal: Normal range of motion. She exhibits edema. " She exhibits no tenderness.   Lymphadenopathy:     She has no cervical adenopathy.   Neurological: She is alert and oriented to person, place, and time. No cranial nerve deficit. Coordination normal.   Skin: Skin is warm. No rash noted. No erythema.   Psychiatric: She has a normal mood and affect. Her behavior is normal. Judgment and thought content normal.       Assessment:     1. Coronary artery disease involving native coronary artery of native heart without angina pectoris    2. Cerebral arterial disease    3. Atherosclerosis of aorta    4. Nonrheumatic aortic valve insufficiency    5. Essential hypertension    6. History of smoking 25-50 pack years    7. Hyperkalemia    8. Hyponatremia    9. Hypothyroidism due to acquired atrophy of thyroid    10. LBBB (left bundle branch block)    11. Lumbar disc disease    12. Mesenteric artery stenosis    13. Mixed hyperlipidemia    14. PAD (peripheral artery disease)    15. Venous insufficiency of both lower extremities    16. Vitamin D insufficiency    17. Other depression    18. Chronic systolic congestive heart failure    19. Diastolic congestive heart failure, unspecified HF chronicity        Plan:   Discussed diet , achieving and maintaining ideal body weight, and exercise.   We reviewed meds in detail.  Reassured-discussed goals, options, plan  Furosimide 20 mg just 3 times per week but more often or extra if leg swelling worsens  Elevate legs as much as possible when sitting  Can reduce weights and BPs to every other day    Arti was seen today for heart failure with preserved ejection fraction, unspecified .    Diagnoses and all orders for this visit:    Coronary artery disease involving native coronary artery of native heart without angina pectoris  -     furosemide (LASIX) 20 MG tablet; Take 1 tablet (20 mg total) by mouth 2 (two) times daily. Start with 3 days per week but OK to use more often or extra for increased leg swelling  -     Lipid panel; Future;  Expected date: 05/20/2020  -     Comprehensive metabolic panel; Future; Expected date: 05/20/2020  -     TSH; Future; Expected date: 05/20/2020    Cerebral arterial disease    Atherosclerosis of aorta    Nonrheumatic aortic valve insufficiency    Essential hypertension  -     Lipid panel; Future; Expected date: 05/20/2020  -     Comprehensive metabolic panel; Future; Expected date: 05/20/2020  -     TSH; Future; Expected date: 05/20/2020    History of smoking 25-50 pack years  -     Comprehensive metabolic panel; Future; Expected date: 05/20/2020  -     TSH; Future; Expected date: 05/20/2020    Hyperkalemia  -     Lipid panel; Future; Expected date: 05/20/2020  -     Comprehensive metabolic panel; Future; Expected date: 05/20/2020  -     TSH; Future; Expected date: 05/20/2020    Hyponatremia  -     Comprehensive metabolic panel; Future; Expected date: 05/20/2020  -     TSH; Future; Expected date: 05/20/2020    Hypothyroidism due to acquired atrophy of thyroid    LBBB (left bundle branch block)    Lumbar disc disease    Mesenteric artery stenosis    Mixed hyperlipidemia  -     Lipid panel; Future; Expected date: 05/20/2020  -     Comprehensive metabolic panel; Future; Expected date: 05/20/2020  -     TSH; Future; Expected date: 05/20/2020    PAD (peripheral artery disease)    Venous insufficiency of both lower extremities    Vitamin D insufficiency    Other depression    Chronic systolic congestive heart failure  -     furosemide (LASIX) 20 MG tablet; Take 1 tablet (20 mg total) by mouth 2 (two) times daily. Start with 3 days per week but OK to use more often or extra for increased leg swelling  -     Lipid panel; Future; Expected date: 05/20/2020  -     Comprehensive metabolic panel; Future; Expected date: 05/20/2020  -     TSH; Future; Expected date: 05/20/2020  -     Brain natriuretic peptide; Future; Expected date: 05/20/2020    Diastolic congestive heart failure, unspecified HF chronicity  -     Brain  natriuretic peptide; Future; Expected date: 05/20/2020            Follow up in about 6 months (around 5/20/2020) for with labs.

## 2019-11-20 ENCOUNTER — OFFICE VISIT (OUTPATIENT)
Dept: CARDIOLOGY | Facility: CLINIC | Age: 84
End: 2019-11-20
Payer: MEDICARE

## 2019-11-20 VITALS
BODY MASS INDEX: 20.72 KG/M2 | WEIGHT: 102.75 LBS | HEIGHT: 59 IN | SYSTOLIC BLOOD PRESSURE: 167 MMHG | HEART RATE: 66 BPM | DIASTOLIC BLOOD PRESSURE: 74 MMHG

## 2019-11-20 DIAGNOSIS — E87.5 HYPERKALEMIA: ICD-10-CM

## 2019-11-20 DIAGNOSIS — E87.1 HYPONATREMIA: ICD-10-CM

## 2019-11-20 DIAGNOSIS — E78.2 MIXED HYPERLIPIDEMIA: Chronic | ICD-10-CM

## 2019-11-20 DIAGNOSIS — I10 ESSENTIAL HYPERTENSION: Chronic | ICD-10-CM

## 2019-11-20 DIAGNOSIS — I87.2 VENOUS INSUFFICIENCY OF BOTH LOWER EXTREMITIES: ICD-10-CM

## 2019-11-20 DIAGNOSIS — I44.7 LBBB (LEFT BUNDLE BRANCH BLOCK): ICD-10-CM

## 2019-11-20 DIAGNOSIS — Z87.891 HISTORY OF SMOKING 25-50 PACK YEARS: ICD-10-CM

## 2019-11-20 DIAGNOSIS — K55.1 MESENTERIC ARTERY STENOSIS: ICD-10-CM

## 2019-11-20 DIAGNOSIS — F32.89 OTHER DEPRESSION: Chronic | ICD-10-CM

## 2019-11-20 DIAGNOSIS — I25.10 CORONARY ARTERY DISEASE INVOLVING NATIVE CORONARY ARTERY OF NATIVE HEART WITHOUT ANGINA PECTORIS: Primary | Chronic | ICD-10-CM

## 2019-11-20 DIAGNOSIS — E55.9 VITAMIN D INSUFFICIENCY: ICD-10-CM

## 2019-11-20 DIAGNOSIS — I35.1 NONRHEUMATIC AORTIC VALVE INSUFFICIENCY: ICD-10-CM

## 2019-11-20 DIAGNOSIS — I50.30 DIASTOLIC CONGESTIVE HEART FAILURE, UNSPECIFIED HF CHRONICITY: ICD-10-CM

## 2019-11-20 DIAGNOSIS — I50.22 CHRONIC SYSTOLIC CONGESTIVE HEART FAILURE: ICD-10-CM

## 2019-11-20 DIAGNOSIS — I70.0 ATHEROSCLEROSIS OF AORTA: ICD-10-CM

## 2019-11-20 DIAGNOSIS — E03.4 HYPOTHYROIDISM DUE TO ACQUIRED ATROPHY OF THYROID: Chronic | ICD-10-CM

## 2019-11-20 DIAGNOSIS — I67.9 CEREBRAL ARTERIAL DISEASE: ICD-10-CM

## 2019-11-20 DIAGNOSIS — I73.9 PAD (PERIPHERAL ARTERY DISEASE): Chronic | ICD-10-CM

## 2019-11-20 DIAGNOSIS — M51.9 LUMBAR DISC DISEASE: ICD-10-CM

## 2019-11-20 PROCEDURE — 99214 PR OFFICE/OUTPT VISIT, EST, LEVL IV, 30-39 MIN: ICD-10-PCS | Mod: S$PBB,,, | Performed by: INTERNAL MEDICINE

## 2019-11-20 PROCEDURE — 99999 PR PBB SHADOW E&M-EST. PATIENT-LVL III: CPT | Mod: PBBFAC,,, | Performed by: INTERNAL MEDICINE

## 2019-11-20 PROCEDURE — 99213 OFFICE O/P EST LOW 20 MIN: CPT | Mod: PBBFAC | Performed by: INTERNAL MEDICINE

## 2019-11-20 PROCEDURE — 1159F PR MEDICATION LIST DOCUMENTED IN MEDICAL RECORD: ICD-10-PCS | Mod: ,,, | Performed by: INTERNAL MEDICINE

## 2019-11-20 PROCEDURE — 1126F PR PAIN SEVERITY QUANTIFIED, NO PAIN PRESENT: ICD-10-PCS | Mod: ,,, | Performed by: INTERNAL MEDICINE

## 2019-11-20 PROCEDURE — 99999 PR PBB SHADOW E&M-EST. PATIENT-LVL III: ICD-10-PCS | Mod: PBBFAC,,, | Performed by: INTERNAL MEDICINE

## 2019-11-20 PROCEDURE — 99214 OFFICE O/P EST MOD 30 MIN: CPT | Mod: S$PBB,,, | Performed by: INTERNAL MEDICINE

## 2019-11-20 PROCEDURE — 1126F AMNT PAIN NOTED NONE PRSNT: CPT | Mod: ,,, | Performed by: INTERNAL MEDICINE

## 2019-11-20 PROCEDURE — 1159F MED LIST DOCD IN RCRD: CPT | Mod: ,,, | Performed by: INTERNAL MEDICINE

## 2019-11-20 RX ORDER — FUROSEMIDE 20 MG/1
20 TABLET ORAL 2 TIMES DAILY
Qty: 90 TABLET | Refills: 3 | Status: SHIPPED | OUTPATIENT
Start: 2019-11-20 | End: 2020-01-01

## 2019-11-20 NOTE — PATIENT INSTRUCTIONS
Discussed diet , achieving and maintaining ideal body weight, and exercise.   We reviewed meds in detail.  Reassured-discussed goals, options, plan  Furosimide 20 mg just 3 times per week but more often or extra if leg swelling worsens  Elevate legs as much as possible when sitting  Can reduce weights and BPs to every other day

## 2019-11-22 ENCOUNTER — TELEPHONE (OUTPATIENT)
Dept: INTERNAL MEDICINE | Facility: CLINIC | Age: 84
End: 2019-11-22

## 2019-11-22 NOTE — TELEPHONE ENCOUNTER
Please call pt and let her know re: percoset request:    We have not prescribed this or other opioids to her.  She had been receiving this from an outside doctor:   Yuniel Marion MD [id number] [address]   She should direct refill requests to him.    This is not a medication we recommend.  It is also not legal to prescribe it outside a face to face encounter.

## 2019-11-22 NOTE — TELEPHONE ENCOUNTER
----- Message from Halina Zhou sent at 11/22/2019 10:55 AM CST -----  Contact: [facility] [place] [facility] /[phone]  Patient is calling for an RX refill or new RX.  Is this a refill or new RX:  New Refill   RX name and strength: oxyCODONE-acetaminophen (PERCOCET) 7.5-325 mg per tablet  Directions (copy/paste from chart):    Is this a 30 day or 90 day RX:    Local pharmacy or mail order pharmacy:  Local pharmacy  Pharmacy name and phone # (copy/paste from chart):  [facility]# [phone]  Comments:

## 2019-11-26 ENCOUNTER — TELEPHONE (OUTPATIENT)
Dept: INTERNAL MEDICINE | Facility: CLINIC | Age: 84
End: 2019-11-26

## 2019-11-26 NOTE — TELEPHONE ENCOUNTER
Spoke with patient and informed her of message for pcp about percocets that the patient is requesting

## 2019-12-12 ENCOUNTER — PATIENT OUTREACH (OUTPATIENT)
Dept: ADMINISTRATIVE | Facility: OTHER | Age: 84
End: 2019-12-12

## 2019-12-13 ENCOUNTER — OFFICE VISIT (OUTPATIENT)
Dept: CARDIOLOGY | Facility: CLINIC | Age: 84
End: 2019-12-13
Payer: MEDICARE

## 2019-12-13 VITALS
HEIGHT: 59 IN | DIASTOLIC BLOOD PRESSURE: 78 MMHG | HEART RATE: 65 BPM | BODY MASS INDEX: 20.98 KG/M2 | SYSTOLIC BLOOD PRESSURE: 156 MMHG | WEIGHT: 104.06 LBS

## 2019-12-13 DIAGNOSIS — I83.893 VARICOSE VEINS OF BOTH LOWER EXTREMITIES WITH COMPLICATIONS: Primary | ICD-10-CM

## 2019-12-13 DIAGNOSIS — R60.0 EDEMA OF BOTH LOWER EXTREMITIES: ICD-10-CM

## 2019-12-13 DIAGNOSIS — I73.9 PAD (PERIPHERAL ARTERY DISEASE): Chronic | ICD-10-CM

## 2019-12-13 DIAGNOSIS — Z87.891 HISTORY OF SMOKING 25-50 PACK YEARS: ICD-10-CM

## 2019-12-13 PROCEDURE — 99999 PR PBB SHADOW E&M-EST. PATIENT-LVL V: CPT | Mod: PBBFAC,,, | Performed by: INTERNAL MEDICINE

## 2019-12-13 PROCEDURE — 99214 OFFICE O/P EST MOD 30 MIN: CPT | Mod: S$PBB,,, | Performed by: INTERNAL MEDICINE

## 2019-12-13 PROCEDURE — 1126F PR PAIN SEVERITY QUANTIFIED, NO PAIN PRESENT: ICD-10-PCS | Mod: ,,, | Performed by: INTERNAL MEDICINE

## 2019-12-13 PROCEDURE — 99215 OFFICE O/P EST HI 40 MIN: CPT | Mod: PBBFAC | Performed by: INTERNAL MEDICINE

## 2019-12-13 PROCEDURE — 99999 PR PBB SHADOW E&M-EST. PATIENT-LVL V: ICD-10-PCS | Mod: PBBFAC,,, | Performed by: INTERNAL MEDICINE

## 2019-12-13 PROCEDURE — 99214 PR OFFICE/OUTPT VISIT, EST, LEVL IV, 30-39 MIN: ICD-10-PCS | Mod: S$PBB,,, | Performed by: INTERNAL MEDICINE

## 2019-12-13 PROCEDURE — 1126F AMNT PAIN NOTED NONE PRSNT: CPT | Mod: ,,, | Performed by: INTERNAL MEDICINE

## 2019-12-13 PROCEDURE — 1159F PR MEDICATION LIST DOCUMENTED IN MEDICAL RECORD: ICD-10-PCS | Mod: ,,, | Performed by: INTERNAL MEDICINE

## 2019-12-13 PROCEDURE — 1159F MED LIST DOCD IN RCRD: CPT | Mod: ,,, | Performed by: INTERNAL MEDICINE

## 2019-12-13 RX ORDER — ZOLPIDEM TARTRATE 5 MG/1
TABLET ORAL
Status: ON HOLD | COMMUNITY
Start: 2019-12-03 | End: 2020-01-26 | Stop reason: HOSPADM

## 2019-12-13 NOTE — PATIENT INSTRUCTIONS
Assessment/Plan:  Arti Olsen is a 89 y.o. female with a past medical history of HTN, HLD, spinal stenosis, arthritis, who presents for an initial appointment.    1. BLE edema with intermittent ulcers- Likely due to chronic venous insufficiency.  BLE Arterial Ultrasound on 2/27/2019 showed mild left SFA and EIA plaque suggestive of mild BLE PAD.  Check BLE venous reflux study.  Continue lasix 20 mg bid.  Pt to elevate legs when resting.      2. PAD- Currently asymptomatic.  Start ASA 81 mg daily.  Continue atorvastatin 40 mg daily.      Follow up in 1 month

## 2019-12-13 NOTE — PROGRESS NOTES
[facility]      Chief Complaint   Patient presents with    Leg Swelling    Leg Problem       Patient ID: Arti Olsen is a 89 y.o. female with a past medical history of HTN, HLD, spinal stenosis, arthritis, who presents for an initial appointment.  Pertinent history/events are as follows:     -Pt presents for evaluation of leg swelling.    HPI:  Mrs. Olsen reports episodes of leg swelling with ulcers and tightness since age 30.  No claudication or rest pain.  Formerly smoked fro 15 years.  Quit over 20 years ago.  Exam shows BLE varicose veins with evidence of chronic venous stasis dermatitis. BLE Arterial Ultrasound on 2/27/2019 showed mild left SFA and EIA plaque    Past Medical History:   Diagnosis Date    Acid reflux     chronic    Anemia 6/24/2014    Arthritis     osetoarthritis    Clotting disorder     Colon polyp     Coronary artery disease     Nonobstructive CAD per [facility]    History of colonoscopy     1 polyp noted in 2009    Hyperlipemia     Hypertension     Migraine headache     chronic    Peripheral vascular disease     PONV (postoperative nausea and vomiting)     Renal artery atherosclerosis 11/11/2012    Spinal stenosis     Thyroid disease      Past Surgical History:   Procedure Laterality Date    acf      ANTERIOR CERVICAL DISCECTOMY W/ FUSION      APPENDECTOMY  1942    BLADDER SURGERY  1958    suspension    COLONOSCOPY W/ POLYPECTOMY      CYST REMOVAL  1983    removed from scalp    DISC REMOVAL  1983    EYE SURGERY      cataracts removed    HYSTERECTOMY  1959    COMPLETE    JOINT REPLACEMENT  2006    repair 3rd toe    JOINT REPLACEMENT  2007    right/left rotator    SPINE SURGERY  02/2014    relieve pressure on nerves    TONSILLECTOMY, ADENOIDECTOMY  1930's    UTERINE FIBROID SURGERY  1958    VARICOSE VEIN SURGERY  1958     Social History     Socioeconomic History    Marital status:      Spouse name: Not on file    Number of children: Not on file     Years of education: Not on file    Highest education level: Not on file   Occupational History    Not on file   Social Needs    Financial resource strain: Not hard at all    Food insecurity:     Worry: Never true     Inability: Never true    Transportation needs:     Medical: No     Non-medical: No   Tobacco Use    Smoking status: Former Smoker     Packs/day: 1.00     Years: 29.00     Pack years: 29.00     Last attempt to quit: 9/10/1969     Years since quittin.2    Smokeless tobacco: Never Used    Tobacco comment: The patient lives alone and is able to accomplish a usual activities of daily living.  She is not that active due to the severity of her back pain.   Substance and Sexual Activity    Alcohol use: No     Frequency: Never     Drinks per session: Patient refused     Binge frequency: Never    Drug use: No    Sexual activity: Never   Lifestyle    Physical activity:     Days per week: 0 days     Minutes per session: 0 min    Stress: Very much   Relationships    Social connections:     Talks on phone: Never     Gets together: Never     Attends [employer] service: Not on file     Active member of club or organization: No     Attends meetings of clubs or organizations: Never     Relationship status:    Other Topics Concern    Not on file   Social History Narrative    Not on file     Family History   Problem Relation Age of Onset    Heart disease Father         aorta burst    Heart disease Mother         stroke    Kidney disease Brother     Cancer Brother     Lung cancer Brother     Cancer Brother     Leukemia Brother     Heart disease Brother     Cancer Brother     Heart disease Maternal Grandmother     Kidney disease Maternal Grandfather     Uterine cancer Paternal Grandmother     Anesthesia problems Neg Hx     Clotting disorder Neg Hx      problems Neg Hx     Hypertension Neg Hx     Kidney cancer Neg Hx     Malignant hyperthermia Neg Hx     Prostate cancer Neg  "Hx     Sickle cell trait Neg Hx     Lupus Neg Hx     Sudden death Neg Hx     Urolithiasis Neg Hx     Colon cancer Neg Hx     Esophageal cancer Neg Hx     Irritable bowel syndrome Neg Hx     Rectal cancer Neg Hx     Stomach cancer Neg Hx     Ulcerative colitis Neg Hx     Celiac disease Neg Hx     Cystic fibrosis Neg Hx        Review of patient's allergies indicates:   Allergen Reactions    Iodinated contrast media Swelling    Reglan [metoclopramide] Other (See Comments)     "Body shaking"    Sulfa (sulfonamide antibiotics)      Yrs ago    Augmentin [amoxicillin-pot clavulanate] Diarrhea       Medication List with Changes/Refills   Current Medications    ACETAMINOPHEN (TYLENOL) 500 MG TABLET    Take 500 mg by mouth daily as needed.     ALPRAZOLAM (XANAX) 0.25 MG TABLET    Take 0.5 tablets (0.125 mg total) by mouth nightly as needed for Insomnia or Anxiety.    ATORVASTATIN (LIPITOR) 40 MG TABLET    Take 1 tablet (40 mg total) by mouth every evening. TAKE (1/2) HALF BY MOUTH ONCE A DAY    CHOLECALCIFEROL, VITAMIN D3, (VITAMIN D3) 1,000 UNIT CAPSULE    Take 1 capsule (1,000 Units total) by mouth once daily.    DEXTROMETHORPHAN-GUAIFENESIN (ADULT ROBITUSSIN PEAK COLD DM)  MG/5 ML LIQD    Take 5 mLs by mouth daily as needed.     FUROSEMIDE (LASIX) 20 MG TABLET    Take 1 tablet (20 mg total) by mouth 2 (two) times daily. Start with 3 days per week but OK to use more often or extra for increased leg swelling    GABAPENTIN (NEURONTIN) 300 MG CAPSULE    Take 1 capsule (300 mg total) by mouth every evening.    IRBESARTAN (AVAPRO) 300 MG TABLET    Take 1 tablet (300 mg total) by mouth every evening.    ISOSORBIDE MONONITRATE (IMDUR) 60 MG 24 HR TABLET    Take 1 tablet (60 mg total) by mouth once daily.    LEVOTHYROXINE (SYNTHROID) 25 MCG TABLET    Take 1 tablet (25 mcg total) by mouth once daily.    LOPERAMIDE (IMODIUM A-D) 2 MG TAB    Take 4 mg by mouth 3 (three) times daily as needed.     MAG " "HYDROX/ALUMINUM HYD/SIMETH (ALUM-MAG HYDROXIDE-SIMETH ORAL)    Take by mouth daily as needed.     METOPROLOL SUCCINATE (TOPROL-XL) 25 MG 24 HR TABLET    Take 1 tablet (25 mg total) by mouth once daily.    NITROGLYCERIN (NITROSTAT) 0.4 MG SL TABLET    Place 1 tablet (0.4 mg total) under the tongue every 5 (five) minutes as needed for Chest pain.    OXYCODONE-ACETAMINOPHEN (PERCOCET) 7.5-325 MG PER TABLET    1 tablet every 4 (four) hours as needed.     PANTOPRAZOLE (PROTONIX) 40 MG TABLET    Take 1 tablet (40 mg total) by mouth once daily.    QUETIAPINE (SEROQUEL) 25 MG TAB    Take 1 tablet (25 mg total) by mouth 2 (two) times daily as needed (anxiety, insomnia).    SERTRALINE (ZOLOFT) 100 MG TABLET    Take 1 tablet (100 mg total) by mouth once daily.    VIT C/VIT E/LUTEIN/MIN/OMEGA-3 (OCUVITE ORAL)    Take 1 tablet by mouth once daily.    ZOLPIDEM (AMBIEN) 5 MG TAB           Review of Systems  Constitution: Denies chills, fever, and sweats.  HENT: Denies headaches or blurry vision.  Cardiovascular: Denies chest pain or irregular heart beat.  Respiratory: Denies cough or shortness of breath.  Gastrointestinal: Denies abdominal pain, nausea, or vomiting.  Musculoskeletal: Positive for leg swelling.  Neurological: Denies dizziness or focal weakness.  Psychiatric/Behavioral: Normal mental status.  Hematologic/Lymphatic: Denies bleeding problem or easy bruising/bleeding.  Skin: Denies rash or suspicious lesions    Physical Examination  BP (!) 156/78 (BP Location: Left arm, Patient Position: Sitting, BP Method: Small (Automatic))   Pulse 65   Ht 4' 11" (1.499 m)   Wt 47.2 kg (104 lb 0.9 oz)   LMP  (LMP Unknown)   BMI 21.02 kg/m²     Constitutional: No acute distress, conversant  HEENT: Sclera anicteric, Pupils equal, round and reactive to light, extraocular motions intact, Oropharynx clear  Neck: No JVD, no carotid bruits  Cardiovascular: regular rate and rhythm, no murmur, rubs or gallops, normal S1/S2  Pulmonary: " Clear to auscultation bilaterally  Abdominal: Abdomen soft, nontender, nondistended, positive bowel sounds  Extremities: BLE varicose veins with evidence of chronic venous stasis dermatitis.   No edema  Pulses:  Carotid pulses are 2+ on the right side, and 2+ on the left side.  Radial pulses are 2+ on the right side, and 2+ on the left side.   Femoral pulses are 2+ on the right side, and 2+ on the left side.  Popliteal pulses are 2+ on the right side, and 2+ on the left side.   Dorsalis pedis pulses are 2+ on the right side, and 2+ on the left side.   Posterior tibial pulses are 2+ on the right side, and 2+ on the left side.    Skin: No ecchymosis, erythema, or ulcers  Psych: Alert and oriented x 3, appropriate affect  Neuro: CNII-XII intact, no focal deficits    Labs:  Most Recent Data  CBC:   Lab Results   Component Value Date    WBC 4.99 10/11/2019    HGB 9.0 (L) 10/11/2019    HCT 28.7 (L) 10/11/2019     10/11/2019    MCV 89 10/11/2019    RDW 14.7 (H) 10/11/2019     BMP:   Lab Results   Component Value Date     11/06/2019    K 4.0 11/06/2019     11/06/2019    CO2 28 11/06/2019    BUN 24 11/06/2019    CREATININE 0.9 11/06/2019    GLU 88 11/06/2019    CALCIUM 9.3 11/06/2019    MG 1.9 11/10/2016    PHOS 3.4 11/10/2016     LFTS;   Lab Results   Component Value Date    PROT 6.8 11/06/2019    ALBUMIN 3.8 11/06/2019    BILITOT 0.7 11/06/2019    AST 11 11/06/2019    ALKPHOS 90 11/06/2019    ALT 7 (L) 11/06/2019     05/06/2008     COAGS:   Lab Results   Component Value Date    INR 1.1 02/26/2019     FLP:   Lab Results   Component Value Date    CHOL 119 (L) 11/06/2019    HDL 51 11/06/2019    LDLCALC 36.8 (L) 11/06/2019    TRIG 156 (H) 11/06/2019    CHOLHDL 42.9 11/06/2019     CARDIAC:   Lab Results   Component Value Date    TROPONINI <0.006 02/26/2019     (H) 02/26/2019         BLE Arterial Ultrasound 2/27/2019:  · SHAKIRA unreliable due to non-compressible vessels.  · Mild left SFA and EIA  plaque.    Assessment/Plan:  Arti Olsen is a 89 y.o. female with a past medical history of HTN, HLD, spinal stenosis, arthritis, who presents for an initial appointment.    1. BLE edema with intermittent ulcers- Likely due to chronic venous insufficiency.  BLE Arterial Ultrasound on 2/27/2019 showed mild left SFA and EIA plaque suggestive of mild BLE PAD.  Check BLE venous reflux study.  Continue lasix 20 mg bid.  Pt to elevate legs when resting.      2. PAD- Currently asymptomatic.  Start ASA 81 mg daily.  Continue atorvastatin 40 mg daily.      Follow up in 1 month    Total duration of face to face visit time 30 minutes.  Total time spent counseling greater than fifty percent of total visit time.  Counseling included discussion regarding imaging findings, diagnosis, possibilities, treatment options, risks and benefits.  The patient had many questions regarding the options and long-term effects.    Edgardo Cameron MD, PhD  Interventional Cardiology

## 2019-12-16 ENCOUNTER — HOSPITAL ENCOUNTER (EMERGENCY)
Facility: HOSPITAL | Age: 84
Discharge: HOME | End: 2019-12-16
Attending: EMERGENCY MEDICINE
Payer: MEDICARE

## 2019-12-16 ENCOUNTER — TELEPHONE (OUTPATIENT)
Dept: INTERNAL MEDICINE | Facility: CLINIC | Age: 84
End: 2019-12-16

## 2019-12-16 VITALS
HEART RATE: 59 BPM | DIASTOLIC BLOOD PRESSURE: 76 MMHG | HEIGHT: 59 IN | BODY MASS INDEX: 20.76 KG/M2 | TEMPERATURE: 98 F | RESPIRATION RATE: 16 BRPM | OXYGEN SATURATION: 97 % | SYSTOLIC BLOOD PRESSURE: 179 MMHG | WEIGHT: 103 LBS

## 2019-12-16 DIAGNOSIS — N30.90 CYSTITIS: ICD-10-CM

## 2019-12-16 DIAGNOSIS — R55 SYNCOPE: ICD-10-CM

## 2019-12-16 DIAGNOSIS — E86.0 DEHYDRATION: Primary | ICD-10-CM

## 2019-12-16 LAB
ALBUMIN SERPL BCP-MCNC: 3.8 G/DL (ref 3.5–5.2)
ALP SERPL-CCNC: 79 U/L (ref 55–135)
ALT SERPL W/O P-5'-P-CCNC: 5 U/L (ref 10–44)
ANION GAP SERPL CALC-SCNC: 8 MMOL/L (ref 8–16)
AST SERPL-CCNC: 12 U/L (ref 10–40)
BACTERIA #/AREA URNS AUTO: ABNORMAL /HPF
BASOPHILS # BLD AUTO: 0.03 K/UL (ref 0–0.2)
BASOPHILS NFR BLD: 0.5 % (ref 0–1.9)
BILIRUB SERPL-MCNC: 0.3 MG/DL (ref 0.1–1)
BILIRUB UR QL STRIP: NEGATIVE
BUN SERPL-MCNC: 40 MG/DL (ref 10–30)
CALCIUM SERPL-MCNC: 8.7 MG/DL (ref 8.7–10.5)
CHLORIDE SERPL-SCNC: 108 MMOL/L (ref 95–110)
CLARITY UR REFRACT.AUTO: CLEAR
CO2 SERPL-SCNC: 26 MMOL/L (ref 23–29)
COLOR UR AUTO: YELLOW
CREAT SERPL-MCNC: 1.3 MG/DL (ref 0.5–1.4)
DIFFERENTIAL METHOD: ABNORMAL
EOSINOPHIL # BLD AUTO: 0.2 K/UL (ref 0–0.5)
EOSINOPHIL NFR BLD: 2.5 % (ref 0–8)
ERYTHROCYTE [DISTWIDTH] IN BLOOD BY AUTOMATED COUNT: 14.8 % (ref 11.5–14.5)
EST. GFR  (AFRICAN AMERICAN): 41.4 ML/MIN/1.73 M^2
EST. GFR  (NON AFRICAN AMERICAN): 36 ML/MIN/1.73 M^2
GLUCOSE SERPL-MCNC: 82 MG/DL (ref 70–110)
GLUCOSE UR QL STRIP: NEGATIVE
HCT VFR BLD AUTO: 28.5 % (ref 37–48.5)
HGB BLD-MCNC: 8.3 G/DL (ref 12–16)
HGB UR QL STRIP: NEGATIVE
IMM GRANULOCYTES # BLD AUTO: 0.02 K/UL (ref 0–0.04)
IMM GRANULOCYTES NFR BLD AUTO: 0.3 % (ref 0–0.5)
KETONES UR QL STRIP: NEGATIVE
LEUKOCYTE ESTERASE UR QL STRIP: ABNORMAL
LYMPHOCYTES # BLD AUTO: 1.5 K/UL (ref 1–4.8)
LYMPHOCYTES NFR BLD: 23.4 % (ref 18–48)
MCH RBC QN AUTO: 24.8 PG (ref 27–31)
MCHC RBC AUTO-ENTMCNC: 29.1 G/DL (ref 32–36)
MCV RBC AUTO: 85 FL (ref 82–98)
MICROSCOPIC COMMENT: ABNORMAL
MONOCYTES # BLD AUTO: 0.4 K/UL (ref 0.3–1)
MONOCYTES NFR BLD: 7 % (ref 4–15)
NEUTROPHILS # BLD AUTO: 4.2 K/UL (ref 1.8–7.7)
NEUTROPHILS NFR BLD: 66.3 % (ref 38–73)
NITRITE UR QL STRIP: POSITIVE
NRBC BLD-RTO: 0 /100 WBC
PH UR STRIP: 7 [PH] (ref 5–8)
PLATELET # BLD AUTO: 195 K/UL (ref 150–350)
PMV BLD AUTO: 11.7 FL (ref 9.2–12.9)
POTASSIUM SERPL-SCNC: 4.6 MMOL/L (ref 3.5–5.1)
PROT SERPL-MCNC: 6.6 G/DL (ref 6–8.4)
PROT UR QL STRIP: NEGATIVE
RBC # BLD AUTO: 3.35 M/UL (ref 4–5.4)
RBC #/AREA URNS AUTO: 1 /HPF (ref 0–4)
SODIUM SERPL-SCNC: 142 MMOL/L (ref 136–145)
SP GR UR STRIP: 1.01 (ref 1–1.03)
SQUAMOUS #/AREA URNS AUTO: 0 /HPF
TROPONIN I SERPL DL<=0.01 NG/ML-MCNC: 0.01 NG/ML (ref 0–0.03)
URN SPEC COLLECT METH UR: ABNORMAL
WBC # BLD AUTO: 6.33 K/UL (ref 3.9–12.7)
WBC #/AREA URNS AUTO: 11 /HPF (ref 0–5)

## 2019-12-16 PROCEDURE — 63600175 PHARM REV CODE 636 W HCPCS: Performed by: EMERGENCY MEDICINE

## 2019-12-16 PROCEDURE — 96360 HYDRATION IV INFUSION INIT: CPT

## 2019-12-16 PROCEDURE — 80053 COMPREHEN METABOLIC PANEL: CPT

## 2019-12-16 PROCEDURE — 25000003 PHARM REV CODE 250: Performed by: EMERGENCY MEDICINE

## 2019-12-16 PROCEDURE — 85025 COMPLETE CBC W/AUTO DIFF WBC: CPT

## 2019-12-16 PROCEDURE — 87086 URINE CULTURE/COLONY COUNT: CPT

## 2019-12-16 PROCEDURE — 99285 EMERGENCY DEPT VISIT HI MDM: CPT | Mod: ,,, | Performed by: EMERGENCY MEDICINE

## 2019-12-16 PROCEDURE — 93010 EKG 12-LEAD: ICD-10-PCS | Mod: ,,, | Performed by: INTERNAL MEDICINE

## 2019-12-16 PROCEDURE — 99285 PR EMERGENCY DEPT VISIT,LEVEL V: ICD-10-PCS | Mod: ,,, | Performed by: EMERGENCY MEDICINE

## 2019-12-16 PROCEDURE — 99285 EMERGENCY DEPT VISIT HI MDM: CPT | Mod: 25

## 2019-12-16 PROCEDURE — 87077 CULTURE AEROBIC IDENTIFY: CPT

## 2019-12-16 PROCEDURE — 84484 ASSAY OF TROPONIN QUANT: CPT

## 2019-12-16 PROCEDURE — 93005 ELECTROCARDIOGRAM TRACING: CPT

## 2019-12-16 PROCEDURE — 81001 URINALYSIS AUTO W/SCOPE: CPT

## 2019-12-16 PROCEDURE — 96361 HYDRATE IV INFUSION ADD-ON: CPT

## 2019-12-16 PROCEDURE — 87186 SC STD MICRODIL/AGAR DIL: CPT

## 2019-12-16 PROCEDURE — 93010 ELECTROCARDIOGRAM REPORT: CPT | Mod: ,,, | Performed by: INTERNAL MEDICINE

## 2019-12-16 PROCEDURE — 87088 URINE BACTERIA CULTURE: CPT

## 2019-12-16 RX ORDER — CIPROFLOXACIN 500 MG/1
500 TABLET ORAL EVERY 12 HOURS
Qty: 6 TABLET | Refills: 0 | Status: SHIPPED | OUTPATIENT
Start: 2019-12-16 | End: 2019-12-19

## 2019-12-16 RX ORDER — CEPHALEXIN 500 MG/1
500 CAPSULE ORAL EVERY 6 HOURS
Qty: 28 CAPSULE | Refills: 0 | Status: SHIPPED | OUTPATIENT
Start: 2019-12-16 | End: 2019-12-23

## 2019-12-16 RX ORDER — CEPHALEXIN 500 MG/1
500 CAPSULE ORAL
Status: COMPLETED | OUTPATIENT
Start: 2019-12-16 | End: 2019-12-16

## 2019-12-16 RX ADMIN — SODIUM CHLORIDE 1000 ML: 0.9 INJECTION, SOLUTION INTRAVENOUS at 01:12

## 2019-12-16 RX ADMIN — CEPHALEXIN 500 MG: 500 CAPSULE ORAL at 05:12

## 2019-12-16 NOTE — DISCHARGE INSTRUCTIONS
Diagnosis: Urinary tract infection    Tests today showed:   Labs Reviewed   CBC W/ AUTO DIFFERENTIAL - Abnormal; Notable for the following components:       Result Value    RBC 3.35 (*)     Hemoglobin 8.3 (*)     Hematocrit 28.5 (*)     Mean Corpuscular Hemoglobin 24.8 (*)     Mean Corpuscular Hemoglobin Conc 29.1 (*)     RDW 14.8 (*)     All other components within normal limits   COMPREHENSIVE METABOLIC PANEL - Abnormal; Notable for the following components:    BUN, Bld 40 (*)     ALT 5 (*)     eGFR if  41.4 (*)     eGFR if non  36.0 (*)     All other components within normal limits   URINALYSIS, REFLEX TO URINE CULTURE - Abnormal; Notable for the following components:    Nitrite, UA Positive (*)     Leukocytes, UA 2+ (*)     All other components within normal limits    Narrative:     Preferred Collection Type->Urine, Clean Catch   URINALYSIS MICROSCOPIC - Abnormal; Notable for the following components:    WBC, UA 11 (*)     Bacteria Many (*)     All other components within normal limits    Narrative:     Preferred Collection Type->Urine, Clean Catch   CULTURE, URINE   TROPONIN I   POCT GLUCOSE MONITORING CONTINUOUS     Imaging Results              CT Head Without Contrast (Final result)  Result time 12/16/19 01:17:21      Final result by Armen Miles MD (12/16/19 01:17:21)                   Impression:      No CT evidence of acute intracranial abnormality. Clinical correlation and further evaluation as warranted.    Chronic senescent and microvascular ischemic change.      Electronically signed by: Armen Miles MD  Date:    12/16/2019  Time:    01:17               Narrative:    EXAMINATION:  CT HEAD WITHOUT CONTRAST    CLINICAL HISTORY:  Confusion/delirium, altered LOC, unexplained;    TECHNIQUE:  Low dose axial images were obtained through the head.  Coronal and sagittal reformations were also performed. Contrast was not administered.    COMPARISON:  MRI brain 11/09/2016,  CT head 11/09/2016    FINDINGS:  There is generalized cerebral volume loss with compensatory sulcal widening and ventricular enlargement.  There is patchy periventricular white matter hypoattenuation suggestive of sequelae of chronic microvascular ischemic change.  There is no evidence of acute intracranial hemorrhage or midline shift.  No extra-axial fluid collections identified.  The basal cisterns are patent. The mastoid air cells and paranasal sinuses are clear of acute process. The visualized bones of the calvarium demonstrate no acute osseous abnormality.                                        X-Ray Chest AP Portable (Final result)  Result time 12/16/19 01:13:57      Final result by Cecilio Cardoso MD (12/16/19 01:13:57)                   Impression:      Prominence along the right paratracheal region may relate to tortuous brachiocephalic vasculature exaggerated by rotation, however seen as an interval change in appearance when compared to the prior studies the possibility adenopathy or mass lesion can not be excluded, follow-up CT recommended.    Otherwise chronic changes are noted without additional evidence for superimposed acute intrathoracic process.    This report was flagged in Epic as abnormal.      Electronically signed by: Cecilio Cardoso  Date:    12/16/2019  Time:    01:13               Narrative:    EXAMINATION:  XR CHEST AP PORTABLE    CLINICAL HISTORY:  Chest Pain;    TECHNIQUE:  Single frontal view of the chest was performed.    COMPARISON:  February 26, 2019    FINDINGS:  Single chest view is submitted.  The heart size appears stable aorta demonstrates atherosclerotic change and tortuosity.  There is greater prominence along the right paramediastinal location, more prominent than on the prior study this may relate to tortuous brachiocephalic vasculature, exaggerated by rotation, however the possibility of right paratracheal adenopathy or mass lesion can not be excluded, chest CT follow-up  recommended.  Chronic appearing intrathoracic changes are otherwise noted.  There is no evidence for confluent infiltrate or consolidation, significant pleural effusion or pneumothorax.  The osseous structures demonstrate chronic change.  Scoliotic curvature of the spine is noted postoperative change of the cervical spine noted.                                      Treatments you had today:   Medications   sodium chloride 0.9% bolus 1,000 mL (1,000 mLs Intravenous New Bag 12/16/19 0104)       Home Care Instructions:  - Take the prescribed antibiotic as directed  - Stay well hydrated  - For relief of burning and the feeling of urgency, take Azo over-the-counter according to packaging directions for up to two days. This medication may turn your urine yellow-orange, and may stain clothing.  - Continue taking your home medications as prescribed    Take ibuprofen (also called Advil, Motrin) for your pain. This medicine is available over-the-counter in 200 mg tablets.  - You may take 600 mg every 6 hours, or 800 mg every 8 hours as needed   - Do not take more than this amount, as it can cause kidney problems, bleeding in your stomach, and other serious problems.   - Do not also take naproxen (Aleve) at the same time or on the same day  - If you have heart problems or uncontrolled high blood pressure, you should not take ibuprofen for more than 3 days without discussing with your doctor    Follow-up plan:  - Follow-up with: Primary care doctor within 3 - 5 days  - Additional testing and/or evaluation as directed by your primary doctor    Return to the Emergency Department for symptoms including but not limited to: worsening symptoms, severe abdominal or back pain, shortness of breath or chest pain, vomiting with inability to hold down fluids, fevers greater than 100.4°F, dizziness, passing out/fainting/unconsciousness, or other concerning symptoms.

## 2019-12-16 NOTE — TELEPHONE ENCOUNTER
----- Message from Bailey Simons sent at 12/16/2019  3:06 PM CST -----  Contact: Ace Winkler/ Kimber [phone]  The patient was prescribed cephALEXin (KEFLEX) 500 MG capsule yesterday when she went to the ER but, per the pharmacy she is allergic to it so please prescribed and alternative and send it to:     Pharmacy: [facility] - [address] [phone] (Phone) [phone] (Fax)

## 2019-12-16 NOTE — ED PROVIDER NOTES
"Source of History:  Patient, caregiver    Chief complaint:  Loss of Consciousness (sitter reports pt was standing and slumped forward and was "unconscious."  reports pt had slurred speech.  on hospital arrival, pt has no complaints.  ) and Diarrhea (x4 days)    HPI:  Arti Olsen is a 89 y.o. female with history of coronary artery disease, P 80, hypertension, hyperlipidemia presenting to emergency department with complaint of syncopal episode.  Per the caregiver who is present at bedside, the patient was in her normal state of health throughout the day, aside from 4 days of diarrhea with poor oral intake. The patient denies fevers or chills, however she has had some dysuria as well.   No abdominal pain or flank pain.     The patient was standing at the side of her bed when she suddenly fell forward.  She was conscious but weak.  The caregiver moved her onto the bed the patient had a syncopal event.  After the patient was confused.  No seizure-like activity was noted.  The patient no longer seems confused,   Per caregiver.  She is back at her baseline.      The patient denies any chest pain or shortness of breath.  No dizziness or lightheadedness.    ROS: As per HPI and below:  Review of Systems   Constitutional: Negative for fever.   HENT: Negative for sore throat.    Eyes: Negative for double vision.   Respiratory: Negative for cough and shortness of breath.    Cardiovascular: Negative for chest pain.   Gastrointestinal: Positive for diarrhea. Negative for abdominal pain and vomiting.   Genitourinary: Positive for dysuria.   Musculoskeletal: Negative for falls.   Skin: Negative for rash.   Neurological: Positive for loss of consciousness. Negative for dizziness, sensory change, speech change, focal weakness, seizures, weakness and headaches.     Review of patient's allergies indicates:   Allergen Reactions    Iodinated contrast media Swelling    Reglan [metoclopramide] Other (See Comments)     "Body " "shaking"    Sulfa (sulfonamide antibiotics)      Yrs ago    Augmentin [amoxicillin-pot clavulanate] Diarrhea       No current facility-administered medications on file prior to encounter.      Current Outpatient Medications on File Prior to Encounter   Medication Sig Dispense Refill    acetaminophen (TYLENOL) 500 MG tablet Take 500 mg by mouth daily as needed.       ALPRAZolam (XANAX) 0.25 MG tablet Take 0.5 tablets (0.125 mg total) by mouth nightly as needed for Insomnia or Anxiety. 90 tablet 1    atorvastatin (LIPITOR) 40 MG tablet Take 1 tablet (40 mg total) by mouth every evening. TAKE (1/2) HALF BY MOUTH ONCE A DAY 90 tablet 3    cholecalciferol, vitamin D3, (VITAMIN D3) 1,000 unit capsule Take 1 capsule (1,000 Units total) by mouth once daily. 90 capsule 3    dextromethorphan-guaifenesin (ADULT ROBITUSSIN PEAK COLD DM)  mg/5 mL Liqd Take 5 mLs by mouth daily as needed.       furosemide (LASIX) 20 MG tablet Take 1 tablet (20 mg total) by mouth 2 (two) times daily. Start with 3 days per week but OK to use more often or extra for increased leg swelling 90 tablet 3    gabapentin (NEURONTIN) 300 MG capsule Take 1 capsule (300 mg total) by mouth every evening. 90 capsule 3    irbesartan (AVAPRO) 300 MG tablet Take 1 tablet (300 mg total) by mouth every evening. 90 tablet 3    isosorbide mononitrate (IMDUR) 60 MG 24 hr tablet Take 1 tablet (60 mg total) by mouth once daily. 90 tablet 3    levothyroxine (SYNTHROID) 25 MCG tablet Take 1 tablet (25 mcg total) by mouth once daily. 90 tablet 3    loperamide (IMODIUM A-D) 2 mg Tab Take 4 mg by mouth 3 (three) times daily as needed.       mag hydrox/aluminum hyd/simeth (ALUM-MAG HYDROXIDE-SIMETH ORAL) Take by mouth daily as needed.       metoprolol succinate (TOPROL-XL) 25 MG 24 hr tablet Take 1 tablet (25 mg total) by mouth once daily. 90 tablet 3    nitroGLYCERIN (NITROSTAT) 0.4 MG SL tablet Place 1 tablet (0.4 mg total) under the tongue every 5 " (five) minutes as needed for Chest pain. 25 tablet 4    oxyCODONE-acetaminophen (PERCOCET) 7.5-325 mg per tablet 1 tablet every 4 (four) hours as needed.       pantoprazole (PROTONIX) 40 MG tablet Take 1 tablet (40 mg total) by mouth once daily. 90 tablet 3    QUEtiapine (SEROQUEL) 25 MG Tab Take 1 tablet (25 mg total) by mouth 2 (two) times daily as needed (anxiety, insomnia). 180 tablet 3    sertraline (ZOLOFT) 100 MG tablet Take 1 tablet (100 mg total) by mouth once daily. 90 tablet 3    VIT C/VIT E/LUTEIN/MIN/OMEGA-3 (OCUVITE ORAL) Take 1 tablet by mouth once daily.      zolpidem (AMBIEN) 5 MG Tab          PMH:  As per HPI and below:  Past Medical History:   Diagnosis Date    Acid reflux     chronic    Anemia 6/24/2014    Arthritis     osetoarthritis    Clotting disorder     Colon polyp     Coronary artery disease     Nonobstructive [facility]    History of colonoscopy     1 polyp noted in 2009    Hyperlipemia     Hypertension     Migraine headache     chronic    Peripheral vascular disease     PONV (postoperative nausea and vomiting)     Renal artery atherosclerosis 11/11/2012    Spinal stenosis     Thyroid disease      Past Surgical History:   Procedure Laterality Date    acf      ANTERIOR CERVICAL DISCECTOMY W/ FUSION      APPENDECTOMY  1942    BLADDER SURGERY  1958    suspension    COLONOSCOPY W/ POLYPECTOMY      CYST REMOVAL  1983    removed from scalp    DISC REMOVAL  1983    EYE SURGERY      cataracts removed    HYSTERECTOMY  1959    COMPLETE    JOINT REPLACEMENT  2006    repair 3rd toe    JOINT REPLACEMENT  2007    right/left rotator    SPINE SURGERY  02/2014    relieve pressure on nerves    TONSILLECTOMY, ADENOIDECTOMY  1930's    UTERINE FIBROID SURGERY  1958    VARICOSE VEIN SURGERY  1958       Social History     Socioeconomic History    Marital status:      Spouse name: Not on file    Number of children: Not on file    Years of education: Not on file     Highest education level: Not on file   Occupational History    Not on file   Social Needs    Financial resource strain: Not hard at all    Food insecurity:     Worry: Never true     Inability: Never true    Transportation needs:     Medical: No     Non-medical: No   Tobacco Use    Smoking status: Former Smoker     Packs/day: 1.00     Years: 29.00     Pack years: 29.00     Last attempt to quit: 9/10/1969     Years since quittin.3    Smokeless tobacco: Never Used    Tobacco comment: The patient lives alone and is able to accomplish a usual activities of daily living.  She is not that active due to the severity of her back pain.   Substance and Sexual Activity    Alcohol use: No     Frequency: Never     Drinks per session: Patient refused     Binge frequency: Never    Drug use: No    Sexual activity: Never   Lifestyle    Physical activity:     Days per week: 0 days     Minutes per session: 0 min    Stress: Very much   Relationships    Social connections:     Talks on phone: Never     Gets together: Never     Attends [employer] service: Not on file     Active member of club or organization: No     Attends meetings of clubs or organizations: Never     Relationship status:    Other Topics Concern    Not on file   Social History Narrative    Not on file       Family History   Problem Relation Age of Onset    Heart disease Father         aorta burst    Heart disease Mother         stroke    Kidney disease Brother     Cancer Brother     Lung cancer Brother     Cancer Brother     Leukemia Brother     Heart disease Brother     Cancer Brother     Heart disease Maternal Grandmother     Kidney disease Maternal Grandfather     Uterine cancer Paternal Grandmother     Anesthesia problems Neg Hx     Clotting disorder Neg Hx      problems Neg Hx     Hypertension Neg Hx     Kidney cancer Neg Hx     Malignant hyperthermia Neg Hx     Prostate cancer Neg Hx     Sickle cell trait Neg Hx      Lupus Neg Hx     Sudden death Neg Hx     Urolithiasis Neg Hx     Colon cancer Neg Hx     Esophageal cancer Neg Hx     Irritable bowel syndrome Neg Hx     Rectal cancer Neg Hx     Stomach cancer Neg Hx     Ulcerative colitis Neg Hx     Celiac disease Neg Hx     Cystic fibrosis Neg Hx        Physical Exam:    Vitals:    12/16/19 0431   BP: (!) 179/76   Pulse: (!) 59   Resp:    Temp:      Gen: No acute distress.  Mental Status:  Alert and oriented x 2.  Appropriate, conversant.  Skin: Warm, dry. No rashes seen.  Eyes: No conjunctival injection.  ENT: Oropharynx clear.  Dry oral mucosa.  Pulm: Clear to auscultation bilaterally.  Good air movement.  No wheezes. No increased work of breathing.  CV: Regular rate. Regular rhythm. Normal peripheral perfusion. No lower extremity edema.  Abd: Soft.  Not distended.  Nontender.  No guarding.  No rebound.  MSK: Good range of motion all joints.  No deformities.    Neck supple.  No meningismus.  Neuro: Awake. Speech normal. No focal neuro deficit observed. Cranial nerves intact. Motor grossly intact, 4/5 strength throughout, no asymmetry noted.  Sensation grossly intact.  Cerebellar intact.  Gait not assessed.    Laboratory Studies:  Labs Reviewed   CULTURE, URINE - Abnormal; Notable for the following components:       Result Value    Urine Culture, Routine   (*)     Value: GRAM NEGATIVE JOAQUIM  >100,000 cfu/ml  Identification and susceptibility pending      All other components within normal limits    Narrative:     Preferred Collection Type->Urine, Clean Catch   CBC W/ AUTO DIFFERENTIAL - Abnormal; Notable for the following components:    RBC 3.35 (*)     Hemoglobin 8.3 (*)     Hematocrit 28.5 (*)     Mean Corpuscular Hemoglobin 24.8 (*)     Mean Corpuscular Hemoglobin Conc 29.1 (*)     RDW 14.8 (*)     All other components within normal limits   COMPREHENSIVE METABOLIC PANEL - Abnormal; Notable for the following components:    BUN, Bld 40 (*)     ALT 5 (*)     eGFR if   41.4 (*)     eGFR if non  36.0 (*)     All other components within normal limits   URINALYSIS, REFLEX TO URINE CULTURE - Abnormal; Notable for the following components:    Nitrite, UA Positive (*)     Leukocytes, UA 2+ (*)     All other components within normal limits    Narrative:     Preferred Collection Type->Urine, Clean Catch   URINALYSIS MICROSCOPIC - Abnormal; Notable for the following components:    WBC, UA 11 (*)     Bacteria Many (*)     All other components within normal limits    Narrative:     Preferred Collection Type->Urine, Clean Catch   TROPONIN I       Chart reviewed.     Imaging Results          CT Head Without Contrast (Final result)  Result time 12/16/19 01:17:21    Final result by Armen Miles MD (12/16/19 01:17:21)                 Impression:      No CT evidence of acute intracranial abnormality. Clinical correlation and further evaluation as warranted.    Chronic senescent and microvascular ischemic change.      Electronically signed by: Armen Miles MD  Date:    12/16/2019  Time:    01:17             Narrative:    EXAMINATION:  CT HEAD WITHOUT CONTRAST    CLINICAL HISTORY:  Confusion/delirium, altered LOC, unexplained;    TECHNIQUE:  Low dose axial images were obtained through the head.  Coronal and sagittal reformations were also performed. Contrast was not administered.    COMPARISON:  MRI brain 11/09/2016, CT head 11/09/2016    FINDINGS:  There is generalized cerebral volume loss with compensatory sulcal widening and ventricular enlargement.  There is patchy periventricular white matter hypoattenuation suggestive of sequelae of chronic microvascular ischemic change.  There is no evidence of acute intracranial hemorrhage or midline shift.  No extra-axial fluid collections identified.  The basal cisterns are patent. The mastoid air cells and paranasal sinuses are clear of acute process. The visualized bones of the calvarium demonstrate no acute  osseous abnormality.                                X-Ray Chest AP Portable (Final result)  Result time 12/16/19 01:13:57    Final result by Cecilio Cardoso MD (12/16/19 01:13:57)                 Impression:      Prominence along the right paratracheal region may relate to tortuous brachiocephalic vasculature exaggerated by rotation, however seen as an interval change in appearance when compared to the prior studies the possibility adenopathy or mass lesion can not be excluded, follow-up CT recommended.    Otherwise chronic changes are noted without additional evidence for superimposed acute intrathoracic process.    This report was flagged in Epic as abnormal.      Electronically signed by: Cecilio Cardoso  Date:    12/16/2019  Time:    01:13             Narrative:    EXAMINATION:  XR CHEST AP PORTABLE    CLINICAL HISTORY:  Chest Pain;    TECHNIQUE:  Single frontal view of the chest was performed.    COMPARISON:  February 26, 2019    FINDINGS:  Single chest view is submitted.  The heart size appears stable aorta demonstrates atherosclerotic change and tortuosity.  There is greater prominence along the right paramediastinal location, more prominent than on the prior study this may relate to tortuous brachiocephalic vasculature, exaggerated by rotation, however the possibility of right paratracheal adenopathy or mass lesion can not be excluded, chest CT follow-up recommended.  Chronic appearing intrathoracic changes are otherwise noted.  There is no evidence for confluent infiltrate or consolidation, significant pleural effusion or pneumothorax.  The osseous structures demonstrate chronic change.  Scoliotic curvature of the spine is noted postoperative change of the cervical spine noted.                              Medications Given:  Medications   sodium chloride 0.9% bolus 1,000 mL (0 mLs Intravenous Stopped 12/16/19 0525)   cephALEXin capsule 500 mg (500 mg Oral Given 12/16/19 0528)       MDM:    89 y.o. female  with   Complaint of syncope.  She is afebrile, stable, nontoxic.  She does appear mildly dehydrated.  Patient was given IV fluids.  Her EKG is nonischemic.   Hemoglobin 8.3 from 9.  Creatinine slightly increased from previous, consistent with concern for mild dehydration.    CT head unremarkable.    Urinalysis is consistent with infection.  Patient received a dose of ceftriaxone here.  As she is hemodynamically stable, well-appearing, I discussed  Disposition with patient and caregiver.  They strongly preferred discharge with oral antibiotics.  I believe this is reasonable.    12/17/2019  10:28 AM    Chart was reviewed, CXR with mass versus adenopathy. Phone call was placed to patient's caregiver Giana Nicholas   Regarding the findings, caregivers aware that follow-up must be arranged with primary care to include likely CT scan of the chest.    Diagnostic Impression:    1. Dehydration    2. Syncope    3. Cystitis        Patient and/or family understands the plan and is in agreement, verbalized understanding, questions answered    Ny Olguin MD  Emergency Medicine           Ny Olguin MD  12/17/19 1023       Ny Olguin MD  12/17/19 1036

## 2019-12-16 NOTE — PROVIDER PROGRESS NOTES - EMERGENCY DEPT.
Medical Screening Exam Completed    HPI:   89-year-old female presenting to emergency department with complaint of syncope.  Per the caregiver who is present at bedside, the patient was in her normal state of health throughout the day, aside from 4 days of diarrhea with poor oral intake.  The patient was standing at the side of her bed when she suddenly fell forward.  She was conscious but weak.  The caregiver moved her onto the bed the patient had a syncopal event.  After the patient was confused.  No seizure-like activity was noted.  The patient no longer seems confused.    Vitals:    10/27/19 2247   BP: 132/70   Pulse: 80   Resp: 16   Temp: 97.6 °F (36.4 °C)     Focused Exam:   Alert and oriented x2, at baseline.  No focal numbness or weakness of the arms or legs.  No facial droop or other focal neuro deficit noted.  Speech is clear.    Plan:   Patient to be brought back to room for further evaluation.    Ny Olguin MD  Emergency Medicine

## 2019-12-16 NOTE — TELEPHONE ENCOUNTER
The patient's emergency room note is incomplete there is no diagnosis of linked to the encounter and her chart is not a labeled allergic to Keflex.  Looking at the urinalysis, this may be for a urinary tract infection.  I will call in a 3 day course of ciprofloxacin, however the patient should be seen for a follow-up to determine her clinical status.  Please schedule with PCP or Urgent Care here in the next 2-3 days.  Thank you

## 2019-12-17 NOTE — TELEPHONE ENCOUNTER
----- Message from Ninfa Covarrubias sent at 12/17/2019 10:33 AM CST -----  Contact: Kimber  [phone] direct number   [facility]   Patient was seen in the ER on Sunday night and returned to [facility] with a rx for keflex. The ER doctor said it was ok to give patient this medication . Patient isn't sure if she is allergic to it. Pt's pharmacy  [facility] said that on their profile, they show patient has had a negative reaction to this and they won't fill it.    ER doctor / Ny Olguin just called nursing home with results of melina'ts cxr and said that is shows potentional for enlarged lymph nodes on her right side and needs a ct scan.    They would like your permission to allow patient to have t his. Patient has a uti and needs some type of medication. Please notify the pharmacy if it is ok to fill it.    Omnarin [facility] - [address] [phone]

## 2019-12-18 LAB — BACTERIA UR CULT: ABNORMAL

## 2019-12-20 ENCOUNTER — CLINICAL SUPPORT (OUTPATIENT)
Dept: CARDIOLOGY | Facility: CLINIC | Age: 84
End: 2019-12-20
Attending: INTERNAL MEDICINE
Payer: MEDICARE

## 2019-12-20 DIAGNOSIS — R60.0 EDEMA OF BOTH LOWER EXTREMITIES: ICD-10-CM

## 2019-12-20 DIAGNOSIS — Z87.891 HISTORY OF SMOKING 25-50 PACK YEARS: ICD-10-CM

## 2019-12-20 DIAGNOSIS — I83.893 VARICOSE VEINS OF BOTH LOWER EXTREMITIES WITH COMPLICATIONS: ICD-10-CM

## 2019-12-20 DIAGNOSIS — I73.9 PAD (PERIPHERAL ARTERY DISEASE): ICD-10-CM

## 2019-12-20 LAB
LEFT GREAT SAPHENOUS DISTAL THIGH DIA: 0.23 CM
LEFT GREAT SAPHENOUS JUNCTION DIA: 0.51 CM
LEFT GREAT SAPHENOUS KNEE DIA: 0.28 CM
LEFT GREAT SAPHENOUS MIDDLE THIGH DIA: 0.4 CM
LEFT GREAT SAPHENOUS PROXIMAL CALF DIA: 0.23 CM
LEFT SMALL SAPHENOUS KNEE DIA: 0.36 CM
RIGHT GIAC DIA: 0.19 MM
RIGHT GREAT SAPHENOUS DISTAL THIGH DIA: 0.36 CM
RIGHT GREAT SAPHENOUS DISTAL THIGH REFLUX: 1455 MS
RIGHT GREAT SAPHENOUS JUNCTION DIA: 0.35 CM
RIGHT GREAT SAPHENOUS KNEE DIA: 0.26 CM
RIGHT GREAT SAPHENOUS MIDDLE THIGH DIA: 0.33 CM
RIGHT GREAT SAPHENOUS PROXIMAL CALF DIA: 0.32 CM

## 2019-12-20 PROCEDURE — 93970 EXTREMITY STUDY: CPT | Mod: PBBFAC | Performed by: INTERNAL MEDICINE

## 2019-12-20 PROCEDURE — 93970 CV US LOWER VENOUS INSUFFICIENCY BILATERAL (CUPID ONLY): ICD-10-PCS | Mod: 26,S$PBB,, | Performed by: INTERNAL MEDICINE

## 2019-12-27 ENCOUNTER — CLINICAL SUPPORT (OUTPATIENT)
Dept: CARDIOLOGY | Facility: CLINIC | Age: 84
End: 2019-12-27
Attending: INTERNAL MEDICINE
Payer: MEDICARE

## 2019-12-27 DIAGNOSIS — I83.893 VARICOSE VEINS OF BOTH LOWER EXTREMITIES WITH COMPLICATIONS: ICD-10-CM

## 2019-12-27 DIAGNOSIS — Z87.891 HISTORY OF SMOKING 25-50 PACK YEARS: ICD-10-CM

## 2019-12-27 DIAGNOSIS — R60.0 EDEMA OF BOTH LOWER EXTREMITIES: ICD-10-CM

## 2019-12-27 DIAGNOSIS — I73.9 PAD (PERIPHERAL ARTERY DISEASE): ICD-10-CM

## 2019-12-27 LAB
LEFT ANT TIBIAL SYS PSV: 60 CM/S
LEFT CFA PSV: 121 CM/S
LEFT EXTERNAL ILIAC PSV: 231 CM/S
LEFT PERONEAL SYS PSV: 68 CM/S
LEFT POPLITEAL PSV: 85 CM/S
LEFT POST TIBIAL SYS PSV: 49 CM/S
LEFT PROFUNDA SYS PSV: 118 CM/S
LEFT SUPER FEMORAL DIST SYS PSV: 101 CM/S
LEFT SUPER FEMORAL MID SYS PSV: 113 CM/S
LEFT SUPER FEMORAL OSTIAL SYS PSV: 122 CM/S
LEFT SUPER FEMORAL PROX SYS PSV: 138 CM/S
LEFT TIB/PER TRUNK SYS PSV: 147 CM/S
OHS CV LEFT LOWER EXTREMITY ABI (NO CALC): 1.48
OHS CV RIGHT ABI LOWER EXTREMITY (NO CALC): 1.48
RIGHT ANT TIBIAL SYS PSV: 48 CM/S
RIGHT CFA PSV: 160 CM/S
RIGHT EXTERNAL ILLIAC PSV: 236 CM/S
RIGHT PERONEAL SYS PSV: 91 CM/S
RIGHT POPLITEAL PSV: 107 CM/S
RIGHT POST TIBIAL SYS PSV: 69 CM/S
RIGHT PROFUNDA SYS PSV: 113 CM/S
RIGHT SUPER FEMORAL DIST SYS PSV: 91 CM/S
RIGHT SUPER FEMORAL MID SYS PSV: 131 CM/S
RIGHT SUPER FEMORAL OSTIAL SYS PSV: 125 CM/S
RIGHT SUPER FEMORAL PROX SYS PSV: 121 CM/S
RIGHT TIB/PER TRUNK SYS PSV: 108 CM/S

## 2019-12-27 PROCEDURE — 93925 LOWER EXTREMITY STUDY: CPT | Mod: PBBFAC | Performed by: INTERNAL MEDICINE

## 2019-12-27 PROCEDURE — 93925 CV US DOPPLER ARTERIAL LEGS BILATERAL (CUPID ONLY): ICD-10-PCS | Mod: 26,S$PBB,, | Performed by: INTERNAL MEDICINE

## 2020-01-01 ENCOUNTER — TELEPHONE (OUTPATIENT)
Dept: ENDOSCOPY | Facility: HOSPITAL | Age: 85
End: 2020-01-01

## 2020-01-01 ENCOUNTER — PES CALL (OUTPATIENT)
Dept: ADMINISTRATIVE | Facility: CLINIC | Age: 85
End: 2020-01-01

## 2020-01-01 ENCOUNTER — TELEPHONE (OUTPATIENT)
Dept: INTERNAL MEDICINE | Facility: CLINIC | Age: 85
End: 2020-01-01

## 2020-01-01 ENCOUNTER — PATIENT MESSAGE (OUTPATIENT)
Dept: OTHER | Facility: OTHER | Age: 85
End: 2020-01-01

## 2020-01-01 ENCOUNTER — OFFICE VISIT (OUTPATIENT)
Dept: INTERNAL MEDICINE | Facility: CLINIC | Age: 85
End: 2020-01-01
Payer: MEDICARE

## 2020-01-01 ENCOUNTER — TELEPHONE (OUTPATIENT)
Dept: GASTROENTEROLOGY | Facility: CLINIC | Age: 85
End: 2020-01-01

## 2020-01-01 ENCOUNTER — OFFICE VISIT (OUTPATIENT)
Dept: GASTROENTEROLOGY | Facility: CLINIC | Age: 85
End: 2020-01-01
Payer: MEDICARE

## 2020-01-01 ENCOUNTER — PATIENT OUTREACH (OUTPATIENT)
Dept: ADMINISTRATIVE | Facility: OTHER | Age: 85
End: 2020-01-01

## 2020-01-01 VITALS — WEIGHT: 103 LBS | HEIGHT: 59 IN | BODY MASS INDEX: 20.76 KG/M2

## 2020-01-01 DIAGNOSIS — Z71.1 PERSON WITH FEARED COMPLAINT, NO DIAGNOSIS MADE: ICD-10-CM

## 2020-01-01 DIAGNOSIS — G89.29 OTHER CHRONIC PAIN: ICD-10-CM

## 2020-01-01 DIAGNOSIS — I67.9 CEREBRAL ARTERIAL DISEASE: Primary | ICD-10-CM

## 2020-01-01 DIAGNOSIS — G47.00 INSOMNIA, UNSPECIFIED TYPE: ICD-10-CM

## 2020-01-01 DIAGNOSIS — S06.5X0S TRAUMATIC SUBDURAL HEMORRHAGE WITHOUT LOSS OF CONSCIOUSNESS, SEQUELA: ICD-10-CM

## 2020-01-01 DIAGNOSIS — R31.9 HEMATURIA, UNSPECIFIED TYPE: Primary | ICD-10-CM

## 2020-01-01 DIAGNOSIS — R30.0 DYSURIA: Primary | ICD-10-CM

## 2020-01-01 PROCEDURE — 99214 PR OFFICE/OUTPT VISIT, EST, LEVL IV, 30-39 MIN: ICD-10-PCS | Mod: S$PBB,,, | Performed by: FAMILY MEDICINE

## 2020-01-01 PROCEDURE — 99443 PR PHYSICIAN TELEPHONE EVALUATION 21-30 MIN: CPT | Mod: 95,,, | Performed by: INTERNAL MEDICINE

## 2020-01-01 PROCEDURE — 99443 PR PHYSICIAN TELEPHONE EVALUATION 21-30 MIN: ICD-10-PCS | Mod: 95,,, | Performed by: INTERNAL MEDICINE

## 2020-01-01 PROCEDURE — 99214 OFFICE O/P EST MOD 30 MIN: CPT | Mod: S$PBB,,, | Performed by: FAMILY MEDICINE

## 2020-01-01 RX ORDER — GABAPENTIN 300 MG/1
CAPSULE ORAL
Qty: 30 CAPSULE | Refills: 5 | Status: SHIPPED | OUTPATIENT
Start: 2020-01-01 | End: 2021-01-01

## 2020-01-08 ENCOUNTER — PATIENT OUTREACH (OUTPATIENT)
Dept: ADMINISTRATIVE | Facility: OTHER | Age: 85
End: 2020-01-08

## 2020-01-10 ENCOUNTER — LAB VISIT (OUTPATIENT)
Dept: LAB | Facility: HOSPITAL | Age: 85
End: 2020-01-10
Attending: INTERNAL MEDICINE
Payer: MEDICARE

## 2020-01-10 ENCOUNTER — OFFICE VISIT (OUTPATIENT)
Dept: GASTROENTEROLOGY | Facility: CLINIC | Age: 85
End: 2020-01-10
Payer: MEDICARE

## 2020-01-10 VITALS
HEIGHT: 59 IN | RESPIRATION RATE: 16 BRPM | DIASTOLIC BLOOD PRESSURE: 56 MMHG | BODY MASS INDEX: 20.93 KG/M2 | WEIGHT: 103.81 LBS | HEART RATE: 74 BPM | SYSTOLIC BLOOD PRESSURE: 154 MMHG

## 2020-01-10 DIAGNOSIS — R19.7 DIARRHEA, UNSPECIFIED TYPE: Primary | ICD-10-CM

## 2020-01-10 DIAGNOSIS — Z51.81 ENCOUNTER FOR MONITORING LONG-TERM PROTON PUMP INHIBITOR THERAPY: ICD-10-CM

## 2020-01-10 DIAGNOSIS — Z79.899 ENCOUNTER FOR MONITORING LONG-TERM PROTON PUMP INHIBITOR THERAPY: ICD-10-CM

## 2020-01-10 DIAGNOSIS — D50.9 IRON DEFICIENCY ANEMIA, UNSPECIFIED IRON DEFICIENCY ANEMIA TYPE: ICD-10-CM

## 2020-01-10 DIAGNOSIS — R19.7 DIARRHEA, UNSPECIFIED TYPE: ICD-10-CM

## 2020-01-10 LAB
25(OH)D3+25(OH)D2 SERPL-MCNC: 42 NG/ML (ref 30–96)
ALBUMIN SERPL BCP-MCNC: 3.7 G/DL (ref 3.5–5.2)
ALP SERPL-CCNC: 66 U/L (ref 55–135)
ALT SERPL W/O P-5'-P-CCNC: 5 U/L (ref 10–44)
ANION GAP SERPL CALC-SCNC: 8 MMOL/L (ref 8–16)
AST SERPL-CCNC: 12 U/L (ref 10–40)
BILIRUB SERPL-MCNC: 0.5 MG/DL (ref 0.1–1)
BUN SERPL-MCNC: 19 MG/DL (ref 10–30)
CALCIUM SERPL-MCNC: 9 MG/DL (ref 8.7–10.5)
CHLORIDE SERPL-SCNC: 108 MMOL/L (ref 95–110)
CO2 SERPL-SCNC: 30 MMOL/L (ref 23–29)
CREAT SERPL-MCNC: 1 MG/DL (ref 0.5–1.4)
EST. GFR  (AFRICAN AMERICAN): 56.9 ML/MIN/1.73 M^2
EST. GFR  (NON AFRICAN AMERICAN): 49.4 ML/MIN/1.73 M^2
FERRITIN SERPL-MCNC: 4 NG/ML (ref 20–300)
GLUCOSE SERPL-MCNC: 88 MG/DL (ref 70–110)
IGA SERPL-MCNC: 170 MG/DL (ref 40–350)
IRON SERPL-MCNC: 27 UG/DL (ref 30–160)
MAGNESIUM SERPL-MCNC: 1.8 MG/DL (ref 1.6–2.6)
POTASSIUM SERPL-SCNC: 3.2 MMOL/L (ref 3.5–5.1)
PROT SERPL-MCNC: 6.5 G/DL (ref 6–8.4)
SATURATED IRON: 6 % (ref 20–50)
SODIUM SERPL-SCNC: 146 MMOL/L (ref 136–145)
TOTAL IRON BINDING CAPACITY: 435 UG/DL (ref 250–450)
TRANSFERRIN SERPL-MCNC: 294 MG/DL (ref 200–375)
VIT B12 SERPL-MCNC: 303 PG/ML (ref 210–950)

## 2020-01-10 PROCEDURE — 36415 COLL VENOUS BLD VENIPUNCTURE: CPT

## 2020-01-10 PROCEDURE — 82306 VITAMIN D 25 HYDROXY: CPT

## 2020-01-10 PROCEDURE — 1159F MED LIST DOCD IN RCRD: CPT | Mod: ,,, | Performed by: INTERNAL MEDICINE

## 2020-01-10 PROCEDURE — 99204 OFFICE O/P NEW MOD 45 MIN: CPT | Mod: S$PBB,,, | Performed by: INTERNAL MEDICINE

## 2020-01-10 PROCEDURE — 99204 PR OFFICE/OUTPT VISIT, NEW, LEVL IV, 45-59 MIN: ICD-10-PCS | Mod: S$PBB,,, | Performed by: INTERNAL MEDICINE

## 2020-01-10 PROCEDURE — 83516 IMMUNOASSAY NONANTIBODY: CPT

## 2020-01-10 PROCEDURE — 99215 OFFICE O/P EST HI 40 MIN: CPT | Mod: PBBFAC | Performed by: INTERNAL MEDICINE

## 2020-01-10 PROCEDURE — 99999 PR PBB SHADOW E&M-EST. PATIENT-LVL V: CPT | Mod: PBBFAC,,, | Performed by: INTERNAL MEDICINE

## 2020-01-10 PROCEDURE — 82784 ASSAY IGA/IGD/IGG/IGM EACH: CPT

## 2020-01-10 PROCEDURE — 83540 ASSAY OF IRON: CPT

## 2020-01-10 PROCEDURE — 80053 COMPREHEN METABOLIC PANEL: CPT

## 2020-01-10 PROCEDURE — 1159F PR MEDICATION LIST DOCUMENTED IN MEDICAL RECORD: ICD-10-PCS | Mod: ,,, | Performed by: INTERNAL MEDICINE

## 2020-01-10 PROCEDURE — 1125F PR PAIN SEVERITY QUANTIFIED, PAIN PRESENT: ICD-10-PCS | Mod: ,,, | Performed by: INTERNAL MEDICINE

## 2020-01-10 PROCEDURE — 99999 PR PBB SHADOW E&M-EST. PATIENT-LVL V: ICD-10-PCS | Mod: PBBFAC,,, | Performed by: INTERNAL MEDICINE

## 2020-01-10 PROCEDURE — 82728 ASSAY OF FERRITIN: CPT

## 2020-01-10 PROCEDURE — 1125F AMNT PAIN NOTED PAIN PRSNT: CPT | Mod: ,,, | Performed by: INTERNAL MEDICINE

## 2020-01-10 PROCEDURE — 82607 VITAMIN B-12: CPT

## 2020-01-10 PROCEDURE — 83735 ASSAY OF MAGNESIUM: CPT

## 2020-01-10 RX ORDER — FERROUS SULFATE 325(65) MG
325 TABLET ORAL EVERY 12 HOURS
Qty: 60 TABLET | Refills: 4 | Status: ON HOLD | OUTPATIENT
Start: 2020-01-10 | End: 2021-01-01 | Stop reason: HOSPADM

## 2020-01-10 RX ORDER — NAPROXEN SODIUM 220 MG/1
81 TABLET, FILM COATED ORAL DAILY
COMMUNITY

## 2020-01-10 RX ORDER — BISACODYL 5 MG
5 TABLET, DELAYED RELEASE (ENTERIC COATED) ORAL DAILY PRN
COMMUNITY

## 2020-01-10 RX ORDER — DOCUSATE SODIUM 100 MG/1
100 CAPSULE, LIQUID FILLED ORAL 2 TIMES DAILY
Status: ON HOLD | COMMUNITY
End: 2020-01-26 | Stop reason: HOSPADM

## 2020-01-10 RX ORDER — FERROUS SULFATE 325(65) MG
325 TABLET ORAL EVERY 12 HOURS
Qty: 60 TABLET | Refills: 4 | Status: SHIPPED | OUTPATIENT
Start: 2020-01-10 | End: 2020-01-10 | Stop reason: SDUPTHER

## 2020-01-10 NOTE — LETTER
January 10, 2020      Wong Le MD  [address]  [facility] [id number]           [facility] - Gastroenterology  [address]  [place] [address]  Phone: [phone]  Fax: [phone]          Patient: Arti Olsen   MR Number: [id number]   YOB: 1928   Date of Visit: 1/10/2020       Dear Dr. Wong Le:    Thank you for referring Arti Olsen to me for evaluation. Attached you will find relevant portions of my assessment and plan of care.    If you have questions, please do not hesitate to call me. I look forward to following Arti Olsen along with you.    Sincerely,    Deshaun Mccann MD    Enclosure  CC:  No Recipients    If you would like to receive this communication electronically, please contact [email or web address] or [phone] to request more information on [employer] access.    For providers and/or their staff who would like to refer a patient to [facility], please contact us through our one-stop-shop provider referral line, [facility], at [phone].    If you feel you have received this communication in error or would no longer like to receive these types of communications, please e-mail [email or web address]

## 2020-01-10 NOTE — PROGRESS NOTES
[facility] Consultation Note    Reason for Consult:  The primary encounter diagnosis was Diarrhea, unspecified type. Diagnoses of Iron deficiency anemia, unspecified iron deficiency anemia type and Encounter for monitoring long-term proton pump inhibitor therapy were also pertinent to this visit.    PCP:   Wong Le   [address] / [place] [address]    Referring MD:  Wong Le Md  [address] [place] [address]    Initial History of Present Illness (HPI):  This is a 89 y.o. female here for evaluation of diarrhea.  Patient has fecal incontinence in the past when away with Metamucil wafers 2 a day she has not been doing that she is here with her sitter Visiting olivia and says that she has not been seen the patient taking her Metamucil wafers but has seen him in her medicine cabinet.  She will make sure the patient is doing that.  Patient denies any chest pain shortness of breath no lightheadedness no dizziness. Last EGD and colonoscopy is reviewed last clinic no reviewed patient had declined video capsule endoscopy at her age.  No hematochezia no abdominal pain no fever no chills no unintentional weight loss.  No recent antibiotics.    Abdominal pain - no  Reflux - no  Dysphagia - no   Bowel habits - diarrhea  GI bleeding - none  NSAID usage - none    Interval HPI 01/10/2020:  The patient's last visit with me was on 8/18/2016.      ROS:  Constitutional: No fevers, chills, No weight loss  ENT:  No heartburn no dysphagia no odynophagia no hoarseness  CV: No chest pain, no palpitation  Pulm: No cough, No shortness of breath, no wheezing  Ophtho: No vision changes  GI: see HPI  Derm: No rash, no itching  Heme: No lymphadenopathy, No easy bruising  MSK: No significant arthritis  : No dysuria, No hematuria  Endo: No hot or cold intolerance  Neuro: No syncope, No seizure, no strokes  Psych: No uncontrolled anxiety, No uncontrolled depression    Medical History:  " has a past medical history of Acid reflux, Anemia (6/24/2014), Arthritis, Clotting disorder, Colon polyp, Coronary artery disease, History of colonoscopy, Hyperlipemia, Hypertension, Migraine headache, Peripheral vascular disease, PONV (postoperative nausea and vomiting), Renal artery atherosclerosis (11/11/2012), Spinal stenosis, and Thyroid disease.    Surgical History:  has a past surgical history that includes Appendectomy (1942); TONSILLECTOMY, ADENOIDECTOMY (1930's); Bladder surgery (1958); Uterine fibroid surgery (1958); Varicose vein surgery (1958); Disc removal (1983); Cyst Removal (1983); Joint replacement (2006); Joint replacement (2007); Colonoscopy w/ polypectomy; Hysterectomy (1959); acf; Anterior cervical discectomy w/ fusion; Eye surgery; and Spine surgery (02/2014).    Family History: family history includes Cancer in her brother, brother, and brother; Heart disease in her brother, father, maternal grandmother, and mother; Kidney disease in her brother and maternal grandfather; Leukemia in her brother; Lung cancer in her brother; Uterine cancer in her paternal grandmother..     Social History:  reports that she quit smoking about 50 years ago. She has a 29.00 pack-year smoking history. She has never used smokeless tobacco. She reports that she does not drink alcohol or use drugs.    Review of patient's allergies indicates:   Allergen Reactions    Iodinated contrast media Swelling    Reglan [metoclopramide] Other (See Comments)     "Body shaking"    Sulfa (sulfonamide antibiotics)      Yrs ago    Augmentin [amoxicillin-pot clavulanate] Diarrhea       Medication List with Changes/Refills   New Medications    FERROUS SULFATE (FEOSOL) 325 MG (65 MG IRON) TAB TABLET    Take 1 tablet (325 mg total) by mouth every 12 (twelve) hours.    PSYLLIUM HUSK, WITH SUGAR, (METAMUCIL FIBER THIN) 2.5 GRAM WAFR    Take 2 Wafers by mouth every 12 (twelve) hours.   Current Medications    ACETAMINOPHEN (TYLENOL) 500 " MG TABLET    Take 500 mg by mouth daily as needed.     ALPRAZOLAM (XANAX) 0.25 MG TABLET    Take 0.5 tablets (0.125 mg total) by mouth nightly as needed for Insomnia or Anxiety.    ASPIRIN 81 MG CHEW    Take 81 mg by mouth once daily.    ATORVASTATIN (LIPITOR) 40 MG TABLET    Take 1 tablet (40 mg total) by mouth every evening. TAKE (1/2) HALF BY MOUTH ONCE A DAY    BISACODYL (DULCOLAX) 5 MG EC TABLET    Take 5 mg by mouth daily as needed for Constipation.    C,E,ZINC,COPPER 11-OMEGA3S-LUT (OCUVITE ADULT 50 PLUS) 250-5-1 MG CAP    Take by mouth.    CHOLECALCIFEROL, VITAMIN D3, (VITAMIN D3) 1,000 UNIT CAPSULE    Take 1 capsule (1,000 Units total) by mouth once daily.    DEXTROMETHORPHAN-GUAIFENESIN (ADULT ROBITUSSIN PEAK COLD DM)  MG/5 ML LIQD    Take 5 mLs by mouth daily as needed.     DOCUSATE SODIUM (COLACE) 100 MG CAPSULE    Take 100 mg by mouth 2 (two) times daily.    FUROSEMIDE (LASIX) 20 MG TABLET    Take 1 tablet (20 mg total) by mouth 2 (two) times daily. Start with 3 days per week but OK to use more often or extra for increased leg swelling    GABAPENTIN (NEURONTIN) 300 MG CAPSULE    Take 1 capsule (300 mg total) by mouth every evening.    IRBESARTAN (AVAPRO) 300 MG TABLET    Take 1 tablet (300 mg total) by mouth every evening.    ISOSORBIDE MONONITRATE (IMDUR) 60 MG 24 HR TABLET    Take 1 tablet (60 mg total) by mouth once daily.    LEVOTHYROXINE (SYNTHROID) 25 MCG TABLET    Take 1 tablet (25 mcg total) by mouth once daily.    LOPERAMIDE (IMODIUM A-D) 2 MG TAB    Take 4 mg by mouth 3 (three) times daily as needed.     MAG HYDROX/ALUMINUM HYD/SIMETH (ALUM-MAG HYDROXIDE-SIMETH ORAL)    Take by mouth daily as needed.     METOPROLOL SUCCINATE (TOPROL-XL) 25 MG 24 HR TABLET    Take 1 tablet (25 mg total) by mouth once daily.    NITROGLYCERIN (NITROSTAT) 0.4 MG SL TABLET    Place 1 tablet (0.4 mg total) under the tongue every 5 (five) minutes as needed for Chest pain.    OXYCODONE-ACETAMINOPHEN (PERCOCET)  "7.5-325 MG PER TABLET    1 tablet every 4 (four) hours as needed.     PANTOPRAZOLE (PROTONIX) 40 MG TABLET    Take 1 tablet (40 mg total) by mouth once daily.    QUETIAPINE (SEROQUEL) 25 MG TAB    Take 1 tablet (25 mg total) by mouth 2 (two) times daily as needed (anxiety, insomnia).    SERTRALINE (ZOLOFT) 100 MG TABLET    Take 1 tablet (100 mg total) by mouth once daily.    VIT C/VIT E/LUTEIN/MIN/OMEGA-3 (OCUVITE ORAL)    Take 1 tablet by mouth once daily.    ZOLPIDEM (AMBIEN) 5 MG TAB             Objective Findings:    Vital Signs:  BP (!) 154/56 (BP Location: Left arm, Patient Position: Sitting)   Pulse 74   Resp 16   Ht 4' 11" (1.499 m)   Wt 47.1 kg (103 lb 13.4 oz)   LMP  (LMP Unknown)   BMI 20.97 kg/m²   Body mass index is 20.97 kg/m².    Physical Exam:  General Appearance: Well appearing in no acute distress  Eyes:    No scleral icterus  ENT:  No lesions or masses   Lungs: CTA bilaterally, no wheezes, no rhonchi, no rales  Heart:  S1, S2 normal, no murmurs heard  Abdomen:  Non distended, soft, no guarding, no rebound, no tenderness, no appreciated ascites, no bruits, no hepatosplenomegaly,  No CVA tenderness, no appreciated hernias  Rectal:  No external lesions good rectal tone brown stool in the rectal vault no palpable rectal masses heme negative with good positive control on Hemoccult card.  Musculoskeletal:  No major joint deformities  Skin: No petechiae or rash on exposed skin areas  Neurologic:  Alert and oriented x4  Psychiatric:  Normal speech mentation and affect    Labs:  Lab Results   Component Value Date    WBC 6.33 12/16/2019    HGB 8.3 (L) 12/16/2019    HCT 28.5 (L) 12/16/2019     12/16/2019    CHOL 119 (L) 11/06/2019    TRIG 156 (H) 11/06/2019    HDL 51 11/06/2019    ALT 5 (L) 12/16/2019    AST 12 12/16/2019     12/16/2019    K 4.6 12/16/2019     12/16/2019    CREATININE 1.3 12/16/2019    BUN 40 (H) 12/16/2019    CO2 26 12/16/2019    TSH 1.532 11/06/2019    INR 1.1 " 02/26/2019    HGBA1C 5.7 (H) 02/26/2019             Medical Decision Making:    EGD and colonoscopy talk given patient is willing to do it if she is iron deficient.  Recommend she take ferrous sulfate 325 mg every 12 hr.  Labs reviewed.  Repeat labs ordered today.    Assessment:  1. Diarrhea, unspecified type    2. Iron deficiency anemia, unspecified iron deficiency anemia type    3. Encounter for monitoring long-term proton pump inhibitor therapy         Recommendations:   1.  Patient with reports of diarrhea no blood in the stool she is no longer taking her Metamucil wafers in the past her fecal incontinence resolved with Metamucil wafers 2 a day.  Rectal exam shows good tone of brown solid stool in the vault heme-negative.  Will recommend patient restart her Metamucil to wafers either once a day or 2 wafers every 12 hr with 8 oz water it each dosing.  2.  Stool studies have been ordered.  3.  History of chronic iron deficiency anemia read recheck anemia labs if dropping or not stable recommend repeat EGD and colonoscopy.  4.  Return GI clinic in 4 weeks for follow-up    No follow-ups on file.      Order summary:  Orders Placed This Encounter    Stool culture    Clostridium difficile EIA    Comprehensive metabolic panel    Iron and TIBC    Ferritin    TISSUE TRANSGLUTAMINASE (TTG), IGA    IgA    Gastrointestinal Pathogens Panel, PCR    Stool Exam-Ova,Cysts,Parasites    Giardia / Cryptosporidum, EIA    Vitamin B12    Vitamin D    Magnesium    ferrous sulfate (FEOSOL) 325 mg (65 mg iron) Tab tablet    psyllium husk, with sugar, (METAMUCIL FIBER THIN) 2.5 gram Wafr         Thank you so much for allowing me to participate in the care of Arti Mccann MD

## 2020-01-10 NOTE — Clinical Note
Lesvia it looks like ordering her CBC was over looked but very important to have it.  Can it be added on or can she come back for it to be drawn across the street today or tomorrow.Thanks,Deshaun

## 2020-01-13 ENCOUNTER — IMMUNIZATION (OUTPATIENT)
Dept: INTERNAL MEDICINE | Facility: CLINIC | Age: 85
End: 2020-01-13
Payer: MEDICARE

## 2020-01-13 ENCOUNTER — OFFICE VISIT (OUTPATIENT)
Dept: INTERNAL MEDICINE | Facility: CLINIC | Age: 85
End: 2020-01-13
Payer: MEDICARE

## 2020-01-13 VITALS
OXYGEN SATURATION: 98 % | WEIGHT: 105.19 LBS | SYSTOLIC BLOOD PRESSURE: 160 MMHG | BODY MASS INDEX: 21.2 KG/M2 | DIASTOLIC BLOOD PRESSURE: 58 MMHG | HEART RATE: 73 BPM | HEIGHT: 59 IN

## 2020-01-13 DIAGNOSIS — R93.89 ABNORMAL CXR: Primary | ICD-10-CM

## 2020-01-13 DIAGNOSIS — I73.9 PAD (PERIPHERAL ARTERY DISEASE): Chronic | ICD-10-CM

## 2020-01-13 DIAGNOSIS — I20.0 UNSTABLE ANGINA: ICD-10-CM

## 2020-01-13 DIAGNOSIS — I83.002: ICD-10-CM

## 2020-01-13 DIAGNOSIS — E78.2 MIXED HYPERLIPIDEMIA: Chronic | ICD-10-CM

## 2020-01-13 DIAGNOSIS — I50.22 CHRONIC SYSTOLIC CONGESTIVE HEART FAILURE: ICD-10-CM

## 2020-01-13 DIAGNOSIS — S06.5X0S TRAUMATIC SUBDURAL HEMORRHAGE WITHOUT LOSS OF CONSCIOUSNESS, SEQUELA: ICD-10-CM

## 2020-01-13 DIAGNOSIS — I70.0 ATHEROSCLEROSIS OF AORTA: ICD-10-CM

## 2020-01-13 DIAGNOSIS — I10 ESSENTIAL HYPERTENSION: Chronic | ICD-10-CM

## 2020-01-13 DIAGNOSIS — R91.1 LUNG NODULE: ICD-10-CM

## 2020-01-13 DIAGNOSIS — I70.1 RENAL ARTERY ATHEROSCLEROSIS: Chronic | ICD-10-CM

## 2020-01-13 DIAGNOSIS — G95.9 DISEASE OF SPINAL CORD: ICD-10-CM

## 2020-01-13 DIAGNOSIS — I25.119 CORONARY ARTERY DISEASE INVOLVING NATIVE CORONARY ARTERY OF NATIVE HEART WITH ANGINA PECTORIS: ICD-10-CM

## 2020-01-13 DIAGNOSIS — L97.421 NON-PRESSURE CHRONIC ULCER OF LEFT HEEL AND MIDFOOT LIMITED TO BREAKDOWN OF SKIN: ICD-10-CM

## 2020-01-13 PROBLEM — S06.5X0A SUBDURAL HEMORRHAGE FOLLOWING INJURY, NO LOSS OF CONSCIOUSNESS: Status: ACTIVE | Noted: 2020-01-13

## 2020-01-13 PROBLEM — R60.0 EDEMA OF BOTH LOWER EXTREMITIES: Status: RESOLVED | Noted: 2019-12-13 | Resolved: 2020-01-13

## 2020-01-13 PROCEDURE — 99215 OFFICE O/P EST HI 40 MIN: CPT | Mod: PBBFAC,25 | Performed by: FAMILY MEDICINE

## 2020-01-13 PROCEDURE — 1126F PR PAIN SEVERITY QUANTIFIED, NO PAIN PRESENT: ICD-10-PCS | Mod: ,,, | Performed by: FAMILY MEDICINE

## 2020-01-13 PROCEDURE — 90662 IIV NO PRSV INCREASED AG IM: CPT | Mod: PBBFAC

## 2020-01-13 PROCEDURE — 99999 PR PBB SHADOW E&M-EST. PATIENT-LVL V: ICD-10-PCS | Mod: PBBFAC,,, | Performed by: FAMILY MEDICINE

## 2020-01-13 PROCEDURE — 1159F PR MEDICATION LIST DOCUMENTED IN MEDICAL RECORD: ICD-10-PCS | Mod: ,,, | Performed by: FAMILY MEDICINE

## 2020-01-13 PROCEDURE — 99214 OFFICE O/P EST MOD 30 MIN: CPT | Mod: S$PBB,,, | Performed by: FAMILY MEDICINE

## 2020-01-13 PROCEDURE — 99214 PR OFFICE/OUTPT VISIT, EST, LEVL IV, 30-39 MIN: ICD-10-PCS | Mod: S$PBB,,, | Performed by: FAMILY MEDICINE

## 2020-01-13 PROCEDURE — 1126F AMNT PAIN NOTED NONE PRSNT: CPT | Mod: ,,, | Performed by: FAMILY MEDICINE

## 2020-01-13 PROCEDURE — 99999 PR PBB SHADOW E&M-EST. PATIENT-LVL V: CPT | Mod: PBBFAC,,, | Performed by: FAMILY MEDICINE

## 2020-01-13 PROCEDURE — 1159F MED LIST DOCD IN RCRD: CPT | Mod: ,,, | Performed by: FAMILY MEDICINE

## 2020-01-13 NOTE — PROGRESS NOTES
"Subjective:       Patient ID: Arti Olsen is a 89 y.o. female.    Chief Complaint: Follow-up (3 month f/u )    Patient is here for follow-up of their chronic diseases, and the 12/17/2019 ER visit whose note stated:  "Chart was reviewed, CXR with mass versus adenopathy. Phone call was placed to patient's caregiver Giana Nicholas   Regarding the findings, caregivers aware that follow-up must be arranged with primary care to include likely CT scan of the chest."  She is with the [facility] caretaker; she says her son has POA    Says she has not yet received the flu shot    Review of Systems   Constitutional: Negative for chills and fever.   HENT: Negative for ear pain.    Eyes: Negative for pain.   Respiratory: Negative for chest tightness.    Cardiovascular: Negative for chest pain.   Gastrointestinal: Negative for abdominal pain.   Genitourinary: Negative for flank pain.   Musculoskeletal: Positive for gait problem.        Right foot problems with abnormal toes and healing ulcer - she says she sees an outside podiatrist q 3 mo who manages it   Neurological: Negative for syncope.   Psychiatric/Behavioral: Negative for behavioral problems.       Objective:      Physical Exam   Constitutional:   NAD, appears happy and answers all questions intelligently   HENT:   Head: Normocephalic and atraumatic.   Eyes: EOM are normal.   Neck: Neck supple.   Cardiovascular: Normal rate.   Murmur heard.  Pulmonary/Chest: Breath sounds normal. No respiratory distress. She has no wheezes. She has no rales.   Abdominal: Soft.   Musculoskeletal: She exhibits no edema.   Neurological: She is alert.   Skin: Skin is dry.   Psychiatric: Her behavior is normal.   Nursing note and vitals reviewed.      Assessment:       1. Abnormal CXR    2. Disease of spinal cord    3. Chronic systolic congestive heart failure    4. Coronary artery disease involving native coronary artery of native heart with angina pectoris    5. Varicose veins " "of unspecified lower extremity with ulcer of calf (CODE)    6. 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 Traumatic subdural hemorrhage without loss of consciousness, sequela    8. Unstable angina    9. Renal artery atherosclerosis    10. PAD (peripheral artery disease)    11. Mixed hyperlipidemia    12. Essential hypertension    13. Atherosclerosis of aorta    14. Lung nodule        Plan:       Arti was seen today for follow-up.    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

## 2020-01-14 ENCOUNTER — PATIENT OUTREACH (OUTPATIENT)
Dept: ADMINISTRATIVE | Facility: OTHER | Age: 85
End: 2020-01-14

## 2020-01-14 LAB — TTG IGA SER-ACNC: 4 UNITS

## 2020-01-15 ENCOUNTER — TELEPHONE (OUTPATIENT)
Dept: ENDOSCOPY | Facility: HOSPITAL | Age: 85
End: 2020-01-15

## 2020-01-15 ENCOUNTER — TELEPHONE (OUTPATIENT)
Dept: GASTROENTEROLOGY | Facility: CLINIC | Age: 85
End: 2020-01-15

## 2020-01-16 ENCOUNTER — TELEPHONE (OUTPATIENT)
Dept: GASTROENTEROLOGY | Facility: CLINIC | Age: 85
End: 2020-01-16

## 2020-01-16 NOTE — TELEPHONE ENCOUNTER
----- Message from Deshaun Mccann MD sent at 1/14/2020 12:10 PM CST -----  Lesvia - please order CBC for this week. Orders placed

## 2020-01-16 NOTE — TELEPHONE ENCOUNTER
Called and spoke to caregiver of pt.  Dept number provided.   Caregiver stated she will come in for labs. She says they have to set up transportation through home health.

## 2020-01-17 ENCOUNTER — TELEPHONE (OUTPATIENT)
Dept: GASTROENTEROLOGY | Facility: CLINIC | Age: 85
End: 2020-01-17

## 2020-01-17 NOTE — TELEPHONE ENCOUNTER
----- Message from Karla Hammonds sent at 1/17/2020  1:47 PM CST -----  Contact: Mr Betancourt/ [facility]   cell number  [phone]  Type:  Need medical Advice      Name of Caller:Mercedes Betancourt states patient has experiencing  black stool and diaherra had diaherra several times today  When is the first available appointment?  Symptoms:  Best Call Back Number:[phone]  Additional Information:

## 2020-01-17 NOTE — TELEPHONE ENCOUNTER
Called and spoke to Serafin.  Pt having no pain, only discomfort. He asked if pt can be seen on Monday or should she go to ER for diarrhea.   Notified Serafin at pt's visit, it was recommended for pt to turn in stool specimens and specimens have not yet been turned in. He asked where to turn them in. Informed him they can go [facility] lab. Mentioned to Serafin, if the discomfort becomes too unbearable she can be seen in nearest ER.  Serafin says he will turn in stool specimens on tomorrow and appreciated the call.

## 2020-01-20 ENCOUNTER — LAB VISIT (OUTPATIENT)
Dept: LAB | Facility: HOSPITAL | Age: 85
End: 2020-01-20
Attending: INTERNAL MEDICINE
Payer: MEDICARE

## 2020-01-20 DIAGNOSIS — R19.7 DIARRHEA, UNSPECIFIED TYPE: ICD-10-CM

## 2020-01-20 DIAGNOSIS — D50.9 IRON DEFICIENCY ANEMIA, UNSPECIFIED IRON DEFICIENCY ANEMIA TYPE: ICD-10-CM

## 2020-01-20 PROCEDURE — 87493 C DIFF AMPLIFIED PROBE: CPT | Mod: 59

## 2020-01-20 PROCEDURE — 87046 STOOL CULTR AEROBIC BACT EA: CPT

## 2020-01-20 PROCEDURE — 87507 IADNA-DNA/RNA PROBE TQ 12-25: CPT

## 2020-01-20 PROCEDURE — 87324 CLOSTRIDIUM AG IA: CPT

## 2020-01-20 PROCEDURE — 87045 FECES CULTURE AEROBIC BACT: CPT

## 2020-01-20 PROCEDURE — 87209 SMEAR COMPLEX STAIN: CPT

## 2020-01-20 PROCEDURE — 87329 GIARDIA AG IA: CPT

## 2020-01-20 PROCEDURE — 87427 SHIGA-LIKE TOXIN AG IA: CPT

## 2020-01-20 PROCEDURE — 87449 NOS EACH ORGANISM AG IA: CPT

## 2020-01-21 LAB
C DIFF GDH STL QL: POSITIVE
C DIFF TOX A+B STL QL IA: NEGATIVE
C DIFF TOX GENS STL QL NAA+PROBE: POSITIVE
CRYPTOSP AG STL QL IA: NEGATIVE
G LAMBLIA AG STL QL IA: NEGATIVE
O+P STL TRI STN: NORMAL

## 2020-01-22 DIAGNOSIS — E87.6 LOW SERUM POTASSIUM: ICD-10-CM

## 2020-01-22 DIAGNOSIS — A04.72 CLOSTRIDIUM DIFFICILE DIARRHEA: Primary | ICD-10-CM

## 2020-01-22 DIAGNOSIS — D50.9 IRON DEFICIENCY ANEMIA, UNSPECIFIED IRON DEFICIENCY ANEMIA TYPE: Primary | ICD-10-CM

## 2020-01-22 LAB
E COLI SXT1 STL QL IA: NEGATIVE
E COLI SXT2 STL QL IA: NEGATIVE

## 2020-01-22 RX ORDER — VANCOMYCIN HYDROCHLORIDE 125 MG/1
125 CAPSULE ORAL EVERY 6 HOURS
Qty: 56 CAPSULE | Refills: 0 | Status: ON HOLD | OUTPATIENT
Start: 2020-01-22 | End: 2020-01-25 | Stop reason: HOSPADM

## 2020-01-22 RX ORDER — FERROUS SULFATE 325(65) MG
325 TABLET ORAL EVERY 12 HOURS
Qty: 60 TABLET | Refills: 4 | Status: SHIPPED | OUTPATIENT
Start: 2020-01-22

## 2020-01-22 RX ORDER — POTASSIUM CHLORIDE 20 MEQ/1
20 TABLET, EXTENDED RELEASE ORAL DAILY
Qty: 30 TABLET | Refills: 0 | Status: SHIPPED | OUTPATIENT
Start: 2020-01-22 | End: 2020-02-21

## 2020-01-23 ENCOUNTER — TELEPHONE (OUTPATIENT)
Dept: GASTROENTEROLOGY | Facility: CLINIC | Age: 85
End: 2020-01-23

## 2020-01-23 ENCOUNTER — HOSPITAL ENCOUNTER (OUTPATIENT)
Facility: HOSPITAL | Age: 85
Discharge: HOME | End: 2020-01-26
Attending: EMERGENCY MEDICINE | Admitting: INTERNAL MEDICINE
Payer: MEDICARE

## 2020-01-23 DIAGNOSIS — I73.9 PAD (PERIPHERAL ARTERY DISEASE): Chronic | ICD-10-CM

## 2020-01-23 DIAGNOSIS — D50.8 IRON DEFICIENCY ANEMIA SECONDARY TO INADEQUATE DIETARY IRON INTAKE: ICD-10-CM

## 2020-01-23 DIAGNOSIS — A04.72 CLOSTRIDIUM DIFFICILE DIARRHEA: Primary | ICD-10-CM

## 2020-01-23 DIAGNOSIS — I50.32 CHRONIC DIASTOLIC CONGESTIVE HEART FAILURE: ICD-10-CM

## 2020-01-23 DIAGNOSIS — E03.4 HYPOTHYROIDISM DUE TO ACQUIRED ATROPHY OF THYROID: Chronic | ICD-10-CM

## 2020-01-23 DIAGNOSIS — I10 ESSENTIAL HYPERTENSION: Chronic | ICD-10-CM

## 2020-01-23 DIAGNOSIS — F41.9 ANXIETY: Chronic | ICD-10-CM

## 2020-01-23 DIAGNOSIS — I16.0 HYPERTENSIVE URGENCY: ICD-10-CM

## 2020-01-23 LAB
ALBUMIN SERPL BCP-MCNC: 3.3 G/DL (ref 3.5–5.2)
ALP SERPL-CCNC: 74 U/L (ref 55–135)
ALT SERPL W/O P-5'-P-CCNC: 6 U/L (ref 10–44)
ANION GAP SERPL CALC-SCNC: 10 MMOL/L (ref 8–16)
AST SERPL-CCNC: 19 U/L (ref 10–40)
BASOPHILS # BLD AUTO: 0.03 K/UL (ref 0–0.2)
BASOPHILS NFR BLD: 0.6 % (ref 0–1.9)
BILIRUB SERPL-MCNC: 0.2 MG/DL (ref 0.1–1)
BUN SERPL-MCNC: 22 MG/DL (ref 10–30)
CALCIUM SERPL-MCNC: 8.7 MG/DL (ref 8.7–10.5)
CHLORIDE SERPL-SCNC: 108 MMOL/L (ref 95–110)
CO2 SERPL-SCNC: 25 MMOL/L (ref 23–29)
CREAT SERPL-MCNC: 0.9 MG/DL (ref 0.5–1.4)
DIFFERENTIAL METHOD: ABNORMAL
EOSINOPHIL # BLD AUTO: 0.1 K/UL (ref 0–0.5)
EOSINOPHIL NFR BLD: 2.2 % (ref 0–8)
ERYTHROCYTE [DISTWIDTH] IN BLOOD BY AUTOMATED COUNT: 19.9 % (ref 11.5–14.5)
EST. GFR  (AFRICAN AMERICAN): >60 ML/MIN/1.73 M^2
EST. GFR  (NON AFRICAN AMERICAN): 56.1 ML/MIN/1.73 M^2
GLUCOSE SERPL-MCNC: 95 MG/DL (ref 70–110)
GPP - ADENOVIRUS 40/41: NOT DETECTED
GPP - CAMPYLOBACTER: NOT DETECTED
GPP - CLOSTRIDIUM DIFFICILE TOXIN A/B: NOT DETECTED
GPP - CRYPTOSPORIDIUM: NOT DETECTED
GPP - E COLI O157: NOT DETECTED
GPP - ENTAMOEBA HISTOLYTICA: NOT DETECTED
GPP - ENTEROTOXIGENIC E COLI (ETEC): NOT DETECTED
GPP - GIARDIA LAMBLIA: NOT DETECTED
GPP - NOROVIRUS GI/GII: NOT DETECTED
GPP - ROTAVIRUS A: NOT DETECTED
GPP - SALMONELLA: NOT DETECTED
GPP - SHIGELLA: NOT DETECTED
GPP - VIBRIO CHOLERA: NOT DETECTED
GPP - YERSINIA ENTEROCOLITICA: NOT DETECTED
HCT VFR BLD AUTO: 31.8 % (ref 37–48.5)
HGB BLD-MCNC: 9 G/DL (ref 12–16)
IMM GRANULOCYTES # BLD AUTO: 0.01 K/UL (ref 0–0.04)
IMM GRANULOCYTES NFR BLD AUTO: 0.2 % (ref 0–0.5)
LACTATE PLASV-SCNC: NOT DETECTED MMOL/L
LYMPHOCYTES # BLD AUTO: 1 K/UL (ref 1–4.8)
LYMPHOCYTES NFR BLD: 19.6 % (ref 18–48)
MAGNESIUM SERPL-MCNC: 2 MG/DL (ref 1.6–2.6)
MCH RBC QN AUTO: 25.7 PG (ref 27–31)
MCHC RBC AUTO-ENTMCNC: 28.3 G/DL (ref 32–36)
MCV RBC AUTO: 91 FL (ref 82–98)
MONOCYTES # BLD AUTO: 0.3 K/UL (ref 0.3–1)
MONOCYTES NFR BLD: 6.7 % (ref 4–15)
NEUTROPHILS # BLD AUTO: 3.5 K/UL (ref 1.8–7.7)
NEUTROPHILS NFR BLD: 70.7 % (ref 38–73)
NRBC BLD-RTO: 0 /100 WBC
PLATELET # BLD AUTO: 131 K/UL (ref 150–350)
PMV BLD AUTO: 11.9 FL (ref 9.2–12.9)
POTASSIUM SERPL-SCNC: 3.6 MMOL/L (ref 3.5–5.1)
PROT SERPL-MCNC: 6.2 G/DL (ref 6–8.4)
RBC # BLD AUTO: 3.5 M/UL (ref 4–5.4)
SODIUM SERPL-SCNC: 143 MMOL/L (ref 136–145)
WBC # BLD AUTO: 4.91 K/UL (ref 3.9–12.7)

## 2020-01-23 PROCEDURE — 80053 COMPREHEN METABOLIC PANEL: CPT

## 2020-01-23 PROCEDURE — 85025 COMPLETE CBC W/AUTO DIFF WBC: CPT

## 2020-01-23 PROCEDURE — 96361 HYDRATE IV INFUSION ADD-ON: CPT

## 2020-01-23 PROCEDURE — 83735 ASSAY OF MAGNESIUM: CPT

## 2020-01-23 PROCEDURE — 63600175 PHARM REV CODE 636 W HCPCS: Performed by: EMERGENCY MEDICINE

## 2020-01-23 PROCEDURE — 99220 PR INITIAL OBSERVATION CARE,LEVL III: CPT | Mod: ,,, | Performed by: PHYSICIAN ASSISTANT

## 2020-01-23 PROCEDURE — 25000003 PHARM REV CODE 250: Performed by: STUDENT IN AN ORGANIZED HEALTH CARE EDUCATION/TRAINING PROGRAM

## 2020-01-23 PROCEDURE — G0378 HOSPITAL OBSERVATION PER HR: HCPCS

## 2020-01-23 PROCEDURE — 96374 THER/PROPH/DIAG INJ IV PUSH: CPT

## 2020-01-23 PROCEDURE — 63600175 PHARM REV CODE 636 W HCPCS: Performed by: STUDENT IN AN ORGANIZED HEALTH CARE EDUCATION/TRAINING PROGRAM

## 2020-01-23 PROCEDURE — 25000003 PHARM REV CODE 250: Performed by: PHYSICIAN ASSISTANT

## 2020-01-23 PROCEDURE — 99284 PR EMERGENCY DEPT VISIT,LEVEL IV: ICD-10-PCS | Mod: ,,, | Performed by: EMERGENCY MEDICINE

## 2020-01-23 PROCEDURE — 99284 EMERGENCY DEPT VISIT MOD MDM: CPT | Mod: ,,, | Performed by: EMERGENCY MEDICINE

## 2020-01-23 PROCEDURE — 99285 EMERGENCY DEPT VISIT HI MDM: CPT | Mod: 25

## 2020-01-23 PROCEDURE — 99220 PR INITIAL OBSERVATION CARE,LEVL III: ICD-10-PCS | Mod: ,,, | Performed by: PHYSICIAN ASSISTANT

## 2020-01-23 RX ORDER — GABAPENTIN 100 MG/1
100 CAPSULE ORAL 3 TIMES DAILY
Status: DISCONTINUED | OUTPATIENT
Start: 2020-01-24 | End: 2020-01-26 | Stop reason: HOSPADM

## 2020-01-23 RX ORDER — IRBESARTAN 150 MG/1
300 TABLET ORAL NIGHTLY
Status: DISCONTINUED | OUTPATIENT
Start: 2020-01-23 | End: 2020-01-23

## 2020-01-23 RX ORDER — IBUPROFEN 200 MG
24 TABLET ORAL
Status: DISCONTINUED | OUTPATIENT
Start: 2020-01-23 | End: 2020-01-26 | Stop reason: HOSPADM

## 2020-01-23 RX ORDER — QUETIAPINE FUMARATE 25 MG/1
25 TABLET, FILM COATED ORAL 2 TIMES DAILY PRN
Status: DISCONTINUED | OUTPATIENT
Start: 2020-01-23 | End: 2020-01-26 | Stop reason: HOSPADM

## 2020-01-23 RX ORDER — ATORVASTATIN CALCIUM 20 MG/1
20 TABLET, FILM COATED ORAL NIGHTLY
Status: DISCONTINUED | OUTPATIENT
Start: 2020-01-24 | End: 2020-01-26 | Stop reason: HOSPADM

## 2020-01-23 RX ORDER — ISOSORBIDE MONONITRATE 60 MG/1
60 TABLET, EXTENDED RELEASE ORAL DAILY
Status: DISCONTINUED | OUTPATIENT
Start: 2020-01-24 | End: 2020-01-24

## 2020-01-23 RX ORDER — IPRATROPIUM BROMIDE AND ALBUTEROL SULFATE 2.5; .5 MG/3ML; MG/3ML
3 SOLUTION RESPIRATORY (INHALATION) EVERY 4 HOURS PRN
Status: DISCONTINUED | OUTPATIENT
Start: 2020-01-23 | End: 2020-01-26 | Stop reason: HOSPADM

## 2020-01-23 RX ORDER — GABAPENTIN 300 MG/1
300 CAPSULE ORAL NIGHTLY
Status: DISCONTINUED | OUTPATIENT
Start: 2020-01-23 | End: 2020-01-23

## 2020-01-23 RX ORDER — HYDRALAZINE HYDROCHLORIDE 25 MG/1
25 TABLET, FILM COATED ORAL EVERY 8 HOURS PRN
Status: DISCONTINUED | OUTPATIENT
Start: 2020-01-23 | End: 2020-01-26 | Stop reason: HOSPADM

## 2020-01-23 RX ORDER — GLUCAGON 1 MG
1 KIT INJECTION
Status: DISCONTINUED | OUTPATIENT
Start: 2020-01-23 | End: 2020-01-26 | Stop reason: HOSPADM

## 2020-01-23 RX ORDER — LEVOTHYROXINE SODIUM 25 UG/1
25 TABLET ORAL DAILY
Status: DISCONTINUED | OUTPATIENT
Start: 2020-01-24 | End: 2020-01-26 | Stop reason: HOSPADM

## 2020-01-23 RX ORDER — ONDANSETRON 4 MG/1
4 TABLET, ORALLY DISINTEGRATING ORAL EVERY 8 HOURS PRN
Status: DISCONTINUED | OUTPATIENT
Start: 2020-01-23 | End: 2020-01-26 | Stop reason: HOSPADM

## 2020-01-23 RX ORDER — ACETAMINOPHEN 325 MG/1
650 TABLET ORAL EVERY 4 HOURS PRN
Status: DISCONTINUED | OUTPATIENT
Start: 2020-01-23 | End: 2020-01-26 | Stop reason: HOSPADM

## 2020-01-23 RX ORDER — TALC
6 POWDER (GRAM) TOPICAL NIGHTLY PRN
Status: DISCONTINUED | OUTPATIENT
Start: 2020-01-23 | End: 2020-01-26 | Stop reason: HOSPADM

## 2020-01-23 RX ORDER — CLONAZEPAM 0.5 MG/1
0.5 TABLET ORAL 2 TIMES DAILY PRN
Status: DISCONTINUED | OUTPATIENT
Start: 2020-01-23 | End: 2020-01-26 | Stop reason: HOSPADM

## 2020-01-23 RX ORDER — IBUPROFEN 200 MG
16 TABLET ORAL
Status: DISCONTINUED | OUTPATIENT
Start: 2020-01-23 | End: 2020-01-26 | Stop reason: HOSPADM

## 2020-01-23 RX ORDER — SODIUM CHLORIDE 0.9 % (FLUSH) 0.9 %
10 SYRINGE (ML) INJECTION
Status: CANCELLED | OUTPATIENT
Start: 2020-01-23

## 2020-01-23 RX ORDER — FERROUS SULFATE 325(65) MG
325 TABLET, DELAYED RELEASE (ENTERIC COATED) ORAL 2 TIMES DAILY
Status: DISCONTINUED | OUTPATIENT
Start: 2020-01-24 | End: 2020-01-26 | Stop reason: HOSPADM

## 2020-01-23 RX ORDER — HYDRALAZINE HYDROCHLORIDE 20 MG/ML
10 INJECTION INTRAMUSCULAR; INTRAVENOUS
Status: COMPLETED | OUTPATIENT
Start: 2020-01-23 | End: 2020-01-23

## 2020-01-23 RX ORDER — SODIUM CHLORIDE 0.9 % (FLUSH) 0.9 %
5 SYRINGE (ML) INJECTION
Status: DISCONTINUED | OUTPATIENT
Start: 2020-01-23 | End: 2020-01-26 | Stop reason: HOSPADM

## 2020-01-23 RX ORDER — SERTRALINE HYDROCHLORIDE 100 MG/1
100 TABLET, FILM COATED ORAL DAILY
Status: DISCONTINUED | OUTPATIENT
Start: 2020-01-24 | End: 2020-01-26 | Stop reason: HOSPADM

## 2020-01-23 RX ORDER — IRBESARTAN 300 MG/1
300 TABLET ORAL NIGHTLY
Status: DISCONTINUED | OUTPATIENT
Start: 2020-01-24 | End: 2020-01-26 | Stop reason: HOSPADM

## 2020-01-23 RX ORDER — AMLODIPINE BESYLATE 10 MG/1
10 TABLET ORAL DAILY
Status: DISCONTINUED | OUTPATIENT
Start: 2020-01-23 | End: 2020-01-24

## 2020-01-23 RX ORDER — IRBESARTAN 150 MG/1
300 TABLET ORAL ONCE
Status: COMPLETED | OUTPATIENT
Start: 2020-01-23 | End: 2020-01-23

## 2020-01-23 RX ORDER — NAPROXEN SODIUM 220 MG/1
81 TABLET, FILM COATED ORAL DAILY
Status: DISCONTINUED | OUTPATIENT
Start: 2020-01-24 | End: 2020-01-26 | Stop reason: HOSPADM

## 2020-01-23 RX ADMIN — AMLODIPINE BESYLATE 10 MG: 10 TABLET ORAL at 11:01

## 2020-01-23 RX ADMIN — SODIUM CHLORIDE 500 ML: 0.9 INJECTION, SOLUTION INTRAVENOUS at 09:01

## 2020-01-23 RX ADMIN — Medication 125 MG: at 09:01

## 2020-01-23 RX ADMIN — ALPRAZOLAM 0.12 MG: 0.25 TABLET ORAL at 10:01

## 2020-01-23 RX ADMIN — HYDRALAZINE HYDROCHLORIDE 10 MG: 20 INJECTION INTRAMUSCULAR; INTRAVENOUS at 11:01

## 2020-01-23 RX ADMIN — IRBESARTAN 300 MG: 150 TABLET, FILM COATED ORAL at 10:01

## 2020-01-23 NOTE — TELEPHONE ENCOUNTER
----- Message from Maryjane Prather sent at 1/23/2020 10:29 AM CST -----  Contact: bertha ( with brandin guardado) [phone]  He's returning call from Lesvia. Please call back

## 2020-01-23 NOTE — TELEPHONE ENCOUNTER
----- Message from Deshaun Mccann MD sent at 1/22/2020  4:28 PM CST -----  VERY IMPORTANT:    Lesvia please tell Juana that her serum potassium is slightly low in recommend her taking a potassium pill once daily.  Prescription sent to [facility].  Recommend she follow up next week with her primary care doctor for evaluation of her low potassium.    Also please have her get repeat lab work in 1 week to recheck her potassium.  Orders placed    Lesvia - please tell patient that they are iron deficient and anaemic and recommend that they take ferrous sulfate one 325mg pill every 12 hours for next 4 months and repeat fasting hemoglobin and Ferritin in 8 weeks - Orders placed.

## 2020-01-23 NOTE — TELEPHONE ENCOUNTER
Called and spoke to Serafin, pt's .  Reviewed results with him and he verbalized understanding.   Informed medications are sent to [facility] and Up To Date info packet on C Diff is ready for pt to .  He states the center she is in is non medical and asked if home health can be ordered for pt re C Diff recommendations.  Notified Serafin pt's PCP will have to set up for home health.  Serafin asked to schedule pt's lab appts and send appt slips in mail.  Serafin appreciated the call.

## 2020-01-23 NOTE — TELEPHONE ENCOUNTER
----- Message from Deshaun Mccann MD sent at 1/22/2020  4:20 PM CST -----  I spoke with Juana on the phone today about her stool PCR positive for C diff a prescription for vancomycin 125 mg p.o. Q 6 hr for 14 days [facility] for her to  tomorrow.  I offered to call anybody at her assisted living placed but she declined I did tell her it is potentially contagious in that she needs good hygiene she will swing by the office tomorrow to  patient information on Clostridium difficile.  She will also check in with me in a week or 2 to let me know how she is feeling sooner if she is not doing better.

## 2020-01-24 PROBLEM — E87.6 HYPOKALEMIA: Status: ACTIVE | Noted: 2020-01-24

## 2020-01-24 LAB
ANION GAP SERPL CALC-SCNC: 10 MMOL/L (ref 8–16)
BACTERIA #/AREA URNS AUTO: NORMAL /HPF
BACTERIA STL CULT: NORMAL
BASOPHILS # BLD AUTO: 0.04 K/UL (ref 0–0.2)
BASOPHILS NFR BLD: 0.6 % (ref 0–1.9)
BILIRUB UR QL STRIP: NEGATIVE
BUN SERPL-MCNC: 17 MG/DL (ref 10–30)
CALCIUM SERPL-MCNC: 8.3 MG/DL (ref 8.7–10.5)
CHLORIDE SERPL-SCNC: 109 MMOL/L (ref 95–110)
CLARITY UR REFRACT.AUTO: CLEAR
CO2 SERPL-SCNC: 26 MMOL/L (ref 23–29)
COLOR UR AUTO: ABNORMAL
CREAT SERPL-MCNC: 0.8 MG/DL (ref 0.5–1.4)
DIFFERENTIAL METHOD: ABNORMAL
EOSINOPHIL # BLD AUTO: 0.1 K/UL (ref 0–0.5)
EOSINOPHIL NFR BLD: 2.1 % (ref 0–8)
ERYTHROCYTE [DISTWIDTH] IN BLOOD BY AUTOMATED COUNT: 19.9 % (ref 11.5–14.5)
EST. GFR  (AFRICAN AMERICAN): >60 ML/MIN/1.73 M^2
EST. GFR  (NON AFRICAN AMERICAN): >60 ML/MIN/1.73 M^2
GLUCOSE SERPL-MCNC: 90 MG/DL (ref 70–110)
GLUCOSE UR QL STRIP: NEGATIVE
HCT VFR BLD AUTO: 31.2 % (ref 37–48.5)
HGB BLD-MCNC: 9.4 G/DL (ref 12–16)
HGB UR QL STRIP: NEGATIVE
HYALINE CASTS UR QL AUTO: 0 /LPF
IMM GRANULOCYTES # BLD AUTO: 0.03 K/UL (ref 0–0.04)
IMM GRANULOCYTES NFR BLD AUTO: 0.5 % (ref 0–0.5)
KETONES UR QL STRIP: NEGATIVE
LEUKOCYTE ESTERASE UR QL STRIP: ABNORMAL
LYMPHOCYTES # BLD AUTO: 1.7 K/UL (ref 1–4.8)
LYMPHOCYTES NFR BLD: 25.6 % (ref 18–48)
MAGNESIUM SERPL-MCNC: 1.8 MG/DL (ref 1.6–2.6)
MCH RBC QN AUTO: 25.7 PG (ref 27–31)
MCHC RBC AUTO-ENTMCNC: 30.1 G/DL (ref 32–36)
MCV RBC AUTO: 85 FL (ref 82–98)
MICROSCOPIC COMMENT: NORMAL
MONOCYTES # BLD AUTO: 0.4 K/UL (ref 0.3–1)
MONOCYTES NFR BLD: 6.6 % (ref 4–15)
NEUTROPHILS # BLD AUTO: 4.3 K/UL (ref 1.8–7.7)
NEUTROPHILS NFR BLD: 64.6 % (ref 38–73)
NITRITE UR QL STRIP: NEGATIVE
NRBC BLD-RTO: 0 /100 WBC
PH UR STRIP: 7 [PH] (ref 5–8)
PHOSPHATE SERPL-MCNC: 3 MG/DL (ref 2.7–4.5)
PLATELET # BLD AUTO: 168 K/UL (ref 150–350)
PMV BLD AUTO: 11.9 FL (ref 9.2–12.9)
POTASSIUM SERPL-SCNC: 2.7 MMOL/L (ref 3.5–5.1)
POTASSIUM SERPL-SCNC: 3.2 MMOL/L (ref 3.5–5.1)
PROT UR QL STRIP: ABNORMAL
RBC # BLD AUTO: 3.66 M/UL (ref 4–5.4)
RBC #/AREA URNS AUTO: 1 /HPF (ref 0–4)
SODIUM SERPL-SCNC: 145 MMOL/L (ref 136–145)
SP GR UR STRIP: 1.01 (ref 1–1.03)
SQUAMOUS #/AREA URNS AUTO: 1 /HPF
URN SPEC COLLECT METH UR: ABNORMAL
WBC # BLD AUTO: 6.63 K/UL (ref 3.9–12.7)
WBC #/AREA URNS AUTO: 3 /HPF (ref 0–5)

## 2020-01-24 PROCEDURE — 93010 ELECTROCARDIOGRAM REPORT: CPT | Mod: ,,, | Performed by: INTERNAL MEDICINE

## 2020-01-24 PROCEDURE — 94761 N-INVAS EAR/PLS OXIMETRY MLT: CPT

## 2020-01-24 PROCEDURE — 93005 ELECTROCARDIOGRAM TRACING: CPT

## 2020-01-24 PROCEDURE — 96376 TX/PRO/DX INJ SAME DRUG ADON: CPT

## 2020-01-24 PROCEDURE — 25000003 PHARM REV CODE 250: Performed by: PHYSICIAN ASSISTANT

## 2020-01-24 PROCEDURE — 97535 SELF CARE MNGMENT TRAINING: CPT

## 2020-01-24 PROCEDURE — 80048 BASIC METABOLIC PNL TOTAL CA: CPT

## 2020-01-24 PROCEDURE — 36415 COLL VENOUS BLD VENIPUNCTURE: CPT

## 2020-01-24 PROCEDURE — 84100 ASSAY OF PHOSPHORUS: CPT

## 2020-01-24 PROCEDURE — 99225 PR SUBSEQUENT OBSERVATION CARE,LEVEL II: CPT | Mod: ,,, | Performed by: NURSE PRACTITIONER

## 2020-01-24 PROCEDURE — 63600175 PHARM REV CODE 636 W HCPCS: Performed by: STUDENT IN AN ORGANIZED HEALTH CARE EDUCATION/TRAINING PROGRAM

## 2020-01-24 PROCEDURE — 83735 ASSAY OF MAGNESIUM: CPT

## 2020-01-24 PROCEDURE — 93010 EKG 12-LEAD: ICD-10-PCS | Mod: ,,, | Performed by: INTERNAL MEDICINE

## 2020-01-24 PROCEDURE — 99225 PR SUBSEQUENT OBSERVATION CARE,LEVEL II: ICD-10-PCS | Mod: ,,, | Performed by: NURSE PRACTITIONER

## 2020-01-24 PROCEDURE — 84132 ASSAY OF SERUM POTASSIUM: CPT | Mod: 91

## 2020-01-24 PROCEDURE — G0378 HOSPITAL OBSERVATION PER HR: HCPCS

## 2020-01-24 PROCEDURE — 97161 PT EVAL LOW COMPLEX 20 MIN: CPT

## 2020-01-24 PROCEDURE — 81001 URINALYSIS AUTO W/SCOPE: CPT

## 2020-01-24 PROCEDURE — 97165 OT EVAL LOW COMPLEX 30 MIN: CPT

## 2020-01-24 PROCEDURE — 97116 GAIT TRAINING THERAPY: CPT

## 2020-01-24 PROCEDURE — 85025 COMPLETE CBC W/AUTO DIFF WBC: CPT

## 2020-01-24 PROCEDURE — 99900035 HC TECH TIME PER 15 MIN (STAT)

## 2020-01-24 PROCEDURE — 25000003 PHARM REV CODE 250: Performed by: NURSE PRACTITIONER

## 2020-01-24 RX ORDER — HYDRALAZINE HYDROCHLORIDE 20 MG/ML
10 INJECTION INTRAMUSCULAR; INTRAVENOUS ONCE
Status: DISCONTINUED | OUTPATIENT
Start: 2020-01-24 | End: 2020-01-24

## 2020-01-24 RX ORDER — HYDRALAZINE HYDROCHLORIDE 20 MG/ML
20 INJECTION INTRAMUSCULAR; INTRAVENOUS
Status: COMPLETED | OUTPATIENT
Start: 2020-01-24 | End: 2020-01-24

## 2020-01-24 RX ORDER — LABETALOL HCL 20 MG/4 ML
10 SYRINGE (ML) INTRAVENOUS ONCE AS NEEDED
Status: DISCONTINUED | OUTPATIENT
Start: 2020-01-24 | End: 2020-01-24

## 2020-01-24 RX ORDER — ISOSORBIDE MONONITRATE 60 MG/1
60 TABLET, EXTENDED RELEASE ORAL DAILY
Status: DISCONTINUED | OUTPATIENT
Start: 2020-01-24 | End: 2020-01-25

## 2020-01-24 RX ORDER — AMLODIPINE BESYLATE 10 MG/1
10 TABLET ORAL DAILY
Status: DISCONTINUED | OUTPATIENT
Start: 2020-01-25 | End: 2020-01-26 | Stop reason: HOSPADM

## 2020-01-24 RX ADMIN — POTASSIUM BICARBONATE 50 MEQ: 978 TABLET, EFFERVESCENT ORAL at 03:01

## 2020-01-24 RX ADMIN — CLONAZEPAM 0.5 MG: 0.5 TABLET ORAL at 10:01

## 2020-01-24 RX ADMIN — ACETAMINOPHEN 650 MG: 325 TABLET ORAL at 10:01

## 2020-01-24 RX ADMIN — QUETIAPINE FUMARATE 25 MG: 25 TABLET ORAL at 02:01

## 2020-01-24 RX ADMIN — POTASSIUM BICARBONATE 50 MEQ: 978 TABLET, EFFERVESCENT ORAL at 09:01

## 2020-01-24 RX ADMIN — LEVOTHYROXINE SODIUM 25 MCG: 25 TABLET ORAL at 07:01

## 2020-01-24 RX ADMIN — FERROUS SULFATE TAB EC 325 MG (65 MG FE EQUIVALENT) 325 MG: 325 (65 FE) TABLET DELAYED RESPONSE at 10:01

## 2020-01-24 RX ADMIN — ACETAMINOPHEN 650 MG: 325 TABLET ORAL at 03:01

## 2020-01-24 RX ADMIN — IRBESARTAN 300 MG: 300 TABLET, FILM COATED ORAL at 10:01

## 2020-01-24 RX ADMIN — SERTRALINE HYDROCHLORIDE 100 MG: 100 TABLET ORAL at 09:01

## 2020-01-24 RX ADMIN — GABAPENTIN 100 MG: 100 CAPSULE ORAL at 10:01

## 2020-01-24 RX ADMIN — FERROUS SULFATE TAB EC 325 MG (65 MG FE EQUIVALENT) 325 MG: 325 (65 FE) TABLET DELAYED RESPONSE at 09:01

## 2020-01-24 RX ADMIN — ISOSORBIDE MONONITRATE 60 MG: 60 TABLET, EXTENDED RELEASE ORAL at 02:01

## 2020-01-24 RX ADMIN — GABAPENTIN 100 MG: 100 CAPSULE ORAL at 09:01

## 2020-01-24 RX ADMIN — ATORVASTATIN CALCIUM 20 MG: 20 TABLET, FILM COATED ORAL at 10:01

## 2020-01-24 RX ADMIN — Medication 125 MG: at 03:01

## 2020-01-24 RX ADMIN — HYDRALAZINE HYDROCHLORIDE 20 MG: 20 INJECTION INTRAMUSCULAR; INTRAVENOUS at 01:01

## 2020-01-24 RX ADMIN — ASPIRIN 81 MG CHEWABLE TABLET 81 MG: 81 TABLET CHEWABLE at 09:01

## 2020-01-24 RX ADMIN — GABAPENTIN 100 MG: 100 CAPSULE ORAL at 03:01

## 2020-01-24 RX ADMIN — Medication 125 MG: at 07:01

## 2020-01-24 NOTE — ED NOTES
Patient identifiers verified and correct for Arti Zayaste  LOC: The patient is awake, alert and aware of environment with an appropriate affect, the patient is oriented x 3 and speaking appropriately.   APPEARANCE: Patient appears comfortable and in no acute distress, patient is clean and well groomed.  SKIN: The skin is warm and dry, color consistent with ethnicity, patient has normal skin turgor and moist mucus membranes, skin intact, no breakdown or bruising noted.   MUSCULOSKELETAL: Patient moving all extremities spontaneously, no swelling noted.  RESPIRATORY: Airway is open and patent, respirations are spontaneous, patient has a normal effort and rate, no accessory muscle use noted, pt placed on continuous pulse ox with O2 sats noted at 100% on room air.  CARDIAC: Pt placed on cardiac monitor. Patient has a normal rate and regular rhythm, no edema noted, capillary refill < 3 seconds.   GASTRO: Soft and non tender to palpation, no distention noteFrom [place]. Diarrhea x2 weeks. Cultures came back today + for C. Dif  : Pt denies any pain or frequency with urination.  NEURO: Pt opens eyes spontaneously, behavior appropriate to situation, follows commands, facial expression symmetrical, bilateral hand grasp equal and even, purposeful motor response noted, normal sensation in all extremities when touched with a finger.

## 2020-01-24 NOTE — SUBJECTIVE & OBJECTIVE
Interval History: Patient seen at bedside. She able to describe events that occurred to arrive a hospitalization AAOx4. Patient did reports she in nursing home with home health PT/OT. Patient is at an assisted living with visiting [facility]. So she does have some confusion. Reports last diarrhea stool was yesterday but initially reports Saturday. She also reports stools are no longer black but are brown and liquid.     Review of Systems   Constitutional: Positive for appetite change and fatigue. Negative for chills, diaphoresis, fever and unexpected weight change.   HENT: Negative for congestion and rhinorrhea.    Eyes: Negative for photophobia and visual disturbance.   Respiratory: Negative for cough, chest tightness and shortness of breath.    Cardiovascular: Negative for chest pain and palpitations.   Gastrointestinal: Positive for diarrhea. Negative for abdominal pain, nausea and vomiting.   Genitourinary: Negative for difficulty urinating and dysuria.   Musculoskeletal: Positive for gait problem (chronic). Negative for back pain.   Skin: Negative for rash and wound.   Neurological: Negative for dizziness and headaches.   Psychiatric/Behavioral: Negative for agitation and confusion.     Objective:     Vital Signs (Most Recent):  Temp: 98 °F (36.7 °C) (01/24/20 1313)  Pulse: 75 (01/24/20 1313)  Resp: 16 (01/24/20 1313)  BP: (!) 146/65 (01/24/20 1313)  SpO2: (!) 92 % (01/24/20 1313) Vital Signs (24h Range):  Temp:  [97.6 °F (36.4 °C)-98.1 °F (36.7 °C)] 98 °F (36.7 °C)  Pulse:  [64-82] 75  Resp:  [14-22] 16  SpO2:  [92 %-99 %] 92 %  BP: (118-220)/() 146/65        Body mass index is 21.24 kg/m².    Intake/Output Summary (Last 24 hours) at 1/24/2020 1359  Last data filed at 1/23/2020 2300  Gross per 24 hour   Intake 500 ml   Output --   Net 500 ml      Physical Exam   Constitutional: She is oriented to person, place, and time. She appears well-developed and well-nourished.   Sweet, frail, elderly  female in NAD   HENT:   Head: Normocephalic and atraumatic.   Eyes: Pupils are equal, round, and reactive to light. EOM are normal.   Neck: Normal range of motion. Neck supple.   Cardiovascular: Normal rate, regular rhythm, normal heart sounds and intact distal pulses.   Pulmonary/Chest: Effort normal and breath sounds normal. No respiratory distress. She has no wheezes.   Abdominal: Soft. Bowel sounds are normal. She exhibits no distension. There is no tenderness.   Musculoskeletal: Normal range of motion.   Neurological: She is alert and oriented to person, place, and time.   Oriented x4 with intermittent confusion   Skin: Skin is warm and dry.   Psychiatric: She has a normal mood and affect. Her speech is normal and behavior is normal. Judgment and thought content normal. Cognition and memory are impaired (mild).   Nursing note and vitals reviewed.      Significant Labs:   Bilirubin:   Recent Labs   Lab 01/10/20  1100 01/23/20 2041   BILITOT 0.5 0.2     CBC:   Recent Labs   Lab 01/23/20 2041 01/24/20  0331   WBC 4.91 6.63   HGB 9.0* 9.4*   HCT 31.8* 31.2*   * 168     CMP:   Recent Labs   Lab 01/23/20 2041 01/24/20  0331    145   K 3.6 2.7*    109   CO2 25 26   GLU 95 90   BUN 22 17   CREATININE 0.9 0.8   CALCIUM 8.7 8.3*   PROT 6.2  --    ALBUMIN 3.3*  --    BILITOT 0.2  --    ALKPHOS 74  --    AST 19  --    ALT 6*  --    ANIONGAP 10 10   EGFRNONAA 56.1* >60.0     Magnesium:   Recent Labs   Lab 01/23/20 2041 01/24/20  0331   MG 2.0 1.8     TSH:   Recent Labs   Lab 11/06/19  1110   TSH 1.532     Urine Studies:   Recent Labs   Lab 01/24/20  0005   COLORU Straw   APPEARANCEUA Clear   PHUR 7.0   SPECGRAV 1.010   PROTEINUA 3+*   GLUCUA Negative   KETONESU Negative   BILIRUBINUA Negative   OCCULTUA Negative   NITRITE Negative   LEUKOCYTESUR 2+*   RBCUA 1   WBCUA 3   BACTERIA Rare   SQUAMEPITHEL 1   HYALINECASTS 0     All pertinent labs within the past 24 hours have been reviewed.    Significant  Imaging: I have reviewed all pertinent imaging results/findings within the past 24 hours.

## 2020-01-24 NOTE — ASSESSMENT & PLAN NOTE
Essential hypertension  Hypertensive () on admission despite IV hydralazine 10 mg and home avapro.  Patient reports BP elevated since diarrhea started.  - restart norvasc 10 mg PO daily (now)  - Continue Avapro 300mg PO qHS  - continue toprol XL 25 mg PO daily  - continue Imdur 60mg PO daily  - hydralazine 25 mg PO PRN    /65 at goal. Will continue with current regimen.

## 2020-01-24 NOTE — ASSESSMENT & PLAN NOTE
Venous insufficiency of both lower extremities  Mesenteric artery stenosis  Stable  - c/w ASA, statin  - US arterial BLE 12/2019 with mild PAD

## 2020-01-24 NOTE — ASSESSMENT & PLAN NOTE
H/o diarrhea s/p abx for UTI.  Seen by Dr. Gaming for this (1/10) and C. Diff PCR positive.  Unable to be treated at NH d/t isolation limitations of facility.  - c/w vancomycin PO Q6 hours [id number]  - avoid GI motility agents, acid reducers  - monitor fluid status, supportive care  - isolation precautions  - will need to contact family re: return to NH vs home.  Son has POA.   - PT/OT consulted

## 2020-01-24 NOTE — ED TRIAGE NOTES
From [facility]. Diarrhea x2 weeks. Cultures came back today + for C. Dif. Pt aox4. Denies abd pain

## 2020-01-24 NOTE — PLAN OF CARE
No goals set, pt does not require additional acute PT services at this time.     Pt educated on asking medical staff for PT consult if changes in functional status occurs.     - Christiano Britt, PT, DPT  1/24/2020

## 2020-01-24 NOTE — ASSESSMENT & PLAN NOTE
Hypertensive on admission, likely from missed BP meds  - Continue Avapro 300mg PO qHS  - continue toprol XL 25 mg PO daily  - continue Imdur 60mg PO daily

## 2020-01-24 NOTE — ASSESSMENT & PLAN NOTE
LBBB (left bundle branch block)  - chronic and stable  - [facility] 2012 nonobstructive CAD  - c/w ASA, statin  - h/o LBBB and recent admission for UA 2/2019

## 2020-01-24 NOTE — PLAN OF CARE
Problem: Occupational Therapy Goal  Goal: Occupational Therapy Goal  Description  Goals to be met by: 2/7/2020    Patient will increase functional independence with ADLs by performing:    Grooming while standing at sink with Modified [place].  Stand pivot transfers with Modified [place].  Toilet transfer to toilet with Modified [place].     Outcome: Ongoing, Progressing   Patient's goals are set.   MILANA Pacheco  1/24/2020

## 2020-01-24 NOTE — ASSESSMENT & PLAN NOTE
H/o diarrhea s/p abx for UTI.  Seen by Dr. Gaming for this (1/10) and C. Diff PCR positive.  Unable to be treated at NH d/t isolation limitations of facility.  - c/w vancomycin PO Q6 hours [id number]  - avoid GI motility agents, acid reducers  - monitor fluid status, supportive care  - isolation precautions  - will need to contact family re: return to NH vs home.  Son has POA.   - PT/OT consulted

## 2020-01-24 NOTE — NURSING
MD called and notified that lab called in a critical potassium level of 2.7.  MD stated ok I will review and submit orders.

## 2020-01-24 NOTE — PT/OT/SLP EVAL
Occupational Therapy   Evaluation/Treatment    Name: Arti Olsen  MRN: [id number]  Admitting Diagnosis:  Clostridium difficile diarrhea      Recommendations:     Discharge Recommendations: (return to [facility])  Discharge Equipment Recommendations:  none  Barriers to discharge:  None    Assessment:     Arti Olsen is a 89 y.o. female with a medical diagnosis of Clostridium difficile diarrhea.  Performance deficits affecting function: weakness, impaired endurance, impaired self care skills, impaired functional mobilty, gait instability, impaired balance, impaired cognition, decreased safety awareness.  Patient required CGA during functional ambulation task using RW. Patient will benefit from OT 2x/wk to maximize functional independence, reduce burden of care, and ensure safety prior to discharge home.     Rehab Prognosis: Good; patient would benefit from acute skilled OT services to address these deficits and reach maximum level of function.       Plan:     Patient to be seen 2 x/week to address the above listed problems via self-care/home management, therapeutic activities, therapeutic exercises  · Plan of Care Expires: 02/24/20  · Plan of Care Reviewed with: patient    Subjective     Chief Complaint: not feeling well.   Patient/Family Comments/goals: to go home    Occupational Profile:  Living Environment: Patient lives at [facility]. Patient has a walk in shower with bench and grab bar. Patient has a regular toilet with grab bar. Patient was independent with ADLs and modified independence using a RW PTA.     Pain/Comfort:  · Pain Rating 1: 0/10  · Pain Rating Post-Intervention 1: 0/10    Patients cultural, spiritual, Yarsani conflicts given the current situation: yes    Objective:     Communicated with: NSTIM prior to session.  Patient found supine with bed alarm upon OT entry to room.    General Precautions: Standard, fall   Orthopedic Precautions:N/A   Braces: N/A     Occupational  Performance:    Bed Mobility:    · Patient completed Supine to Sit with stand by assistance  · Patient completed Sit to Supine with stand by assistance    Functional Mobility/Transfers:  · Patient completed Sit <> Stand Transfer with contact guard assistance  with  rolling walker   · Patient completed Toilet Transfer bed<>toilet with functional ambulation technique with contact guard assistance with  rolling walker    Activities of Daily Living:  · Grooming: contact guard assistance standing at sink to wash/dry hands  · Upper Body Dressing: supervision [place] and donning robe  · Lower Body Dressing: setup [place] and donning socks EOB  · Toileting: contact guard assistance      Cognitive/Visual Perceptual:  Cognitive/Psychosocial Skills:     -       Oriented to: Person, Place and Time   -       Follows Commands/attention:Follows one-step commands  -       Communication: clear/fluent  -       Memory: No Deficits noted  -       Safety awareness/insight to disability: impaired   -       Mood/Affect/Coping skills/emotional control: Appropriate to situation  Visual/Perceptual:      -Intact      Physical Exam:  Upper Extremity Range of Motion:     -       Right Upper Extremity: limited shoulder flexion  -       Left Upper Extremity: very limited shoulder flexion  Upper Extremity Strength:    -       Right Upper Extremity: 3-/5  -       Left Upper Extremity: 3-/5   Strength:    -       Right Upper Extremity: WFL  -       Left Upper Extremity: WFL  Fine Motor Coordination:    -       Intact  Gross motor coordination:   WFL    AMPAC 6 Click ADL:  AMPAC Total Score: 22    Treatment & Education:  Role of OT and POC  Safety  ADL retraining  Functional mobility training  Discharge planning  Education:    Patient left sitting EOB with call button in reach, nurse present and all needs met.     GOALS:   Multidisciplinary Problems     Occupational Therapy Goals        Problem: Occupational Therapy Goal    Goal Priority  Disciplines Outcome Interventions   Occupational Therapy Goal     OT, PT/OT Ongoing, Progressing    Description:  Goals to be met by: 2/7/2020    Patient will increase functional independence with ADLs by performing:    Grooming while standing at sink with Modified [place].  Stand pivot transfers with Modified [place].  Toilet transfer to toilet with Modified [place].                      History:     Past Medical History:   Diagnosis Date    Acid reflux     chronic    Anemia 6/24/2014    Arthritis     osetoarthritis    Clotting disorder     Colon polyp     Coronary artery disease     Nonobstructive CAD per [facility]    History of colonoscopy     1 polyp noted in 2009    Hyperlipemia     Hypertension     Migraine headache     chronic    Peripheral vascular disease     PONV (postoperative nausea and vomiting)     Renal artery atherosclerosis 11/11/2012    Spinal stenosis     Thyroid disease        Past Surgical History:   Procedure Laterality Date    acf      ANTERIOR CERVICAL DISCECTOMY W/ FUSION      APPENDECTOMY  1942    BLADDER SURGERY  1958    suspension    COLONOSCOPY W/ POLYPECTOMY      CYST REMOVAL  1983    removed from scalp    DISC REMOVAL  1983    EYE SURGERY      cataracts removed    HYSTERECTOMY  1959    COMPLETE    JOINT REPLACEMENT  2006    repair 3rd toe    JOINT REPLACEMENT  2007    right/left rotator    SPINE SURGERY  02/2014    relieve pressure on nerves    TONSILLECTOMY, ADENOIDECTOMY  1930's    UTERINE FIBROID SURGERY  1958    VARICOSE VEIN SURGERY  1958       Time Tracking:     OT Date of Treatment: 01/24/20  OT Start Time: 1030  OT Stop Time: 1047  OT Total Time (min): 17 min    Billable Minutes:Evaluation 8  Self Care/Home Management 9    IMLANA Pacheco  1/24/2020

## 2020-01-24 NOTE — ASSESSMENT & PLAN NOTE
Chronic and stable  - H/H stable, recent iron studies concerning for iron def anemia   - c/w ferrous sulfate 325 mg PO BID (started 1/22)

## 2020-01-24 NOTE — PROGRESS NOTES
"[facility] Hospital Medicine  Progress Note    Patient Name: Arti Olsen  MRN: [id number]  Patient Class: OP- Observation   Admission Date: 1/23/2020  Length of Stay: 0 days  Attending Physician: Jorge Luis Lucero MD  Primary Care Provider: Wong Le MD    [facility] Medicine Team: [facility] HOSP MED Y Maryjane Amaya NP    Subjective:     Principal Problem:Clostridium difficile diarrhea      HPI:  Arti Olsen is a 89 y.o. with hypothyroidism, CVA with L resiedual weakness, anxiety, CAD s/p PCI, PAD, HTN, GERD who presents to [facility] ED 1/23 [facility] for positive C. Diff PCR.   Arti Olsen is a 89 y.o. female that was brought in by caregiver from nursing home for management of C diff.  History assisted by sitter at the bedside. Patient was seen in the ED 12/2019 for syncope/dehydration 2/2 UTI. She was given CTX and started on cephalexin.  Unclear if diarrhea started after abx regimen (ED note reports prior to), but appears to have worsened after.  Patient reports multiple, black, episodes of diarrhea daily, usually worse in AM.  She reports associated weakness, and loss of appetite but no abd pain, NVD, f/c/sweats.   She was seen by GI 1/10/20 and had stool stuides ordered.  She was C. Diff positive on PCR 1/20 and advised to  vancomycin prescription.  Per ED provider, "When asked why she came to the ED I was put on the phone with 1 of the administrators at her nursing home he said that the nursing home was not equipped to manage contact precautions and that she would was unable to stay there while contagious." Patient is adamant that she return home from here, but states that her son has POA and has "changed the locks".  Patient uses a walker at baseline and has [facility] 24/7.      ED: AFVSS, no leukocytosis.  BP elevated to 200s, given home avapro.  Intake labs unremarkable. Given PO vanc and 500 cc IVF.    Overview/Hospital Course:  Patient placed in " observation for cdiff. She was transferred here since her assisted living stated they would not be able to supply contact isolation. Patient was placed on oral vanc which to be continued for 10 days. Patient is also hypokalemic. EKG stable. Provided supplementation K+ repeat.     Interval History: Patient seen at bedside. She able to describe events that occurred to arrive a hospitalization AAOx4. Patient did reports she in nursing home with home health PT/OT. Patient is at an assisted living with visiting [facility]. So she does have some confusion. Reports last diarrhea stool was yesterday but initially reports Saturday. She also reports stools are no longer black but are brown and liquid.     Review of Systems   Constitutional: Positive for appetite change and fatigue. Negative for chills, diaphoresis, fever and unexpected weight change.   HENT: Negative for congestion and rhinorrhea.    Eyes: Negative for photophobia and visual disturbance.   Respiratory: Negative for cough, chest tightness and shortness of breath.    Cardiovascular: Negative for chest pain and palpitations.   Gastrointestinal: Positive for diarrhea. Negative for abdominal pain, nausea and vomiting.   Genitourinary: Negative for difficulty urinating and dysuria.   Musculoskeletal: Positive for gait problem (chronic). Negative for back pain.   Skin: Negative for rash and wound.   Neurological: Negative for dizziness and headaches.   Psychiatric/Behavioral: Negative for agitation and confusion.     Objective:     Vital Signs (Most Recent):  Temp: 98 °F (36.7 °C) (01/24/20 1313)  Pulse: 75 (01/24/20 1313)  Resp: 16 (01/24/20 1313)  BP: (!) 146/65 (01/24/20 1313)  SpO2: (!) 92 % (01/24/20 1313) Vital Signs (24h Range):  Temp:  [97.6 °F (36.4 °C)-98.1 °F (36.7 °C)] 98 °F (36.7 °C)  Pulse:  [64-82] 75  Resp:  [14-22] 16  SpO2:  [92 %-99 %] 92 %  BP: (118-220)/() 146/65        Body mass index is 21.24 kg/m².    Intake/Output Summary  (Last 24 hours) at 1/24/2020 1359  Last data filed at 1/23/2020 2300  Gross per 24 hour   Intake 500 ml   Output --   Net 500 ml      Physical Exam   Constitutional: She is oriented to person, place, and time. She appears well-developed and well-nourished.   Sweet, frail, elderly female in NAD   HENT:   Head: Normocephalic and atraumatic.   Eyes: Pupils are equal, round, and reactive to light. EOM are normal.   Neck: Normal range of motion. Neck supple.   Cardiovascular: Normal rate, regular rhythm, normal heart sounds and intact distal pulses.   Pulmonary/Chest: Effort normal and breath sounds normal. No respiratory distress. She has no wheezes.   Abdominal: Soft. Bowel sounds are normal. She exhibits no distension. There is no tenderness.   Musculoskeletal: Normal range of motion.   Neurological: She is alert and oriented to person, place, and time.   Oriented x4 with intermittent confusion   Skin: Skin is warm and dry.   Psychiatric: She has a normal mood and affect. Her speech is normal and behavior is normal. Judgment and thought content normal. Cognition and memory are impaired (mild).   Nursing note and vitals reviewed.      Significant Labs:   Bilirubin:   Recent Labs   Lab 01/10/20  1100 01/23/20 2041   BILITOT 0.5 0.2     CBC:   Recent Labs   Lab 01/23/20 2041 01/24/20  0331   WBC 4.91 6.63   HGB 9.0* 9.4*   HCT 31.8* 31.2*   * 168     CMP:   Recent Labs   Lab 01/23/20 2041 01/24/20  0331    145   K 3.6 2.7*    109   CO2 25 26   GLU 95 90   BUN 22 17   CREATININE 0.9 0.8   CALCIUM 8.7 8.3*   PROT 6.2  --    ALBUMIN 3.3*  --    BILITOT 0.2  --    ALKPHOS 74  --    AST 19  --    ALT 6*  --    ANIONGAP 10 10   EGFRNONAA 56.1* >60.0     Magnesium:   Recent Labs   Lab 01/23/20 2041 01/24/20  0331   MG 2.0 1.8     TSH:   Recent Labs   Lab 11/06/19  1110   TSH 1.532     Urine Studies:   Recent Labs   Lab 01/24/20  0005   COLORU Straw   APPEARANCEUA Clear   PHUR 7.0   SPECGRAV 1.010    PROTEINUA 3+*   GLUCUA Negative   KETONESU Negative   BILIRUBINUA Negative   OCCULTUA Negative   NITRITE Negative   LEUKOCYTESUR 2+*   RBCUA 1   WBCUA 3   BACTERIA Rare   SQUAMEPITHEL 1   HYALINECASTS 0     All pertinent labs within the past 24 hours have been reviewed.    Significant Imaging: I have reviewed all pertinent imaging results/findings within the past 24 hours.      Assessment/Plan:      * Clostridium difficile diarrhea  H/o diarrhea s/p abx for UTI.  Seen by Dr. Gaming for this (1/10) and C. Diff PCR positive.  Unable to be treated at NH d/t isolation limitations of facility.  - c/w vancomycin PO Q6 hours [id number]  - avoid GI motility agents, acid reducers  - monitor fluid status, supportive care  - isolation precautions  - will need to contact family re: return to NH vs home.  Son has POA.   - PT/OT consulted    Hypokalemia  EKG stable  Supplementing  Pending 1400 repeat    Hypertensive urgency  Essential hypertension  Hypertensive () on admission despite IV hydralazine 10 mg and home avapro.  Patient reports BP elevated since diarrhea started.  - restart norvasc 10 mg PO daily (now)  - Continue Avapro 300mg PO qHS  - continue toprol XL 25 mg PO daily  - continue Imdur 60mg PO daily  - hydralazine 25 mg PO PRN    /65 at goal. Will continue with current regimen.     Chronic diastolic congestive heart failure  Euvolemic  - last TTE 11/2019 with #F 65%, moderate LAE, grade 1 DD, CVP 8, PASP 46  - holding lasix 20 mg PO BID for now with GI fluid losses  - c/w BB  - strict I/O, daily weights    Iron deficiency anemia  Chronic and stable  - H/H stable, recent iron studies concerning for iron def anemia   - c/w ferrous sulfate 325 mg PO BID (started 1/22)    PAD (peripheral artery disease)  Venous insufficiency of both lower extremities  Mesenteric artery stenosis  Stable  - c/w ASA, statin  -  arterial BLE 12/2019 with mild PAD    Anxiety  Stable  - Continue Zoloft 50mg PO daily  - Continue  Klonopin 0.5mg PO BID prn    Coronary artery disease involving native coronary artery of native heart with angina pectoris  LBBB (left bundle branch block)  - chronic and stable  - [facility] 2012 nonobstructive CAD  - c/w ASA, statin  - h/o LBBB and recent admission for UA 2/2019    Hypothyroid  Stable  - Continue Synthroid 25mcg PO daily      VTE Risk Mitigation (From admission, onward)         Ordered     IP VTE HIGH RISK PATIENT  Once      01/23/20 2220     Place CUONG hose  Until discontinued      01/23/20 2220     Place sequential compression device  Until discontinued      01/23/20 2220                  Maryjane Amaya NP  Department of Hospital Medicine   [facility]

## 2020-01-24 NOTE — PLAN OF CARE
"OSCAR spoke to Reilly at [facility], who was able to confirm that the patient is in Assisted Living at [facility], OSCAR called to speak to Enid [phone] in regards to patient services she is receiving, no answer, voicemail was left, CM will continue to follow.     1122am- OSCAR spoke to Enid at [facility], the patient is able to return to her apartment tomorrow, but that is dependent on her sitter services [facility] still being able to sit with her, since the patient is a "flight risk". If discharging tomorrow she will need her medicine prescribed to [facility], so that they are available for her tomorrow when she returns.     1417: OSCAR spoke to Enid at [facility], pt is able to return once medically stable, [facility] will continue services in the patients Assisted Living Apartment. Abx prescription can be e-scribed to [facility] and any new hard scripts can be faxed to [facility] admissions, OSCAR did speak to Maryjane Amaya NP about the patients case.   "

## 2020-01-24 NOTE — ASSESSMENT & PLAN NOTE
Essential hypertension  Hypertensive () on admission despite IV hydralazine 10 mg and home avapro.  Patient reports BP elevated since diarrhea started.  - restart norvasc 10 mg PO daily (now)  - Continue Avapro 300mg PO qHS  - continue toprol XL 25 mg PO daily  - continue Imdur 60mg PO daily  - hydralazine 25 mg PO PRN

## 2020-01-24 NOTE — PT/OT/SLP EVAL
"Physical Therapy Evaluation and Discharge Note    Patient Name:  Arti Olsen   MRN:  [id number]    Recommendations:     Discharge Recommendations:  (return to [facility])   Discharge Equipment Recommendations: none   Barriers to discharge: None    Assessment:     Arti Olsen is a 89 y.o. female admitted with a medical diagnosis of Clostridium difficile diarrhea. At this time, patient is functioning at their prior level of function and does not require further acute PT services. Pt oriented to person and time but overall confused w/primary c/o LBP d/t "uncomfortable beds". Pt performed all functional mobility CGA/SBA d/t decreased cognitive function and poor insight to her deficits.     Recent Surgery: * No surgery found *      Plan:     During this hospitalization, patient does not require further acute PT services.  Please re-consult if situation changes.      Subjective     Chief Complaint: LBP d/t hospital bed   Pain/Comfort:  · Pain Rating 1: 0/10    Patients cultural, spiritual, Advent conflicts given the current situation: no    Living Environment:  Pt reports she lives at [facility] and performs functional mobility at [facility] using a RW.  Equipment used at home: shower chair, walker, rolling.  DME owned (not currently used): none.  Upon discharge, patient will have assistance from staff.    Objective:     Communicated with NSG prior to session.  Patient found supine with bed alarm upon PT entry to room.    General Precautions: Standard, fall   Orthopedic Precautions:N/A   Braces: N/A     Exams:  · Cognitive Exam:  Patient is oriented to Person and Time  · RLE ROM: WFL  · RLE Strength: 4+/5  · LLE ROM: WFL  · LLE Strength: 4+/5    Functional Mobility:  · Bed Mobility:     · Supine to Sit: stand by assistance  · Sit to Supine: stand by assistance  · Transfers:     · Sit to Stand:  stand by assistance with rolling walker  · Toilet Transfer: stand by assistance with  rolling walker and " grab bars  using  Step Transfer  · Gait: 15ft x 2 w/RW CGA initially progressing to SBA  · Balance: CGA/SBA    AM-PAC 6 CLICK MOBILITY  Total Score:18       Therapeutic Activities and Exercises:   - Pt educated on:   -PT roles, expectations, and POC    -Safety with mobility   -Benefits of OOB activities to increase strength and functional mobility    -Performing ther ex for increasing LE ROM and strength   -Discharge recommendations     AM-PAC 6 CLICK MOBILITY  Total Score:18     Patient left supine with call button in reach.    GOALS:   Multidisciplinary Problems     Physical Therapy Goals     Not on file          Multidisciplinary Problems (Resolved)        Problem: Physical Therapy Goal    Goal Priority Disciplines Outcome Goal Variances Interventions   Physical Therapy Goal   (Resolved)     PT, PT/OT Met                     History:     Past Medical History:   Diagnosis Date    Acid reflux     chronic    Anemia 6/24/2014    Arthritis     osetoarthritis    Clotting disorder     Colon polyp     Coronary artery disease     Nonobstructive CAD per [facility]    History of colonoscopy     1 polyp noted in 2009    Hyperlipemia     Hypertension     Migraine headache     chronic    Peripheral vascular disease     PONV (postoperative nausea and vomiting)     Renal artery atherosclerosis 11/11/2012    Spinal stenosis     Thyroid disease        Past Surgical History:   Procedure Laterality Date    acf      ANTERIOR CERVICAL DISCECTOMY W/ FUSION      APPENDECTOMY  1942    BLADDER SURGERY  1958    suspension    COLONOSCOPY W/ POLYPECTOMY      CYST REMOVAL  1983    removed from scalp    DISC REMOVAL  1983    EYE SURGERY      cataracts removed    HYSTERECTOMY  1959    COMPLETE    JOINT REPLACEMENT  2006    repair 3rd toe    JOINT REPLACEMENT  2007    right/left rotator    SPINE SURGERY  02/2014    relieve pressure on nerves    TONSILLECTOMY, ADENOIDECTOMY  1930's    UTERINE FIBROID SURGERY  1958     VARICOSE VEIN SURGERY  1958       Time Tracking:     PT Received On: 01/24/20  PT Start Time: 0906     PT Stop Time: 0926  PT Total Time (min): 20 min     Billable Minutes: Evaluation 10 and Gait Training 10      Darrion Britt, PT  01/24/2020

## 2020-01-24 NOTE — HPI
"Arti Olsen is a 89 y.o. with hypothyroidism, CVA with L resiedual weakness, anxiety, CAD s/p PCI, PAD, HTN, GERD who presents to [facility] ED 1/23 from NH ([facility]) for positive C. Diff PCR.   Arti Olsen is a 89 y.o. female that was brought in by caregiver from nursing home for management of C diff.  History assisted by sitter at the bedside. Patient was seen in the ED 12/2019 for syncope/dehydration 2/2 UTI. She was given CTX and started on cephalexin.  Unclear if diarrhea started after abx regimen (ED note reports prior to), but appears to have worsened after.  Patient reports multiple, black, episodes of diarrhea daily, usually worse in AM.  She reports associated weakness, and loss of appetite but no abd pain, NVD, f/c/sweats.   She was seen by GI 1/10/20 and had stool stuides ordered.  She was C. Diff positive on PCR 1/20 and advised to  vancomycin prescription.  Per ED provider, "When asked why she came to the ED I was put on the phone with 1 of the administrators at her nursing home he said that the nursing home was not equipped to manage contact precautions and that she would was unable to stay there while contagious." Patient is adamant that she return home from here, but states that her son has POA and has "changed the locks".  Patient uses a walker at baseline and has [facility] paul 24/7.      ED: AFVSS, no leukocytosis.  BP elevated to 200s, given home avapro.  Intake labs unremarkable. Given PO vanc and 500 cc IVF.  "

## 2020-01-24 NOTE — ASSESSMENT & PLAN NOTE
LBBB (left bundle branch block)  - chronic and stable  - [facility] 2012 nonobstructive CAD  - c/w ASA, statin  - h/o LBBB and recent admission for UA 2/2019

## 2020-01-24 NOTE — H&P
"[facility] Medicine  History & Physical    Patient Name: Arti Olsen  MRN: [id number]  Admission Date: 1/23/2020  Attending Physician: Jorge Luis Lucero MD   Primary Care Provider: Wong Le MD    [facility] Medicine Team: [facility] MED Y Reilly Hameed PA-C     Patient information was obtained from patient, caregiver / friend, past medical records and ER records.     Subjective:     Principal Problem:Clostridium difficile diarrhea    Chief Complaint:   Chief Complaint   Patient presents with    Diarrhea     From [facility]. Diarrhea x2 weeks. Cultures came back today + for C. Dif        HPI: Arti Olsen is a 89 y.o. with hypothyroidism, CVA with L resiedual weakness, anxiety, CAD s/p PCI, PAD, HTN, GERD who presents to [facility] 1/23 from NH ([facility]) for positive C. Diff PCR.   Arti Olsen is a 89 y.o. female that was brought in by caregiver from nursing home for management of C diff.  History assisted by sitter at the bedside. Patient was seen in the ED 12/2019 for syncope/dehydration 2/2 UTI. She was given CTX and started on cephalexin.  Unclear if diarrhea started after abx regimen (ED note reports prior to), but appears to have worsened after.  Patient reports multiple, black, episodes of diarrhea daily, usually worse in AM.  She reports associated weakness, and loss of appetite but no abd pain, NVD, f/c/sweats.   She was seen by GI 1/10/20 and had stool stuides ordered.  She was C. Diff positive on PCR 1/20 and advised to  vancomycin prescription.  Per ED provider, "When asked why she came to the ED I was put on the phone with 1 of the administrators at her nursing home he said that the nursing home was not equipped to manage contact precautions and that she would was unable to stay there while contagious." Patient is adamant that she return home from here, but states that her son has POA and has "changed the locks".  Patient uses a walker at baseline " "and has Visiting Katie paul 24/7.      ED: AFVSS, no leukocytosis.  BP elevated to 200s, given home avapro.  Intake labs unremarkable. Given PO vanc and 500 cc IVF.    Past Medical History:   Diagnosis Date    Acid reflux     chronic    Anemia 6/24/2014    Arthritis     osetoarthritis    Clotting disorder     Colon polyp     Coronary artery disease     Nonobstructive CAD per [facility]    History of colonoscopy     1 polyp noted in 2009    Hyperlipemia     Hypertension     Migraine headache     chronic    Peripheral vascular disease     PONV (postoperative nausea and vomiting)     Renal artery atherosclerosis 11/11/2012    Spinal stenosis     Thyroid disease        Past Surgical History:   Procedure Laterality Date    acf      ANTERIOR CERVICAL DISCECTOMY W/ FUSION      APPENDECTOMY  1942    BLADDER SURGERY  1958    suspension    COLONOSCOPY W/ POLYPECTOMY      CYST REMOVAL  1983    removed from scalp    DISC REMOVAL  1983    EYE SURGERY      cataracts removed    HYSTERECTOMY  1959    COMPLETE    JOINT REPLACEMENT  2006    repair 3rd toe    JOINT REPLACEMENT  2007    right/left rotator    SPINE SURGERY  02/2014    relieve pressure on nerves    TONSILLECTOMY, ADENOIDECTOMY  1930's    UTERINE FIBROID SURGERY  1958    VARICOSE VEIN SURGERY  1958       Review of patient's allergies indicates:   Allergen Reactions    Iodinated contrast media Swelling    Reglan [metoclopramide] Other (See Comments)     "Body shaking"    Sulfa (sulfonamide antibiotics)      Yrs ago    Augmentin [amoxicillin-pot clavulanate] Diarrhea       No current facility-administered medications on file prior to encounter.      Current Outpatient Medications on File Prior to Encounter   Medication Sig    acetaminophen (TYLENOL) 500 MG tablet Take 500 mg by mouth daily as needed.     ALPRAZolam (XANAX) 0.25 MG tablet Take 0.5 tablets (0.125 mg total) by mouth nightly as needed for Insomnia or Anxiety.    aspirin 81 MG " Chew Take 81 mg by mouth once daily.    atorvastatin (LIPITOR) 40 MG tablet Take 1 tablet (40 mg total) by mouth every evening. TAKE (1/2) HALF BY MOUTH ONCE A DAY    bisacodyl (DULCOLAX) 5 mg EC tablet Take 5 mg by mouth daily as needed for Constipation.    C,E,zinc,copper 11-omega3s-lut (OCUVITE ADULT 50 PLUS) 250-5-1 mg Cap Take by mouth.    cholecalciferol, vitamin D3, (VITAMIN D3) 1,000 unit capsule Take 1 capsule (1,000 Units total) by mouth once daily.    dextromethorphan-guaifenesin (ADULT ROBITUSSIN PEAK COLD DM)  mg/5 mL Liqd Take 5 mLs by mouth daily as needed.     docusate sodium (COLACE) 100 MG capsule Take 100 mg by mouth 2 (two) times daily.    ferrous sulfate (FEOSOL) 325 mg (65 mg iron) Tab tablet Take 1 tablet (325 mg total) by mouth every 12 (twelve) hours.    ferrous sulfate (FEOSOL) 325 mg (65 mg iron) Tab tablet Take 1 tablet (325 mg total) by mouth every 12 (twelve) hours.    furosemide (LASIX) 20 MG tablet Take 1 tablet (20 mg total) by mouth 2 (two) times daily. Start with 3 days per week but OK to use more often or extra for increased leg swelling    gabapentin (NEURONTIN) 300 MG capsule Take 1 capsule (300 mg total) by mouth every evening.    irbesartan (AVAPRO) 300 MG tablet Take 1 tablet (300 mg total) by mouth every evening.    isosorbide mononitrate (IMDUR) 60 MG 24 hr tablet Take 1 tablet (60 mg total) by mouth once daily.    levothyroxine (SYNTHROID) 25 MCG tablet Take 1 tablet (25 mcg total) by mouth once daily.    loperamide (IMODIUM A-D) 2 mg Tab Take 4 mg by mouth 3 (three) times daily as needed.     mag hydrox/aluminum hyd/simeth (ALUM-MAG HYDROXIDE-SIMETH ORAL) Take by mouth daily as needed.     metoprolol succinate (TOPROL-XL) 25 MG 24 hr tablet Take 1 tablet (25 mg total) by mouth once daily.    nitroGLYCERIN (NITROSTAT) 0.4 MG SL tablet Place 1 tablet (0.4 mg total) under the tongue every 5 (five) minutes as needed for Chest pain.     oxyCODONE-acetaminophen (PERCOCET) 7.5-325 mg per tablet 1 tablet every 4 (four) hours as needed.     pantoprazole (PROTONIX) 40 MG tablet Take 1 tablet (40 mg total) by mouth once daily.    potassium chloride SA (K-DUR,KLOR-CON) 20 MEQ tablet Take 1 tablet (20 mEq total) by mouth once daily.    psyllium husk, with sugar, (METAMUCIL FIBER THIN) 2.5 gram Wafr Take 2 Wafers by mouth every 12 (twelve) hours.    QUEtiapine (SEROQUEL) 25 MG Tab Take 1 tablet (25 mg total) by mouth 2 (two) times daily as needed (anxiety, insomnia).    sertraline (ZOLOFT) 100 MG tablet Take 1 tablet (100 mg total) by mouth once daily.    vancomycin (VANCOCIN) 125 MG capsule Take 1 capsule (125 mg total) by mouth every 6 (six) hours. for 14 days    VIT C/VIT E/LUTEIN/MIN/OMEGA-3 (OCUVITE ORAL) Take 1 tablet by mouth once daily.    zolpidem (AMBIEN) 5 MG Tab      Family History     Problem Relation (Age of Onset)    Cancer Brother, Brother, Brother    Heart disease Father, Mother, Brother, Maternal Grandmother    Kidney disease Brother, Maternal Grandfather    Leukemia Brother    Lung cancer Brother    Uterine cancer Paternal Grandmother        Tobacco Use    Smoking status: Former Smoker     Packs/day: 1.00     Years: 29.00     Pack years: 29.00     Last attempt to quit: 9/10/1969     Years since quittin.4    Smokeless tobacco: Never Used    Tobacco comment: The patient lives alone and is able to accomplish a usual activities of daily living.  She is not that active due to the severity of her back pain.   Substance and Sexual Activity    Alcohol use: No     Frequency: Never     Drinks per session: Patient refused     Binge frequency: Never    Drug use: No    Sexual activity: Never     Review of Systems   Constitutional: Positive for appetite change and fatigue. Negative for chills, diaphoresis, fever and unexpected weight change.   HENT: Negative for congestion and rhinorrhea.    Eyes: Negative for photophobia and visual  disturbance.   Respiratory: Negative for cough, chest tightness and shortness of breath.    Cardiovascular: Negative for chest pain and palpitations.   Gastrointestinal: Positive for diarrhea. Negative for abdominal pain, nausea and vomiting.   Genitourinary: Negative for difficulty urinating and dysuria.   Musculoskeletal: Positive for gait problem (chronic). Negative for back pain.   Skin: Negative for rash and wound.   Neurological: Negative for dizziness and headaches.   Psychiatric/Behavioral: Negative for agitation and confusion.     Objective:     Vital Signs (Most Recent):  Temp: 98.1 °F (36.7 °C) (01/23/20 1932)  Pulse: 68 (01/23/20 2312)  Resp: 17 (01/23/20 2312)  BP: (!) 211/85 (01/23/20 2313)  SpO2: 99 % (01/23/20 2312) Vital Signs (24h Range):  Temp:  [98.1 °F (36.7 °C)] 98.1 °F (36.7 °C)  Pulse:  [64-68] 68  Resp:  [17-22] 17  SpO2:  [96 %-99 %] 99 %  BP: (154-211)/() 211/85        There is no height or weight on file to calculate BMI.    Physical Exam   Constitutional: She is oriented to person, place, and time. She appears well-developed and well-nourished.   Sweet, frail, elderly female in NAD   HENT:   Head: Normocephalic and atraumatic.   Eyes: Pupils are equal, round, and reactive to light. EOM are normal.   Neck: Normal range of motion. Neck supple.   Cardiovascular: Normal rate, regular rhythm, normal heart sounds and intact distal pulses.   Pulmonary/Chest: Effort normal and breath sounds normal. No respiratory distress. She has no wheezes.   Abdominal: Soft. Bowel sounds are normal. She exhibits no distension. There is no tenderness.   Musculoskeletal: Normal range of motion.   Neurological: She is alert and oriented to person, place, and time.   Oriented x4 but sitter contradicts certain statements that patient makes   Skin: Skin is warm and dry.   Psychiatric: She has a normal mood and affect. Her speech is normal and behavior is normal. Judgment and thought content normal. Cognition  and memory are impaired (mild).   Perseverated on wanting to discharge home and get a  to conter her son who wants patient to stay at NH   Nursing note and vitals reviewed.        CRANIAL NERVES     CN III, IV, VI   Pupils are equal, round, and reactive to light.  Extraocular motions are normal.        Significant Labs:   CBC:   Recent Labs   Lab 01/23/20 2041   WBC 4.91   HGB 9.0*   HCT 31.8*   *     CMP:   Recent Labs   Lab 01/23/20 2041      K 3.6      CO2 25   GLU 95   BUN 22   CREATININE 0.9   CALCIUM 8.7   PROT 6.2   ALBUMIN 3.3*   BILITOT 0.2   ALKPHOS 74   AST 19   ALT 6*   ANIONGAP 10   EGFRNONAA 56.1*       Significant Imaging: I have reviewed all pertinent imaging results/findings within the past 24 hours.       Assessment/Plan:     * Clostridium difficile diarrhea  H/o diarrhea s/p abx for UTI.  Seen by Dr. Gaming for this (1/10) and C. Diff PCR positive.  Unable to be treated at NH d/t isolation limitations of facility.  - c/w vancomycin PO Q6 hours [id number]  - avoid GI motility agents, acid reducers  - monitor fluid status, supportive care  - isolation precautions  - will need to contact family re: return to NH vs home.  Son has POA.   - PT/OT consulted    Hypertensive urgency  Essential hypertension  Hypertensive () on admission despite IV hydralazine 10 mg and home avapro.  Patient reports BP elevated since diarrhea started.  - restart norvasc 10 mg PO daily (now)  - Continue Avapro 300mg PO qHS  - continue toprol XL 25 mg PO daily  - continue Imdur 60mg PO daily  - hydralazine 25 mg PO PRN    Coronary artery disease involving native coronary artery of native heart with angina pectoris  LBBB (left bundle branch block)  - chronic and stable  - [facility] 2012 nonobstructive CAD  - c/w ASA, statin  - h/o LBBB and recent admission for UA 2/2019    Chronic diastolic congestive heart failure  Euvolemic  - last TTE 11/2019 with #F 65%, moderate LAE, grade 1 DD, CVP 8, PASP 46  -  holding lasix 20 mg PO BID for now with GI fluid losses  - c/w BB  - strict I/O, daily weights    Iron deficiency anemia  Chronic and stable  - H/H stable, recent iron studies concerning for iron def anemia   - c/w ferrous sulfate 325 mg PO BID (started 1/22)    PAD (peripheral artery disease)  Venous insufficiency of both lower extremities  Mesenteric artery stenosis  Stable  - c/w ASA, statin  - US arterial BLE 12/2019 with mild PAD    Anxiety  Stable  - Continue Zoloft 50mg PO daily  - Continue Klonopin 0.5mg PO BID prn    Hypothyroid  Stable  - Continue Synthroid 25mcg PO daily    VTE Risk Mitigation (From admission, onward)         Ordered     IP VTE HIGH RISK PATIENT  Once      01/23/20 2220     Place CUONG hose  Until discontinued      01/23/20 2220     Place sequential compression device  Until discontinued      01/23/20 2220                   Reilly Hameed PASaschaC  Department of Hospital Medicine   [facility]

## 2020-01-24 NOTE — PLAN OF CARE
CM spoke with patient at bedside. CM explained the role of the CM/SW in assisting with discharge planning. Information in medial records verified with patient. Patient is a resident of the [facility], DME utilized. Patient anticipates returning to nursing home upon discharge. CM asked if I could call patients daughter for further information, patient declined. Patient is unaware of her son's phone number. CM name and number on board.       PCP: Wong Le MD        PHARM:   [facility] [place] - [address]  [address]  [place] [address]  Phone: [phone] Fax: [phone]        Payor: MEDICARE / Plan: MEDICARE PART A & B / Product Type: [facility] /          01/24/20 1031   Discharge Assessment   Assessment Type Discharge Planning Assessment   Confirmed/corrected address and phone number on facesheet? Yes   Assessment information obtained from? Patient;Medical Record   Expected Length of Stay (days) 4   Communicated expected length of stay with patient/caregiver yes   Prior to hospitilization cognitive status: Alert/Oriented;No Deficits   Prior to hospitalization functional status: Assistive Equipment;Independent   Current cognitive status: Alert/Oriented;No Deficits   Current Functional Status: Independent;Assistive Equipment   Facility Arrived From: [facility]    Lives With facility resident   Able to Return to Prior Arrangements yes   Is patient able to care for self after discharge? Yes   Patient's perception of discharge disposition nursing home   Readmission Within the Last 30 Days no previous admission in last 30 days   Patient currently being followed by outpatient case management? No   Patient currently receives any other outside agency services? No   Equipment Currently Used at Home shower chair;walker, rolling   Do you have any problems affording any of your prescribed medications? No   Is the patient taking  medications as prescribed? yes   Does the patient have transportation home? Yes   Transportation Anticipated health plan transportation   Does the patient receive services at the [facility]? No   Discharge Plan A Return to nursing home   Discharge Plan B Return to Nursing Home   DME Needed Upon Discharge  none   Patient/Family in Agreement with Plan yes

## 2020-01-24 NOTE — SUBJECTIVE & OBJECTIVE
"Past Medical History:   Diagnosis Date    Acid reflux     chronic    Anemia 6/24/2014    Arthritis     osetoarthritis    Clotting disorder     Colon polyp     Coronary artery disease     Nonobstructive CAD per [facility]    History of colonoscopy     1 polyp noted in 2009    Hyperlipemia     Hypertension     Migraine headache     chronic    Peripheral vascular disease     PONV (postoperative nausea and vomiting)     Renal artery atherosclerosis 11/11/2012    Spinal stenosis     Thyroid disease        Past Surgical History:   Procedure Laterality Date    acf      ANTERIOR CERVICAL DISCECTOMY W/ FUSION      APPENDECTOMY  1942    BLADDER SURGERY  1958    suspension    COLONOSCOPY W/ POLYPECTOMY      CYST REMOVAL  1983    removed from scalp    DISC REMOVAL  1983    EYE SURGERY      cataracts removed    HYSTERECTOMY  1959    COMPLETE    JOINT REPLACEMENT  2006    repair 3rd toe    JOINT REPLACEMENT  2007    right/left rotator    SPINE SURGERY  02/2014    relieve pressure on nerves    TONSILLECTOMY, ADENOIDECTOMY  1930's    UTERINE FIBROID SURGERY  1958    VARICOSE VEIN SURGERY  1958       Review of patient's allergies indicates:   Allergen Reactions    Iodinated contrast media Swelling    Reglan [metoclopramide] Other (See Comments)     "Body shaking"    Sulfa (sulfonamide antibiotics)      Yrs ago    Augmentin [amoxicillin-pot clavulanate] Diarrhea       No current facility-administered medications on file prior to encounter.      Current Outpatient Medications on File Prior to Encounter   Medication Sig    acetaminophen (TYLENOL) 500 MG tablet Take 500 mg by mouth daily as needed.     ALPRAZolam (XANAX) 0.25 MG tablet Take 0.5 tablets (0.125 mg total) by mouth nightly as needed for Insomnia or Anxiety.    aspirin 81 MG Chew Take 81 mg by mouth once daily.    atorvastatin (LIPITOR) 40 MG tablet Take 1 tablet (40 mg total) by mouth every evening. TAKE (1/2) HALF BY MOUTH ONCE A DAY    " bisacodyl (DULCOLAX) 5 mg EC tablet Take 5 mg by mouth daily as needed for Constipation.    C,E,zinc,copper 11-omega3s-lut (OCUVITE ADULT 50 PLUS) 250-5-1 mg Cap Take by mouth.    cholecalciferol, vitamin D3, (VITAMIN D3) 1,000 unit capsule Take 1 capsule (1,000 Units total) by mouth once daily.    dextromethorphan-guaifenesin (ADULT ROBITUSSIN PEAK COLD DM)  mg/5 mL Liqd Take 5 mLs by mouth daily as needed.     docusate sodium (COLACE) 100 MG capsule Take 100 mg by mouth 2 (two) times daily.    ferrous sulfate (FEOSOL) 325 mg (65 mg iron) Tab tablet Take 1 tablet (325 mg total) by mouth every 12 (twelve) hours.    ferrous sulfate (FEOSOL) 325 mg (65 mg iron) Tab tablet Take 1 tablet (325 mg total) by mouth every 12 (twelve) hours.    furosemide (LASIX) 20 MG tablet Take 1 tablet (20 mg total) by mouth 2 (two) times daily. Start with 3 days per week but OK to use more often or extra for increased leg swelling    gabapentin (NEURONTIN) 300 MG capsule Take 1 capsule (300 mg total) by mouth every evening.    irbesartan (AVAPRO) 300 MG tablet Take 1 tablet (300 mg total) by mouth every evening.    isosorbide mononitrate (IMDUR) 60 MG 24 hr tablet Take 1 tablet (60 mg total) by mouth once daily.    levothyroxine (SYNTHROID) 25 MCG tablet Take 1 tablet (25 mcg total) by mouth once daily.    loperamide (IMODIUM A-D) 2 mg Tab Take 4 mg by mouth 3 (three) times daily as needed.     mag hydrox/aluminum hyd/simeth (ALUM-MAG HYDROXIDE-SIMETH ORAL) Take by mouth daily as needed.     metoprolol succinate (TOPROL-XL) 25 MG 24 hr tablet Take 1 tablet (25 mg total) by mouth once daily.    nitroGLYCERIN (NITROSTAT) 0.4 MG SL tablet Place 1 tablet (0.4 mg total) under the tongue every 5 (five) minutes as needed for Chest pain.    oxyCODONE-acetaminophen (PERCOCET) 7.5-325 mg per tablet 1 tablet every 4 (four) hours as needed.     pantoprazole (PROTONIX) 40 MG tablet Take 1 tablet (40 mg total) by mouth once  daily.    potassium chloride SA (K-DUR,KLOR-CON) 20 MEQ tablet Take 1 tablet (20 mEq total) by mouth once daily.    psyllium husk, with sugar, (METAMUCIL FIBER THIN) 2.5 gram Wafr Take 2 Wafers by mouth every 12 (twelve) hours.    QUEtiapine (SEROQUEL) 25 MG Tab Take 1 tablet (25 mg total) by mouth 2 (two) times daily as needed (anxiety, insomnia).    sertraline (ZOLOFT) 100 MG tablet Take 1 tablet (100 mg total) by mouth once daily.    vancomycin (VANCOCIN) 125 MG capsule Take 1 capsule (125 mg total) by mouth every 6 (six) hours. for 14 days    VIT C/VIT E/LUTEIN/MIN/OMEGA-3 (OCUVITE ORAL) Take 1 tablet by mouth once daily.    zolpidem (AMBIEN) 5 MG Tab      Family History     Problem Relation (Age of Onset)    Cancer Brother, Brother, Brother    Heart disease Father, Mother, Brother, Maternal Grandmother    Kidney disease Brother, Maternal Grandfather    Leukemia Brother    Lung cancer Brother    Uterine cancer Paternal Grandmother        Tobacco Use    Smoking status: Former Smoker     Packs/day: 1.00     Years: 29.00     Pack years: 29.00     Last attempt to quit: 9/10/1969     Years since quittin.4    Smokeless tobacco: Never Used    Tobacco comment: The patient lives alone and is able to accomplish a usual activities of daily living.  She is not that active due to the severity of her back pain.   Substance and Sexual Activity    Alcohol use: No     Frequency: Never     Drinks per session: Patient refused     Binge frequency: Never    Drug use: No    Sexual activity: Never     Review of Systems   Constitutional: Positive for appetite change and fatigue. Negative for chills, diaphoresis, fever and unexpected weight change.   HENT: Negative for congestion and rhinorrhea.    Eyes: Negative for photophobia and visual disturbance.   Respiratory: Negative for cough, chest tightness and shortness of breath.    Cardiovascular: Negative for chest pain and palpitations.   Gastrointestinal: Positive for  diarrhea. Negative for abdominal pain, nausea and vomiting.   Genitourinary: Negative for difficulty urinating and dysuria.   Musculoskeletal: Positive for gait problem (chronic). Negative for back pain.   Skin: Negative for rash and wound.   Neurological: Negative for dizziness and headaches.   Psychiatric/Behavioral: Negative for agitation and confusion.     Objective:     Vital Signs (Most Recent):  Temp: 98.1 °F (36.7 °C) (01/23/20 1932)  Pulse: 68 (01/23/20 2312)  Resp: 17 (01/23/20 2312)  BP: (!) 211/85 (01/23/20 2313)  SpO2: 99 % (01/23/20 2312) Vital Signs (24h Range):  Temp:  [98.1 °F (36.7 °C)] 98.1 °F (36.7 °C)  Pulse:  [64-68] 68  Resp:  [17-22] 17  SpO2:  [96 %-99 %] 99 %  BP: (154-211)/() 211/85        There is no height or weight on file to calculate BMI.    Physical Exam   Constitutional: She is oriented to person, place, and time. She appears well-developed and well-nourished.   Sweet, frail, elderly female in NAD   HENT:   Head: Normocephalic and atraumatic.   Eyes: Pupils are equal, round, and reactive to light. EOM are normal.   Neck: Normal range of motion. Neck supple.   Cardiovascular: Normal rate, regular rhythm, normal heart sounds and intact distal pulses.   Pulmonary/Chest: Effort normal and breath sounds normal. No respiratory distress. She has no wheezes.   Abdominal: Soft. Bowel sounds are normal. She exhibits no distension. There is no tenderness.   Musculoskeletal: Normal range of motion.   Neurological: She is alert and oriented to person, place, and time.   Oriented x4 but sitter contradicts certain statements that patient makes   Skin: Skin is warm and dry.   Psychiatric: She has a normal mood and affect. Her speech is normal and behavior is normal. Judgment and thought content normal. Cognition and memory are impaired (mild).   Perseverated on wanting to discharge home and get a  to conter her son who wants patient to stay at NH   Nursing note and vitals  reviewed.        CRANIAL NERVES     CN III, IV, VI   Pupils are equal, round, and reactive to light.  Extraocular motions are normal.        Significant Labs:   CBC:   Recent Labs   Lab 01/23/20 2041   WBC 4.91   HGB 9.0*   HCT 31.8*   *     CMP:   Recent Labs   Lab 01/23/20 2041      K 3.6      CO2 25   GLU 95   BUN 22   CREATININE 0.9   CALCIUM 8.7   PROT 6.2   ALBUMIN 3.3*   BILITOT 0.2   ALKPHOS 74   AST 19   ALT 6*   ANIONGAP 10   EGFRNONAA 56.1*       Significant Imaging: I have reviewed all pertinent imaging results/findings within the past 24 hours.

## 2020-01-24 NOTE — ED PROVIDER NOTES
"Encounter Date: 1/23/2020       History     Chief Complaint   Patient presents with    Diarrhea     From [facility]. Diarrhea x2 weeks. Cultures came back today + for C. Dif     HPI   Arti Olsen is a 89 y.o. female that was brought in by caregiver from nursing home for management of C diff.    Patient has had diarrhea for approximately 2 months.  She notes that approximately 2 weeks prior to the onset of diarrhea at she received antibiotics for urinary tract infection.  She says that she has multiple episodes of watery diarrhea per day.  She says that the diarrhea has gotten darker over the past 2 weeks.  She does say that a having many episodes of diarrhea she does feel more weak.  She underwent workup for infectious diarrhea by outpatient Gastroenterology and was diagnosed with C diff - she was C diff antigen positive on her 01/20/2020 stool sample.    Sided weakness she has really no other complaints. No shortness of breath, no chest pain.  No abdominal pain. No bloody stools.  No urinary complaints.    I reviewed the patient's chart and do see that there are multiple telephone communications from her gastroenterologist with instructions to start taking p.o. vancomycin in the outpatient setting.  When asked why she came to the ED I was put on the phone with 1 of the administrators at her nursing home he said that the nursing home was not equipped to manage contact precautions and that she would was unable to stay there while contagious.     Review of patient's allergies indicates:   Allergen Reactions    Iodinated contrast media Swelling    Reglan [metoclopramide] Other (See Comments)     "Body shaking"    Sulfa (sulfonamide antibiotics)      Yrs ago    Augmentin [amoxicillin-pot clavulanate] Diarrhea     Past Medical History:   Diagnosis Date    Acid reflux     chronic    Anemia 6/24/2014    Arthritis     osetoarthritis    Clotting disorder     Colon polyp     Coronary artery disease     " Nonobstructive CAD per [facility]    History of colonoscopy     1 polyp noted in 2009    Hyperlipemia     Hypertension     Migraine headache     chronic    Peripheral vascular disease     PONV (postoperative nausea and vomiting)     Renal artery atherosclerosis 11/11/2012    Spinal stenosis     Thyroid disease      Past Surgical History:   Procedure Laterality Date    acf      ANTERIOR CERVICAL DISCECTOMY W/ FUSION      APPENDECTOMY  1942    BLADDER SURGERY  1958    suspension    COLONOSCOPY W/ POLYPECTOMY      CYST REMOVAL  1983    removed from scalp    DISC REMOVAL  1983    EYE SURGERY      cataracts removed    HYSTERECTOMY  1959    COMPLETE    JOINT REPLACEMENT  2006    repair 3rd toe    JOINT REPLACEMENT  2007    right/left rotator    SPINE SURGERY  02/2014    relieve pressure on nerves    TONSILLECTOMY, ADENOIDECTOMY  1930's    UTERINE FIBROID SURGERY  1958    VARICOSE VEIN SURGERY  1958     Family History   Problem Relation Age of Onset    Heart disease Father         aorta burst    Heart disease Mother         stroke    Kidney disease Brother     Cancer Brother     Lung cancer Brother     Cancer Brother     Leukemia Brother     Heart disease Brother     Cancer Brother     Heart disease Maternal Grandmother     Kidney disease Maternal Grandfather     Uterine cancer Paternal Grandmother     Anesthesia problems Neg Hx     Clotting disorder Neg Hx      problems Neg Hx     Hypertension Neg Hx     Kidney cancer Neg Hx     Malignant hyperthermia Neg Hx     Prostate cancer Neg Hx     Sickle cell trait Neg Hx     Lupus Neg Hx     Sudden death Neg Hx     Urolithiasis Neg Hx     Colon cancer Neg Hx     Esophageal cancer Neg Hx     Irritable bowel syndrome Neg Hx     Rectal cancer Neg Hx     Stomach cancer Neg Hx     Ulcerative colitis Neg Hx     Celiac disease Neg Hx     Cystic fibrosis Neg Hx      Social History     Tobacco Use    Smoking status: Former Smoker      Packs/day: 1.00     Years: 29.00     Pack years: 29.00     Last attempt to quit: 9/10/1969     Years since quittin.4    Smokeless tobacco: Never Used    Tobacco comment: The patient lives alone and is able to accomplish a usual activities of daily living.  She is not that active due to the severity of her back pain.   Substance Use Topics    Alcohol use: No     Frequency: Never     Drinks per session: Patient refused     Binge frequency: Never    Drug use: No     Review of Systems   Constitutional: Positive for fatigue. Negative for appetite change, diaphoresis and fever.   HENT: Negative for congestion, sore throat and trouble swallowing.    Eyes: Negative for visual disturbance.   Respiratory: Negative for cough and shortness of breath.    Cardiovascular: Negative for chest pain and leg swelling.   Gastrointestinal: Positive for diarrhea. Negative for abdominal pain, blood in stool and vomiting.   Genitourinary: Negative for dysuria.   Musculoskeletal: Negative for arthralgias and myalgias.   Skin: Negative for rash.   Neurological: Negative for syncope, weakness and headaches.   Psychiatric/Behavioral: Negative for confusion.       Physical Exam     Initial Vitals [20]   BP Pulse Resp Temp SpO2   (!) 154/70 68 18 98.1 °F (36.7 °C) 96 %      MAP       --         Physical Exam    Nursing note and vitals reviewed.  Constitutional: She appears well-developed and well-nourished. She is not diaphoretic.   Looks well for her age. Nontoxic.   HENT:   Head: Normocephalic and atraumatic.   Eyes: EOM are normal. Pupils are equal, round, and reactive to light.   Neck: Normal range of motion.   Cardiovascular: Normal rate, regular rhythm and normal heart sounds.   Pulmonary/Chest: Breath sounds normal. No respiratory distress.   Abdominal: Soft. She exhibits no distension. There is no tenderness.   Musculoskeletal: Normal range of motion. She exhibits no edema or tenderness.   Neurological: She is alert  and oriented to person, place, and time.   Skin: Skin is warm and dry. Capillary refill takes less than 2 seconds. No rash noted.   Psychiatric: She has a normal mood and affect.         ED Course   Procedures  Labs Reviewed   CBC W/ AUTO DIFFERENTIAL   COMPREHENSIVE METABOLIC PANEL   MAGNESIUM          Imaging Results    None          Medical Decision Making:   Initial Assessment:   PGY-3 [facility]  Patient mildly hypertensive on arrival, but vitals otherwise stable. No fever and no tachycardia.  She was recently diagnosed with C diff on 01/20/2020 stool sample and was instructed by outpatient GI to the start p.o. vancomycin in outpatient setting.  She was sent to the emergency department because the nursing home is unable to manage patient's with Clostridium difficile/contact precautions.    Of note, another telephone communication mention that the patient was mildly hypokalemic and she was to be started on p.o. potassium.    Patient's exam is unremarkable. She looks well.  No abdominal tenderness.    Screening labs were ordered.  No leukocytosis that would suggest complicated C diff.  Patient has mild/stable anemia.  Electrolytes are all within normal limits.  No HARPREET.    Given home dose of blood pressure medicine as well as nightly benzo for persistent hypertension as well as prevent benzodiazepine withdrawal.    Given 1st dose of p.o. Vancomycin.    Discussed case with Medicine; patient will be admitted to Dr. Jorge Luis Lucero observation team.     Calvin Segovia MD, Emergency Medicine, PGY-3, 9:58 PM 1/23/2020                                  Clinical Impression:       ICD-10-CM ICD-9-CM   1. Clostridium difficile diarrhea A04.72 008.45                             Calvin Segovia MD  Resident  01/23/20 [id number]

## 2020-01-24 NOTE — ASSESSMENT & PLAN NOTE
Euvolemic  - last TTE 11/2019 with #F 65%, moderate LAE, grade 1 DD, CVP 8, PASP 46  - holding lasix 20 mg PO BID for now with GI fluid losses  - c/w BB  - strict I/O, daily weights

## 2020-01-25 LAB
ANION GAP SERPL CALC-SCNC: 9 MMOL/L (ref 8–16)
BASOPHILS # BLD AUTO: 0.03 K/UL (ref 0–0.2)
BASOPHILS NFR BLD: 0.6 % (ref 0–1.9)
BUN SERPL-MCNC: 18 MG/DL (ref 10–30)
CALCIUM SERPL-MCNC: 8.4 MG/DL (ref 8.7–10.5)
CHLORIDE SERPL-SCNC: 109 MMOL/L (ref 95–110)
CO2 SERPL-SCNC: 27 MMOL/L (ref 23–29)
CREAT SERPL-MCNC: 0.8 MG/DL (ref 0.5–1.4)
DIFFERENTIAL METHOD: ABNORMAL
EOSINOPHIL # BLD AUTO: 0.1 K/UL (ref 0–0.5)
EOSINOPHIL NFR BLD: 2.6 % (ref 0–8)
ERYTHROCYTE [DISTWIDTH] IN BLOOD BY AUTOMATED COUNT: 21 % (ref 11.5–14.5)
EST. GFR  (AFRICAN AMERICAN): >60 ML/MIN/1.73 M^2
EST. GFR  (NON AFRICAN AMERICAN): >60 ML/MIN/1.73 M^2
GLUCOSE SERPL-MCNC: 86 MG/DL (ref 70–110)
HCT VFR BLD AUTO: 30 % (ref 37–48.5)
HGB BLD-MCNC: 9 G/DL (ref 12–16)
IMM GRANULOCYTES # BLD AUTO: 0.02 K/UL (ref 0–0.04)
IMM GRANULOCYTES NFR BLD AUTO: 0.4 % (ref 0–0.5)
LYMPHOCYTES # BLD AUTO: 1.3 K/UL (ref 1–4.8)
LYMPHOCYTES NFR BLD: 26 % (ref 18–48)
MCH RBC QN AUTO: 26.1 PG (ref 27–31)
MCHC RBC AUTO-ENTMCNC: 30 G/DL (ref 32–36)
MCV RBC AUTO: 87 FL (ref 82–98)
MONOCYTES # BLD AUTO: 0.3 K/UL (ref 0.3–1)
MONOCYTES NFR BLD: 6.5 % (ref 4–15)
NEUTROPHILS # BLD AUTO: 3.1 K/UL (ref 1.8–7.7)
NEUTROPHILS NFR BLD: 63.9 % (ref 38–73)
NRBC BLD-RTO: 0 /100 WBC
PLATELET # BLD AUTO: 139 K/UL (ref 150–350)
PMV BLD AUTO: 12.8 FL (ref 9.2–12.9)
POTASSIUM SERPL-SCNC: 3.1 MMOL/L (ref 3.5–5.1)
POTASSIUM SERPL-SCNC: 3.8 MMOL/L (ref 3.5–5.1)
RBC # BLD AUTO: 3.45 M/UL (ref 4–5.4)
SODIUM SERPL-SCNC: 145 MMOL/L (ref 136–145)
WBC # BLD AUTO: 4.92 K/UL (ref 3.9–12.7)

## 2020-01-25 PROCEDURE — 36415 COLL VENOUS BLD VENIPUNCTURE: CPT

## 2020-01-25 PROCEDURE — 99225 PR SUBSEQUENT OBSERVATION CARE,LEVEL II: ICD-10-PCS | Mod: ,,, | Performed by: NURSE PRACTITIONER

## 2020-01-25 PROCEDURE — G0378 HOSPITAL OBSERVATION PER HR: HCPCS

## 2020-01-25 PROCEDURE — 25000003 PHARM REV CODE 250: Performed by: NURSE PRACTITIONER

## 2020-01-25 PROCEDURE — 99225 PR SUBSEQUENT OBSERVATION CARE,LEVEL II: CPT | Mod: ,,, | Performed by: NURSE PRACTITIONER

## 2020-01-25 PROCEDURE — 85025 COMPLETE CBC W/AUTO DIFF WBC: CPT

## 2020-01-25 PROCEDURE — 84132 ASSAY OF SERUM POTASSIUM: CPT

## 2020-01-25 PROCEDURE — 25000003 PHARM REV CODE 250: Performed by: PHYSICIAN ASSISTANT

## 2020-01-25 PROCEDURE — 80048 BASIC METABOLIC PNL TOTAL CA: CPT

## 2020-01-25 RX ORDER — METOPROLOL SUCCINATE 25 MG/1
25 TABLET, EXTENDED RELEASE ORAL DAILY
Status: DISCONTINUED | OUTPATIENT
Start: 2020-01-25 | End: 2020-01-26 | Stop reason: HOSPADM

## 2020-01-25 RX ORDER — ACETAMINOPHEN 325 MG/1
650 TABLET ORAL ONCE
Status: COMPLETED | OUTPATIENT
Start: 2020-01-25 | End: 2020-01-25

## 2020-01-25 RX ADMIN — ATORVASTATIN CALCIUM 20 MG: 20 TABLET, FILM COATED ORAL at 09:01

## 2020-01-25 RX ADMIN — POTASSIUM BICARBONATE 50 MEQ: 978 TABLET, EFFERVESCENT ORAL at 12:01

## 2020-01-25 RX ADMIN — Medication 125 MG: at 08:01

## 2020-01-25 RX ADMIN — POTASSIUM BICARBONATE 50 MEQ: 978 TABLET, EFFERVESCENT ORAL at 02:01

## 2020-01-25 RX ADMIN — Medication 125 MG: at 01:01

## 2020-01-25 RX ADMIN — GABAPENTIN 100 MG: 100 CAPSULE ORAL at 02:01

## 2020-01-25 RX ADMIN — SERTRALINE HYDROCHLORIDE 100 MG: 100 TABLET ORAL at 08:01

## 2020-01-25 RX ADMIN — METOPROLOL SUCCINATE 25 MG: 25 TABLET, EXTENDED RELEASE ORAL at 02:01

## 2020-01-25 RX ADMIN — AMLODIPINE BESYLATE 10 MG: 10 TABLET ORAL at 08:01

## 2020-01-25 RX ADMIN — HYDRALAZINE HYDROCHLORIDE 25 MG: 25 TABLET, FILM COATED ORAL at 08:01

## 2020-01-25 RX ADMIN — GABAPENTIN 100 MG: 100 CAPSULE ORAL at 09:01

## 2020-01-25 RX ADMIN — IRBESARTAN 300 MG: 300 TABLET, FILM COATED ORAL at 09:01

## 2020-01-25 RX ADMIN — ASPIRIN 81 MG CHEWABLE TABLET 81 MG: 81 TABLET CHEWABLE at 08:01

## 2020-01-25 RX ADMIN — CLONAZEPAM 0.5 MG: 0.5 TABLET ORAL at 02:01

## 2020-01-25 RX ADMIN — FERROUS SULFATE TAB EC 325 MG (65 MG FE EQUIVALENT) 325 MG: 325 (65 FE) TABLET DELAYED RESPONSE at 09:01

## 2020-01-25 RX ADMIN — HYDRALAZINE HYDROCHLORIDE 25 MG: 25 TABLET, FILM COATED ORAL at 01:01

## 2020-01-25 RX ADMIN — Medication 125 MG: at 12:01

## 2020-01-25 RX ADMIN — LEVOTHYROXINE SODIUM 25 MCG: 25 TABLET ORAL at 06:01

## 2020-01-25 RX ADMIN — ACETAMINOPHEN 650 MG: 325 TABLET ORAL at 02:01

## 2020-01-25 RX ADMIN — Medication 125 MG: at 07:01

## 2020-01-25 RX ADMIN — GABAPENTIN 100 MG: 100 CAPSULE ORAL at 08:01

## 2020-01-25 RX ADMIN — ISOSORBIDE MONONITRATE 90 MG: 60 TABLET, EXTENDED RELEASE ORAL at 12:01

## 2020-01-25 NOTE — ASSESSMENT & PLAN NOTE
H/o diarrhea s/p abx for UTI.  Seen by Dr. Gaming for this (1/10) and C. Diff PCR positive.  Unable to be treated at NH d/t isolation limitations of facility.  - c/w vancomycin PO Q6 hours [id number], end date 2/2/2020  - avoid GI motility agents, acid reducers  - monitor fluid status, supportive care  - isolation precautions  - will need to contact family re: return to NH vs home.  Son has POA.   - PT/OT consulted    Seems to be improving but still resulting in hypokalemia

## 2020-01-25 NOTE — ASSESSMENT & PLAN NOTE
LBBB (left bundle branch block)  - chronic and stable  - [facility] 2012 nonobstructive CAD  - c/w ASA, statin  - h/o LBBB and recent admission for UA 2/2019

## 2020-01-25 NOTE — NURSING
Patient transferred to Observation room 729.  Dinner ordered and delivered.  IV dressing changed.  Continue to monitor for pain, change in mental status and vital signs.

## 2020-01-25 NOTE — ASSESSMENT & PLAN NOTE
Essential hypertension  Hypertensive () on admission despite IV hydralazine 10 mg and home avapro.  Patient reports BP elevated since diarrhea started.  - restart norvasc 10 mg PO daily (now)  - Continue Avapro 300mg PO qHS  - continue toprol XL 25 mg PO daily  - continue Imdur 60mg PO daily  - hydralazine 25 mg PO PRN    BP improved initially. BP increased to 211/93, increased to isosorbide to 90; resumed metoprolol. Currently hold lasix in setting of gi loss and hypokalemia

## 2020-01-25 NOTE — PLAN OF CARE
Patient in contact precautions.   Denies stool since this am.  Some chronic back pain identified.  No falls, uncontrolled pain or signs of infection.

## 2020-01-25 NOTE — SUBJECTIVE & OBJECTIVE
Interval History: Patient seen at bedside. Able to ambulate with min assistance. Had 2 BM yesterday and 1 today thus far. BP elevated resumed metoprolol and increased isosorbide. Currently holding lasix in the setting of diarrhea and hypokalemia.     Review of Systems   Constitutional: Negative for appetite change, chills, diaphoresis, fatigue, fever and unexpected weight change.   HENT: Negative for congestion and rhinorrhea.    Eyes: Negative for photophobia and visual disturbance.   Respiratory: Negative for cough, chest tightness and shortness of breath.    Cardiovascular: Negative for chest pain and palpitations.   Gastrointestinal: Positive for diarrhea. Negative for abdominal pain, nausea and vomiting.   Genitourinary: Negative for difficulty urinating and dysuria.   Musculoskeletal: Positive for gait problem (chronic). Negative for back pain.   Skin: Negative for rash and wound.   Neurological: Negative for dizziness and headaches.   Psychiatric/Behavioral: Negative for agitation and confusion.     Objective:     Vital Signs (Most Recent):  Temp: 96.9 °F (36.1 °C) (01/25/20 1100)  Pulse: 72 (01/25/20 1100)  Resp: 16 (01/25/20 1100)  BP: (!) 190/81 (01/25/20 1100)  SpO2: 97 % (01/25/20 0725) Vital Signs (24h Range):  Temp:  [96.9 °F (36.1 °C)-98.4 °F (36.9 °C)] 96.9 °F (36.1 °C)  Pulse:  [72-77] 72  Resp:  [16-18] 16  SpO2:  [92 %-97 %] 97 %  BP: (164-211)/(70-93) 190/81        Body mass index is 21.24 kg/m².  No intake or output data in the 24 hours ending 01/25/20 1349   Physical Exam   Constitutional: She is oriented to person, place, and time. She appears well-developed and well-nourished.   Sweet, frail, elderly female in NAD   HENT:   Head: Normocephalic and atraumatic.   Eyes: Pupils are equal, round, and reactive to light. EOM are normal.   Neck: Normal range of motion. Neck supple.   Cardiovascular: Normal rate, regular rhythm, normal heart sounds and intact distal pulses.   Pulmonary/Chest: Effort  normal and breath sounds normal. No respiratory distress. She has no wheezes.   Abdominal: Soft. Bowel sounds are normal. She exhibits no distension. There is no tenderness.   Musculoskeletal: Normal range of motion.   Neurological: She is alert and oriented to person, place, and time.   Oriented x4 with intermittent confusion   Skin: Skin is warm and dry.   Psychiatric: She has a normal mood and affect. Her speech is normal and behavior is normal. Judgment and thought content normal. Cognition and memory are impaired (mild).   Nursing note and vitals reviewed.      Significant Labs:   Bilirubin:   Recent Labs   Lab 01/10/20  1100 01/23/20 2041   BILITOT 0.5 0.2     CBC:   Recent Labs   Lab 01/23/20 2041 01/24/20  0331 01/25/20  0528   WBC 4.91 6.63 4.92   HGB 9.0* 9.4* 9.0*   HCT 31.8* 31.2* 30.0*   * 168 139*     CMP:   Recent Labs   Lab 01/23/20 2041 01/24/20  0331 01/24/20  1416 01/25/20  0528    145  --  145   K 3.6 2.7* 3.2* 3.1*    109  --  109   CO2 25 26  --  27   GLU 95 90  --  86   BUN 22 17  --  18   CREATININE 0.9 0.8  --  0.8   CALCIUM 8.7 8.3*  --  8.4*   PROT 6.2  --   --   --    ALBUMIN 3.3*  --   --   --    BILITOT 0.2  --   --   --    ALKPHOS 74  --   --   --    AST 19  --   --   --    ALT 6*  --   --   --    ANIONGAP 10 10  --  9   EGFRNONAA 56.1* >60.0  --  >60.0     Magnesium:   Recent Labs   Lab 01/23/20 2041 01/24/20  0331   MG 2.0 1.8     TSH:   Recent Labs   Lab 11/06/19  1110   TSH 1.532     Urine Studies:   Recent Labs   Lab 01/24/20  0005   COLORU Straw   APPEARANCEUA Clear   PHUR 7.0   SPECGRAV 1.010   PROTEINUA 3+*   GLUCUA Negative   KETONESU Negative   BILIRUBINUA Negative   OCCULTUA Negative   NITRITE Negative   LEUKOCYTESUR 2+*   RBCUA 1   WBCUA 3   BACTERIA Rare   SQUAMEPITHEL 1   HYALINECASTS 0     All pertinent labs within the past 24 hours have been reviewed.    Significant Imaging: I have reviewed all pertinent imaging results/findings within the past 24  hours.

## 2020-01-25 NOTE — ASSESSMENT & PLAN NOTE
Euvolemic  - last TTE 11/2019 with #F 65%, moderate LAE, grade 1 DD, CVP 8, PASP 46  - holding lasix 20 mg PO BID for now with GI fluid losses  - c/w BB  - strict I/O, daily weights    Continue to hold lasix in setting of gi loss and hypokalemia

## 2020-01-25 NOTE — PROGRESS NOTES
"[facility] Hospital Medicine  Progress Note    Patient Name: Arti Olsen  MRN: [id number]  Patient Class: OP- Observation   Admission Date: 1/23/2020  Length of Stay: 0 days  Attending Physician: Jorge Luis Lucero MD  Primary Care Provider: Wong Le MD    [facility] Medicine Team: [facility] MED Y Maryjane Amaya NP    Subjective:     Principal Problem:Clostridium difficile diarrhea    HPI:  Arti Olsen is a 89 y.o. with hypothyroidism, CVA with L resiedual weakness, anxiety, CAD s/p PCI, PAD, HTN, GERD who presents to [facility] ED 1/23 from NH ([facility]) for positive C. Diff PCR.   Arti Olsen is a 89 y.o. female that was brought in by caregiver from nursing home for management of C diff.  History assisted by sitter at the bedside. Patient was seen in the ED 12/2019 for syncope/dehydration 2/2 UTI. She was given CTX and started on cephalexin.  Unclear if diarrhea started after abx regimen (ED note reports prior to), but appears to have worsened after.  Patient reports multiple, black, episodes of diarrhea daily, usually worse in AM.  She reports associated weakness, and loss of appetite but no abd pain, NVD, f/c/sweats.   She was seen by GI 1/10/20 and had stool stuides ordered.  She was C. Diff positive on PCR 1/20 and advised to  vancomycin prescription.  Per ED provider, "When asked why she came to the ED I was put on the phone with 1 of the administrators at her nursing home he said that the nursing home was not equipped to manage contact precautions and that she would was unable to stay there while contagious." Patient is adamant that she return home from here, but states that her son has POA and has "changed the locks".  Patient uses a walker at baseline and has [facility] apul 24/7.      ED: AFVSS, no leukocytosis.  BP elevated to 200s, given home avapro.  Intake labs unremarkable. Given PO vanc and 500 cc IVF.    Overview/Hospital Course:  [facility] since her assisted living stated they would not be able to supply contact isolation. Patient was placed on oral vanc which to be continued for 10 days. Patient is also hypokalemic. EKG stable. Provided supplementation K+ repeat. Ongoing hypertension.     Interval History: Patient seen at bedside. Able to ambulate with min assistance. Had 2 BM yesterday and 1 today thus far. BP elevated resumed metoprolol and increased isosorbide. Currently holding lasix in the setting of diarrhea and hypokalemia.     Review of Systems   Constitutional: Negative for appetite change, chills, diaphoresis, fatigue, fever and unexpected weight change.   HENT: Negative for congestion and rhinorrhea.    Eyes: Negative for photophobia and visual disturbance.   Respiratory: Negative for cough, chest tightness and shortness of breath.    Cardiovascular: Negative for chest pain and palpitations.   Gastrointestinal: Positive for diarrhea. Negative for abdominal pain, nausea and vomiting.   Genitourinary: Negative for difficulty urinating and dysuria.   Musculoskeletal: Positive for gait problem (chronic). Negative for back pain.   Skin: Negative for rash and wound.   Neurological: Negative for dizziness and headaches.   Psychiatric/Behavioral: Negative for agitation and confusion.     Objective:     Vital Signs (Most Recent):  Temp: 96.9 °F (36.1 °C) (01/25/20 1100)  Pulse: 72 (01/25/20 1100)  Resp: 16 (01/25/20 1100)  BP: (!) 190/81 (01/25/20 1100)  SpO2: 97 % (01/25/20 0725) Vital Signs (24h Range):  Temp:  [96.9 °F (36.1 °C)-98.4 °F (36.9 °C)] 96.9 °F (36.1 °C)  Pulse:  [72-77] 72  Resp:  [16-18] 16  SpO2:  [92 %-97 %] 97 %  BP: (164-211)/(70-93) 190/81        Body mass index is 21.24 kg/m².  No intake or output data in the 24 hours ending 01/25/20 1349   Physical Exam   Constitutional: She is oriented to person, place, and time. She appears well-developed and well-nourished.   Sweet, frail,  elderly female in NAD   HENT:   Head: Normocephalic and atraumatic.   Eyes: Pupils are equal, round, and reactive to light. EOM are normal.   Neck: Normal range of motion. Neck supple.   Cardiovascular: Normal rate, regular rhythm, normal heart sounds and intact distal pulses.   Pulmonary/Chest: Effort normal and breath sounds normal. No respiratory distress. She has no wheezes.   Abdominal: Soft. Bowel sounds are normal. She exhibits no distension. There is no tenderness.   Musculoskeletal: Normal range of motion.   Neurological: She is alert and oriented to person, place, and time.   Oriented x4 with intermittent confusion   Skin: Skin is warm and dry.   Psychiatric: She has a normal mood and affect. Her speech is normal and behavior is normal. Judgment and thought content normal. Cognition and memory are impaired (mild).   Nursing note and vitals reviewed.      Significant Labs:   Bilirubin:   Recent Labs   Lab 01/10/20  1100 01/23/20 2041   BILITOT 0.5 0.2     CBC:   Recent Labs   Lab 01/23/20 2041 01/24/20  0331 01/25/20  0528   WBC 4.91 6.63 4.92   HGB 9.0* 9.4* 9.0*   HCT 31.8* 31.2* 30.0*   * 168 139*     CMP:   Recent Labs   Lab 01/23/20 2041 01/24/20  0331 01/24/20  1416 01/25/20  0528    145  --  145   K 3.6 2.7* 3.2* 3.1*    109  --  109   CO2 25 26  --  27   GLU 95 90  --  86   BUN 22 17  --  18   CREATININE 0.9 0.8  --  0.8   CALCIUM 8.7 8.3*  --  8.4*   PROT 6.2  --   --   --    ALBUMIN 3.3*  --   --   --    BILITOT 0.2  --   --   --    ALKPHOS 74  --   --   --    AST 19  --   --   --    ALT 6*  --   --   --    ANIONGAP 10 10  --  9   EGFRNONAA 56.1* >60.0  --  >60.0     Magnesium:   Recent Labs   Lab 01/23/20 2041 01/24/20  0331   MG 2.0 1.8     TSH:   Recent Labs   Lab 11/06/19  1110   TSH 1.532     Urine Studies:   Recent Labs   Lab 01/24/20  0005   COLORU Straw   APPEARANCEUA Clear   PHUR 7.0   SPECGRAV 1.010   PROTEINUA 3+*   GLUCUA Negative   KETONESU Negative    BILIRUBINUA Negative   OCCULTUA Negative   NITRITE Negative   LEUKOCYTESUR 2+*   RBCUA 1   WBCUA 3   BACTERIA Rare   SQUAMEPITHEL 1   HYALINECASTS 0     All pertinent labs within the past 24 hours have been reviewed.    Significant Imaging: I have reviewed all pertinent imaging results/findings within the past 24 hours.      Assessment/Plan:      * Clostridium difficile diarrhea  H/o diarrhea s/p abx for UTI.  Seen by Dr. Gaming for this (1/10) and C. Diff PCR positive.  Unable to be treated at NH d/t isolation limitations of facility.  - c/w vancomycin PO Q6 hours [id number], end date 2/2/2020  - avoid GI motility agents, acid reducers  - monitor fluid status, supportive care  - isolation precautions  - will need to contact family re: return to NH vs home.  Son has POA.   - PT/OT consulted    Seems to be improving but still resulting in hypokalemia     Hypokalemia  EKG stable  Supplementing  Pending 1400 repeat    Hypertensive urgency  Essential hypertension  Hypertensive () on admission despite IV hydralazine 10 mg and home avapro.  Patient reports BP elevated since diarrhea started.  - restart norvasc 10 mg PO daily (now)  - Continue Avapro 300mg PO qHS  - continue toprol XL 25 mg PO daily  - continue Imdur 60mg PO daily  - hydralazine 25 mg PO PRN    BP improved initially. BP increased to 211/93, increased to isosorbide to 90; resumed metoprolol. Currently hold lasix in setting of gi loss and hypokalemia       Chronic diastolic congestive heart failure  Euvolemic  - last TTE 11/2019 with #F 65%, moderate LAE, grade 1 DD, CVP 8, PASP 46  - holding lasix 20 mg PO BID for now with GI fluid losses  - c/w BB  - strict I/O, daily weights    Continue to hold lasix in setting of gi loss and hypokalemia     Iron deficiency anemia  Chronic and stable  - H/H stable, recent iron studies concerning for iron def anemia   - c/w ferrous sulfate 325 mg PO BID (started 1/22)    PAD (peripheral artery disease)  Venous  insufficiency of both lower extremities  Mesenteric artery stenosis  Stable  - c/w ASA, statin  - US arterial BLE 12/2019 with mild PAD    Anxiety  Stable  - Continue Zoloft 50mg PO daily  - Continue Klonopin 0.5mg PO BID prn    Coronary artery disease involving native coronary artery of native heart with angina pectoris  LBBB (left bundle branch block)  - chronic and stable  - [facility] 2012 nonobstructive CAD  - c/w ASA, statin  - h/o LBBB and recent admission for UA 2/2019    Hypothyroid  Stable  - Continue Synthroid 25mcg PO daily      VTE Risk Mitigation (From admission, onward)         Ordered     IP VTE HIGH RISK PATIENT  Once      01/23/20 2220     Place CUONG hose  Until discontinued      01/23/20 2220     Place sequential compression device  Until discontinued      01/23/20 2220                  Maryjane Amaya NP  Department of Hospital Medicine   [facility]

## 2020-01-26 VITALS
HEIGHT: 59 IN | OXYGEN SATURATION: 98 % | BODY MASS INDEX: 21.24 KG/M2 | TEMPERATURE: 98 F | HEART RATE: 68 BPM | DIASTOLIC BLOOD PRESSURE: 57 MMHG | SYSTOLIC BLOOD PRESSURE: 131 MMHG | RESPIRATION RATE: 15 BRPM

## 2020-01-26 LAB
ANION GAP SERPL CALC-SCNC: 7 MMOL/L (ref 8–16)
ANISOCYTOSIS BLD QL SMEAR: SLIGHT
BASOPHILS # BLD AUTO: 0.04 K/UL (ref 0–0.2)
BASOPHILS NFR BLD: 0.8 % (ref 0–1.9)
BUN SERPL-MCNC: 19 MG/DL (ref 10–30)
CALCIUM SERPL-MCNC: 8.5 MG/DL (ref 8.7–10.5)
CHLORIDE SERPL-SCNC: 108 MMOL/L (ref 95–110)
CO2 SERPL-SCNC: 28 MMOL/L (ref 23–29)
CREAT SERPL-MCNC: 0.8 MG/DL (ref 0.5–1.4)
DIFFERENTIAL METHOD: ABNORMAL
EOSINOPHIL # BLD AUTO: 0.1 K/UL (ref 0–0.5)
EOSINOPHIL NFR BLD: 2.8 % (ref 0–8)
ERYTHROCYTE [DISTWIDTH] IN BLOOD BY AUTOMATED COUNT: 21.3 % (ref 11.5–14.5)
EST. GFR  (AFRICAN AMERICAN): >60 ML/MIN/1.73 M^2
EST. GFR  (NON AFRICAN AMERICAN): >60 ML/MIN/1.73 M^2
GLUCOSE SERPL-MCNC: 78 MG/DL (ref 70–110)
HCT VFR BLD AUTO: 29.7 % (ref 37–48.5)
HGB BLD-MCNC: 8.7 G/DL (ref 12–16)
IMM GRANULOCYTES # BLD AUTO: 0.01 K/UL (ref 0–0.04)
IMM GRANULOCYTES NFR BLD AUTO: 0.2 % (ref 0–0.5)
LYMPHOCYTES # BLD AUTO: 1.5 K/UL (ref 1–4.8)
LYMPHOCYTES NFR BLD: 29.8 % (ref 18–48)
MCH RBC QN AUTO: 26 PG (ref 27–31)
MCHC RBC AUTO-ENTMCNC: 29.3 G/DL (ref 32–36)
MCV RBC AUTO: 89 FL (ref 82–98)
MONOCYTES # BLD AUTO: 0.3 K/UL (ref 0.3–1)
MONOCYTES NFR BLD: 6.5 % (ref 4–15)
NEUTROPHILS # BLD AUTO: 3 K/UL (ref 1.8–7.7)
NEUTROPHILS NFR BLD: 59.9 % (ref 38–73)
NRBC BLD-RTO: 0 /100 WBC
OVALOCYTES BLD QL SMEAR: ABNORMAL
PLATELET # BLD AUTO: 188 K/UL (ref 150–350)
PLATELET BLD QL SMEAR: ABNORMAL
PMV BLD AUTO: 12.5 FL (ref 9.2–12.9)
POIKILOCYTOSIS BLD QL SMEAR: SLIGHT
POTASSIUM SERPL-SCNC: 3.7 MMOL/L (ref 3.5–5.1)
RBC # BLD AUTO: 3.35 M/UL (ref 4–5.4)
SODIUM SERPL-SCNC: 143 MMOL/L (ref 136–145)
WBC # BLD AUTO: 5.04 K/UL (ref 3.9–12.7)

## 2020-01-26 PROCEDURE — G0378 HOSPITAL OBSERVATION PER HR: HCPCS

## 2020-01-26 PROCEDURE — 85025 COMPLETE CBC W/AUTO DIFF WBC: CPT

## 2020-01-26 PROCEDURE — 99225 PR SUBSEQUENT OBSERVATION CARE,LEVEL II: CPT | Mod: ,,, | Performed by: NURSE PRACTITIONER

## 2020-01-26 PROCEDURE — 94761 N-INVAS EAR/PLS OXIMETRY MLT: CPT

## 2020-01-26 PROCEDURE — 80048 BASIC METABOLIC PNL TOTAL CA: CPT

## 2020-01-26 PROCEDURE — 25000003 PHARM REV CODE 250: Performed by: PHYSICIAN ASSISTANT

## 2020-01-26 PROCEDURE — 99225 PR SUBSEQUENT OBSERVATION CARE,LEVEL II: ICD-10-PCS | Mod: ,,, | Performed by: NURSE PRACTITIONER

## 2020-01-26 PROCEDURE — 25000003 PHARM REV CODE 250: Performed by: NURSE PRACTITIONER

## 2020-01-26 PROCEDURE — 36415 COLL VENOUS BLD VENIPUNCTURE: CPT

## 2020-01-26 RX ORDER — AMLODIPINE BESYLATE 10 MG/1
10 TABLET ORAL DAILY
Qty: 30 TABLET | Refills: 11 | Status: SHIPPED | OUTPATIENT
Start: 2020-01-27 | End: 2021-01-01 | Stop reason: SDUPTHER

## 2020-01-26 RX ADMIN — GABAPENTIN 100 MG: 100 CAPSULE ORAL at 08:01

## 2020-01-26 RX ADMIN — ASPIRIN 81 MG CHEWABLE TABLET 81 MG: 81 TABLET CHEWABLE at 08:01

## 2020-01-26 RX ADMIN — Medication 125 MG: at 12:01

## 2020-01-26 RX ADMIN — SERTRALINE HYDROCHLORIDE 100 MG: 100 TABLET ORAL at 08:01

## 2020-01-26 RX ADMIN — ACETAMINOPHEN 650 MG: 325 TABLET ORAL at 06:01

## 2020-01-26 RX ADMIN — HYDRALAZINE HYDROCHLORIDE 25 MG: 25 TABLET, FILM COATED ORAL at 06:01

## 2020-01-26 RX ADMIN — Medication 125 MG: at 01:01

## 2020-01-26 RX ADMIN — METOPROLOL SUCCINATE 25 MG: 25 TABLET, EXTENDED RELEASE ORAL at 08:01

## 2020-01-26 RX ADMIN — ISOSORBIDE MONONITRATE 90 MG: 60 TABLET, EXTENDED RELEASE ORAL at 08:01

## 2020-01-26 RX ADMIN — Medication 125 MG: at 06:01

## 2020-01-26 RX ADMIN — FERROUS SULFATE TAB EC 325 MG (65 MG FE EQUIVALENT) 325 MG: 325 (65 FE) TABLET DELAYED RESPONSE at 08:01

## 2020-01-26 RX ADMIN — AMLODIPINE BESYLATE 10 MG: 10 TABLET ORAL at 08:01

## 2020-01-26 RX ADMIN — LEVOTHYROXINE SODIUM 25 MCG: 25 TABLET ORAL at 06:01

## 2020-01-26 NOTE — ASSESSMENT & PLAN NOTE
H/o diarrhea s/p abx for UTI.  Seen by Dr. Gaming for this (1/10) and C. Diff PCR positive.  Unable to be treated at NH d/t isolation limitations of facility.  - c/w vancomycin PO Q6 hours [id number], end date 2/2/2020  - avoid GI motility agents, acid reducers  - monitor fluid status, supportive care  - isolation precautions  - will need to contact family re: return to NH vs home.  Son has POA.   - PT/OT consulted    Only 1 loose stool yesterday, hypokalemia has resolved. Discussed precautions with patient and sitter, provided education

## 2020-01-26 NOTE — ASSESSMENT & PLAN NOTE
Essential hypertension  Hypertensive () on admission despite IV hydralazine 10 mg and home avapro.  Patient reports BP elevated since diarrhea started.  - restart norvasc 10 mg PO daily (now)  - Continue Avapro 300mg PO qHS  - continue toprol XL 25 mg PO daily  - continue Imdur 60mg PO daily  - hydralazine 25 mg PO PRN    BP greatly improved with starting metoprolol and increasing isosorbide.

## 2020-01-26 NOTE — ASSESSMENT & PLAN NOTE
Euvolemic  - last TTE 11/2019 with #F 65%, moderate LAE, grade 1 DD, CVP 8, PASP 46  - holding lasix 20 mg PO BID for now with GI fluid losses  - c/w BB  - strict I/O, daily weights    Continue to hold lasix in setting of gi loss and hypokalemia; resume at AL on MWF then daily

## 2020-01-26 NOTE — ASSESSMENT & PLAN NOTE
LBBB (left bundle branch block)  - chronic and stable  - [facility] 2012 nonobstructive CAD  - c/w ASA, statin  - h/o LBBB and recent admission for UA 2/2019

## 2020-01-26 NOTE — NURSING
Patient awake, alert and responsive. Vitals stable.   No distress , unlabored breathing.  Able to ambulate with stable gait.    Sitter at the bedside.  Discharge instructions to be given and explained to patient.

## 2020-01-26 NOTE — DISCHARGE SUMMARY
"[facility] Hospital Medicine  Discharge Summary      Patient Name: Arti Olsen  MRN: [id number]  Admission Date: 1/23/2020  Hospital Length of Stay: 0 days  Discharge Date and Time:  01/26/2020 1:22 PM  Attending Physician: Jorge Luis Lucero MD   Discharging Provider: Maryjane Amaya NP  Primary Care Provider: Wong Le MD  [facility] Medicine Team: [facility] HOSP MED Y Maryjane Amaya NP    HPI:   Arti Olsen is a 89 y.o. with hypothyroidism, CVA with L resiedual weakness, anxiety, CAD s/p PCI, PAD, HTN, GERD who presents to [facility] ED 1/23 from NH ([facility]) for positive C. Diff PCR.   Arti Olsen is a 89 y.o. female that was brought in by caregiver from nursing home for management of C diff.  History assisted by sitter at the bedside. Patient was seen in the ED 12/2019 for syncope/dehydration 2/2 UTI. She was given CTX and started on cephalexin.  Unclear if diarrhea started after abx regimen (ED note reports prior to), but appears to have worsened after.  Patient reports multiple, black, episodes of diarrhea daily, usually worse in AM.  She reports associated weakness, and loss of appetite but no abd pain, NVD, f/c/sweats.   She was seen by GI 1/10/20 and had stool stuides ordered.  She was C. Diff positive on PCR 1/20 and advised to  vancomycin prescription.  Per ED provider, "When asked why she came to the ED I was put on the phone with 1 of the administrators at her nursing home he said that the nursing home was not equipped to manage contact precautions and that she would was unable to stay there while contagious." Patient is adamant that she return home from here, but states that her son has POA and has "changed the locks".  Patient uses a walker at baseline and has [facility] paul 24/7.      ED: AFVSS, no leukocytosis.  BP elevated to 200s, given home avapro.  Intake labs unremarkable. Given PO vanc and 500 cc IVF.    * No surgery found *      Hospital Course: "   Patient placed in observation for cdiff. She was transferred here since her assisted living stated they would not be able to supply contact isolation. Patient was placed on oral vanc which to be continued for 10 days. Patient is also hypokalemic. EKG stable. Provided supplementation K+, after supplementation has remain stable. HTN improved once making adjustments. Resumed metoprolol and increased isosorbide. Patient being discharged back to [facility] with [facility]. CM/SW oncall verified with [facility] that the oral vanc was received. Sitter is providing ride back to [facility]. Reviewed contact precautions with sitter that include to use bleach products on bathroom surfaces between uses.      Consults:   Consults (From admission, onward)        Status Ordering Provider     Inpatient consult to PICC team (HILARIO)  Once     Provider:  (Not yet assigned)    Acknowledged GEORGINA DELUCA          * Clostridium difficile diarrhea  H/o diarrhea s/p abx for UTI.  Seen by Dr. Gaming for this (1/10) and C. Diff PCR positive.  Unable to be treated at NH d/t isolation limitations of facility.  - c/w vancomycin PO Q6 hours [id number], end date 2/2/2020  - avoid GI motility agents, acid reducers  - monitor fluid status, supportive care  - isolation precautions  - will need to contact family re: return to NH vs home.  Son has POA.   - PT/OT consulted    Only 1 loose stool yesterday, hypokalemia has resolved. Discussed precautions with patient and sitter, provided education     Hypokalemia  EKG stable  Supplementing  Pending 1400 repeat    resolved    Hypertensive urgency  Essential hypertension  Hypertensive () on admission despite IV hydralazine 10 mg and home avapro.  Patient reports BP elevated since diarrhea started.  - restart norvasc 10 mg PO daily (now)  - Continue Avapro 300mg PO qHS  - continue toprol XL 25 mg PO daily  - continue Imdur 60mg PO daily  - hydralazine 25 mg PO PRN    BP greatly improved with starting  metoprolol and increasing isosorbide.     Chronic diastolic congestive heart failure  Euvolemic  - last TTE 11/2019 with #F 65%, moderate LAE, grade 1 DD, CVP 8, PASP 46  - holding lasix 20 mg PO BID for now with GI fluid losses  - c/w BB  - strict I/O, daily weights    Continue to hold lasix in setting of gi loss and hypokalemia; resume at AL on MWF then daily    Iron deficiency anemia  Chronic and stable  - H/H stable, recent iron studies concerning for iron def anemia   - c/w ferrous sulfate 325 mg PO BID (started 1/22)    PAD (peripheral artery disease)  Venous insufficiency of both lower extremities  Mesenteric artery stenosis  Stable  - c/w ASA, statin  - US arterial BLE 12/2019 with mild PAD    Anxiety  Stable  - Continue Zoloft 50mg PO daily  - Continue Klonopin 0.5mg PO BID prn    Coronary artery disease involving native coronary artery of native heart with angina pectoris  LBBB (left bundle branch block)  - chronic and stable  - [facility] 2012 nonobstructive CAD  - c/w ASA, statin  - h/o LBBB and recent admission for UA 2/2019    Hypothyroid  Stable  - Continue Synthroid 25mcg PO daily      Final Active Diagnoses:    Diagnosis Date Noted POA    PRINCIPAL PROBLEM:  Clostridium difficile diarrhea [A04.72] 01/23/2020 Yes    Hypokalemia [E87.6] 01/24/2020 Yes    Hypertensive urgency [I16.0] 01/23/2020 Unknown    Chronic diastolic congestive heart failure [I50.32] 01/13/2020 Yes    Essential hypertension [I10] 11/15/2016 Yes     Chronic    Mesenteric artery stenosis [K55.1] 09/15/2015 Yes    Iron deficiency anemia [D50.9] 08/19/2014 Yes    LBBB (left bundle branch block) [I44.7] 11/13/2012 Yes    Venous insufficiency of both lower extremities [I87.2] 11/11/2012 Yes    PAD (peripheral artery disease) [I73.9] 11/11/2012 Yes     Chronic    Coronary artery disease involving native coronary artery of native heart with angina pectoris [I25.119] 11/11/2012 Yes     Chronic    Anxiety [F41.9] 11/11/2012 Yes      Chronic    Hypothyroid [E03.9] 10/30/2012 Yes     Chronic      Problems Resolved During this Admission:       Discharged Condition: stable    Disposition: Home or Self Care    Follow Up:  Follow-up Information     Wong Le MD In 2 weeks.    Specialty:  Family Medicine  Contact information:  [address]  [facility] [address]  [phone]                 Patient Instructions:      Diet Cardiac     No driving until:     Notify your health care provider if you experience any of the following:  temperature >100.4     Notify your health care provider if you experience any of the following:  persistent nausea and vomiting or diarrhea     Notify your health care provider if you experience any of the following:  severe uncontrolled pain     Notify your health care provider if you experience any of the following:  increased confusion or weakness     Notify your health care provider if you experience any of the following:  persistent dizziness, light-headedness, or visual disturbances     Notify your health care provider if you experience any of the following:  worsening rash     Notify your health care provider if you experience any of the following:  severe persistent headache     Notify your health care provider if you experience any of the following:  difficulty breathing or increased cough     Activity as tolerated       Significant Diagnostic Studies: Labs:   BMP:   Recent Labs   Lab 01/25/20  0528 01/25/20  1337 01/26/20  0517   GLU 86  --  78     --  143   K 3.1* 3.8 3.7     --  108   CO2 27  --  28   BUN 18  --  19   CREATININE 0.8  --  0.8   CALCIUM 8.4*  --  8.5*   , CBC   Recent Labs   Lab 01/25/20  0528 01/26/20  0518   WBC 4.92 5.04   HGB 9.0* 8.7*   HCT 30.0* 29.7*   * 188    and All labs within the past 24 hours have been reviewed    Pending Diagnostic Studies:     None         Medications:  Reconciled Home Medications:      Medication List      START taking these  medications    amLODIPine 10 MG tablet  Commonly known as:  NORVASC  Take 1 tablet (10 mg total) by mouth once daily.  Start taking on:  January 27, 2020     vancomycin 250mg / 10ml Susp  Take 5 mLs (125 mg total) by mouth every 6 (six) hours. for 8 days  Replaces:  vancomycin 125 MG capsule        CONTINUE taking these medications    acetaminophen 500 MG tablet  Commonly known as:  TYLENOL  Take 500 mg by mouth daily as needed.     ALPRAZolam 0.25 MG tablet  Commonly known as:  XANAX  Take 0.5 tablets (0.125 mg total) by mouth nightly as needed for Insomnia or Anxiety.     ALUM-MAG HYDROXIDE-SIMETH ORAL  Take by mouth daily as needed.     aspirin 81 MG Chew  Take 81 mg by mouth once daily.     atorvastatin 40 MG tablet  Commonly known as:  LIPITOR  Take 1 tablet (40 mg total) by mouth every evening. TAKE (1/2) HALF BY MOUTH ONCE A DAY     bisacodyl 5 mg EC tablet  Commonly known as:  DULCOLAX  Take 5 mg by mouth daily as needed for Constipation.     cholecalciferol (vitamin D3) 25 mcg (1,000 unit) capsule  Commonly known as:  VITAMIN D3  Take 1 capsule (1,000 Units total) by mouth once daily.     * ferrous sulfate 325 mg (65 mg iron) Tab tablet  Commonly known as:  FEOSOL  Take 1 tablet (325 mg total) by mouth every 12 (twelve) hours.     * ferrous sulfate 325 mg (65 mg iron) Tab tablet  Commonly known as:  FEOSOL  Take 1 tablet (325 mg total) by mouth every 12 (twelve) hours.     furosemide 20 MG tablet  Commonly known as:  LASIX  Take 1 tablet (20 mg total) by mouth 2 (two) times daily. Start with 3 days per week but OK to use more often or extra for increased leg swelling     gabapentin 300 MG capsule  Commonly known as:  NEURONTIN  Take 1 capsule (300 mg total) by mouth every evening.     irbesartan 300 MG tablet  Commonly known as:  AVAPRO  Take 1 tablet (300 mg total) by mouth every evening.     isosorbide mononitrate 60 MG 24 hr tablet  Commonly known as:  IMDUR  Take 1 tablet (60 mg total) by mouth once  daily.     levothyroxine 25 MCG tablet  Commonly known as:  SYNTHROID  Take 1 tablet (25 mcg total) by mouth once daily.     metoprolol succinate 25 MG 24 hr tablet  Commonly known as:  TOPROL-XL  Take 1 tablet (25 mg total) by mouth once daily.     nitroGLYCERIN 0.4 MG SL tablet  Commonly known as:  NITROSTAT  Place 1 tablet (0.4 mg total) under the tongue every 5 (five) minutes as needed for Chest pain.     Ocuvite Adult 50 Plus 250-5-1 mg Cap  Generic drug:  C,E,zinc,copper 11-omega3s-lut  Take by mouth.     OCUVITE ORAL  Take 1 tablet by mouth once daily.     oxyCODONE-acetaminophen 7.5-325 mg per tablet  Commonly known as:  PERCOCET  1 tablet every 4 (four) hours as needed.     pantoprazole 40 MG tablet  Commonly known as:  PROTONIX  Take 1 tablet (40 mg total) by mouth once daily.     potassium chloride SA 20 MEQ tablet  Commonly known as:  K-DUR,KLOR-CON  Take 1 tablet (20 mEq total) by mouth once daily.     psyllium husk (with sugar) 2.5 gram Wafr  Commonly known as:  Metamucil Fiber Thin  Take 2 Wafers by mouth every 12 (twelve) hours.     QUEtiapine 25 MG Tab  Commonly known as:  SEROQUEL  Take 1 tablet (25 mg total) by mouth 2 (two) times daily as needed (anxiety, insomnia).     sertraline 100 MG tablet  Commonly known as:  ZOLOFT  Take 1 tablet (100 mg total) by mouth once daily.         * This list has 2 medication(s) that are the same as other medications prescribed for you. Read the directions carefully, and ask your doctor or other care provider to review them with you.            STOP taking these medications    Adult Robitussin Peak Cold DM  mg/5 mL Liqd  Generic drug:  dextromethorphan-guaifenesin     docusate sodium 100 MG capsule  Commonly known as:  COLACE     loperamide 2 mg Tab  Commonly known as:  IMODIUM A-D     vancomycin 125 MG capsule  Commonly known as:  VANCOCIN  Replaced by:  vancomycin 250mg / 10ml Susp     zolpidem 5 MG Tab  Commonly known as:  AMBIEN            Indwelling  Lines/Drains at time of discharge:   Lines/Drains/Airways     None                 Time spent on the discharge of patient: 30 minutes  Patient was seen and examined on the date of discharge and determined to be suitable for discharge.         Maryjane Amaya NP  Department of Hospital Medicine  [facility]

## 2020-01-26 NOTE — PLAN OF CARE
"   [facility]     Department of Hospital Medicine     [address] [place] [address]     [phone] after hours  [phone] fax       NURSING HOME ORDERS    01/26/2020    Admit to Nursing Home:  Regular Bed      Diagnoses:  Active Hospital Problems    Diagnosis  POA    *Clostridium difficile diarrhea [A04.72]  Yes    Hypokalemia [E87.6]  Yes    Hypertensive urgency [I16.0]  Unknown    Chronic diastolic congestive heart failure [I50.32]  Yes    Essential hypertension [I10]  Yes     Chronic    Mesenteric artery stenosis [K55.1]  Yes    Iron deficiency anemia [D50.9]  Yes    LBBB (left bundle branch block) [I44.7]  Yes    Venous insufficiency of both lower extremities [I87.2]  Yes    PAD (peripheral artery disease) [I73.9]  Yes     Chronic    Coronary artery disease involving native coronary artery of native heart with angina pectoris [I25.119]  Yes     Chronic     MINIMAL      Anxiety [F41.9]  Yes     Chronic    Hypothyroid [E03.9]  Yes     Chronic      Resolved Hospital Problems   No resolved problems to display.       Patient is homebound due to:  Clostridium difficile diarrhea    Allergies:  Review of patient's allergies indicates:   Allergen Reactions    Iodinated contrast media Swelling    Reglan [metoclopramide] Other (See Comments)     "Body shaking"    Sulfa (sulfonamide antibiotics)      Yrs ago    Augmentin [amoxicillin-pot clavulanate] Diarrhea       Vitals:       Once weekly       Diet: Cardiac diet, 1500ml fluid restriction     Acitivities:    - Up in a chair each morning as tolerated   - Ambulate with assistance to bathroom   - Scheduled walks once each shift (every 8 hours)      LABS:  Per facility protocol   CMP, CBC each month for 3 months   Pre-albumin each month for 3 months   TSH every year     Nursing Precautions:     - Aspiration precautions:             - Total assistance with meals            -  Upright 90 degrees befor " during and after meals             -  Suction at bedside          - Fall precautions per nursing home protocol   - Decubitus precautions:        -  for positioning   - Pressure reducing foam mattress   - Turn patient every two hours. Use wedge pillows to anchor patient        MISCELLANEOUS CARE:       Routine Skin for Bedridden Patients:  Apply moisture barrier cream to all    skin folds and wet areas in perineal area daily and after baths and                           all bowel movements.        Medications: Discontinue all previous medication orders, if any. See new list below.     Arti Olsen AMELIE   Home Medication Instructions STU:[id number]    Printed on:01/26/20 3416   Medication Information                      acetaminophen (TYLENOL) 500 MG tablet  Take 500 mg by mouth daily as needed.              ALPRAZolam (XANAX) 0.25 MG tablet  Take 0.5 tablets (0.125 mg total) by mouth nightly as needed for Insomnia or Anxiety.             amLODIPine (NORVASC) 10 MG tablet  Take 1 tablet (10 mg total) by mouth once daily.             aspirin 81 MG Chew  Take 81 mg by mouth once daily.             atorvastatin (LIPITOR) 40 MG tablet  Take 1 tablet (40 mg total) by mouth every evening. TAKE (1/2) HALF BY MOUTH ONCE A DAY             bisacodyl (DULCOLAX) 5 mg EC tablet  Take 5 mg by mouth daily as needed for Constipation.             C,E,zinc,copper 11-omega3s-lut (OCUVITE ADULT 50 PLUS) 250-5-1 mg Cap  Take by mouth.             cholecalciferol, vitamin D3, (VITAMIN D3) 1,000 unit capsule  Take 1 capsule (1,000 Units total) by mouth once daily.             ferrous sulfate (FEOSOL) 325 mg (65 mg iron) Tab tablet  Take 1 tablet (325 mg total) by mouth every 12 (twelve) hours.             ferrous sulfate (FEOSOL) 325 mg (65 mg iron) Tab tablet  Take 1 tablet (325 mg total) by mouth every 12 (twelve) hours.             furosemide (LASIX) 20 MG tablet  Take 1 tablet (20 mg total) by mouth 2 (two) times daily. Start  with 3 days per week but OK to use more often or extra for increased leg swelling             gabapentin (NEURONTIN) 300 MG capsule  Take 1 capsule (300 mg total) by mouth every evening.             irbesartan (AVAPRO) 300 MG tablet  Take 1 tablet (300 mg total) by mouth every evening.             isosorbide mononitrate (IMDUR) 60 MG 24 hr tablet  Take 1 tablet (60 mg total) by mouth once daily.             levothyroxine (SYNTHROID) 25 MCG tablet  Take 1 tablet (25 mcg total) by mouth once daily.             mag hydrox/aluminum hyd/simeth (ALUM-MAG HYDROXIDE-SIMETH ORAL)  Take by mouth daily as needed.              metoprolol succinate (TOPROL-XL) 25 MG 24 hr tablet  Take 1 tablet (25 mg total) by mouth once daily.             nitroGLYCERIN (NITROSTAT) 0.4 MG SL tablet  Place 1 tablet (0.4 mg total) under the tongue every 5 (five) minutes as needed for Chest pain.             oxyCODONE-acetaminophen (PERCOCET) 7.5-325 mg per tablet  1 tablet every 4 (four) hours as needed.              pantoprazole (PROTONIX) 40 MG tablet  Take 1 tablet (40 mg total) by mouth once daily.             potassium chloride SA (K-DUR,KLOR-CON) 20 MEQ tablet  Take 1 tablet (20 mEq total) by mouth once daily.             psyllium husk, with sugar, (METAMUCIL FIBER THIN) 2.5 gram Wafr  Take 2 Wafers by mouth every 12 (twelve) hours.             QUEtiapine (SEROQUEL) 25 MG Tab  Take 1 tablet (25 mg total) by mouth 2 (two) times daily as needed (anxiety, insomnia).             sertraline (ZOLOFT) 100 MG tablet  Take 1 tablet (100 mg total) by mouth once daily.             vancomycin 250mg / 10ml Susp  Take 5 mLs (125 mg total) by mouth every 6 (six) hours. for 8 days  END date 2/2/2020           VIT C/VIT E/LUTEIN/MIN/OMEGA-3 (OCUVITE ORAL)  Take 1 tablet by mouth once daily.                   _________________________________  Maryjane Amaya, MINDY  01/26/2020

## 2020-01-31 ENCOUNTER — PATIENT OUTREACH (OUTPATIENT)
Dept: ADMINISTRATIVE | Facility: OTHER | Age: 85
End: 2020-01-31

## 2020-02-03 ENCOUNTER — OFFICE VISIT (OUTPATIENT)
Dept: PULMONOLOGY | Facility: CLINIC | Age: 85
End: 2020-02-03
Payer: MEDICARE

## 2020-02-03 ENCOUNTER — HOSPITAL ENCOUNTER (OUTPATIENT)
Dept: RADIOLOGY | Facility: HOSPITAL | Age: 85
Discharge: HOME | End: 2020-02-03
Attending: EMERGENCY MEDICINE
Payer: MEDICARE

## 2020-02-03 VITALS
WEIGHT: 105 LBS | DIASTOLIC BLOOD PRESSURE: 60 MMHG | HEART RATE: 58 BPM | OXYGEN SATURATION: 98 % | SYSTOLIC BLOOD PRESSURE: 130 MMHG | BODY MASS INDEX: 21.17 KG/M2 | HEIGHT: 59 IN

## 2020-02-03 DIAGNOSIS — R93.89 ABNORMAL CHEST CT: ICD-10-CM

## 2020-02-03 DIAGNOSIS — R93.89 ABNORMAL CHEST CT: Primary | ICD-10-CM

## 2020-02-03 PROCEDURE — 99999 PR PBB SHADOW E&M-EST. PATIENT-LVL III: ICD-10-PCS | Mod: PBBFAC,,, | Performed by: EMERGENCY MEDICINE

## 2020-02-03 PROCEDURE — 99213 OFFICE O/P EST LOW 20 MIN: CPT | Mod: PBBFAC,25 | Performed by: EMERGENCY MEDICINE

## 2020-02-03 PROCEDURE — 99204 OFFICE O/P NEW MOD 45 MIN: CPT | Mod: S$PBB,,, | Performed by: EMERGENCY MEDICINE

## 2020-02-03 PROCEDURE — 99204 PR OFFICE/OUTPT VISIT, NEW, LEVL IV, 45-59 MIN: ICD-10-PCS | Mod: S$PBB,,, | Performed by: EMERGENCY MEDICINE

## 2020-02-03 PROCEDURE — 99999 PR PBB SHADOW E&M-EST. PATIENT-LVL III: CPT | Mod: PBBFAC,,, | Performed by: EMERGENCY MEDICINE

## 2020-02-03 PROCEDURE — 71250 CT THORAX DX C-: CPT | Mod: TC

## 2020-02-03 PROCEDURE — 71250 CT CHEST WITHOUT CONTRAST: ICD-10-PCS | Mod: 26,,, | Performed by: RADIOLOGY

## 2020-02-03 PROCEDURE — 71250 CT THORAX DX C-: CPT | Mod: 26,,, | Performed by: RADIOLOGY

## 2020-02-03 RX ORDER — VANCOMYCIN HYDROCHLORIDE 1 G/20ML
INJECTION, POWDER, LYOPHILIZED, FOR SOLUTION INTRAVENOUS
COMMUNITY
Start: 2020-01-25 | End: 2021-01-01

## 2020-02-03 NOTE — LETTER
February 3, 2020      Wong Le MD  [address]  [facility] [address]           [facility]  [address]  [facility] [address]  Phone: [phone]          Patient: Arti Olsen   MR Number: [id number]   YOB: 1928   Date of Visit: 2/3/2020       Dear Dr. Wong Le:    Thank you for referring Arti Olsen to me for evaluation. Attached you will find relevant portions of my assessment and plan of care.    If you have questions, please do not hesitate to call me. I look forward to following Arti Olsen along with you.    Sincerely,    Peter Manzano MD    Enclosure  CC:  No Recipients    If you would like to receive this communication electronically, please contact [email or web address] or [phone] to request more information on Home Inventory S[pecialists Link access.    For providers and/or their staff who would like to refer a patient to [facility], please contact us through our one-stop-shop provider referral line, [facility], at [phone].    If you feel you have received this communication in error or would no longer like to receive these types of communications, please e-mail [email or web address]

## 2020-02-03 NOTE — PROGRESS NOTES
"Pulmonary & Critical Care Medicine   Clinic Note    Reason for Consultation: Abnormal CXR     HPI:  89 y.o. with hypothyroidism, CVA with L resiedual weakness, anxiety, CAD s/p PCI, PAD, HTN, GERD.  Currently lives at [facility]. Requires assistance, but ambulatory with use of walker. Recent admission for increased diarrhea and weakness. +C-diff and initiated on PO vanc. Slowly improving. Had chest imaging 2019 for reported chest pain and there was a questionable abnl of the paratracheal stripe. She has been sent to me to further evaluate.. She has no pulmonary symptoms.     Sitter present with her today. Has 2 children. Son (70's) from which she is estranged and poor relationship. Daughter (66) who is a [place] and recently had what sounds like a debilitating CVA. . Lived last 50+ years in [facility] before NH placement.     She is very present and engaging. 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    Exposure to Animals/Pets: None   Travel History: No recent travel.   History of exposures to TB: Denies  Family History of Lung Cancer: Not sure. 6 brothers  young mostly from "heart"   Childhood history of Lung Disease: Denies   Recurrent PNA- none  Chest trauma/surgeries- None   Tobacco 25Pk/yr- quit in he 60's.   Had bronchitis in past, but never "other lung problems"     Past Medical History:   Diagnosis Date    Acid reflux     chronic    Anemia 2014    Arthritis     osetoarthritis    Clotting disorder     Colon polyp     Coronary artery disease     Nonobstructive CAD per [facility]    History of colonoscopy     1 polyp noted in     Hyperlipemia     Hypertension     Migraine headache     chronic    Peripheral vascular disease     PONV (postoperative nausea and vomiting)     Renal artery atherosclerosis 2012    Spinal stenosis     Thyroid disease      Past Surgical " "History:   Procedure Laterality Date    acf      ANTERIOR CERVICAL DISCECTOMY W/ FUSION      APPENDECTOMY  1942    BLADDER SURGERY  1958    suspension    COLONOSCOPY W/ POLYPECTOMY      CYST REMOVAL  1983    removed from scalp    DISC REMOVAL  1983    EYE SURGERY      cataracts removed    HYSTERECTOMY  1959    COMPLETE    JOINT REPLACEMENT  2006    repair 3rd toe    JOINT REPLACEMENT  2007    right/left rotator    SPINE SURGERY  02/2014    relieve pressure on nerves    TONSILLECTOMY, ADENOIDECTOMY  1930's    UTERINE FIBROID SURGERY  1958    VARICOSE VEIN SURGERY  1958     Family/Social History: Reviewed and updated.      Drug Allergies:   Review of patient's allergies indicates:   Allergen Reactions    Iodinated contrast media Swelling    Reglan [metoclopramide] Other (See Comments)     "Body shaking"    Sulfa (sulfonamide antibiotics)      Yrs ago    Augmentin [amoxicillin-pot clavulanate] Diarrhea         Review of Systems:   Constitutional: Negative for fever, chills, diaphoresis, activity change, appetite change and fatigue.   HENT: Negative for congestion, drooling, facial swelling, hearing loss, rhinorrhea, sinus pressure, tinnitus, trouble swallowing and voice change.    Eyes: Negative for photophobia, pain and visual disturbance.   Respiratory: Negative for cough, chest tightness and shortness of breath.    Cardiovascular: Negative for chest pain, palpitations + edema.. Improved.   Gastrointestinal: Negative for nausea, vomiting, and abdominal distention. + Abd cramping and diarrhea.   Endocrine: Negative for cold intolerance, heat intolerance, polydipsia and polyuria.   Genitourinary: Negative for dysuria, frequency and hematuria.   Musculoskeletal: Negative for myalgias, back pain, arthralgias, neck pain and neck stiffness.   Skin: Negative for color change, pallor, rash and wound.   Allergic/Immunologic: Negative for immunocompromised state.   Neurological: Negative for dizziness and " "headaches.  + weakness per HPI   A comprehensive 12-point review of systems was performed, and is negative except for those items mentioned above in the HPI section of this note.     Vital Signs:  /60 (BP Location: Right arm, Patient Position: Sitting)   Pulse (!) 58   Ht 4' 11" (1.499 m)   Wt 47.6 kg (105 lb)   LMP  (LMP Unknown)   SpO2 98%   BMI 21.21 kg/m²          Physical Exam:   GEN- NAD AAOx3\ Frail/elderly Very pleasant    HEENT- ATNC, PERRLA, EOMI, OP-Cl. No JVD, LAD or bruit noted. Trachea Midline.   CV- RRR No R/G 2/6 late systolic M near apex.   RESP- CTA-Bilateral   GI- S/NT/ND. Positive BS X 4. No HSM Noted  Ext- MAEW, No deformity. 1+ edema bilateral LE. No asymmetry. Walking shoe right. Toes with deformity.      Personal Review and Summary of Prior Diagnostics    Laboratory Studies: Reviewed     WBC 3.90 - 12.70 K/uL 5.04    RBC 4.00 - 5.40 M/uL 3.35Low     Hemoglobin 12.0 - 16.0 g/dL 8.7Low     Hematocrit 37.0 - 48.5 % 29.7Low     Mean Corpuscular Volume 82 - 98 fL 89    Mean Corpuscular Hemoglobin 27.0 - 31.0 pg 26.0Low     Mean Corpuscular Hemoglobin Conc 32.0 - 36.0 g/dL 29.3Low     RDW 11.5 - 14.5 % 21.3High     Platelets 150 - 350 K/uL 188    MPV 9.2 - 12.9 fL 12.5    Immature Granulocytes 0.0 - 0.5 % 0.2    Gran # (ANC) 1.8 - 7.7 K/uL 3.0    Immature Grans (Abs) 0.00 - 0.04 K/uL 0.01    Comment: Mild elevation in immature granulocytes is non specific and   can be seen in a variety of conditions including stress response,   acute inflammation, trauma and pregnancy. Correlation with other   laboratory and clinical findings is essential.    Lymph # 1.0 - 4.8 K/uL 1.5    Mono # 0.3 - 1.0 K/uL 0.3    Eos # 0.0 - 0.5 K/uL 0.1    Baso # 0.00 - 0.20 K/uL 0.04    nRBC 0 /100 WBC 0    Gran% 38.0 - 73.0 % 59.9    Lymph% 18.0 - 48.0 % 29.8    Mono% 4.0 - 15.0 % 6.5    Eosinophil% 0.0 - 8.0 % 2.8    Basophil% 0.0 - 1.9 % 0.8     Sodium 136 - 145 mmol/L 143    Potassium 3.5 - 5.1 mmol/L 3.7  "   Chloride 95 - 110 mmol/L 108    CO2 23 - 29 mmol/L 28    Glucose 70 - 110 mg/dL 78    BUN, Bld 10 - 30 mg/dL 19    Creatinine 0.5 - 1.4 mg/dL 0.8    Calcium 8.7 - 10.5 mg/dL 8.5Low     Anion Gap 8 - 16 mmol/L 7Low     eGFR if African American >60 mL/min/1.73 m^2 >60.0    eGFR if non African American >60 mL/min/1.73 m^2 >60.0           Microbiology Data:   None     Summary of Chest Imaging Personally Reviewed:     LE Doppler 12/2019-   No evidence of lower extremity DVT bilaterally.     CXR 2/2019- Postoperative changes in the cervical spine.  Heart size normal.  The lungs are clear.  No pleural effusion..  Old fracture of the right humeral neck identified    CXR 12/2019-Prominence along the right paratracheal region may relate to tortuous brachiocephalic vasculature exaggerated by rotation, however seen as an interval change in appearance when compared to the prior studies the possibility adenopathy or mass lesion can not be excluded    CT Chest 2014- The mediastinum contains no abnormally enlarged lymph nodes. The aorta is normal in caliber, contour and course and there is abundant atherosclerotic calcification in its walls.  Coronary arteries contain atherosclerotic calcifications and the heart   demonstrates no significant enlargement. There is dense calcification of the mitral annulus.  Pulmonary and systemic venoatrial connections are concordant. There are four pulmonary veins.  The esophagus is unremarkable and GE junction is patent.    Trachea and bronchi are patent and the lungs are symmetric and well aerated.  The previously identified 1.9-cm opacity in the anterobasal segment of the right lower lobe is markedly improved on today's examination and there is a 0.4-cm pulmonary nodule   in this location.  Continued follow-up with noncontrast CT chest is recommended until resolution. Evaluation of the pulmonary parenchyma reveals changes compatible with bronchitis or bronchiolitis with areas of air trapping.  There is stable appearance of   three pulmonary micronodules, two in the posterior basal segment of the right lower lobe and one in the posterior basal segment of the left lower lobe.  There is calcified bronchial cartilage. There is no pleural process.    2D Echo:     · Concentric left ventricular hypertrophy. Normal left ventricular systolic function. The estimated ejection fraction is 65%  · Moderate left atrial enlargement.  · Mild right atrial enlargement.  · Grade I (mild) left ventricular diastolic dysfunction consistent with impaired relaxation.  · Normal right ventricular systolic function.  · Mild tricuspid regurgitation.  · Mild-to-moderate mitral regurgitation.  · Mild aortic regurgitation.  · Intermediate central venous pressure (8 mm Hg).  · The estimated PA systolic pressure is 46 mm Hg  · Pulmonary hypertension present.    PFT's: 2014  FEV1/FVC- 80   FEV1- 1.82L (121%)   FVC- 2.27L (111%)   TLC- 105   DLCO- 83               Assessment:       No diagnosis found.    Outpatient Encounter Medications as of 2/3/2020   Medication Sig Dispense Refill    acetaminophen (TYLENOL) 500 MG tablet Take 500 mg by mouth daily as needed.       ALPRAZolam (XANAX) 0.25 MG tablet Take 0.5 tablets (0.125 mg total) by mouth nightly as needed for Insomnia or Anxiety. 90 tablet 1    amLODIPine (NORVASC) 10 MG tablet Take 1 tablet (10 mg total) by mouth once daily. 30 tablet 11    aspirin 81 MG Chew Take 81 mg by mouth once daily.      atorvastatin (LIPITOR) 40 MG tablet Take 1 tablet (40 mg total) by mouth every evening. TAKE (1/2) HALF BY MOUTH ONCE A DAY 90 tablet 3    bisacodyl (DULCOLAX) 5 mg EC tablet Take 5 mg by mouth daily as needed for Constipation.      C,E,zinc,copper 11-omega3s-lut (OCUVITE ADULT 50 PLUS) 250-5-1 mg Cap Take by mouth.      cholecalciferol, vitamin D3, (VITAMIN D3) 1,000 unit capsule Take 1 capsule (1,000 Units total) by mouth once daily. 90 capsule 3    ferrous sulfate (FEOSOL) 325 mg  (65 mg iron) Tab tablet Take 1 tablet (325 mg total) by mouth every 12 (twelve) hours. 60 tablet 4    ferrous sulfate (FEOSOL) 325 mg (65 mg iron) Tab tablet Take 1 tablet (325 mg total) by mouth every 12 (twelve) hours. 60 tablet 4    furosemide (LASIX) 20 MG tablet Take 1 tablet (20 mg total) by mouth 2 (two) times daily. Start with 3 days per week but OK to use more often or extra for increased leg swelling 90 tablet 3    gabapentin (NEURONTIN) 300 MG capsule Take 1 capsule (300 mg total) by mouth every evening. 90 capsule 3    irbesartan (AVAPRO) 300 MG tablet Take 1 tablet (300 mg total) by mouth every evening. 90 tablet 3    isosorbide mononitrate (IMDUR) 60 MG 24 hr tablet Take 1 tablet (60 mg total) by mouth once daily. 90 tablet 3    levothyroxine (SYNTHROID) 25 MCG tablet Take 1 tablet (25 mcg total) by mouth once daily. 90 tablet 3    mag hydrox/aluminum hyd/simeth (ALUM-MAG HYDROXIDE-SIMETH ORAL) Take by mouth daily as needed.       metoprolol succinate (TOPROL-XL) 25 MG 24 hr tablet Take 1 tablet (25 mg total) by mouth once daily. 90 tablet 3    nitroGLYCERIN (NITROSTAT) 0.4 MG SL tablet Place 1 tablet (0.4 mg total) under the tongue every 5 (five) minutes as needed for Chest pain. 25 tablet 4    oxyCODONE-acetaminophen (PERCOCET) 7.5-325 mg per tablet 1 tablet every 4 (four) hours as needed.       pantoprazole (PROTONIX) 40 MG tablet Take 1 tablet (40 mg total) by mouth once daily. 90 tablet 3    potassium chloride SA (K-DUR,KLOR-CON) 20 MEQ tablet Take 1 tablet (20 mEq total) by mouth once daily. 30 tablet 0    psyllium husk, with sugar, (METAMUCIL FIBER THIN) 2.5 gram Wafr Take 2 Wafers by mouth every 12 (twelve) hours. 120 Wafer 11    QUEtiapine (SEROQUEL) 25 MG Tab Take 1 tablet (25 mg total) by mouth 2 (two) times daily as needed (anxiety, insomnia). 180 tablet 3    sertraline (ZOLOFT) 100 MG tablet Take 1 tablet (100 mg total) by mouth once daily. 90 tablet 3    []  vancomycin 250mg / 10ml Susp Take 5 mLs (125 mg total) by mouth every 6 (six) hours. for 8 days 160 mL 0    VIT C/VIT E/LUTEIN/MIN/OMEGA-3 (OCUVITE ORAL) Take 1 tablet by mouth once daily.       No facility-administered encounter medications on file as of 2/3/2020.      No orders of the defined types were placed in this encounter.      Plan:       1. Abnormal CXR- CXR with questionable blunting of paratracheal stripe. New compared to prior radiograph in 2/2019.. Significance unclear but likely reflective of imaging technique.. She is 89 y/o and frail.. I had a long discussion with patient and she would not be interested in pursuing any aggressive diagnostic or therapeutic interventions if this is found to be anything aside from an imaging artifact.. She would want to know if something is indeed present to allow family to know so we will obtain CT imaging chest to further evaluate..     -- Will follow up CT.. No need for routine follow up. I will call and discuss results with her.. Given frailty and age, I completely agree that she should not be subjected to invasive testing/diagnosis or therapies.. This is in congruence with her preference as well..     Peter Manzano M.D.   [facility]

## 2020-02-04 ENCOUNTER — DOCUMENTATION ONLY (OUTPATIENT)
Dept: PULMONOLOGY | Facility: CLINIC | Age: 85
End: 2020-02-04

## 2020-02-04 NOTE — PROGRESS NOTES
I have reviewed CT imaging. The enlarged paratracheal stripe seen on prior CXR is not associated with any corresponding abnl on her chest CT and likely reflective of positioning.. Does have a few sub centimeter pulmonary nodules of no consequence. Given age and limited functional status + patients desire to not have further interventions I would NOT follow these any further. She can follow up with me PRN. I left a voice message at NH as this is the only number provided. They have my contact information and can call if questions.     Peter Manzano M.D.   [facility]

## 2020-02-05 ENCOUNTER — LAB VISIT (OUTPATIENT)
Dept: LAB | Facility: HOSPITAL | Age: 85
End: 2020-02-05
Attending: INTERNAL MEDICINE
Payer: MEDICARE

## 2020-02-05 DIAGNOSIS — R19.7 DIARRHEA, UNSPECIFIED TYPE: ICD-10-CM

## 2020-02-05 DIAGNOSIS — D50.9 IRON DEFICIENCY ANEMIA, UNSPECIFIED IRON DEFICIENCY ANEMIA TYPE: ICD-10-CM

## 2020-02-05 DIAGNOSIS — Z79.899 ENCOUNTER FOR MONITORING LONG-TERM PROTON PUMP INHIBITOR THERAPY: ICD-10-CM

## 2020-02-05 DIAGNOSIS — E87.6 LOW SERUM POTASSIUM: ICD-10-CM

## 2020-02-05 DIAGNOSIS — Z51.81 ENCOUNTER FOR MONITORING LONG-TERM PROTON PUMP INHIBITOR THERAPY: ICD-10-CM

## 2020-02-05 LAB
ANION GAP SERPL CALC-SCNC: 9 MMOL/L (ref 8–16)
BASOPHILS # BLD AUTO: 0.03 K/UL (ref 0–0.2)
BASOPHILS NFR BLD: 0.5 % (ref 0–1.9)
BUN SERPL-MCNC: 19 MG/DL (ref 10–30)
CALCIUM SERPL-MCNC: 8.7 MG/DL (ref 8.7–10.5)
CHLORIDE SERPL-SCNC: 109 MMOL/L (ref 95–110)
CO2 SERPL-SCNC: 28 MMOL/L (ref 23–29)
CREAT SERPL-MCNC: 0.8 MG/DL (ref 0.5–1.4)
DIFFERENTIAL METHOD: ABNORMAL
EOSINOPHIL # BLD AUTO: 0.2 K/UL (ref 0–0.5)
EOSINOPHIL NFR BLD: 2.9 % (ref 0–8)
ERYTHROCYTE [DISTWIDTH] IN BLOOD BY AUTOMATED COUNT: 21.1 % (ref 11.5–14.5)
EST. GFR  (AFRICAN AMERICAN): >60 ML/MIN/1.73 M^2
EST. GFR  (NON AFRICAN AMERICAN): >60 ML/MIN/1.73 M^2
GLUCOSE SERPL-MCNC: 82 MG/DL (ref 70–110)
HCT VFR BLD AUTO: 37.4 % (ref 37–48.5)
HGB BLD-MCNC: 11.3 G/DL (ref 12–16)
IMM GRANULOCYTES # BLD AUTO: 0.01 K/UL (ref 0–0.04)
IMM GRANULOCYTES NFR BLD AUTO: 0.2 % (ref 0–0.5)
LYMPHOCYTES # BLD AUTO: 1.5 K/UL (ref 1–4.8)
LYMPHOCYTES NFR BLD: 26.6 % (ref 18–48)
MCH RBC QN AUTO: 26.9 PG (ref 27–31)
MCHC RBC AUTO-ENTMCNC: 30.2 G/DL (ref 32–36)
MCV RBC AUTO: 89 FL (ref 82–98)
MONOCYTES # BLD AUTO: 0.3 K/UL (ref 0.3–1)
MONOCYTES NFR BLD: 5.7 % (ref 4–15)
NEUTROPHILS # BLD AUTO: 3.5 K/UL (ref 1.8–7.7)
NEUTROPHILS NFR BLD: 64.1 % (ref 38–73)
NRBC BLD-RTO: 0 /100 WBC
PLATELET # BLD AUTO: 229 K/UL (ref 150–350)
PMV BLD AUTO: 10.6 FL (ref 9.2–12.9)
POTASSIUM SERPL-SCNC: 3.5 MMOL/L (ref 3.5–5.1)
RBC # BLD AUTO: 4.2 M/UL (ref 4–5.4)
SODIUM SERPL-SCNC: 146 MMOL/L (ref 136–145)
WBC # BLD AUTO: 5.48 K/UL (ref 3.9–12.7)

## 2020-02-05 PROCEDURE — 80048 BASIC METABOLIC PNL TOTAL CA: CPT

## 2020-02-05 PROCEDURE — 85025 COMPLETE CBC W/AUTO DIFF WBC: CPT

## 2020-02-05 PROCEDURE — 36415 COLL VENOUS BLD VENIPUNCTURE: CPT

## 2020-02-11 ENCOUNTER — TELEPHONE (OUTPATIENT)
Dept: INTERNAL MEDICINE | Facility: CLINIC | Age: 85
End: 2020-02-11

## 2020-02-11 ENCOUNTER — DOCUMENTATION ONLY (OUTPATIENT)
Dept: ENDOSCOPY | Facility: HOSPITAL | Age: 85
End: 2020-02-11

## 2020-02-11 ENCOUNTER — TELEPHONE (OUTPATIENT)
Dept: GASTROENTEROLOGY | Facility: CLINIC | Age: 85
End: 2020-02-11

## 2020-02-11 NOTE — TELEPHONE ENCOUNTER
Contact: Santana Murray [phone]  Type: Home Health (orders, updates, clarifications, etc.)    Home Health Agency/Nurse:  Santana Betancourt    Phone number:  [phone]  Reason for call: What cough syrup can patient have.    Comments:

## 2020-02-11 NOTE — PROGRESS NOTES
eLsvia Bronson 5 hours ago (9:10 AM)         Called and spoke to pt.  Pt says diarrhea has resolved since taking prescribed meds.  Pt says she is having no GI symptoms at this time.  Informed pt I will update provider.  Pt appreciated the call.

## 2020-02-11 NOTE — TELEPHONE ENCOUNTER
Called and spoke to pt.  Pt says diarrhea has resolved since taking prescribed meds.  Pt says she is having no GI symptoms at this time.  Informed pt I will update provider.  Pt appreciated the call.

## 2020-02-11 NOTE — TELEPHONE ENCOUNTER
----- Message from Deshaun Mccann MD sent at 2/9/2020 10:48 AM CST -----  Lesvia please call in check on Arti to see if her diarrhea has resolved.    Thank you

## 2020-02-13 ENCOUNTER — TELEPHONE (OUTPATIENT)
Dept: GASTROENTEROLOGY | Facility: CLINIC | Age: 85
End: 2020-02-13

## 2020-02-13 NOTE — TELEPHONE ENCOUNTER
----- Message from Rebecca Pires sent at 2/13/2020  3:12 PM CST -----  Contact: pt   Pt called stated she would like to see Dr. dennis after her lab appt on 3/20/20. Call back [phone]

## 2020-02-13 NOTE — TELEPHONE ENCOUNTER
Called and spoke to pt.  Pt scheduled for clinic on 03/26 @ 2:00 p.  Pt accepted appt and appreciated the call.

## 2020-02-14 ENCOUNTER — OFFICE VISIT (OUTPATIENT)
Dept: INTERNAL MEDICINE | Facility: CLINIC | Age: 85
End: 2020-02-14
Payer: MEDICARE

## 2020-02-14 VITALS
WEIGHT: 106.94 LBS | BODY MASS INDEX: 21.6 KG/M2 | SYSTOLIC BLOOD PRESSURE: 152 MMHG | HEART RATE: 54 BPM | DIASTOLIC BLOOD PRESSURE: 60 MMHG | OXYGEN SATURATION: 97 %

## 2020-02-14 DIAGNOSIS — J06.9 VIRAL UPPER RESPIRATORY TRACT INFECTION: Primary | ICD-10-CM

## 2020-02-14 PROCEDURE — 99213 PR OFFICE/OUTPT VISIT, EST, LEVL III, 20-29 MIN: ICD-10-PCS | Mod: S$PBB,,, | Performed by: PHYSICIAN ASSISTANT

## 2020-02-14 PROCEDURE — 99999 PR PBB SHADOW E&M-EST. PATIENT-LVL V: CPT | Mod: PBBFAC,,, | Performed by: PHYSICIAN ASSISTANT

## 2020-02-14 PROCEDURE — 99213 OFFICE O/P EST LOW 20 MIN: CPT | Mod: S$PBB,,, | Performed by: PHYSICIAN ASSISTANT

## 2020-02-14 PROCEDURE — 99215 OFFICE O/P EST HI 40 MIN: CPT | Mod: PBBFAC | Performed by: PHYSICIAN ASSISTANT

## 2020-02-14 PROCEDURE — 99999 PR PBB SHADOW E&M-EST. PATIENT-LVL V: ICD-10-PCS | Mod: PBBFAC,,, | Performed by: PHYSICIAN ASSISTANT

## 2020-02-14 RX ORDER — BENZONATATE 100 MG/1
100 CAPSULE ORAL 3 TIMES DAILY PRN
Qty: 20 CAPSULE | Refills: 0 | Status: SHIPPED | OUTPATIENT
Start: 2020-02-14 | End: 2020-02-24

## 2020-02-14 NOTE — PATIENT INSTRUCTIONS
Viral Upper Respiratory Illness (Adult)  You have a viral upper respiratory illness (URI), which is another term for the common cold. This illness is contagious during the first few days. It is spread through the air by coughing and sneezing. It may also be spread by direct contact (touching the sick person and then touching your own eyes, nose, or mouth). Frequent handwashing will decrease risk of spread. Most viral illnesses go away within 7 to 10 days with rest and simple home remedies. Sometimes the illness may last for several weeks. Antibiotics will not kill a virus, and they are generally not prescribed for this condition.    Home care  · If symptoms are severe, rest at home for the first 2 to 3 days. When you resume activity, don't let yourself get too tired.  · Avoid being exposed to cigarette smoke (yours or others).  · You may use acetaminophen or ibuprofen to control pain and fever, unless another medicine was prescribed. (Note: If you have chronic liver or kidney disease, have ever had a stomach ulcer or gastrointestinal bleeding, or are taking blood-thinning medicines, talk with your healthcare provider before using these medicines.) Aspirin should never be given to anyone under 18 years of age who is ill with a viral infection or fever. It may cause severe liver or brain damage.  · Your appetite may be poor, so a light diet is fine. Avoid dehydration by drinking 6 to 8 glasses of fluids per day (water, soft drinks, juices, tea, or soup). Extra fluids will help loosen secretions in the nose and lungs.  · Over-the-counter cold medicines will not shorten the length of time youre sick, but they may be helpful for the following symptoms: cough, sore throat, and nasal and sinus congestion. (Note: Do not use decongestants if you have high blood pressure.)  Follow-up care  Follow up with your healthcare provider, or as advised.  When to seek medical advice  Call your healthcare provider right away if any  of these occur:  · Cough with lots of colored sputum (mucus)  · Severe headache; face, neck, or ear pain  · Difficulty swallowing due to throat pain  · Fever of 100.4°F (38°C)  Call 911, or get immediate medical care  Call emergency services right away if any of these occur:  · Chest pain, shortness of breath, wheezing, or difficulty breathing  · Coughing up blood  · Inability to swallow due to throat pain  Date Last Reviewed: 9/13/2015  © 1010-3611 [employer]. [address] [place] [id number]. All rights reserved. This information is not intended as a substitute for professional medical care. Always follow your healthcare professional's instructions.

## 2020-02-14 NOTE — PROGRESS NOTES
"Subjective:       Patient ID: Arti Olsen is a 89 y.o. female.    Chief Complaint: Cough (x3 days )    HPI     Established pt of Wong Le MD (new to me)    Pt is attended by NH staff from [facility]    C/o cough and congestion. Onset about 3 days ago. Statees she was given a cough syrup at the NH and this has greatly helped. States she almost cancelled appt today. She denies fevers, cp or sob. No sweates or purulent spututm.     Past Medical History:   Diagnosis Date    Acid reflux     chronic    Anemia 2014    Arthritis     osetoarthritis    Clotting disorder     Colon polyp     Coronary artery disease     Nonobstructive CAD per [facility]    History of colonoscopy     1 polyp noted in     Hyperlipemia     Hypertension     Migraine headache     chronic    Peripheral vascular disease     PONV (postoperative nausea and vomiting)     Renal artery atherosclerosis 2012    Spinal stenosis     Thyroid disease      Social History     Tobacco Use    Smoking status: Former Smoker     Packs/day: 1.00     Years: 29.00     Pack years: 29.00     Last attempt to quit: 9/10/1969     Years since quittin.4    Smokeless tobacco: Never Used    Tobacco comment: The patient lives alone and is able to accomplish a usual activities of daily living.  She is not that active due to the severity of her back pain.   Substance Use Topics    Alcohol use: No     Frequency: Never     Drinks per session: Patient refused     Binge frequency: Never    Drug use: No     Review of patient's allergies indicates:   Allergen Reactions    Iodinated contrast media Swelling    Reglan [metoclopramide] Other (See Comments)     "Body shaking"    Sulfa (sulfonamide antibiotics)      Yrs ago    Augmentin [amoxicillin-pot clavulanate] Diarrhea           Review of Systems   Constitutional: Negative for chills, fever and unexpected weight change.   HENT: Positive for congestion. Negative for ear pain, sinus pressure, " sinus pain, sore throat and trouble swallowing.    Respiratory: Positive for cough. Negative for shortness of breath and wheezing.    Cardiovascular: Negative for chest pain and leg swelling.   Gastrointestinal: Negative for abdominal pain, nausea and vomiting.   Skin: Negative for rash.   Neurological: Negative for weakness and headaches.       Objective: BP (!) 152/60   Pulse (!) 54   Wt 48.5 kg (106 lb 14.8 oz)   LMP  (LMP Unknown)   SpO2 97%   BMI 21.60 kg/m²         Physical Exam   Constitutional: She appears well-developed and well-nourished. No distress.   HENT:   Head: Normocephalic and atraumatic.   Right Ear: Tympanic membrane, external ear and ear canal normal.   Left Ear: Tympanic membrane, external ear and ear canal normal.   Mouth/Throat: Oropharynx is clear and moist. No oropharyngeal exudate.   Cardiovascular: Normal rate and regular rhythm. Exam reveals no friction rub.   No murmur heard.  Pulmonary/Chest: Effort normal and breath sounds normal. She has no wheezes. She has no rales.   Musculoskeletal: She exhibits no edema.   Lymphadenopathy:     She has no cervical adenopathy.   Neurological: She is alert.   Skin: Skin is warm and dry. No rash noted.   Psychiatric: She has a normal mood and affect.   Vitals reviewed.      Assessment:       1. Viral upper respiratory tract infection        Plan:         Arti was seen today for cough.    Diagnoses and all orders for this visit:    Viral upper respiratory tract infection        - Much improved per pt  -     benzonatate (TESSALON) 100 MG capsule; Take 1 capsule (100 mg total) by mouth 3 (three) times daily as needed for Cough.  - Consistent conservative measures for symptomatic relief  -RTC precautions discussed      Kiarra Bush PA-C

## 2020-03-23 ENCOUNTER — PATIENT OUTREACH (OUTPATIENT)
Dept: ADMINISTRATIVE | Facility: OTHER | Age: 85
End: 2020-03-23

## 2020-03-23 ENCOUNTER — TELEPHONE (OUTPATIENT)
Dept: GASTROENTEROLOGY | Facility: CLINIC | Age: 85
End: 2020-03-23

## 2020-03-23 DIAGNOSIS — R19.5 DARK STOOLS: ICD-10-CM

## 2020-03-23 DIAGNOSIS — D50.9 IRON DEFICIENCY ANEMIA, UNSPECIFIED IRON DEFICIENCY ANEMIA TYPE: Primary | ICD-10-CM

## 2020-03-23 DIAGNOSIS — D50.8 IRON DEFICIENCY ANEMIA SECONDARY TO INADEQUATE DIETARY IRON INTAKE: Primary | ICD-10-CM

## 2020-03-23 NOTE — TELEPHONE ENCOUNTER
Spoke with Samantha at [facility]. She reports they do not draw blood work and with corona virus they are not sending patients out. She states that home health would have to come draw blood work. Samantha states that patient does not have home health at present. She then transferred me to admissions. Spoke with Angely Roldan in admissions who repeated the same information as Samantha. She states there is no other way to get lab work done.     [facility],  who said to send over demographics, insurance, and an order and she would take to her DON to determine if they can do this. She will let us know the outcome.

## 2020-03-23 NOTE — PROGRESS NOTES
[facility] Consultation Note    Reason for Consult:  The primary encounter diagnosis was Iron deficiency anemia, unspecified iron deficiency anemia type. A diagnosis of Dark stools was also pertinent to this visit.    PCP:   Wong Le       Referring MD:  No referring provider defined for this encounter. TELEPHONE VISIT PATIENT DOES NOT HAVE INTERNET ACCESS FOR VIDEO VISIT.    Initial History of Present Illness (HPI):  This is a 89 y.o. female for evaluation of iron deficiency anemia and dark stools.  Abdominal pain - no  Reflux - no  Dysphagia - no   Bowel habits - normal  GI bleeding - none  NSAID usage - none    Interval HPI 03/23/2020:  The patient's last visit with me was on 1/10/2020.      ROS:  Constitutional: No fevers, chills, No weight loss  ENT:  No heartburn no dysphagia no odynophagia no hoarseness  CV: No chest pain, no palpitation  Pulm: No cough, No shortness of breath, no wheezing  Ophtho: No vision changes  GI: see HPI  Derm: No rash, no itching  Heme: No lymphadenopathy, No easy bruising  MSK: No significant arthritis  : No dysuria, No hematuria  Endo: No hot or cold intolerance  Neuro: No syncope, No seizure, no strokes  Psych: No uncontrolled anxiety, No uncontrolled depression    Medical History:  has a past medical history of Acid reflux, Anemia (6/24/2014), Arthritis, Clotting disorder, Colon polyp, Coronary artery disease, History of colonoscopy, Hyperlipemia, Hypertension, Migraine headache, Peripheral vascular disease, PONV (postoperative nausea and vomiting), Renal artery atherosclerosis (11/11/2012), Spinal stenosis, and Thyroid disease.    Surgical History:  has a past surgical history that includes Appendectomy (1942); TONSILLECTOMY, ADENOIDECTOMY (1930's); Bladder surgery (1958); Uterine fibroid surgery (1958); Varicose vein surgery (1958); Disc removal (1983); Cyst Removal (1983); Joint replacement (2006); Joint replacement (2007); Colonoscopy w/  "polypectomy; Hysterectomy (1959); acf; Anterior cervical discectomy w/ fusion; Eye surgery; and Spine surgery (02/2014).    Family History: family history includes Cancer in her brother, brother, and brother; Heart disease in her brother, father, maternal grandmother, and mother; Kidney disease in her brother and maternal grandfather; Leukemia in her brother; Lung cancer in her brother; Uterine cancer in her paternal grandmother..     Social History:  reports that she quit smoking about 50 years ago. She has a 29.00 pack-year smoking history. She has never used smokeless tobacco. She reports that she does not drink alcohol or use drugs.    Review of patient's allergies indicates:   Allergen Reactions    Iodinated contrast media Swelling    Reglan [metoclopramide] Other (See Comments)     "Body shaking"    Sulfa (sulfonamide antibiotics)      Yrs ago    Augmentin [amoxicillin-pot clavulanate] Diarrhea       Medication List with Changes/Refills   Current Medications    ACETAMINOPHEN (TYLENOL) 500 MG TABLET    Take 500 mg by mouth daily as needed.     ALPRAZOLAM (XANAX) 0.25 MG TABLET    Take 0.5 tablets (0.125 mg total) by mouth nightly as needed for Insomnia or Anxiety.    AMLODIPINE (NORVASC) 10 MG TABLET    Take 1 tablet (10 mg total) by mouth once daily.    ASPIRIN 81 MG CHEW    Take 81 mg by mouth once daily.    ATORVASTATIN (LIPITOR) 40 MG TABLET    Take 1 tablet (40 mg total) by mouth every evening. TAKE (1/2) HALF BY MOUTH ONCE A DAY    BISACODYL (DULCOLAX) 5 MG EC TABLET    Take 5 mg by mouth daily as needed for Constipation.    C,E,ZINC,COPPER 11-OMEGA3S-LUT (OCUVITE ADULT 50 PLUS) 250-5-1 MG CAP    Take by mouth.    CHOLECALCIFEROL, VITAMIN D3, (VITAMIN D3) 1,000 UNIT CAPSULE    Take 1 capsule (1,000 Units total) by mouth once daily.    FERROUS SULFATE (FEOSOL) 325 MG (65 MG IRON) TAB TABLET    Take 1 tablet (325 mg total) by mouth every 12 (twelve) hours.    FERROUS SULFATE (FEOSOL) 325 MG (65 MG IRON) " TAB TABLET    Take 1 tablet (325 mg total) by mouth every 12 (twelve) hours.    FUROSEMIDE (LASIX) 20 MG TABLET    Take 1 tablet (20 mg total) by mouth 2 (two) times daily. Start with 3 days per week but OK to use more often or extra for increased leg swelling    GABAPENTIN (NEURONTIN) 300 MG CAPSULE    Take 1 capsule (300 mg total) by mouth every evening.    IRBESARTAN (AVAPRO) 300 MG TABLET    Take 1 tablet (300 mg total) by mouth every evening.    ISOSORBIDE MONONITRATE (IMDUR) 60 MG 24 HR TABLET    Take 1 tablet (60 mg total) by mouth once daily.    LEVOTHYROXINE (SYNTHROID) 25 MCG TABLET    Take 1 tablet (25 mcg total) by mouth once daily.    MAG HYDROX/ALUMINUM HYD/SIMETH (ALUM-MAG HYDROXIDE-SIMETH ORAL)    Take by mouth daily as needed.     METOPROLOL SUCCINATE (TOPROL-XL) 25 MG 24 HR TABLET    Take 1 tablet (25 mg total) by mouth once daily.    NITROGLYCERIN (NITROSTAT) 0.4 MG SL TABLET    Place 1 tablet (0.4 mg total) under the tongue every 5 (five) minutes as needed for Chest pain.    OXYCODONE-ACETAMINOPHEN (PERCOCET) 7.5-325 MG PER TABLET    1 tablet every 4 (four) hours as needed.     PANTOPRAZOLE (PROTONIX) 40 MG TABLET    Take 1 tablet (40 mg total) by mouth once daily.    PSYLLIUM HUSK, WITH SUGAR, (METAMUCIL FIBER THIN) 2.5 GRAM WAFR    Take 2 Wafers by mouth every 12 (twelve) hours.    QUETIAPINE (SEROQUEL) 25 MG TAB    Take 1 tablet (25 mg total) by mouth 2 (two) times daily as needed (anxiety, insomnia).    SERTRALINE (ZOLOFT) 100 MG TABLET    Take 1 tablet (100 mg total) by mouth once daily.    VANCOMYCIN (VANCOCIN) 1,000 MG INJECTION        VIT C/VIT E/LUTEIN/MIN/OMEGA-3 (OCUVITE ORAL)    Take 1 tablet by mouth once daily.         Objective Findings:    Vital Signs:  LMP  (LMP Unknown)   There is no height or weight on file to calculate BMI.    Physical Exam:  General Appearance: Well appearing in no acute distress  Eyes:    No scleral icterus  ENT:  No lesions or masses   Lungs: CTA  bilaterally, no wheezes, no rhonchi, no rales  Heart:  S1, S2 normal, no murmurs heard  Abdomen:  Non distended, soft, no guarding, no rebound, no tenderness, no appreciated ascites, no bruits, no hepatosplenomegaly,  No CVA tenderness, no appreciated hernias  Musculoskeletal:  No major joint deformities  Skin: No petechiae or rash on exposed skin areas  Neurologic:  Alert and oriented x4  Psychiatric:  Normal speech mentation and affect    Labs:  Lab Results   Component Value Date    WBC 5.48 02/05/2020    HGB 11.3 (L) 02/05/2020    HCT 37.4 02/05/2020     02/05/2020    CHOL 119 (L) 11/06/2019    TRIG 156 (H) 11/06/2019    HDL 51 11/06/2019    ALT 6 (L) 01/23/2020    AST 19 01/23/2020     (H) 02/05/2020    K 3.5 02/05/2020     02/05/2020    CREATININE 0.8 02/05/2020    BUN 19 02/05/2020    CO2 28 02/05/2020    TSH 1.532 11/06/2019    INR 1.1 02/26/2019    HGBA1C 5.7 (H) 02/26/2019           Imaging:    Endoscopy:      Medical Decision Making:        Assessment:  1. Iron deficiency anemia, unspecified iron deficiency anemia type    2. Dark stools         Recommendations:       No follow-ups on file.      Order summary:  Orders Placed This Encounter    CBC auto differential    Iron and TIBC    Ferritin    CBC auto differential    Iron and TIBC    Ferritin         Thank you so much for allowing me to participate in the care of Arti Mccann MD

## 2020-03-23 NOTE — TELEPHONE ENCOUNTER
I put in a home health consult for the requested blood tests. We'll see if its possible    However, as I have not seen the patient in the past 30 days, and the only need is the blood test, I'm not sure this is a covered service under her insurance

## 2020-05-20 ENCOUNTER — HOSPITAL ENCOUNTER (EMERGENCY)
Facility: HOSPITAL | Age: 85
Discharge: HOME | End: 2020-05-20
Attending: EMERGENCY MEDICINE
Payer: MEDICARE

## 2020-05-20 VITALS
SYSTOLIC BLOOD PRESSURE: 178 MMHG | OXYGEN SATURATION: 95 % | DIASTOLIC BLOOD PRESSURE: 72 MMHG | WEIGHT: 106 LBS | HEART RATE: 66 BPM | BODY MASS INDEX: 21.37 KG/M2 | TEMPERATURE: 98 F | RESPIRATION RATE: 20 BRPM | HEIGHT: 59 IN

## 2020-05-20 DIAGNOSIS — S09.90XA TRAUMATIC INJURY OF HEAD, INITIAL ENCOUNTER: ICD-10-CM

## 2020-05-20 DIAGNOSIS — R55 VASOVAGAL SYNCOPE: Primary | ICD-10-CM
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
## 2020-05-20 LAB
ALBUMIN SERPL BCP-MCNC: 3.1 G/DL (ref 3.5–5.2)
ALP SERPL-CCNC: 81 U/L (ref 55–135)
ALT SERPL W/O P-5'-P-CCNC: 7 U/L (ref 10–44)
ANION GAP SERPL CALC-SCNC: 12 MMOL/L (ref 8–16)
ANISOCYTOSIS BLD QL SMEAR: SLIGHT
AST SERPL-CCNC: 19 U/L (ref 10–40)
BACTERIA #/AREA URNS AUTO: ABNORMAL /HPF
BASOPHILS # BLD AUTO: 0.07 K/UL (ref 0–0.2)
BASOPHILS NFR BLD: 0.8 % (ref 0–1.9)
BILIRUB SERPL-MCNC: 0.5 MG/DL (ref 0.1–1)
BILIRUB UR QL STRIP: NEGATIVE
BUN SERPL-MCNC: 19 MG/DL (ref 10–30)
BURR CELLS BLD QL SMEAR: ABNORMAL
CALCIUM SERPL-MCNC: 8.2 MG/DL (ref 8.7–10.5)
CHLORIDE SERPL-SCNC: 109 MMOL/L (ref 95–110)
CLARITY UR REFRACT.AUTO: ABNORMAL
CO2 SERPL-SCNC: 20 MMOL/L (ref 23–29)
COLOR UR AUTO: YELLOW
CREAT SERPL-MCNC: 1 MG/DL (ref 0.5–1.4)
DACRYOCYTES BLD QL SMEAR: ABNORMAL
DIFFERENTIAL METHOD: ABNORMAL
EOSINOPHIL # BLD AUTO: 0.4 K/UL (ref 0–0.5)
EOSINOPHIL NFR BLD: 4.5 % (ref 0–8)
ERYTHROCYTE [DISTWIDTH] IN BLOOD BY AUTOMATED COUNT: 14.9 % (ref 11.5–14.5)
EST. GFR  (AFRICAN AMERICAN): 56.5 ML/MIN/1.73 M^2
EST. GFR  (NON AFRICAN AMERICAN): 49 ML/MIN/1.73 M^2
GLUCOSE SERPL-MCNC: 97 MG/DL (ref 70–110)
GLUCOSE UR QL STRIP: NEGATIVE
HCT VFR BLD AUTO: 38.7 % (ref 37–48.5)
HGB BLD-MCNC: 12.7 G/DL (ref 12–16)
HGB UR QL STRIP: NEGATIVE
HYALINE CASTS UR QL AUTO: 4 /LPF
HYPOCHROMIA BLD QL SMEAR: ABNORMAL
IMM GRANULOCYTES # BLD AUTO: 0.05 K/UL (ref 0–0.04)
IMM GRANULOCYTES NFR BLD AUTO: 0.6 % (ref 0–0.5)
INR PPP: 1 (ref 0.8–1.2)
KETONES UR QL STRIP: NEGATIVE
LEUKOCYTE ESTERASE UR QL STRIP: ABNORMAL
LYMPHOCYTES # BLD AUTO: 1.3 K/UL (ref 1–4.8)
LYMPHOCYTES NFR BLD: 15.4 % (ref 18–48)
MCH RBC QN AUTO: 31.8 PG (ref 27–31)
MCHC RBC AUTO-ENTMCNC: 32.8 G/DL (ref 32–36)
MCV RBC AUTO: 97 FL (ref 82–98)
MICROSCOPIC COMMENT: ABNORMAL
MONOCYTES # BLD AUTO: 0.5 K/UL (ref 0.3–1)
MONOCYTES NFR BLD: 5.7 % (ref 4–15)
NEUTROPHILS # BLD AUTO: 6.1 K/UL (ref 1.8–7.7)
NEUTROPHILS NFR BLD: 73 % (ref 38–73)
NITRITE UR QL STRIP: NEGATIVE
NRBC BLD-RTO: 0 /100 WBC
OVALOCYTES BLD QL SMEAR: ABNORMAL
PH UR STRIP: 5 [PH] (ref 5–8)
PLATELET # BLD AUTO: 135 K/UL (ref 150–350)
PMV BLD AUTO: ABNORMAL FL (ref 9.2–12.9)
POIKILOCYTOSIS BLD QL SMEAR: SLIGHT
POLYCHROMASIA BLD QL SMEAR: ABNORMAL
POTASSIUM SERPL-SCNC: 3.4 MMOL/L (ref 3.5–5.1)
PROT SERPL-MCNC: 6 G/DL (ref 6–8.4)
PROT UR QL STRIP: ABNORMAL
PROTHROMBIN TIME: 10.4 SEC (ref 9–12.5)
RBC # BLD AUTO: 4 M/UL (ref 4–5.4)
RBC #/AREA URNS AUTO: 0 /HPF (ref 0–4)
SODIUM SERPL-SCNC: 141 MMOL/L (ref 136–145)
SP GR UR STRIP: 1.01 (ref 1–1.03)
TROPONIN I SERPL DL<=0.01 NG/ML-MCNC: 0.02 NG/ML (ref 0–0.03)
URN SPEC COLLECT METH UR: ABNORMAL
WBC # BLD AUTO: 8.29 K/UL (ref 3.9–12.7)
WBC #/AREA URNS AUTO: 11 /HPF (ref 0–5)

## 2020-05-20 PROCEDURE — 84484 ASSAY OF TROPONIN QUANT: CPT

## 2020-05-20 PROCEDURE — 81001 URINALYSIS AUTO W/SCOPE: CPT

## 2020-05-20 PROCEDURE — 85610 PROTHROMBIN TIME: CPT

## 2020-05-20 PROCEDURE — 99284 EMERGENCY DEPT VISIT MOD MDM: CPT | Mod: 25,,, | Performed by: EMERGENCY MEDICINE

## 2020-05-20 PROCEDURE — 12002 PR RESUP NPTERF WND BODY 2.6-7.5 CM: ICD-10-PCS | Mod: ,,, | Performed by: EMERGENCY MEDICINE

## 2020-05-20 PROCEDURE — 99284 PR EMERGENCY DEPT VISIT,LEVEL IV: ICD-10-PCS | Mod: 25,,, | Performed by: EMERGENCY MEDICINE

## 2020-05-20 PROCEDURE — 12002 RPR S/N/AX/GEN/TRNK2.6-7.5CM: CPT | Mod: ,,, | Performed by: EMERGENCY MEDICINE

## 2020-05-20 PROCEDURE — 93005 ELECTROCARDIOGRAM TRACING: CPT

## 2020-05-20 PROCEDURE — 12002 RPR S/N/AX/GEN/TRNK2.6-7.5CM: CPT

## 2020-05-20 PROCEDURE — 85025 COMPLETE CBC W/AUTO DIFF WBC: CPT

## 2020-05-20 PROCEDURE — 87086 URINE CULTURE/COLONY COUNT: CPT

## 2020-05-20 PROCEDURE — 99285 EMERGENCY DEPT VISIT HI MDM: CPT | Mod: 25

## 2020-05-20 PROCEDURE — 25000003 PHARM REV CODE 250: Performed by: STUDENT IN AN ORGANIZED HEALTH CARE EDUCATION/TRAINING PROGRAM

## 2020-05-20 PROCEDURE — 93010 ELECTROCARDIOGRAM REPORT: CPT | Mod: ,,, | Performed by: INTERNAL MEDICINE

## 2020-05-20 PROCEDURE — 93010 EKG 12-LEAD: ICD-10-PCS | Mod: ,,, | Performed by: INTERNAL MEDICINE

## 2020-05-20 PROCEDURE — 80053 COMPREHEN METABOLIC PANEL: CPT

## 2020-05-20 RX ORDER — ACETAMINOPHEN 325 MG/1
650 TABLET ORAL
Status: COMPLETED | OUTPATIENT
Start: 2020-05-20 | End: 2020-05-20

## 2020-05-20 RX ORDER — POTASSIUM CHLORIDE 20 MEQ/1
40 TABLET, EXTENDED RELEASE ORAL
Status: COMPLETED | OUTPATIENT
Start: 2020-05-20 | End: 2020-05-20

## 2020-05-20 RX ORDER — LIDOCAINE HYDROCHLORIDE AND EPINEPHRINE 10; 10 MG/ML; UG/ML
1 INJECTION, SOLUTION INFILTRATION; PERINEURAL
Status: COMPLETED | OUTPATIENT
Start: 2020-05-20 | End: 2020-05-20

## 2020-05-20 RX ADMIN — LIDOCAINE HYDROCHLORIDE,EPINEPHRINE BITARTRATE 1 ML: 10; .01 INJECTION, SOLUTION INFILTRATION; PERINEURAL at 02:05

## 2020-05-20 RX ADMIN — POTASSIUM CHLORIDE 40 MEQ: 1500 TABLET, EXTENDED RELEASE ORAL at 02:05

## 2020-05-20 RX ADMIN — ACETAMINOPHEN 650 MG: 325 TABLET ORAL at 02:05

## 2020-05-20 NOTE — ED TRIAGE NOTES
Pt brought in via [facility] from [facility]. Pt was standing up after toilet witnessed by aid and fell hitting her head. Pt denies blood thinners. + LOC and small lacerion to the left side of head bleeding controlled. PT had 1 episode of vomiting post fall.

## 2020-05-20 NOTE — ED NOTES
LOC: The patient is awake, alert, and aware of environment. The patient is oriented x 3 and speaking appropriately.   APPEARANCE: No acute distress noted.   PSYCHOSOCIAL: Patient is calm and cooperative.   SKIN: The skin is warm, dry. 2 inch laceration to left scalp. Bleeding controlled. Bruising noted to left shoulder.   RESPIRATORY: Airway is open and patent. Bilateral chest rise and fall. Respirations are spontaneous, even and unlabored. Normal effort and rate noted. No accessory muscle use noted.   CARDIAC: Patient has a normal rate and rhythm. Denies chest pain or SOB.   ABDOMEN: Soft and non tender to palpation. No distention noted.   URINARY:  Voids independently.   NEUROLOGIC: Eyes open spontaneously. Speech clear. Tolerating saliva secretions well. Able to follow commands, demonstrating ability to actively and appropriately communicate within context of current conversation. Symmetrical facial muscles. Moving all extremities well. Movement is purposeful.   MUSCULOSKELETAL: No obvious deformities noted.

## 2020-05-20 NOTE — ED NOTES
ED  (ANTHONY) following case for Home Health set-up. SW in communication with ED MD Ramirez.     SW sent referral to [facility] via [facility].     Update 4:42 PM  SW received call from Ro Phan at [facility] who confirmed that they have accepted and will initiate services on Friday. Ro reported that she called [facility] and informed them of start date.     ANTHONY informed ED MD Mcfarland of above.     ANTHONY will continue to follow and offer support as needed.     Evelyn Chapa, [facility]  -[phone]

## 2020-05-20 NOTE — PLAN OF CARE
"[facility]    HOME HEALTH ORDERS  FACE TO FACE ENCOUNTER    Patient Name: Arti Olsen  YOB: 1928    PCP: Wong Le MD   PCP Address: [address] / [place] [address]  PCP Phone Number: [phone]  PCP Fax: [phone]    Encounter Date: 05/20/2020    Admit to Home Health    Diagnoses:  There are no hospital problems to display for this patient.      No future appointments.        I have seen and examined this patient face to face today. My clinical findings that support the need for the home health skilled services and home bound status are the following:  Weakness/numbness causing balance and gait disturbance due to Weakness/Debility making it taxing to leave home.    Allergies:  Review of patient's allergies indicates:   Allergen Reactions    Iodinated contrast media Swelling    Reglan [metoclopramide] Other (See Comments)     "Body shaking"    Sulfa (sulfonamide antibiotics)      Yrs ago    Augmentin [amoxicillin-pot clavulanate] Diarrhea       Diet: regular diet    Activities: activity as tolerated    Nursing:   SN to complete comprehensive assessment including routine vital signs. Instruct on disease process and s/s of complications to report to MD. Review/verify medication list sent home with the patient at time of discharge  and instruct patient/caregiver as needed. Frequency may be adjusted depending on start of care date.    Notify MD if SBP > 160 or < 90; DBP > 90 or < 50; HR > 120 or < 50; Temp > 101; Other:         CONSULTS:    Physical Therapy to evaluate and treat. Evaluate for home safety and equipment needs; Establish/upgrade home exercise program. Perform / instruct on therapeutic exercises, gait training, transfer training, and Range of Motion.  Occupational Therapy to evaluate and treat. Evaluate home environment for safety and equipment needs. Perform/Instruct on transfers, ADL training, ROM, and therapeutic " exercises.    MISCELLANEOUS CARE:  N/A    WOUND CARE ORDERS  n/a      Medications: Review discharge medications with patient and family and provide education.      Current Discharge Medication List      CONTINUE these medications which have NOT CHANGED    Details   acetaminophen (TYLENOL) 500 MG tablet Take 500 mg by mouth daily as needed.       ALPRAZolam (XANAX) 0.25 MG tablet Take 0.5 tablets (0.125 mg total) by mouth nightly as needed for Insomnia or Anxiety.  Qty: 90 tablet, Refills: 1    Associated Diagnoses: Insomnia, unspecified type; Anxiety      amLODIPine (NORVASC) 10 MG tablet Take 1 tablet (10 mg total) by mouth once daily.  Qty: 30 tablet, Refills: 11      aspirin 81 MG Chew Take 81 mg by mouth once daily.      atorvastatin (LIPITOR) 40 MG tablet Take 1 tablet (40 mg total) by mouth every evening. TAKE (1/2) HALF BY MOUTH ONCE A DAY  Qty: 90 tablet, Refills: 3    Associated Diagnoses: Atherosclerosis of aorta; Coronary artery disease involving native coronary artery of native heart without angina pectoris; Mixed hyperlipidemia; PAD (peripheral artery disease); Renal artery atherosclerosis; Unstable angina      bisacodyl (DULCOLAX) 5 mg EC tablet Take 5 mg by mouth daily as needed for Constipation.      C,E,zinc,copper 11-omega3s-lut (OCUVITE ADULT 50 PLUS) 250-5-1 mg Cap Take by mouth.      cholecalciferol, vitamin D3, (VITAMIN D3) 1,000 unit capsule Take 1 capsule (1,000 Units total) by mouth once daily.  Qty: 90 capsule, Refills: 3    Associated Diagnoses: Vitamin D insufficiency      !! ferrous sulfate (FEOSOL) 325 mg (65 mg iron) Tab tablet Take 1 tablet (325 mg total) by mouth every 12 (twelve) hours.  Qty: 60 tablet, Refills: 4    Associated Diagnoses: Diarrhea, unspecified type; Iron deficiency anemia, unspecified iron deficiency anemia type; Encounter for monitoring long-term proton pump inhibitor therapy      !! ferrous sulfate (FEOSOL) 325 mg (65 mg iron) Tab tablet Take 1 tablet (325 mg  total) by mouth every 12 (twelve) hours.  Qty: 60 tablet, Refills: 4    Associated Diagnoses: Iron deficiency anemia, unspecified iron deficiency anemia type; Low serum potassium      furosemide (LASIX) 20 MG tablet Take 1 tablet (20 mg total) by mouth 2 (two) times daily. Start with 3 days per week but OK to use more often or extra for increased leg swelling  Qty: 90 tablet, Refills: 3    Associated Diagnoses: Coronary artery disease involving native coronary artery of native heart without angina pectoris; Chronic systolic congestive heart failure      gabapentin (NEURONTIN) 300 MG capsule Take 1 capsule (300 mg total) by mouth every evening.  Qty: 90 capsule, Refills: 3    Associated Diagnoses: Insomnia, unspecified type; Other chronic pain      irbesartan (AVAPRO) 300 MG tablet Take 1 tablet (300 mg total) by mouth every evening.  Qty: 90 tablet, Refills: 3    Associated Diagnoses: Essential hypertension      isosorbide mononitrate (IMDUR) 60 MG 24 hr tablet Take 1 tablet (60 mg total) by mouth once daily.  Qty: 90 tablet, Refills: 3    Associated Diagnoses: Coronary artery disease involving native coronary artery of native heart without angina pectoris      levothyroxine (SYNTHROID) 25 MCG tablet Take 1 tablet (25 mcg total) by mouth once daily.  Qty: 90 tablet, Refills: 3    Associated Diagnoses: Hypothyroidism due to acquired atrophy of thyroid      mag hydrox/aluminum hyd/simeth (ALUM-MAG HYDROXIDE-SIMETH ORAL) Take by mouth daily as needed.       metoprolol succinate (TOPROL-XL) 25 MG 24 hr tablet Take 1 tablet (25 mg total) by mouth once daily.  Qty: 90 tablet, Refills: 3    Associated Diagnoses: Essential hypertension; Coronary artery disease involving native coronary artery of native heart without angina pectoris; Unstable angina      nitroGLYCERIN (NITROSTAT) 0.4 MG SL tablet Place 1 tablet (0.4 mg total) under the tongue every 5 (five) minutes as needed for Chest pain.  Qty: 25 tablet, Refills: 4     Associated Diagnoses: Coronary artery disease involving native coronary artery of native heart without angina pectoris      oxyCODONE-acetaminophen (PERCOCET) 7.5-325 mg per tablet 1 tablet every 4 (four) hours as needed.     Comments: Quantity prescribed more than 7 day supply? Press F2 and select one:[id number]        pantoprazole (PROTONIX) 40 MG tablet Take 1 tablet (40 mg total) by mouth once daily.  Qty: 90 tablet, Refills: 3    Associated Diagnoses: Gastrointestinal hemorrhage, unspecified gastrointestinal hemorrhage type      psyllium husk, with sugar, (METAMUCIL FIBER THIN) 2.5 gram Wafr Take 2 Wafers by mouth every 12 (twelve) hours.  Qty: 120 Wafer, Refills: 11    Associated Diagnoses: Diarrhea, unspecified type; Iron deficiency anemia, unspecified iron deficiency anemia type; Encounter for monitoring long-term proton pump inhibitor therapy      QUEtiapine (SEROQUEL) 25 MG Tab Take 1 tablet (25 mg total) by mouth 2 (two) times daily as needed (anxiety, insomnia).  Qty: 180 tablet, Refills: 3    Associated Diagnoses: Insomnia, unspecified type; Anxiety      sertraline (ZOLOFT) 100 MG tablet Take 1 tablet (100 mg total) by mouth once daily.  Qty: 90 tablet, Refills: 3    Associated Diagnoses: Depression, unspecified depression type      vancomycin (VANCOCIN) 1,000 mg injection       VIT C/VIT E/LUTEIN/MIN/OMEGA-3 (OCUVITE ORAL) Take 1 tablet by mouth once daily.       !! - Potential duplicate medications found. Please discuss with provider.          I certify that this patient is confined to her home and needs physical therapy and occupational therapy.

## 2020-05-20 NOTE — ED NOTES
Pt. Resting in bed in NAD. RR e/u. Continuous cardiac, BP, and O2 monitoring in progress. RR 18 even and unlabored. MD at bedside.

## 2020-05-20 NOTE — ED NOTES
PT cleaned up with wet wipes to remove dried blood from fall. Laceration to head cleaned with gauze and sterile saline.

## 2020-05-20 NOTE — ED PROVIDER NOTES
"Encounter Date: 5/20/2020       History     Chief Complaint   Patient presents with    Loss of Consciousness     arrived via [facility] from [facility] with c/o syncope, witnessed by aide, aide reports patient stood up from toilet and fell to ground, + head injury with lac noted, short duration, aide reports patient remained talking to her throughout event, pt does not recall fall,pt now aao x4, not on blood thinners, pt c/o generalized weakness and loose stools prior to fall, x1 episode of emesis post fall       Arti Olsen is a 89 yo F with a PMHx of traumatic subdural hematoma secondary to a fall in 2018, C diff infection in Jan 2020, hypertension, hyperlipidemia, nonobstructive CAD, anemia who presents to the ED this morning after falling at her assisted living facility secondary to what sounds like a syncopal event. Patient was in her normal sate of health until this morning she reported feeling unwell. Patient reports that she was having a bowel movement and the next thing she remembers is being on the floor and seeing blood on her clothes. The fall was witnessed by an aide who told EMS that the patient stood up to wipe when she suddenly fell down before the aide could get to her to catch her. There was no report of how long seh was down for but the patient stated it felt like a very short time. There was no loss of bladder or bowel control, seizure like activity or prodromal symptoms prior to falling. She denies any recent vomiting, diarrhea and reports normal po intake. She had a meal prior to the incident.      Patient hit the anterior, left side of her head during the fall and subsequently sustained a small scalp laceration. Bleeding was easily controlled at the scene. Since the fall, patient complains of a diffuse 8/10 headache and "strange feeling inside my head." Patient takes an 81mg Aspirin daily but is not on any anticoagulants. Patient was also noted to have some left shoulder bruising " "since the fall. Per EMS, patient had one episode of vomiting while en route to ED, so she was given 4mg Zofran IV, and she currently denies any further nausea. Patient also denies any associated neck pain, back pain, focal weakness/numbness/tingling, vision changes, chest pain, shortness of breath, fever/chills, abdominal pain, pelvic/hip pain, or any other known injuries caused by the fall. Patient's last Tetanus shot was 2018.            Review of patient's allergies indicates:   Allergen Reactions    Iodinated contrast media Swelling    Reglan [metoclopramide] Other (See Comments)     "Body shaking"    Sulfa (sulfonamide antibiotics)      Yrs ago    Augmentin [amoxicillin-pot clavulanate] Diarrhea     Past Medical History:   Diagnosis Date    Acid reflux     chronic    Anemia 6/24/2014    Arthritis     osetoarthritis    Clotting disorder     Colon polyp     Coronary artery disease     Nonobstructive CAD per [facility]    History of colonoscopy     1 polyp noted in 2009    Hyperlipemia     Hypertension     Migraine headache     chronic    Peripheral vascular disease     PONV (postoperative nausea and vomiting)     Renal artery atherosclerosis 11/11/2012    Spinal stenosis     Thyroid disease      Past Surgical History:   Procedure Laterality Date    acf      ANTERIOR CERVICAL DISCECTOMY W/ FUSION      APPENDECTOMY  1942    BLADDER SURGERY  1958    suspension    COLONOSCOPY W/ POLYPECTOMY      CYST REMOVAL  1983    removed from scalp    DISC REMOVAL  1983    EYE SURGERY      cataracts removed    HYSTERECTOMY  1959    COMPLETE    JOINT REPLACEMENT  2006    repair 3rd toe    JOINT REPLACEMENT  2007    right/left rotator    SPINE SURGERY  02/2014    relieve pressure on nerves    TONSILLECTOMY, ADENOIDECTOMY  1930's    UTERINE FIBROID SURGERY  1958    VARICOSE VEIN SURGERY  1958     Family History   Problem Relation Age of Onset    Heart disease Father         aorta burst    Heart disease " Mother         stroke    Kidney disease Brother     Cancer Brother     Lung cancer Brother     Cancer Brother     Leukemia Brother     Heart disease Brother     Cancer Brother     Heart disease Maternal Grandmother     Kidney disease Maternal Grandfather     Uterine cancer Paternal Grandmother     Anesthesia problems Neg Hx     Clotting disorder Neg Hx      problems Neg Hx     Hypertension Neg Hx     Kidney cancer Neg Hx     Malignant hyperthermia Neg Hx     Prostate cancer Neg Hx     Sickle cell trait Neg Hx     Lupus Neg Hx     Sudden death Neg Hx     Urolithiasis Neg Hx     Colon cancer Neg Hx     Esophageal cancer Neg Hx     Irritable bowel syndrome Neg Hx     Rectal cancer Neg Hx     Stomach cancer Neg Hx     Ulcerative colitis Neg Hx     Celiac disease Neg Hx     Cystic fibrosis Neg Hx      Social History     Tobacco Use    Smoking status: Former Smoker     Packs/day: 1.00     Years: 29.00     Pack years: 29.00     Last attempt to quit: 9/10/1969     Years since quittin.7    Smokeless tobacco: Never Used    Tobacco comment: The patient lives alone and is able to accomplish a usual activities of daily living.  She is not that active due to the severity of her back pain.   Substance Use Topics    Alcohol use: No     Frequency: Never     Drinks per session: Patient refused     Binge frequency: Never    Drug use: No     Review of Systems   Constitutional: Positive for activity change. Negative for chills and fever.   Respiratory: Negative for cough, chest tightness, shortness of breath and wheezing.    Cardiovascular: Negative for chest pain and palpitations.   Gastrointestinal: Positive for constipation, nausea and vomiting. Negative for abdominal pain and diarrhea.   Genitourinary: Negative for difficulty urinating, dysuria and hematuria.   Skin: Negative for color change and pallor.   Neurological: Positive for syncope and headaches. Negative for dizziness and weakness.  "  Psychiatric/Behavioral: Negative for agitation, behavioral problems and confusion.       Physical Exam     Initial Vitals [20 1009]   BP Pulse Resp Temp SpO2   (!) 140/90 68 16 97.7 °F (36.5 °C) 98 %      MAP       --         Physical Exam    Constitutional: She appears well-developed and well-nourished. No distress.   HENT:   Head: Normocephalic.   Superficial laceration of 5cm in size located in left frontal head. No mastoid or periocular ecchymosis appreciated.    Neck: Normal range of motion.   Cardiovascular: Normal rate, regular rhythm, normal heart sounds and intact distal pulses.   No murmur heard.  Pulmonary/Chest: Breath sounds normal. No respiratory distress. She has no wheezes. She has no rales.   Abdominal: Soft. Bowel sounds are normal. She exhibits no distension. There is no tenderness.   Musculoskeletal: Normal range of motion. She exhibits no edema or tenderness.   Neurological: She is alert and oriented to person, place, and time. She has normal strength. No cranial nerve deficit or sensory deficit.   Skin: Skin is warm. No rash noted. No erythema.   Psychiatric: She has a normal mood and affect. Her behavior is normal. Thought content normal.         ED Course   Lac Repair  Date/Time: 2020 3:40 PM  Performed by: Ahmet Ramirez MD  Authorized by: Mary Mcfarland MD   Consent Done: Yes  Consent: Verbal consent obtained.  Risks and benefits: risks, benefits and alternatives were discussed  Consent given by: patient  Patient understanding: patient states understanding of the procedure being performed  Patient consent: the patient's understanding of the procedure matches consent given  Procedure consent: procedure consent matches procedure scheduled  Site marked: the operative site was marked  Imaging studies: imaging studies not available  Patient identity confirmed: name, MRN,  and verbally with patient  Time out: Immediately prior to procedure a "time out" was called to verify the " correct patient, procedure, equipment, support staff and site/side marked as required.  Body area: head/neck  Location details: scalp  Laceration length: 5 cm  Foreign bodies: no foreign bodies  Tendon involvement: none  Nerve involvement: none  Vascular damage: no  Anesthesia: local infiltration    Anesthesia:  Local Anesthetic: lidocaine 1% with epinephrine  Patient sedated: no  Preparation: Patient was prepped and draped in the usual sterile fashion.  Irrigation solution: saline  Amount of cleaning: standard  Debridement: minimal  Degree of undermining: minimal  Skin closure: staples  Approximation: close  Approximation difficulty: simple  Dressing: adhesive bandage        Labs Reviewed   URINALYSIS, REFLEX TO URINE CULTURE - Abnormal; Notable for the following components:       Result Value    Appearance, UA Hazy (*)     Protein, UA 3+ (*)     Leukocytes, UA 1+ (*)     All other components within normal limits    Narrative:     Preferred Collection Type->Urine, Clean Catch   COMPREHENSIVE METABOLIC PANEL - Abnormal; Notable for the following components:    Potassium 3.4 (*)     CO2 20 (*)     Calcium 8.2 (*)     Albumin 3.1 (*)     ALT 7 (*)     eGFR if  56.5 (*)     eGFR if non  49.0 (*)     All other components within normal limits    Narrative:     ADD ON TROPONIN PER MEHRAN TORRES MD 5/20/20 11:28   CBC W/ AUTO DIFFERENTIAL - Abnormal; Notable for the following components:    Mean Corpuscular Hemoglobin 31.8 (*)     RDW 14.9 (*)     Platelets 135 (*)     Immature Granulocytes 0.6 (*)     Immature Grans (Abs) 0.05 (*)     Lymph% 15.4 (*)     All other components within normal limits    Narrative:     ADD ON TROPONIN PER MEHRAN TORRES MD 5/20/20 11:28   URINALYSIS MICROSCOPIC - Abnormal; Notable for the following components:    WBC, UA 11 (*)     Hyaline Casts, UA 4 (*)     All other components within normal limits    Narrative:     Preferred Collection Type->Urine, Clean Catch    CULTURE, URINE   PROTIME-INR   TROPONIN I   TROPONIN I    Narrative:     ADD ON TROPONIN PER MEHRAN TORRES MD 5/20/20 11:28     EKG Readings: (Independently Interpreted)   Rhythm: Normal Sinus Rhythm.     ECG Results          EKG 12-lead (Final result)  Result time 05/20/20 12:18:44    Final result by Interface, Lab [facility] (05/20/20 12:18:44)                 Narrative:    Test Reason : R55,    Vent. Rate : 064 BPM     Atrial Rate : 064 BPM     P-R Int : 216 ms          QRS Dur : 136 ms      QT Int : 504 ms       P-R-T Axes : 074 037 146 degrees     QTc Int : 519 ms    Sinus rhythm with 1st degree A-V block  Left bundle branch block  Abnormal ECG  When compared with ECG of 24-JAN-2020 02:07,  Sinus rhythm has replaced Ectopic atrial rhythm  Confirmed by IGOR PENNY MD ([id number]) on 5/20/2020 12:18:30 PM    Referred By: System System           Confirmed By:IGOR PENNY MD                            Imaging Results          X-Ray Shoulder Trauma Left (Final result)  Result time 05/20/20 13:17:32    Final result by Troy Guevara III, MD (05/20/20 13:17:32)                 Narrative:    EXAMINATION:  XR SHOULDER TRAUMA 3 VIEW LEFT    CLINICAL HISTORY:  Unspecified fall, initial encounter    FINDINGS:  There is postoperative change of the C-spine.  There is aortic plaque.  There is DJD of the left shoulder.  No acute fracture dislocation bone destruction seen.      Electronically signed by: Troy Guevara MD  Date:    05/20/2020  Time:    13:17                             X-Ray Chest AP Portable (Final result)  Result time 05/20/20 13:13:16    Final result by Juliet Jacobson MD (05/20/20 13:13:16)                 Impression:      No source for chest pain established in this elderly woman status post fall.      Electronically signed by: Juliet Jacobson MD  Date:    05/20/2020  Time:    13:13             Narrative:    EXAMINATION:  XR CHEST AP PORTABLE    CLINICAL HISTORY:  chest pain after fall;    TECHNIQUE:  Single  frontal view of the chest was performed.    COMPARISON:  12/16/2019.  02/26/2019.    FINDINGS:  Mediastinal structures are midline.  Thoracic aorta is tortuous and atherosclerotic.  Cardiac silhouette and pulmonary vascular distribution are normal.    Multilevel degenerative changes are present in the thoracic spine, incompletely visualized due to under penetration of mediastinal structures.    Lung volumes are normal and symmetric.  I detect no pulmonary disease, pleural fluid, pneumothorax, pneumomediastinum or pneumoperitoneum.  Skin folds project over the lateral aspect of the left hemithorax and over the upper mediastinum.    Left humeral head is high riding similar to 2/20 June 2019 suggesting chronic rotator cuff tear.                               CT Head Without Contrast (Final result)  Result time 05/20/20 11:54:13    Final result by Reilly Adler MD (05/20/20 11:54:13)                 Impression:      Mild extracranial soft tissue swelling without evidence of underlying fracture or acute intracranial hemorrhage.    Electronically signed by resident: Fly Hernández  Date:    05/20/2020  Time:    11:31    Electronically signed by: Reilly Adler MD  Date:    05/20/2020  Time:    11:54             Narrative:    EXAMINATION:  CT HEAD WITHOUT CONTRAST    CLINICAL HISTORY:  Syncope/fainting;    TECHNIQUE:  Low-dose, axial images were obtained through the head.  Coronal and sagittal reformations were also performed. Contrast was not administered.    COMPARISON:  CT head without contrast 01/24/2020, 12/16/2019    FINDINGS:  Ventricles stable in size.  No hydrocephalus.    No new extra-axial blood products or fluid collections.    Brain parenchyma appears unchanged.  No new mass, hemorrhage, edema or infarction.    Scattered atherosclerotic calcification about the skull base.    Mild soft tissue swelling overlying the high left frontal calvarium.  Underlying osseous calvarium appears intact.  No displaced  fracture.    Visualized paranasal sinuses and mastoid air cells are essentially clear.                              X-Rays:   Independently Interpreted Readings:   Chest X-Ray: Normal heart size.  No infiltrates.  No acute abnormalities.   Head CT: No hemorrhage.  No skull fracture.  No acute stroke.   Other Readings:  Xray shoulder showed no acute fractures or dislocations.     Medical Decision Making:   History:   Old Medical Records: I decided to obtain old medical records.  Old Records Summarized: records from previous admission(s).       <> Summary of Records: Hx of subdural hematoma in 2018 s/p a sustained fall requiring no surgical intervention.  Initial Assessment:   Arti Olsen is a 89 yo F with a PMHx of traumatic subdural hematoma secondary to a fall in 2018, C diff infection in Jan 2020, hypertension, hyperlipidemia, nonobstructive CAD, [place] who presents to the ED after syncopal episode upon getting up after a bowel movement. There was no seizure like activity or prodromal symptoms preceding the episode.   Differential Diagnosis:   Includes but not limited to:  Vasovagal syncope in the setting of micturition syncope  Orthostatic hypotension  Cardiac arrythmia  Anemia     Clinical Tests:   Lab Tests: Ordered and Reviewed  The following lab test(s) were unremarkable: CBC, Troponin and PT  Radiological Study: Ordered and Reviewed  ED Management:  On presentation, patient was hemodynamically stable. CT Head and CXR showed no acute findings; left shoulder xray revealed no acute fracture/dislocation. CBC revealed stable hemoglobin. INR wnl.   Suspect symptoms likely due to vasovagal syncope in response to straining during a bowel movement or micturition. Orthostats negative.   Bladder scanned with approx 200cc of urine, however patient spontaneously voided. BP recycled to be 170s/90s. Patient reports hx of uncontrolled BP and took her morning medications, although patient remains asymptomatic.  Laceration  repaired with two staples.    4:00pm: Signed out to incoming resident, Dr. Barrow.    UA negative for UTI. However significant for 3+ proteinuria.    Patient stable to be discharged home with home health for PT/OT. Syncope likely vasovagal in etiology. Advised to follow-up with primary care physician in 1 week for removal of staples (2) and further monitoring of syncope as well as proteinuria.                  Attending Attestation:   Physician Attestation Statement for Resident:  As the supervising MD   Physician Attestation Statement: I have personally seen and examined this patient.   I agree with the above history. -:   As the supervising MD I agree with the above PE.    As the supervising MD I agree with the above treatment, course, plan, and disposition.  I was personally present during the critical portions of the procedure(s) performed by the resident and was immediately available in the ED to provide services and assistance as needed during the entire procedure.                    ED Course as of May 20 1737   Wed May 20, 2020   1049 EKG by my independent interpretation is left bundle-branch block, sinus rhythm with prolonged NH interval at a rate of 64, there are slurred discordant ST segment depressions, inferolaterally there are grossly unchanged from prior.  She does not meet Sgarbossa criteria.    [EB]      ED Course User Index  [EB] Mary Mcfarland MD                Clinical Impression:       ICD-10-CM ICD-9-CM   1. Vasovagal syncope R55 780.2   2. Fall W19.XXXA E888.9   3. Traumatic injury of head, initial encounter S09.90XA 959.01             ED Disposition Condition    Discharge Stable        ED Prescriptions     None        Follow-up Information     Follow up With Specialties Details Why Contact Info    Wong Le MD Family [facility] In 1 week  [facility] [facility] [address]  [phone]                                       Radha Barrow MD  Resident  05/20/20 1706       Mary BLOCK  MD [place]  05/20/20 9620

## 2020-05-20 NOTE — ED NOTES
RN called [facility] at [phone] to give update and discuss transportation home.  Representative stated that they do not provide transportation back to facility.  This RN needs to call the nurse at [facility] to give report but that nurse is passing medication right now.  That nurse's number is [phone].

## 2020-05-20 NOTE — ED NOTES
Pt resting on cardiac, bp and o2 monitor . RR 18 even and unlabored. PT aware waiting for lab results and x-rays.

## 2020-05-20 NOTE — ED NOTES
Care handoff to Roger at [facility]. Transportation set up.PT resting comfortably in bed on cardiac, bp and o2 monitor. RR 18 even  And unlabored.

## 2020-05-20 NOTE — MEDICAL/APP STUDENT
"  History     Chief Complaint   Patient presents with    Loss of Consciousness     arrived via [facility] from [facility] with c/o syncope, witnessed by aide, aide reports patient stood up from toilet and fell to ground, + head injury with lac noted, short duration, aide reports patient remained talking to her throughout event, pt does not recall fall,pt now aao x4, not on blood thinners, pt c/o generalized weakness and loose stools prior to fall, x1 episode of emesis post fall     Arti Olsen is a 89 yo F with a PMHx of traumatic subdural hematoma secondary to a fall in 2018, C diff infection in Jan 2020, hypertension, hyperlipidemia, nonobstructive CAD, renal artery stenosis, anemia, GERD, migraines and arthritis who presents to the ED this morning after falling at her assisted living facility secondary to what sounds like a syncopal event. Patient reports that she was having a bowel movement and the next thing she remembers is being on the floor and seeing blood on her clothes. The fall was witnessed by an aide who told EMS that the patient stood up to wipe when she suddenly fell down before the aide could get to her to catch her. Patient hit the anterior, left side of her head during the fall and subsequently sustained a small scalp laceration. Bleeding was easily controlled at the scene. Patient denies any prodromal symptoms prior to falling. Since the fall, patient complains of a diffuse 8/10 headache and "strange feeling inside my head." Patient takes an 81mg Aspirin daily but is not on any anticoagulants. Patient was also noted to have some left shoulder bruising since the fall. Per EMS, patient had one episode of vomiting while en route to ED, so she was given 4mg Zofran IV, and she currently denies any further nausea. Patient also denies any associated neck pain, back pain, focal weakness/numbness/tingling, vision changes, chest pain, shortness of breath, fever/chills, abdominal pain, pelvic/hip " pain, or any other known injuries caused by the fall. Patient's last Tetanus shot was 2018.           Past Medical History:   Diagnosis Date    Acid reflux     chronic    Anemia 6/24/2014    Arthritis     osetoarthritis    Clotting disorder     Colon polyp     Coronary artery disease     Nonobstructive CAD per [facility]    History of colonoscopy     1 polyp noted in 2009    Hyperlipemia     Hypertension     Migraine headache     chronic    Peripheral vascular disease     PONV (postoperative nausea and vomiting)     Renal artery atherosclerosis 11/11/2012    Spinal stenosis     Thyroid disease        Past Surgical History:   Procedure Laterality Date    acf      ANTERIOR CERVICAL DISCECTOMY W/ FUSION      APPENDECTOMY  1942    BLADDER SURGERY  1958    suspension    COLONOSCOPY W/ POLYPECTOMY      CYST REMOVAL  1983    removed from scalp    DISC REMOVAL  1983    EYE SURGERY      cataracts removed    HYSTERECTOMY  1959    COMPLETE    JOINT REPLACEMENT  2006    repair 3rd toe    JOINT REPLACEMENT  2007    right/left rotator    SPINE SURGERY  02/2014    relieve pressure on nerves    TONSILLECTOMY, ADENOIDECTOMY  1930's    UTERINE FIBROID SURGERY  1958    VARICOSE VEIN SURGERY  1958       Family History   Problem Relation Age of Onset    Heart disease Father         aorta burst    Heart disease Mother         stroke    Kidney disease Brother     Cancer Brother     Lung cancer Brother     Cancer Brother     Leukemia Brother     Heart disease Brother     Cancer Brother     Heart disease Maternal Grandmother     Kidney disease Maternal Grandfather     Uterine cancer Paternal Grandmother     Anesthesia problems Neg Hx     Clotting disorder Neg Hx      problems Neg Hx     Hypertension Neg Hx     Kidney cancer Neg Hx     Malignant hyperthermia Neg Hx     Prostate cancer Neg Hx     Sickle cell trait Neg Hx     Lupus Neg Hx     Sudden death Neg Hx     Urolithiasis Neg Hx     Colon  "cancer Neg Hx     Esophageal cancer Neg Hx     Irritable bowel syndrome Neg Hx     Rectal cancer Neg Hx     Stomach cancer Neg Hx     Ulcerative colitis Neg Hx     Celiac disease Neg Hx     Cystic fibrosis Neg Hx        Social History     Tobacco Use    Smoking status: Former Smoker     Packs/day: 1.00     Years: 29.00     Pack years: 29.00     Last attempt to quit: 9/10/1969     Years since quittin.7    Smokeless tobacco: Never Used    Tobacco comment: The patient lives alone and is able to accomplish a usual activities of daily living.  She is not that active due to the severity of her back pain.   Substance Use Topics    Alcohol use: No     Frequency: Never     Drinks per session: Patient refused     Binge frequency: Never    Drug use: No       Review of Systems   Constitutional: Negative for chills and fever.   HENT: Negative for nosebleeds, rhinorrhea and sore throat.    Eyes: Negative for photophobia, pain and visual disturbance.   Respiratory: Negative for cough, chest tightness and shortness of breath.    Cardiovascular: Negative for chest pain, palpitations and leg swelling.   Gastrointestinal: Positive for diarrhea, nausea (resolved after Zofran) and vomiting (x1 episode with EMS). Negative for abdominal distention and abdominal pain.   Genitourinary: Negative for difficulty urinating and dysuria.   Musculoskeletal: Positive for back pain (chronic low back pain; no new back pain). Negative for neck pain and neck stiffness.   Skin: Positive for wound (scalp laceration).   Neurological: Positive for syncope, weakness and headaches. Negative for numbness.   Psychiatric/Behavioral: Negative for confusion.   All other systems reviewed and are negative.      Physical Exam   /71   Pulse 61   Temp 97.7 °F (36.5 °C)   Resp 20   Ht 4' 11" (1.499 m)   Wt 48.1 kg (106 lb)   LMP  (LMP Unknown)   SpO2 97%   Breastfeeding? No   BMI 21.41 kg/m²     Physical Exam    Nursing note and vitals " reviewed.  Constitutional: No distress.   Frail very sweet elderly lady laying comfortably in stretcher with dry blood visible in her hair   HENT:   Head: Normocephalic. Head is without raccoon's eyes and without abrasion.       Nose: Nose normal.   Mouth/Throat: Uvula is midline and oropharynx is clear and moist.   Eyes: Conjunctivae and EOM are normal. Pupils are equal, round, and reactive to light.   Neck: Normal range of motion. Neck supple.   No tenderness to palpation   Cardiovascular: Normal rate, regular rhythm, normal heart sounds and intact distal pulses. Exam reveals no gallop and no friction rub.    No murmur heard.  Pulmonary/Chest: Breath sounds normal. No respiratory distress. She has no wheezes. She has no rales. She exhibits tenderness (mild right sided chest wall tenderness to palpation).   No chest wall bruising, abrasions or lacerations   Abdominal: Soft. Bowel sounds are normal. She exhibits no distension. There is no tenderness. There is no rebound and no guarding.   No abdominal bruising or abrasions   Musculoskeletal: Normal range of motion. She exhibits no edema or tenderness.   Neurological: She is alert and oriented to person, place, and time. She has normal strength. No cranial nerve deficit or sensory deficit. GCS eye subscore is 4. GCS verbal subscore is 5. GCS motor subscore is 6.   Skin: Skin is warm and dry. Capillary refill takes less than 2 seconds. Laceration (scalp) noted.   Psychiatric: She has a normal mood and affect. Thought content normal.         ED Course

## 2020-05-20 NOTE — DISCHARGE INSTRUCTIONS
Follow-up with your primary care doctor in 1 week to have staples removed and further evaluation of fainting.    Stay hydrated - drink more water.    Return to the ED if any further episodes of fainting or other concerns.

## 2020-05-21 LAB — BACTERIA UR CULT: NO GROWTH

## 2020-05-21 NOTE — ED NOTES
Bed: [facility]  Expected date:   Expected time:   Means of arrival:   Comments:

## 2020-05-27 ENCOUNTER — OFFICE VISIT (OUTPATIENT)
Dept: INTERNAL MEDICINE | Facility: CLINIC | Age: 85
End: 2020-05-27
Payer: MEDICARE

## 2020-05-27 VITALS
OXYGEN SATURATION: 98 % | BODY MASS INDEX: 21.42 KG/M2 | SYSTOLIC BLOOD PRESSURE: 140 MMHG | HEART RATE: 72 BPM | DIASTOLIC BLOOD PRESSURE: 62 MMHG | WEIGHT: 106.06 LBS

## 2020-05-27 DIAGNOSIS — Z48.02 ENCOUNTER FOR STAPLE REMOVAL: Primary | ICD-10-CM

## 2020-05-27 PROCEDURE — 99999 PR PBB SHADOW E&M-EST. PATIENT-LVL V: CPT | Mod: PBBFAC,GC,, | Performed by: STUDENT IN AN ORGANIZED HEALTH CARE EDUCATION/TRAINING PROGRAM

## 2020-05-27 PROCEDURE — 99024 POSTOP FOLLOW-UP VISIT: CPT | Mod: S$PBB,GC,, | Performed by: INTERNAL MEDICINE

## 2020-05-27 PROCEDURE — 12002 RPR S/N/AX/GEN/TRNK2.6-7.5CM: CPT | Mod: PBBFAC | Performed by: STUDENT IN AN ORGANIZED HEALTH CARE EDUCATION/TRAINING PROGRAM

## 2020-05-27 PROCEDURE — 99215 OFFICE O/P EST HI 40 MIN: CPT | Mod: PBBFAC | Performed by: STUDENT IN AN ORGANIZED HEALTH CARE EDUCATION/TRAINING PROGRAM

## 2020-05-27 PROCEDURE — 99999 PR PBB SHADOW E&M-EST. PATIENT-LVL V: ICD-10-PCS | Mod: PBBFAC,GC,, | Performed by: STUDENT IN AN ORGANIZED HEALTH CARE EDUCATION/TRAINING PROGRAM

## 2020-05-27 PROCEDURE — 99024 PR POST-OP FOLLOW-UP VISIT: ICD-10-PCS | Mod: S$PBB,GC,, | Performed by: INTERNAL MEDICINE

## 2020-05-27 NOTE — PROGRESS NOTES
Clinic Note  05/27/2020      Subjective:       Patient ID: Arti Olsen is a 89 y.o. female being seen for an established visit.    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 In the ED, patient had a normal head CT and had 2 staples placed for a bleeding laceration. Since that time she has been well. The laceration has begun to heal well and she has had no further episodes of syncope.       Review of Systems   Constitutional: Negative.    HENT: Negative.    Respiratory: Negative for cough and shortness of breath.    Gastrointestinal: Negative.    Genitourinary: Negative.    Musculoskeletal: Positive for arthralgias (L knee).   Neurological: Negative for dizziness, weakness and light-headedness.       Patient's Medications   New Prescriptions    No medications on file   Previous Medications    ACETAMINOPHEN (TYLENOL) 500 MG TABLET    Take 500 mg by mouth daily as needed.     ALPRAZOLAM (XANAX) 0.25 MG TABLET    Take 0.5 tablets (0.125 mg total) by mouth nightly as needed for Insomnia or Anxiety.    AMLODIPINE (NORVASC) 10 MG TABLET    Take 1 tablet (10 mg total) by mouth once daily.    ASPIRIN 81 MG CHEW    Take 81 mg by mouth once daily.    ATORVASTATIN (LIPITOR) 40 MG TABLET    Take 1 tablet (40 mg total) by mouth every evening. TAKE (1/2) HALF BY MOUTH ONCE A DAY    BISACODYL (DULCOLAX) 5 MG EC TABLET    Take 5 mg by mouth daily as needed for Constipation.    C,E,ZINC,COPPER 11-OMEGA3S-LUT (OCUVITE ADULT 50 PLUS) 250-5-1 MG CAP    Take by mouth.    CHOLECALCIFEROL, VITAMIN D3, (VITAMIN D3) 1,000 UNIT CAPSULE    Take 1 capsule (1,000 Units total) by mouth once daily.    FERROUS SULFATE (FEOSOL) 325 MG (65 MG IRON) TAB TABLET    Take 1 tablet (325 mg total) by mouth  every 12 (twelve) hours.    FERROUS SULFATE (FEOSOL) 325 MG (65 MG IRON) TAB TABLET    Take 1 tablet (325 mg total) by mouth every 12 (twelve) hours.    FUROSEMIDE (LASIX) 20 MG TABLET    Take 1 tablet (20 mg total) by mouth 2 (two) times daily. Start with 3 days per week but OK to use more often or extra for increased leg swelling    GABAPENTIN (NEURONTIN) 300 MG CAPSULE    Take 1 capsule (300 mg total) by mouth every evening.    IRBESARTAN (AVAPRO) 300 MG TABLET    Take 1 tablet (300 mg total) by mouth every evening.    ISOSORBIDE MONONITRATE (IMDUR) 60 MG 24 HR TABLET    Take 1 tablet (60 mg total) by mouth once daily.    LEVOTHYROXINE (SYNTHROID) 25 MCG TABLET    Take 1 tablet (25 mcg total) by mouth once daily.    MAG HYDROX/ALUMINUM HYD/SIMETH (ALUM-MAG HYDROXIDE-SIMETH ORAL)    Take by mouth daily as needed.     METOPROLOL SUCCINATE (TOPROL-XL) 25 MG 24 HR TABLET    Take 1 tablet (25 mg total) by mouth once daily.    NITROGLYCERIN (NITROSTAT) 0.4 MG SL TABLET    Place 1 tablet (0.4 mg total) under the tongue every 5 (five) minutes as needed for Chest pain.    OXYCODONE-ACETAMINOPHEN (PERCOCET) 7.5-325 MG PER TABLET    1 tablet every 4 (four) hours as needed.     PANTOPRAZOLE (PROTONIX) 40 MG TABLET    Take 1 tablet (40 mg total) by mouth once daily.    PSYLLIUM HUSK, WITH SUGAR, (METAMUCIL FIBER THIN) 2.5 GRAM WAFR    Take 2 Wafers by mouth every 12 (twelve) hours.    QUETIAPINE (SEROQUEL) 25 MG TAB    Take 1 tablet (25 mg total) by mouth 2 (two) times daily as needed (anxiety, insomnia).    SERTRALINE (ZOLOFT) 100 MG TABLET    Take 1 tablet (100 mg total) by mouth once daily.    VANCOMYCIN (VANCOCIN) 1,000 MG INJECTION        VIT C/VIT E/LUTEIN/MIN/OMEGA-3 (OCUVITE ORAL)    Take 1 tablet by mouth once daily.   Modified Medications    No medications on file   Discontinued Medications    No medications on file       Patient Active Problem List    Diagnosis Date Noted    Hypokalemia 01/24/2020    Clostridium  difficile diarrhea 01/23/2020    Hypertensive urgency 01/23/2020    Disease of spinal cord 01/13/2020    Chronic diastolic congestive heart failure 01/13/2020    Non-pressure chronic ulcer of left heel and midfoot limited to breakdown of skin 01/13/2020    Subdural hemorrhage following injury, no loss of consciousness 01/13/2020    Varicose veins of unspecified lower extremity with ulcer of calf (CODE) 12/13/2019    Chronic right-sided low back pain with right-sided sciatica 07/05/2019    Neck pain on right side 07/05/2019    Other chest pain 04/02/2019    Unstable angina 02/26/2019    Hyperkalemia 02/26/2019    Recurrent falls 09/05/2018    Multiple skin tears 08/03/2018    Atherosclerosis of aorta 11/03/2017    Essential hypertension 11/15/2016    Mixed hyperlipidemia 11/15/2016    Achlorhydria 06/21/2016    Weakness of left leg 06/21/2016    Fecal incontinence 06/21/2016    Vitamin D insufficiency 06/21/2016    Abdominal bruit 06/21/2016    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    Pulmonary nodule 09/09/2015    Anemia 11/19/2014    Iron deficiency anemia 08/19/2014    Lumbar disc disease 01/20/2014    Hallux valgus 08/15/2013    Hammertoe 08/15/2013    Colon adenoma 04/18/2013    Unspecified prolapse of vaginal walls 11/20/2012     Dx updated per 2019 IMO Load      LBBB (left bundle branch block) 11/13/2012    Coronary artery disease involving native coronary artery of native heart with angina pectoris 11/11/2012     MINIMAL      Renal artery atherosclerosis 11/11/2012    Aortic regurgitation 11/11/2012    Depression 11/11/2012    Anxiety 11/11/2012    PAD (peripheral artery disease) 11/11/2012    Hyponatremia 11/11/2012    Venous insufficiency of both lower extremities 11/11/2012    History of smoking 25-50 pack years 11/11/2012    Cerebral arterial disease 11/11/2012    Gastroesophageal reflux disease without esophagitis 11/11/2012    Hypothyroid 10/30/2012    Lateral  "cystocele 07/13/2012           Objective:      LMP  (LMP Unknown)   Estimated body mass index is 21.41 kg/m² as calculated from the following:    Height as of 5/20/20: 4' 11" (1.499 m).    Weight as of 5/20/20: 48.1 kg (106 lb).    Physical Exam   Constitutional: She is oriented to person, place, and time. She appears well-developed and well-nourished. No distress.   HENT:   Head: Normocephalic.   L frontal scalp sutures x2 and well healing 2cm laceration   Eyes: Pupils are equal, round, and reactive to light. Conjunctivae are normal.   Cardiovascular: Normal rate, regular rhythm and normal heart sounds.   Pulmonary/Chest: Effort normal and breath sounds normal.   Abdominal: Soft. Bowel sounds are normal. There is no tenderness.   Neurological: She is alert and oriented to person, place, and time.   Psychiatric: She has a normal mood and affect. Her behavior is normal. Judgment and thought content normal.   Nursing note and vitals reviewed.      Assessment and Plan:   Arti was seen today for suture / staple removal.    Diagnoses and all orders for this visit:    Encounter for staple removal   - staples removed in sterile fashion, dressing not applied   - instructed patient to avoid scratching or irritating area and to avoid shampoos or conditioners to avoid irritation for 24-48 hrs   - paperwork for NH filled out   - can follow up with Dr Rey STOVALL    Patient seen and plan of care discussed with Dr. Cm Zepeda MD    "

## 2020-06-03 NOTE — PROGRESS NOTES
I have reviewed the notes, assessments, and/or procedures performed by Dr. Zepeda, I concur with his documentation of Arti Olsen.

## 2020-07-08 ENCOUNTER — TELEPHONE (OUTPATIENT)
Dept: INTERNAL MEDICINE | Facility: CLINIC | Age: 85
End: 2020-07-08

## 2020-07-08 NOTE — TELEPHONE ENCOUNTER
----- Message from Irma Thompson sent at 7/8/2020  8:42 AM CDT -----  Contact: marlene/ rod blackburn   Marlene states the pt is coming to there office to get a tooth extraction. Marlene states can they get a copy of the pts clinical notes. Marlene states the pt always complains about her chest hurting and they want to make sure everything is ok before they pull her tooth. Please fax to . Also call if there are any further questions or concerns

## 2020-07-14 ENCOUNTER — TELEPHONE (OUTPATIENT)
Dept: INTERNAL MEDICINE | Facility: CLINIC | Age: 85
End: 2020-07-14

## 2020-07-14 NOTE — TELEPHONE ENCOUNTER
Spoke with Sylvia, she stated she need the medical record for Arti. I faxed over latest visit after visit summary and a copy of chest xray.

## 2020-07-14 NOTE — TELEPHONE ENCOUNTER
----- Message from Lillie Nicholson sent at 7/14/2020  1:53 PM CDT -----  Type:  Needs Medical Advice    Who Called: marlene velasco/ dr blackburn       Would the patient rather a call back or a response via [facility]? Call back     Additional Information: marlene is requesting a dental clearance for this pt. Marlene would like it faxed to

## 2020-07-15 ENCOUNTER — TELEPHONE (OUTPATIENT)
Dept: ENDOSCOPY | Facility: HOSPITAL | Age: 85
End: 2020-07-15

## 2020-07-15 NOTE — TELEPHONE ENCOUNTER
Dr. Mccann,     Pt was contacted to schedule EGD/colonoscopy (ordered 3/23/20 for iron deficiency anemia/ dark stools). EGD/colonoscopy  was canceled in March 2020 due to COVID-19 pandemic, Pt was not allowed to leave [facility]. Pt is unsure if she needs to see you in clinic prior to scheduling EGD/colonoscopy.     Spoke to nurse Farley at [facility] where Pt resides. Nurse Farley stated Pt is able to have outside appointments at this time. Nurse Martine stated that she was not aware Pt was to be rescheduled for EGD/colonoscopy. Pt recent labs on 5/20/20.     Does Pt need to see you in clinic or should Pt be scheduled for EGD/colonoscopy? Please advise.     Thank you

## 2020-07-17 ENCOUNTER — TELEPHONE (OUTPATIENT)
Dept: ENDOSCOPY | Facility: HOSPITAL | Age: 85
End: 2020-07-17

## 2020-07-17 DIAGNOSIS — Z71.89 COMPLEX CARE COORDINATION: ICD-10-CM

## 2020-07-17 NOTE — TELEPHONE ENCOUNTER
Swathi Goff MA  [facility] Schedulers 22 hours ago (5:01 PM)     Please schedule pt EGD and colonoscopy     Message text        Rayna Villalobos, RN routed conversation to You Yesterday (3:32 PM)       MD Swathi Ahn MA; [facility] Schedul[facility] Yesterday (3:12 PM)     No clinic apt needed just EGD and colonoscopy    Message text        CHARLES Barry MD 2 days ago     Please advise    Message text        You routed conversation to Mccann Deshaun WONG Staff; Deshaun Mccann MD 2 days ago      You 2 days ago        Dr. Mccann,      Pt was contacted to schedule EGD/colonoscopy (ordered 3/23/20 for iron deficiency anemia/ dark stools). EGD/colonoscopy  was canceled in March 2020 due to COVID-19 pandemic, Pt was not allowed to leave [facility]. Pt is unsure if she needs to see you in clinic prior to scheduling EGD/colonoscopy.      Spoke to nurse Farley at [facility] where Pt resides. Nurse Farley stated Pt is able to have outside appointments at this time. Nurse Farley stated that she was not aware Pt was to be rescheduled for EGD/colonoscopy. Pt recent labs on 5/20/20.      Does Pt need to see you in clinic or should Pt be scheduled for EGD/colonoscopy? Please advise.      Thank you         Documentation

## 2020-07-21 ENCOUNTER — TELEPHONE (OUTPATIENT)
Dept: ENDOSCOPY | Facility: HOSPITAL | Age: 85
End: 2020-07-21

## 2020-07-21 DIAGNOSIS — Z01.818 PRE-OP TESTING: ICD-10-CM

## 2020-07-21 DIAGNOSIS — Z12.11 SPECIAL SCREENING FOR MALIGNANT NEOPLASMS, COLON: Primary | ICD-10-CM

## 2020-07-21 RX ORDER — SODIUM, POTASSIUM,MAG SULFATES 17.5-3.13G
1 SOLUTION, RECONSTITUTED, ORAL ORAL DAILY
Qty: 1 KIT | Refills: 0 | Status: SHIPPED | OUTPATIENT
Start: 2020-07-21 | End: 2020-07-23

## 2020-07-21 NOTE — TELEPHONE ENCOUNTER
Pt scheduled with Dr. Mccann on 2nd floor endoscopy on 8/27/20 at 0800 with 0700 arrival time. COVID-19 screening at [facility] 2nd floor on 8/24/20 per endoscopy protocol. Spoke to Nurse Mullins at [facility] # [phone]/ Nurse Fax # [phone], Pt medical history, allergies and medications reviewed with Nurse Mullins prior to scheduling Pt for EGD and colonoscopy. Suprep e-scribed to Pt preferred pharmacy, Suprep prep instructions and COVID-19 screening appointment faxed to [facility], Attn Nurses. Pt son Olvin Raúl notified prior to scheduling Pt. Per Pt son Olvin, Pt to be accompanied day of procedure and for COVID-19 screening  by [facility] [place] [facility], contact Arlette Saul # [phone]. Arlette Saul , [facility] [place] [facility] notified of EGD/colonoscopy appointment date/time and COVID-19 screening appointment date/time, per Olvin Olsen's request. Emailed Pt EGD/colonoscopy prep instructions and COVID-19 screening appointment to Olvin Olsen per his request.

## 2020-07-22 ENCOUNTER — PES CALL (OUTPATIENT)
Dept: ADMINISTRATIVE | Facility: CLINIC | Age: 85
End: 2020-07-22

## 2020-08-31 ENCOUNTER — TELEPHONE (OUTPATIENT)
Dept: GASTROENTEROLOGY | Facility: CLINIC | Age: 85
End: 2020-08-31

## 2020-08-31 NOTE — TELEPHONE ENCOUNTER
----- Message from Lesvia Bronson sent at 8/28/2020  5:03 PM CDT -----  Contact: Nava pt's nurse  [phone]    ----- Message -----  From: Roshan Randle  Sent: 8/28/2020  11:49 AM CDT  To: Ramy WONG Staff    Pt's nurse called to get the pt schedule to see Dr. Mccann for diarrhea, unable to view schedule         Please contact pt: [phone] or Nava pt's nurse  [phone]

## 2020-09-14 NOTE — TELEPHONE ENCOUNTER
----- Message from Jenn Yip MA sent at 9/11/2020  5:17 PM CDT -----  Contact: pt    ----- Message -----  From: Roshan Randle  Sent: 9/11/2020   4:06 PM CDT  To: Ramy WONG Staff    Pt state she is returning a call to the clinic       Please contact [phone]

## 2020-09-15 NOTE — TELEPHONE ENCOUNTER
----- Message from Irma Thompson sent at 9/15/2020  3:53 PM CDT -----  Contact: self [phone]  Pt asked me to send a message to her cardiologist to be seen due to her passing blood through her urine for the past month. Suggested she get with her PCP. Soonest appt not available until 9/28. Please call and advise pt on what she can do. Pt states she thought it would just go away and never left her. Please call and advise

## 2020-09-15 NOTE — TELEPHONE ENCOUNTER
Spoke to pt , she stated she will have to call back to schedule because a family member brings her to her doctors appt and she dont know when they can.

## 2020-09-15 NOTE — TELEPHONE ENCOUNTER
Re: 1 mo report of blood in urine    Please set up the followin. UA - order is in  2. Urology appt - order is in

## 2020-09-17 NOTE — TELEPHONE ENCOUNTER
I put in a UA test - ask her to come in and do that in the AM wo/ an appt with me, and pending results may send in an antibiotic for UTI if confirmed  tang

## 2020-09-17 NOTE — TELEPHONE ENCOUNTER
----- Message from Benjamin Hazel sent at 9/17/2020 11:55 AM CDT -----  Contact: Self- [phone]  Pt is using transportation from nursing home & needs earlier appt than 4pm tomorrow. Please call to reschedule her.

## 2020-10-06 NOTE — PROGRESS NOTES
Refill Routing Note   Medication(s) are not appropriate for processing by [facility] for the following reason(s):     - Outside of protocol    [facility] actions taken in this encounter: Route          Medication reconciliation completed: No   Automatic Epic Generated Protocol Data:        Requested Prescriptions   Pending Prescriptions Disp Refills    gabapentin (NEURONTIN) 300 MG capsule [Pharmacy Med Name: GABAPENTIN 300MG CAPSULE] 30 capsule 5     Sig: TAKE 1 CAP BY MOUTH EVERY EVENING       Anticonvulsants Protocol Passed - 10/6/2020  2:00 PM        Passed - Visit with Authorizing provider in past 9 months or upcoming 90 days        Passed - No active pregnancy on record              Appointments  past 12m or future 3m with PCP    Date Provider   Last Visit   1/13/2020 Wong Le MD   Next Visit   Visit date not found Wong Le MD   ED visits in past 90 days: 0        Note composed:2:24 PM 10/06/2020

## 2020-10-15 NOTE — TELEPHONE ENCOUNTER
----- Message from Hedy Lee sent at 10/15/2020 12:06 PM CDT -----  Contact: [facility]/[place] [phone]  Stated that the patient has never taken Rx nitroGLYCERIN (NITROSTAT) 0.4 MG SL tablet    Do you want to keep it on file?    Please call and advise.    Thank You

## 2020-10-15 NOTE — TELEPHONE ENCOUNTER
Re: Contact: [facility]/Kimber [phone]  Stated that the patient has never taken Rx nitroGLYCERIN (NITROSTAT) 0.4 MG SL tablet  Do you want to keep it on file?  Please call and advise.    Ans: Chart indicates she has used it in past when she had bad chest pain and had to go to ER, it relived the chest pain somewhat till evaluated in ER.   Would leave as is on file so it is available for a chest pain emergency - might never be needed          Current Outpatient Medications on File Prior to Visit   Medication Sig Dispense Refill    acetaminophen (TYLENOL) 500 MG tablet Take 500 mg by mouth daily as needed.       ALPRAZolam (XANAX) 0.25 MG tablet Take 0.5 tablets (0.125 mg total) by mouth nightly as needed for Insomnia or Anxiety. 90 tablet 1    amLODIPine (NORVASC) 10 MG tablet Take 1 tablet (10 mg total) by mouth once daily. 30 tablet 11    aspirin 81 MG Chew Take 81 mg by mouth once daily.      atorvastatin (LIPITOR) 40 MG tablet Take 1 tablet (40 mg total) by mouth every evening. TAKE (1/2) HALF BY MOUTH ONCE A DAY 90 tablet 3    bisacodyl (DULCOLAX) 5 mg EC tablet Take 5 mg by mouth daily as needed for Constipation.      C,E,zinc,copper 11-omega3s-lut (OCUVITE ADULT 50 PLUS) 250-5-1 mg Cap Take by mouth.      cholecalciferol, vitamin D3, (VITAMIN D3) 1,000 unit capsule Take 1 capsule (1,000 Units total) by mouth once daily. 90 capsule 3    ferrous sulfate (FEOSOL) 325 mg (65 mg iron) Tab tablet Take 1 tablet (325 mg total) by mouth every 12 (twelve) hours. 60 tablet 4    ferrous sulfate (FEOSOL) 325 mg (65 mg iron) Tab tablet Take 1 tablet (325 mg total) by mouth every 12 (twelve) hours. 60 tablet 4    furosemide (LASIX) 20 MG tablet Take 1 tablet (20 mg total) by mouth 2 (two) times daily. Start with 3 days per week but OK to use more often or extra for increased leg swelling 90 tablet 3    gabapentin (NEURONTIN) 300 MG capsule TAKE 1 CAP BY MOUTH EVERY EVENING 30 capsule 5     irbesartan (AVAPRO) 300 MG tablet Take 1 tablet (300 mg total) by mouth every evening. 90 tablet 3    isosorbide mononitrate (IMDUR) 60 MG 24 hr tablet Take 1 tablet (60 mg total) by mouth once daily. 90 tablet 3    levothyroxine (SYNTHROID) 25 MCG tablet Take 1 tablet (25 mcg total) by mouth once daily. 90 tablet 3    mag hydrox/aluminum hyd/simeth (ALUM-MAG HYDROXIDE-SIMETH ORAL) Take by mouth daily as needed.       metoprolol succinate (TOPROL-XL) 25 MG 24 hr tablet Take 1 tablet (25 mg total) by mouth once daily. 90 tablet 3    nitroGLYCERIN (NITROSTAT) 0.4 MG SL tablet Place 1 tablet (0.4 mg total) under the tongue every 5 (five) minutes as needed for Chest pain. 25 tablet 4    oxyCODONE-acetaminophen (PERCOCET) 7.5-325 mg per tablet 1 tablet every 4 (four) hours as needed.       pantoprazole (PROTONIX) 40 MG tablet Take 1 tablet (40 mg total) by mouth once daily. 90 tablet 3    psyllium husk, with sugar, (METAMUCIL FIBER THIN) 2.5 gram Wafr Take 2 Wafers by mouth every 12 (twelve) hours. 120 Wafer 11    QUEtiapine (SEROQUEL) 25 MG Tab Take 1 tablet (25 mg total) by mouth 2 (two) times daily as needed (anxiety, insomnia). 180 tablet 3    sertraline (ZOLOFT) 100 MG tablet TAKE 1 TAB BY MOUTH EVERY DAY 90 tablet 1    vancomycin (VANCOCIN) 1,000 mg injection       VIT C/VIT E/LUTEIN/MIN/OMEGA-3 (OCUVITE ORAL) Take 1 tablet by mouth once daily.       No current facility-administered medications on file prior to visit.

## 2020-10-23 NOTE — TELEPHONE ENCOUNTER
Called Kimber from [facility], was transfereed to but no answer, and unable to leave a message.

## 2020-11-17 NOTE — TELEPHONE ENCOUNTER
----- Message from Bailey Simons sent at 11/17/2020  2:16 PM CST -----  Regarding: Justo Bah/ [phone] [place] [phone] or cell [phone]  Caller is requesting an earlier appt than we can schedule.  Caller declined first available appointment listed below. Caller will not accept being placed on the wait list and is requesting a message be sent to the provider.  When is the next available appointment:  02/2020  Reason for the appointment:  Extremely anxious  Patient preference of timeframe to be scheduled:  Any day any time  Comments:

## 2020-11-17 NOTE — TELEPHONE ENCOUNTER
Son has power of  wants to meet with Dr. Le regarding mothers care. She is in an assisted living and keeps stating she is going to run away, son is concerned about her mental status and medications she is on. Wants to know if you can see her sooner than kyle

## 2020-11-17 NOTE — TELEPHONE ENCOUNTER
Mey scheduled it as a audio call if you wanted to talk to him before he comes in for an in person? She said we can override it to in person if need be. Or we can place him in the 4:30 spot for in person if that is what you wanted to do.

## 2020-11-18 NOTE — PROGRESS NOTES
LINKS immunization registry not responding  Care Everywhere updated  Health Maintenance updated  Chart reviewed for overdue Proactive [facility] (DELMAR) health maintenance testing (CRS, Breast Ca, Diabetic Eye Exam)   Orders entered:N/A
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
## 2020-11-30 NOTE — TELEPHONE ENCOUNTER
[facility] said they can not take verbal they need us to face over a letter stating to d/c the orders for the medications     Fax# [phone]

## 2020-11-30 NOTE — TELEPHONE ENCOUNTER
----- Message from Ninfa Orellana sent at 11/30/2020 10:32 AM CST -----  Contact: assistant duarte/eamon/[phone]  RX name:  oxyCODONE-acetaminophen (PERCOCET) 7.5-325 mg per tablet/ALPRAZolam (XANAX) 0.25 MG tablet  What do they need to clarify:  dc order  Comments: the nurse would like to get DC orders due to the pt has not taken theses rx. The nurse would like a call back,     Please advise

## 2020-11-30 NOTE — TELEPHONE ENCOUNTER
Yes, d/c these orders as not being used/needed  Will remove from our medcard      ====  Contact: assistant living/eamon/[phone]  RX name:  oxyCODONE-acetaminophen (PERCOCET) 7.5-325 mg per tablet/ALPRAZolam (XANAX) 0.25 MG tablet  What do they need to clarify:  dc order  Comments: the nurse would like to get DC orders due to the pt has not taken theses rx. The nurse would like a call back,      Please advise

## 2020-12-01 NOTE — TELEPHONE ENCOUNTER
She declined a video/audio visit and the [facility]  She wants to talk to you about her prior test, but says she cannot understand over the phone.  You are totally booked until Feb.   Please advise

## 2020-12-01 NOTE — TELEPHONE ENCOUNTER
----- Message from Ovi Novak sent at 12/1/2020  3:09 PM CST -----  Patient called to speak w/ someone regarding rescheduling her f/u appt from 11/19, requesting callback  [phone]

## 2020-12-02 NOTE — Clinical Note
Annemarie please schedule patient for GI clinic appointment in person patient would like to come see me in GI clinic.  Next available

## 2020-12-03 NOTE — TELEPHONE ENCOUNTER
----- Message from Anish Martinez sent at 12/3/2020  9:23 AM CST -----  Contact: patient  Attempted to schedule an appointment but it was to soon since the patient states she has to notify her transportation. There were no other appointments available to schedule at a later time     Arti at [phone]

## 2020-12-03 NOTE — PROGRESS NOTES
Established Patient - Audio Only Telehealth Visit     The patient location is:  At her sister had living facility  The chief complaint leading to consultation is:  Would like to go home to her house  Visit type: Virtual visit with audio only (telephone)  Total time spent with patient:  45 min       The reason for the audio only service rather than synchronous audio and video virtual visit was related to technical difficulties or patient preference/necessity.     Each patient to whom I provide medical services by telemedicine is:  (1) informed of the relationship between the physician and patient and the respective role of any other health care provider with respect to management of the patient; and (2) notified that they may decline to receive medical services by telemedicine and may withdraw from such care at any time. Patient verbally consented to receive this service via voice-only telephone call.        This service was not originating from a related E/M service provided within the previous 7 days nor will  to an E/M service or procedure within the next 24 hours or my soonest available appointment.  Prevailing standard of care was able to be met in this audio-only visit.          [facility] Consultation Note    Reason for Consult:  There were no encounter diagnoses.    PCP:   Wong Le       Referring MD:  No referring provider defined for this encounter.    Initial History of Present Illness (HPI):  This is a 89 y.o. female here for evaluation of no medical complaints but concerned that she is being kept against her will add assisted living facility [facility] but being well taken care of at [facility].  She feels she is perfectly fine to be living at her house alone or with sitters she does not feel like she needs to be there she does walk with a cane or walker for balance issue she says.  Apparently her son may have the durable power of  after  her daughter had a massive stroke in his hemiplegia.  She has no GI complaints no nausea vomiting no chest pain no shortness of breath no abdominal pain no diarrhea no constipation no blood in her stool no headaches no shortness of breath or cough.  No unintentional weight loss    Abdominal pain - no  Reflux - no  Dysphagia - no   Bowel habits - normal  GI bleeding - none  NSAID usage - aspirin    Interval HPI 12/02/2020:  The patient's last visit with me was on 1/10/2020.      ROS:  Constitutional: No fevers, chills, No weight loss  ENT:  No heartburn no dysphagia no odynophagia no hoarseness  CV: No chest pain, no palpitation  Pulm: No cough, No shortness of breath, no wheezing  Ophtho: No vision changes  GI: see HPI  Derm: No rash, no itching  Heme: No lymphadenopathy, No easy bruising  MSK:  Chronic arthritis spinal stenosis  : No dysuria, No hematuria  Endo: No hot or cold intolerance  Neuro: No syncope, No seizure, no strokes  Psych: No uncontrolled anxiety, No uncontrolled depression    Medical History:  has a past medical history of Acid reflux, Anemia (6/24/2014), Arthritis, Clotting disorder, Colon polyp, Coronary artery disease, History of colonoscopy, Hyperlipemia, Hypertension, Migraine headache, Peripheral vascular disease, PONV (postoperative nausea and vomiting), Renal artery atherosclerosis (11/11/2012), Spinal stenosis, and Thyroid disease.    Surgical History:  has a past surgical history that includes Appendectomy (1942); TONSILLECTOMY, ADENOIDECTOMY (1930's); Bladder surgery (1958); Uterine fibroid surgery (1958); Varicose vein surgery (1958); Disc removal (1983); Cyst Removal (1983); Joint replacement (2006); Joint replacement (2007); Colonoscopy w/ polypectomy; Hysterectomy (1959); acf; Anterior cervical discectomy w/ fusion; Eye surgery; and Spine surgery (02/2014).    Family History: family history includes Cancer in her brother, brother, and brother; Heart disease in her brother, father,  "maternal grandmother, and mother; Kidney disease in her brother and maternal grandfather; Leukemia in her brother; Lung cancer in her brother; Uterine cancer in her paternal grandmother..     Social History:  reports that she quit smoking about 51 years ago. She has a 29.00 pack-year smoking history. She has never used smokeless tobacco. She reports that she does not drink alcohol or use drugs.    Review of patient's allergies indicates:   Allergen Reactions    Iodinated contrast media Swelling    Reglan [metoclopramide] Other (See Comments)     "Body shaking"    Sulfa (sulfonamide antibiotics)      Yrs ago    Augmentin [amoxicillin-pot clavulanate] Diarrhea       Medication List with Changes/Refills   Current Medications    ACETAMINOPHEN (TYLENOL) 500 MG TABLET    Take 500 mg by mouth daily as needed.     AMLODIPINE (NORVASC) 10 MG TABLET    Take 1 tablet (10 mg total) by mouth once daily.    ASPIRIN 81 MG CHEW    Take 81 mg by mouth once daily.    ATORVASTATIN (LIPITOR) 40 MG TABLET    Take 1 tablet (40 mg total) by mouth every evening. TAKE (1/2) HALF BY MOUTH ONCE A DAY    BISACODYL (DULCOLAX) 5 MG EC TABLET    Take 5 mg by mouth daily as needed for Constipation.    C,E,ZINC,COPPER 11-OMEGA3S-LUT (OCUVITE ADULT 50 PLUS) 250-5-1 MG CAP    Take by mouth.    CHOLECALCIFEROL, VITAMIN D3, (VITAMIN D3) 1,000 UNIT CAPSULE    Take 1 capsule (1,000 Units total) by mouth once daily.    FERROUS SULFATE (FEOSOL) 325 MG (65 MG IRON) TAB TABLET    Take 1 tablet (325 mg total) by mouth every 12 (twelve) hours.    FERROUS SULFATE (FEOSOL) 325 MG (65 MG IRON) TAB TABLET    Take 1 tablet (325 mg total) by mouth every 12 (twelve) hours.    FUROSEMIDE (LASIX) 20 MG TABLET    Take 1 tablet (20 mg total) by mouth 2 (two) times daily. Start with 3 days per week but OK to use more often or extra for increased leg swelling    GABAPENTIN (NEURONTIN) 300 MG CAPSULE    TAKE 1 CAP BY MOUTH EVERY EVENING    IRBESARTAN (AVAPRO) 300 MG " "TABLET    Take 1 tablet (300 mg total) by mouth every evening.    ISOSORBIDE MONONITRATE (IMDUR) 60 MG 24 HR TABLET    TAKE 1 TAB BY MOUTH EVERY DAY    LEVOTHYROXINE (SYNTHROID) 25 MCG TABLET    TAKE 1 TAB BY MOUTH EVERY DAY    MAG HYDROX/ALUMINUM HYD/SIMETH (ALUM-MAG HYDROXIDE-SIMETH ORAL)    Take by mouth daily as needed.     METOPROLOL SUCCINATE (TOPROL-XL) 25 MG 24 HR TABLET    Take 1 tablet (25 mg total) by mouth once daily.    NITROGLYCERIN (NITROSTAT) 0.4 MG SL TABLET    Place 1 tablet (0.4 mg total) under the tongue every 5 (five) minutes as needed for Chest pain.    PANTOPRAZOLE (PROTONIX) 40 MG TABLET    TAKE 1 TAB BY MOUTH EVERY DAY BEFORE BREAKFAST    PSYLLIUM HUSK, WITH SUGAR, (METAMUCIL FIBER THIN) 2.5 GRAM WAFR    Take 2 Wafers by mouth every 12 (twelve) hours.    QUETIAPINE (SEROQUEL) 25 MG TAB    TAKE 1 TAB BY MOUTH TWICE DAILY AS NEEDED FOR ANXIETY/INSOMNIA    SERTRALINE (ZOLOFT) 100 MG TABLET    TAKE 1 TAB BY MOUTH EVERY DAY    VANCOMYCIN (VANCOCIN) 1,000 MG INJECTION        VIT C/VIT E/LUTEIN/MIN/OMEGA-3 (OCUVITE ORAL)    Take 1 tablet by mouth once daily.         Objective Findings:    Vital Signs:  Ht 4' 11" (1.499 m)   Wt 46.7 kg (103 lb)   LMP  (LMP Unknown)   BMI 20.80 kg/m²   Body mass index is 20.8 kg/m².    Physical Exam:  Telemedicine telephone visit patient does not have the ability for video visit  General Appearance:  Sounds Well and in no acute distress  Psychiatric:  Normal speech mentation and affect    Labs:  Lab Results   Component Value Date    WBC 8.29 05/20/2020    HGB 12.7 05/20/2020    HCT 38.7 05/20/2020     (L) 05/20/2020    CHOL 119 (L) 11/06/2019    TRIG 156 (H) 11/06/2019    HDL 51 11/06/2019    ALT 7 (L) 05/20/2020    AST 19 05/20/2020     05/20/2020    K 3.4 (L) 05/20/2020     05/20/2020    CREATININE 1.0 05/20/2020    BUN 19 05/20/2020    CO2 20 (L) 05/20/2020    TSH 1.532 11/06/2019    INR 1.0 05/20/2020    HGBA1C 5.7 (H) 02/26/2019 "               Medical Decision Making:  Total time in the phone with patient 45 min  Patient feels that her primary care doctor does not know her that well  She says that she was a former patient of Dr. Reji Arguello but he is retired  Communication sent to patient's PCP      Assessment/Recommendations:  1.  Patient would like somebody to look into why she cannot go back to her home and why she is being kept  against her wishes at [facility] and not allowed stay at her home.  Will ask pt's PCP look into this  Concern.    2 Return to GI clinic p.r.n.          Follow up in about 3 months (around 3/2/2021).      Order summary:         Thank you so much for allowing me to participate in the care of Arti Mccann MD

## 2020-12-04 PROBLEM — Z71.1: Status: ACTIVE | Noted: 2020-01-01

## 2021-01-01 ENCOUNTER — EXTERNAL CHRONIC CARE MANAGEMENT (OUTPATIENT)
Dept: PRIMARY CARE CLINIC | Facility: CLINIC | Age: 86
End: 2021-01-01
Payer: MEDICARE

## 2021-01-01 ENCOUNTER — OFFICE VISIT (OUTPATIENT)
Dept: CARDIOLOGY | Facility: CLINIC | Age: 86
End: 2021-01-01
Payer: MEDICARE

## 2021-01-01 ENCOUNTER — LAB VISIT (OUTPATIENT)
Dept: LAB | Facility: HOSPITAL | Age: 86
End: 2021-01-01
Attending: FAMILY MEDICINE
Payer: MEDICARE

## 2021-01-01 ENCOUNTER — PATIENT OUTREACH (OUTPATIENT)
Dept: ADMINISTRATIVE | Facility: OTHER | Age: 86
End: 2021-01-01

## 2021-01-01 ENCOUNTER — EXTERNAL CHRONIC CARE MANAGEMENT (OUTPATIENT)
Dept: PRIMARY CARE CLINIC | Facility: CLINIC | Age: 86
DRG: 177 | End: 2021-01-01
Payer: MEDICARE

## 2021-01-01 ENCOUNTER — OFFICE VISIT (OUTPATIENT)
Dept: INTERNAL MEDICINE | Facility: CLINIC | Age: 86
End: 2021-01-01
Payer: MEDICARE

## 2021-01-01 ENCOUNTER — PES CALL (OUTPATIENT)
Dept: ADMINISTRATIVE | Facility: CLINIC | Age: 86
End: 2021-01-01

## 2021-01-01 ENCOUNTER — HOSPITAL ENCOUNTER (INPATIENT)
Facility: HOSPITAL | Age: 86
LOS: 1 days | Discharge: HOME | DRG: 690 | End: 2021-06-23
Attending: EMERGENCY MEDICINE | Admitting: HOSPITALIST
Payer: MEDICARE

## 2021-01-01 ENCOUNTER — PATIENT MESSAGE (OUTPATIENT)
Dept: ADMINISTRATIVE | Facility: CLINIC | Age: 86
End: 2021-01-01

## 2021-01-01 ENCOUNTER — TELEPHONE (OUTPATIENT)
Dept: ADMINISTRATIVE | Facility: CLINIC | Age: 86
End: 2021-01-01

## 2021-01-01 ENCOUNTER — TELEPHONE (OUTPATIENT)
Dept: INTERNAL MEDICINE | Facility: CLINIC | Age: 86
End: 2021-01-01

## 2021-01-01 ENCOUNTER — HOSPITAL ENCOUNTER (INPATIENT)
Facility: HOSPITAL | Age: 86
LOS: 4 days | Discharge: HOME | DRG: 177 | End: 2021-08-24
Attending: EMERGENCY MEDICINE | Admitting: NEUROLOGICAL SURGERY
Payer: MEDICARE

## 2021-01-01 ENCOUNTER — HOSPITAL ENCOUNTER (INPATIENT)
Facility: HOSPITAL | Age: 86
LOS: 5 days | DRG: 177 | End: 2021-09-03
Attending: EMERGENCY MEDICINE | Admitting: INTERNAL MEDICINE
Payer: MEDICARE

## 2021-01-01 ENCOUNTER — EXTERNAL HOSPITAL ADMISSION (OUTPATIENT)
Dept: SKILLED NURSING FACILITY | Facility: HOSPITAL | Age: 86
End: 2021-01-01
Payer: MEDICARE

## 2021-01-01 ENCOUNTER — HOSPITAL ENCOUNTER (OUTPATIENT)
Facility: HOSPITAL | Age: 86
End: 2021-07-25
Attending: EMERGENCY MEDICINE | Admitting: INTERNAL MEDICINE
Payer: MEDICARE

## 2021-01-01 ENCOUNTER — HOSPITAL ENCOUNTER (OUTPATIENT)
Dept: CARDIOLOGY | Facility: CLINIC | Age: 86
Discharge: HOME | End: 2021-02-23
Payer: MEDICARE

## 2021-01-01 ENCOUNTER — PATIENT MESSAGE (OUTPATIENT)
Dept: INTERNAL MEDICINE | Facility: CLINIC | Age: 86
End: 2021-01-01

## 2021-01-01 ENCOUNTER — TELEPHONE (OUTPATIENT)
Dept: ADMINISTRATIVE | Facility: OTHER | Age: 86
End: 2021-01-01

## 2021-01-01 ENCOUNTER — HOSPITAL ENCOUNTER (OUTPATIENT)
Facility: HOSPITAL | Age: 86
Discharge: HOME | End: 2021-02-28
Attending: EMERGENCY MEDICINE | Admitting: INTERNAL MEDICINE
Payer: MEDICARE

## 2021-01-01 ENCOUNTER — TELEPHONE (OUTPATIENT)
Dept: UROGYNECOLOGY | Facility: CLINIC | Age: 86
End: 2021-01-01

## 2021-01-01 ENCOUNTER — CLINICAL SUPPORT (OUTPATIENT)
Dept: CARDIOLOGY | Facility: HOSPITAL | Age: 86
End: 2021-01-01
Attending: PHYSICIAN ASSISTANT
Payer: MEDICARE

## 2021-01-01 VITALS
DIASTOLIC BLOOD PRESSURE: 79 MMHG | RESPIRATION RATE: 16 BRPM | SYSTOLIC BLOOD PRESSURE: 181 MMHG | HEART RATE: 64 BPM | TEMPERATURE: 98 F | BODY MASS INDEX: 23.39 KG/M2 | HEIGHT: 59 IN | WEIGHT: 116 LBS | OXYGEN SATURATION: 93 %

## 2021-01-01 VITALS
BODY MASS INDEX: 23.18 KG/M2 | OXYGEN SATURATION: 94 % | SYSTOLIC BLOOD PRESSURE: 113 MMHG | HEIGHT: 59 IN | TEMPERATURE: 99 F | RESPIRATION RATE: 20 BRPM | WEIGHT: 115 LBS | HEART RATE: 63 BPM | DIASTOLIC BLOOD PRESSURE: 56 MMHG

## 2021-01-01 VITALS
HEIGHT: 60 IN | RESPIRATION RATE: 18 BRPM | TEMPERATURE: 98 F | SYSTOLIC BLOOD PRESSURE: 167 MMHG | OXYGEN SATURATION: 95 % | HEART RATE: 66 BPM | DIASTOLIC BLOOD PRESSURE: 73 MMHG | BODY MASS INDEX: 17.08 KG/M2 | WEIGHT: 87 LBS

## 2021-01-01 VITALS
OXYGEN SATURATION: 92 % | DIASTOLIC BLOOD PRESSURE: 68 MMHG | BODY MASS INDEX: 22.53 KG/M2 | RESPIRATION RATE: 18 BRPM | HEIGHT: 59 IN | TEMPERATURE: 97 F | HEART RATE: 61 BPM | SYSTOLIC BLOOD PRESSURE: 163 MMHG | WEIGHT: 111.75 LBS

## 2021-01-01 VITALS
WEIGHT: 115.5 LBS | TEMPERATURE: 98 F | BODY MASS INDEX: 23.28 KG/M2 | DIASTOLIC BLOOD PRESSURE: 55 MMHG | RESPIRATION RATE: 20 BRPM | HEIGHT: 59 IN | SYSTOLIC BLOOD PRESSURE: 111 MMHG | HEART RATE: 95 BPM | OXYGEN SATURATION: 81 %

## 2021-01-01 VITALS
HEART RATE: 62 BPM | BODY MASS INDEX: 22.4 KG/M2 | HEIGHT: 59 IN | OXYGEN SATURATION: 96 % | WEIGHT: 111.13 LBS | DIASTOLIC BLOOD PRESSURE: 62 MMHG | SYSTOLIC BLOOD PRESSURE: 130 MMHG

## 2021-01-01 VITALS
OXYGEN SATURATION: 97 % | HEART RATE: 61 BPM | WEIGHT: 114.88 LBS | BODY MASS INDEX: 23.16 KG/M2 | DIASTOLIC BLOOD PRESSURE: 64 MMHG | SYSTOLIC BLOOD PRESSURE: 118 MMHG | HEIGHT: 59 IN

## 2021-01-01 VITALS
SYSTOLIC BLOOD PRESSURE: 138 MMHG | DIASTOLIC BLOOD PRESSURE: 62 MMHG | HEART RATE: 63 BPM | HEIGHT: 59 IN | BODY MASS INDEX: 22.71 KG/M2 | WEIGHT: 112.63 LBS

## 2021-01-01 DIAGNOSIS — E03.4 HYPOTHYROIDISM DUE TO ACQUIRED ATROPHY OF THYROID: Chronic | ICD-10-CM

## 2021-01-01 DIAGNOSIS — R73.03 PRE-DIABETES: ICD-10-CM

## 2021-01-01 DIAGNOSIS — E55.9 VITAMIN D INSUFFICIENCY: ICD-10-CM

## 2021-01-01 DIAGNOSIS — I87.2 VENOUS INSUFFICIENCY OF BOTH LOWER EXTREMITIES: ICD-10-CM

## 2021-01-01 DIAGNOSIS — R55 SYNCOPE: Primary | ICD-10-CM

## 2021-01-01 DIAGNOSIS — K55.1 MESENTERIC ARTERY STENOSIS: ICD-10-CM

## 2021-01-01 DIAGNOSIS — G89.29 OTHER CHRONIC PAIN: ICD-10-CM

## 2021-01-01 DIAGNOSIS — I70.1 RENAL ARTERY ATHEROSCLEROSIS: Chronic | ICD-10-CM

## 2021-01-01 DIAGNOSIS — R09.02 HYPOXIA: ICD-10-CM

## 2021-01-01 DIAGNOSIS — N95.0 POSTMENOPAUSAL VAGINAL BLEEDING: ICD-10-CM

## 2021-01-01 DIAGNOSIS — M51.9 LUMBAR DISC DISEASE: ICD-10-CM

## 2021-01-01 DIAGNOSIS — R29.6 RECURRENT FALLS: Chronic | ICD-10-CM

## 2021-01-01 DIAGNOSIS — I10 ESSENTIAL HYPERTENSION: Chronic | ICD-10-CM

## 2021-01-01 DIAGNOSIS — U07.1 COVID-19: ICD-10-CM

## 2021-01-01 DIAGNOSIS — G47.00 INSOMNIA, UNSPECIFIED TYPE: ICD-10-CM

## 2021-01-01 DIAGNOSIS — R55 SYNCOPE AND COLLAPSE: ICD-10-CM

## 2021-01-01 DIAGNOSIS — I25.119 CORONARY ARTERY DISEASE INVOLVING NATIVE CORONARY ARTERY OF NATIVE HEART WITH ANGINA PECTORIS: Primary | Chronic | ICD-10-CM

## 2021-01-01 DIAGNOSIS — R53.81 DEBILITY: ICD-10-CM

## 2021-01-01 DIAGNOSIS — S06.5X0D TRAUMATIC SUBDURAL HEMORRHAGE WITHOUT LOSS OF CONSCIOUSNESS, SUBSEQUENT ENCOUNTER: ICD-10-CM

## 2021-01-01 DIAGNOSIS — R07.9 CHEST PAIN: ICD-10-CM

## 2021-01-01 DIAGNOSIS — R31.9 HEMATURIA, UNSPECIFIED TYPE: Primary | ICD-10-CM

## 2021-01-01 DIAGNOSIS — E87.1 HYPONATREMIA: ICD-10-CM

## 2021-01-01 DIAGNOSIS — Z51.5 PALLIATIVE CARE ENCOUNTER: ICD-10-CM

## 2021-01-01 DIAGNOSIS — I50.32 CHRONIC DIASTOLIC CONGESTIVE HEART FAILURE: ICD-10-CM

## 2021-01-01 DIAGNOSIS — F32.89 OTHER DEPRESSION: Chronic | ICD-10-CM

## 2021-01-01 DIAGNOSIS — K13.79 MOUTH PAIN: Primary | ICD-10-CM

## 2021-01-01 DIAGNOSIS — E78.2 MIXED HYPERLIPIDEMIA: Chronic | ICD-10-CM

## 2021-01-01 DIAGNOSIS — I25.10 CORONARY ARTERY DISEASE INVOLVING NATIVE CORONARY ARTERY OF NATIVE HEART WITHOUT ANGINA PECTORIS: Chronic | ICD-10-CM

## 2021-01-01 DIAGNOSIS — K92.2 GASTROINTESTINAL HEMORRHAGE, UNSPECIFIED GASTROINTESTINAL HEMORRHAGE TYPE: ICD-10-CM

## 2021-01-01 DIAGNOSIS — R55 SYNCOPE, UNSPECIFIED SYNCOPE TYPE: Primary | ICD-10-CM

## 2021-01-01 DIAGNOSIS — U07.1 COVID-19: Primary | ICD-10-CM

## 2021-01-01 DIAGNOSIS — R55 SYNCOPE: ICD-10-CM

## 2021-01-01 DIAGNOSIS — R06.00 DYSPNEA, UNSPECIFIED TYPE: ICD-10-CM

## 2021-01-01 DIAGNOSIS — Z20.822 SUSPECTED COVID-19 VIRUS INFECTION: ICD-10-CM

## 2021-01-01 DIAGNOSIS — N17.9 AKI (ACUTE KIDNEY INJURY): ICD-10-CM

## 2021-01-01 DIAGNOSIS — Z59.9 INABILITY TO RETURN TO LIVING SITUATION: ICD-10-CM

## 2021-01-01 DIAGNOSIS — I44.7 LBBB (LEFT BUNDLE BRANCH BLOCK): ICD-10-CM

## 2021-01-01 DIAGNOSIS — Z87.891 HISTORY OF SMOKING 25-50 PACK YEARS: ICD-10-CM

## 2021-01-01 DIAGNOSIS — E87.5 HYPERKALEMIA: ICD-10-CM

## 2021-01-01 DIAGNOSIS — I73.9 PAD (PERIPHERAL ARTERY DISEASE): Chronic | ICD-10-CM

## 2021-01-01 DIAGNOSIS — I70.0 ATHEROSCLEROSIS OF AORTA: ICD-10-CM

## 2021-01-01 DIAGNOSIS — Z00.00 ANNUAL PHYSICAL EXAM: Primary | ICD-10-CM

## 2021-01-01 DIAGNOSIS — R55 SYNCOPE AND COLLAPSE: Primary | ICD-10-CM

## 2021-01-01 DIAGNOSIS — U07.1 ACUTE HYPOXEMIC RESPIRATORY FAILURE DUE TO COVID-19: Primary | ICD-10-CM

## 2021-01-01 DIAGNOSIS — J96.01 ACUTE HYPOXEMIC RESPIRATORY FAILURE DUE TO COVID-19: Primary | ICD-10-CM

## 2021-01-01 DIAGNOSIS — R53.81 DEBILITY: Primary | ICD-10-CM

## 2021-01-01 DIAGNOSIS — I25.119 CORONARY ARTERY DISEASE INVOLVING NATIVE CORONARY ARTERY OF NATIVE HEART WITH ANGINA PECTORIS: Chronic | ICD-10-CM

## 2021-01-01 DIAGNOSIS — I50.22 CHRONIC SYSTOLIC CONGESTIVE HEART FAILURE: ICD-10-CM

## 2021-01-01 DIAGNOSIS — M26.629 TEMPOROMANDIBULAR JOINT-PAIN-DYSFUNCTION SYNDROME (TMJ): ICD-10-CM

## 2021-01-01 DIAGNOSIS — I67.9 CEREBRAL ARTERIAL DISEASE: ICD-10-CM

## 2021-01-01 DIAGNOSIS — K21.9 GASTROESOPHAGEAL REFLUX DISEASE WITHOUT ESOPHAGITIS: ICD-10-CM

## 2021-01-01 DIAGNOSIS — D50.0 IRON DEFICIENCY ANEMIA DUE TO CHRONIC BLOOD LOSS: ICD-10-CM

## 2021-01-01 DIAGNOSIS — R09.89 ABDOMINAL BRUIT: ICD-10-CM

## 2021-01-01 DIAGNOSIS — I83.002: ICD-10-CM

## 2021-01-01 DIAGNOSIS — I35.1 NONRHEUMATIC AORTIC VALVE INSUFFICIENCY: ICD-10-CM

## 2021-01-01 DIAGNOSIS — R55 VASOVAGAL SYNCOPE: ICD-10-CM

## 2021-01-01 DIAGNOSIS — Z96.0 VAGINAL PESSARY PRESENT: ICD-10-CM

## 2021-01-01 DIAGNOSIS — G95.9 DISEASE OF SPINAL CORD: ICD-10-CM

## 2021-01-01 DIAGNOSIS — Z00.00 ANNUAL PHYSICAL EXAM: ICD-10-CM

## 2021-01-01 DIAGNOSIS — N95.0 POSTMENOPAUSAL VAGINAL BLEEDING: Primary | ICD-10-CM

## 2021-01-01 LAB
25(OH)D3+25(OH)D2 SERPL-MCNC: 35 NG/ML (ref 30–96)
25(OH)D3+25(OH)D2 SERPL-MCNC: 36 NG/ML (ref 30–96)
ALBUMIN SERPL BCP-MCNC: 1.9 G/DL (ref 3.5–5.2)
ALBUMIN SERPL BCP-MCNC: 2 G/DL (ref 3.5–5.2)
ALBUMIN SERPL BCP-MCNC: 2.1 G/DL (ref 3.5–5.2)
ALBUMIN SERPL BCP-MCNC: 2.3 G/DL (ref 3.5–5.2)
ALBUMIN SERPL BCP-MCNC: 2.3 G/DL (ref 3.5–5.2)
ALBUMIN SERPL BCP-MCNC: 2.6 G/DL (ref 3.5–5.2)
ALBUMIN SERPL BCP-MCNC: 2.9 G/DL (ref 3.5–5.2)
ALBUMIN SERPL BCP-MCNC: 3 G/DL (ref 3.5–5.2)
ALBUMIN SERPL BCP-MCNC: 3.2 G/DL (ref 3.5–5.2)
ALBUMIN SERPL BCP-MCNC: 3.3 G/DL (ref 3.5–5.2)
ALBUMIN SERPL BCP-MCNC: 3.3 G/DL (ref 3.5–5.2)
ALBUMIN SERPL BCP-MCNC: 3.4 G/DL (ref 3.5–5.2)
ALBUMIN SERPL BCP-MCNC: 3.6 G/DL (ref 3.5–5.2)
ALBUMIN SERPL BCP-MCNC: 3.9 G/DL (ref 3.5–5.2)
ALP SERPL-CCNC: 51 U/L (ref 55–135)
ALP SERPL-CCNC: 55 U/L (ref 55–135)
ALP SERPL-CCNC: 56 U/L (ref 55–135)
ALP SERPL-CCNC: 56 U/L (ref 55–135)
ALP SERPL-CCNC: 57 U/L (ref 55–135)
ALP SERPL-CCNC: 59 U/L (ref 55–135)
ALP SERPL-CCNC: 59 U/L (ref 55–135)
ALP SERPL-CCNC: 60 U/L (ref 55–135)
ALP SERPL-CCNC: 60 U/L (ref 55–135)
ALP SERPL-CCNC: 63 U/L (ref 55–135)
ALP SERPL-CCNC: 65 U/L (ref 55–135)
ALP SERPL-CCNC: 68 U/L (ref 55–135)
ALP SERPL-CCNC: 72 U/L (ref 55–135)
ALP SERPL-CCNC: 79 U/L (ref 55–135)
ALP SERPL-CCNC: 87 U/L (ref 55–135)
ALT SERPL W/O P-5'-P-CCNC: 5 U/L (ref 10–44)
ALT SERPL W/O P-5'-P-CCNC: 6 U/L (ref 10–44)
ALT SERPL W/O P-5'-P-CCNC: 7 U/L (ref 10–44)
ALT SERPL W/O P-5'-P-CCNC: 8 U/L (ref 10–44)
ALT SERPL W/O P-5'-P-CCNC: 9 U/L (ref 10–44)
ALT SERPL W/O P-5'-P-CCNC: <5 U/L (ref 10–44)
ANION GAP SERPL CALC-SCNC: 10 MMOL/L (ref 8–16)
ANION GAP SERPL CALC-SCNC: 11 MMOL/L (ref 8–16)
ANION GAP SERPL CALC-SCNC: 11 MMOL/L (ref 8–16)
ANION GAP SERPL CALC-SCNC: 12 MMOL/L (ref 8–16)
ANION GAP SERPL CALC-SCNC: 12 MMOL/L (ref 8–16)
ANION GAP SERPL CALC-SCNC: 13 MMOL/L (ref 8–16)
ANION GAP SERPL CALC-SCNC: 14 MMOL/L (ref 8–16)
ANION GAP SERPL CALC-SCNC: 14 MMOL/L (ref 8–16)
ANION GAP SERPL CALC-SCNC: 16 MMOL/L (ref 8–16)
ANION GAP SERPL CALC-SCNC: 9 MMOL/L (ref 8–16)
ANISOCYTOSIS BLD QL SMEAR: SLIGHT
ANISOCYTOSIS BLD QL SMEAR: SLIGHT
APTT BLDCRRT: 30.5 SEC (ref 21–32)
ASCENDING AORTA: 2.53 CM
ASCENDING AORTA: 2.84 CM
AST SERPL-CCNC: 10 U/L (ref 10–40)
AST SERPL-CCNC: 10 U/L (ref 10–40)
AST SERPL-CCNC: 12 U/L (ref 10–40)
AST SERPL-CCNC: 13 U/L (ref 10–40)
AST SERPL-CCNC: 13 U/L (ref 10–40)
AST SERPL-CCNC: 14 U/L (ref 10–40)
AST SERPL-CCNC: 15 U/L (ref 10–40)
AST SERPL-CCNC: 15 U/L (ref 10–40)
AST SERPL-CCNC: 16 U/L (ref 10–40)
AST SERPL-CCNC: 18 U/L (ref 10–40)
AST SERPL-CCNC: 21 U/L (ref 10–40)
AV INDEX (PROSTH): 0.82
AV INDEX (PROSTH): 0.83
AV MEAN GRADIENT: 5 MMHG
AV MEAN GRADIENT: 6 MMHG
AV PEAK GRADIENT: 10 MMHG
AV PEAK GRADIENT: 8 MMHG
AV REGURGITATION PRESSURE HALF TIME: 430 MS
AV VALVE AREA: 2.47 CM2
AV VALVE AREA: 2.56 CM2
AV VELOCITY RATIO: 0.71
AV VELOCITY RATIO: 0.76
BACTERIA #/AREA URNS AUTO: ABNORMAL /HPF
BACTERIA BLD CULT: ABNORMAL
BACTERIA BLD CULT: NORMAL
BACTERIA UR CULT: NORMAL
BASOPHILS # BLD AUTO: 0 K/UL (ref 0–0.2)
BASOPHILS # BLD AUTO: 0.01 K/UL (ref 0–0.2)
BASOPHILS # BLD AUTO: 0.02 K/UL (ref 0–0.2)
BASOPHILS # BLD AUTO: 0.03 K/UL (ref 0–0.2)
BASOPHILS # BLD AUTO: 0.04 K/UL (ref 0–0.2)
BASOPHILS # BLD AUTO: 0.04 K/UL (ref 0–0.2)
BASOPHILS # BLD AUTO: ABNORMAL K/UL (ref 0–0.2)
BASOPHILS # BLD AUTO: ABNORMAL K/UL (ref 0–0.2)
BASOPHILS NFR BLD: 0 % (ref 0–1.9)
BASOPHILS NFR BLD: 0.1 % (ref 0–1.9)
BASOPHILS NFR BLD: 0.2 % (ref 0–1.9)
BASOPHILS NFR BLD: 0.2 % (ref 0–1.9)
BASOPHILS NFR BLD: 0.3 % (ref 0–1.9)
BASOPHILS NFR BLD: 0.4 % (ref 0–1.9)
BASOPHILS NFR BLD: 0.5 % (ref 0–1.9)
BASOPHILS NFR BLD: 0.6 % (ref 0–1.9)
BASOPHILS NFR BLD: 0.7 % (ref 0–1.9)
BILIRUB SERPL-MCNC: 0.3 MG/DL (ref 0.1–1)
BILIRUB SERPL-MCNC: 0.3 MG/DL (ref 0.1–1)
BILIRUB SERPL-MCNC: 0.4 MG/DL (ref 0.1–1)
BILIRUB SERPL-MCNC: 0.5 MG/DL (ref 0.1–1)
BILIRUB SERPL-MCNC: 0.6 MG/DL (ref 0.1–1)
BILIRUB SERPL-MCNC: 0.6 MG/DL (ref 0.1–1)
BILIRUB SERPL-MCNC: 0.8 MG/DL (ref 0.1–1)
BILIRUB SERPL-MCNC: 0.8 MG/DL (ref 0.1–1)
BILIRUB SERPL-MCNC: 0.9 MG/DL (ref 0.1–1)
BILIRUB UR QL STRIP: NEGATIVE
BNP SERPL-MCNC: 187 PG/ML (ref 0–99)
BNP SERPL-MCNC: 314 PG/ML (ref 0–99)
BSA FOR ECHO PROCEDURE: 1.29 M2
BSA FOR ECHO PROCEDURE: 1.48 M2
BUN SERPL-MCNC: 21 MG/DL (ref 10–30)
BUN SERPL-MCNC: 21 MG/DL (ref 10–30)
BUN SERPL-MCNC: 22 MG/DL (ref 10–30)
BUN SERPL-MCNC: 22 MG/DL (ref 10–30)
BUN SERPL-MCNC: 24 MG/DL (ref 10–30)
BUN SERPL-MCNC: 25 MG/DL (ref 10–30)
BUN SERPL-MCNC: 26 MG/DL (ref 10–30)
BUN SERPL-MCNC: 26 MG/DL (ref 10–30)
BUN SERPL-MCNC: 27 MG/DL (ref 6–30)
BUN SERPL-MCNC: 30 MG/DL (ref 10–30)
BUN SERPL-MCNC: 31 MG/DL (ref 10–30)
BUN SERPL-MCNC: 32 MG/DL (ref 10–30)
BUN SERPL-MCNC: 33 MG/DL (ref 10–30)
BUN SERPL-MCNC: 35 MG/DL (ref 10–30)
BUN SERPL-MCNC: 41 MG/DL (ref 10–30)
BUN SERPL-MCNC: 47 MG/DL (ref 10–30)
BUN SERPL-MCNC: 48 MG/DL (ref 10–30)
BUN SERPL-MCNC: 49 MG/DL (ref 10–30)
BUN SERPL-MCNC: 52 MG/DL (ref 10–30)
BUN SERPL-MCNC: 53 MG/DL (ref 10–30)
BUN SERPL-MCNC: 56 MG/DL (ref 10–30)
BURR CELLS BLD QL SMEAR: ABNORMAL
BURR CELLS BLD QL SMEAR: ABNORMAL
CALCIUM SERPL-MCNC: 7.3 MG/DL (ref 8.7–10.5)
CALCIUM SERPL-MCNC: 8 MG/DL (ref 8.7–10.5)
CALCIUM SERPL-MCNC: 8 MG/DL (ref 8.7–10.5)
CALCIUM SERPL-MCNC: 8.1 MG/DL (ref 8.7–10.5)
CALCIUM SERPL-MCNC: 8.2 MG/DL (ref 8.7–10.5)
CALCIUM SERPL-MCNC: 8.3 MG/DL (ref 8.7–10.5)
CALCIUM SERPL-MCNC: 8.4 MG/DL (ref 8.7–10.5)
CALCIUM SERPL-MCNC: 8.6 MG/DL (ref 8.7–10.5)
CALCIUM SERPL-MCNC: 8.6 MG/DL (ref 8.7–10.5)
CALCIUM SERPL-MCNC: 8.9 MG/DL (ref 8.7–10.5)
CALCIUM SERPL-MCNC: 8.9 MG/DL (ref 8.7–10.5)
CALCIUM SERPL-MCNC: 9 MG/DL (ref 8.7–10.5)
CALCIUM SERPL-MCNC: 9.1 MG/DL (ref 8.7–10.5)
CALCIUM SERPL-MCNC: 9.1 MG/DL (ref 8.7–10.5)
CALCIUM SERPL-MCNC: 9.2 MG/DL (ref 8.7–10.5)
CALCIUM SERPL-MCNC: 9.5 MG/DL (ref 8.7–10.5)
CHLORIDE SERPL-SCNC: 104 MMOL/L (ref 95–110)
CHLORIDE SERPL-SCNC: 106 MMOL/L (ref 95–110)
CHLORIDE SERPL-SCNC: 107 MMOL/L (ref 95–110)
CHLORIDE SERPL-SCNC: 107 MMOL/L (ref 95–110)
CHLORIDE SERPL-SCNC: 108 MMOL/L (ref 95–110)
CHLORIDE SERPL-SCNC: 109 MMOL/L (ref 95–110)
CHLORIDE SERPL-SCNC: 110 MMOL/L (ref 95–110)
CHLORIDE SERPL-SCNC: 111 MMOL/L (ref 95–110)
CHLORIDE SERPL-SCNC: 112 MMOL/L (ref 95–110)
CHLORIDE SERPL-SCNC: 112 MMOL/L (ref 95–110)
CHLORIDE SERPL-SCNC: 113 MMOL/L (ref 95–110)
CHLORIDE SERPL-SCNC: 113 MMOL/L (ref 95–110)
CHLORIDE SERPL-SCNC: 114 MMOL/L (ref 95–110)
CHLORIDE SERPL-SCNC: 115 MMOL/L (ref 95–110)
CHLORIDE SERPL-SCNC: 115 MMOL/L (ref 95–110)
CHOLEST SERPL-MCNC: 131 MG/DL (ref 120–199)
CHOLEST/HDLC SERPL: 3.4 {RATIO} (ref 2–5)
CK SERPL-CCNC: 27 U/L (ref 20–180)
CK SERPL-CCNC: 46 U/L (ref 20–180)
CLARITY UR REFRACT.AUTO: ABNORMAL
CLARITY UR REFRACT.AUTO: CLEAR
CO2 SERPL-SCNC: 14 MMOL/L (ref 23–29)
CO2 SERPL-SCNC: 15 MMOL/L (ref 23–29)
CO2 SERPL-SCNC: 16 MMOL/L (ref 23–29)
CO2 SERPL-SCNC: 17 MMOL/L (ref 23–29)
CO2 SERPL-SCNC: 17 MMOL/L (ref 23–29)
CO2 SERPL-SCNC: 18 MMOL/L (ref 23–29)
CO2 SERPL-SCNC: 18 MMOL/L (ref 23–29)
CO2 SERPL-SCNC: 19 MMOL/L (ref 23–29)
CO2 SERPL-SCNC: 19 MMOL/L (ref 23–29)
CO2 SERPL-SCNC: 20 MMOL/L (ref 23–29)
CO2 SERPL-SCNC: 21 MMOL/L (ref 23–29)
CO2 SERPL-SCNC: 22 MMOL/L (ref 23–29)
CO2 SERPL-SCNC: 22 MMOL/L (ref 23–29)
CO2 SERPL-SCNC: 23 MMOL/L (ref 23–29)
CO2 SERPL-SCNC: 23 MMOL/L (ref 23–29)
CO2 SERPL-SCNC: 24 MMOL/L (ref 23–29)
COLOR UR AUTO: ABNORMAL
COLOR UR AUTO: YELLOW
CREAT SERPL-MCNC: 0.8 MG/DL (ref 0.5–1.4)
CREAT SERPL-MCNC: 1 MG/DL (ref 0.5–1.4)
CREAT SERPL-MCNC: 1.1 MG/DL (ref 0.5–1.4)
CREAT SERPL-MCNC: 1.1 MG/DL (ref 0.5–1.4)
CREAT SERPL-MCNC: 1.2 MG/DL (ref 0.5–1.4)
CREAT SERPL-MCNC: 1.3 MG/DL (ref 0.5–1.4)
CREAT SERPL-MCNC: 1.4 MG/DL (ref 0.5–1.4)
CREAT SERPL-MCNC: 1.5 MG/DL (ref 0.5–1.4)
CREAT SERPL-MCNC: 1.6 MG/DL (ref 0.5–1.4)
CREAT UR-MCNC: 64 MG/DL (ref 15–325)
CRP SERPL-MCNC: 136.5 MG/L (ref 0–8.2)
CRP SERPL-MCNC: 4.7 MG/L (ref 0–8.2)
CRP SERPL-MCNC: 6 MG/L (ref 0–8.2)
CRP SERPL-MCNC: 6.9 MG/L (ref 0–8.2)
CTP QC/QA: YES
CV ECHO LV RWT: 0.29 CM
CV ECHO LV RWT: 0.39 CM
D DIMER PPP IA.FEU-MCNC: 0.59 MG/L FEU
D DIMER PPP IA.FEU-MCNC: 0.96 MG/L FEU
D DIMER PPP IA.FEU-MCNC: 1.08 MG/L FEU
D DIMER PPP IA.FEU-MCNC: 1.92 MG/L FEU
DIFFERENTIAL METHOD: ABNORMAL
DOP CALC AO PEAK VEL: 1.4 M/S
DOP CALC AO PEAK VEL: 1.62 M/S
DOP CALC AO VTI: 27.61 CM
DOP CALC AO VTI: 37.45 CM
DOP CALC LVOT AREA: 3 CM2
DOP CALC LVOT AREA: 3.1 CM2
DOP CALC LVOT DIAMETER: 1.95 CM
DOP CALC LVOT DIAMETER: 1.99 CM
DOP CALC LVOT PEAK VEL: 1.06 M/S
DOP CALC LVOT PEAK VEL: 1.15 M/S
DOP CALC LVOT STROKE VOLUME: 68.18 CM3
DOP CALC LVOT STROKE VOLUME: 95.84 CM3
DOP CALCLVOT PEAK VEL VTI: 22.84 CM
DOP CALCLVOT PEAK VEL VTI: 30.83 CM
E WAVE DECELERATION TIME: 297.52 MSEC
E WAVE DECELERATION TIME: 316.65 MSEC
E/A RATIO: 0.46
E/A RATIO: 0.66
E/E' RATIO: 19.64 M/S
E/E' RATIO: 20 M/S
ECHO LV POSTERIOR WALL: 0.65 CM (ref 0.6–1.1)
ECHO LV POSTERIOR WALL: 0.83 CM (ref 0.6–1.1)
EJECTION FRACTION: 65 %
EOSINOPHIL # BLD AUTO: 0 K/UL (ref 0–0.5)
EOSINOPHIL # BLD AUTO: 0.1 K/UL (ref 0–0.5)
EOSINOPHIL # BLD AUTO: 0.2 K/UL (ref 0–0.5)
EOSINOPHIL # BLD AUTO: ABNORMAL K/UL (ref 0–0.5)
EOSINOPHIL # BLD AUTO: ABNORMAL K/UL (ref 0–0.5)
EOSINOPHIL NFR BLD: 0 % (ref 0–8)
EOSINOPHIL NFR BLD: 0.1 % (ref 0–8)
EOSINOPHIL NFR BLD: 0.6 % (ref 0–8)
EOSINOPHIL NFR BLD: 0.8 % (ref 0–8)
EOSINOPHIL NFR BLD: 1 % (ref 0–8)
EOSINOPHIL NFR BLD: 1.9 % (ref 0–8)
EOSINOPHIL NFR BLD: 2 % (ref 0–8)
EOSINOPHIL NFR BLD: 2.1 % (ref 0–8)
EOSINOPHIL NFR BLD: 2.4 % (ref 0–8)
EOSINOPHIL NFR BLD: 2.4 % (ref 0–8)
EOSINOPHIL NFR BLD: 2.8 % (ref 0–8)
EOSINOPHIL NFR BLD: 3 % (ref 0–8)
EOSINOPHIL NFR BLD: 3.4 % (ref 0–8)
ERYTHROCYTE [DISTWIDTH] IN BLOOD BY AUTOMATED COUNT: 12.5 % (ref 11.5–14.5)
ERYTHROCYTE [DISTWIDTH] IN BLOOD BY AUTOMATED COUNT: 12.7 % (ref 11.5–14.5)
ERYTHROCYTE [DISTWIDTH] IN BLOOD BY AUTOMATED COUNT: 12.8 % (ref 11.5–14.5)
ERYTHROCYTE [DISTWIDTH] IN BLOOD BY AUTOMATED COUNT: 12.9 % (ref 11.5–14.5)
ERYTHROCYTE [DISTWIDTH] IN BLOOD BY AUTOMATED COUNT: 13.2 % (ref 11.5–14.5)
ERYTHROCYTE [DISTWIDTH] IN BLOOD BY AUTOMATED COUNT: 13.2 % (ref 11.5–14.5)
ERYTHROCYTE [DISTWIDTH] IN BLOOD BY AUTOMATED COUNT: 13.3 % (ref 11.5–14.5)
ERYTHROCYTE [DISTWIDTH] IN BLOOD BY AUTOMATED COUNT: 13.3 % (ref 11.5–14.5)
ERYTHROCYTE [DISTWIDTH] IN BLOOD BY AUTOMATED COUNT: 13.4 % (ref 11.5–14.5)
ERYTHROCYTE [DISTWIDTH] IN BLOOD BY AUTOMATED COUNT: 13.6 % (ref 11.5–14.5)
ERYTHROCYTE [DISTWIDTH] IN BLOOD BY AUTOMATED COUNT: 13.7 % (ref 11.5–14.5)
ERYTHROCYTE [DISTWIDTH] IN BLOOD BY AUTOMATED COUNT: 13.8 % (ref 11.5–14.5)
ERYTHROCYTE [SEDIMENTATION RATE] IN BLOOD BY WESTERGREN METHOD: 15 MM/HR (ref 0–36)
EST. GFR  (AFRICAN AMERICAN): 31.8 ML/MIN/1.73 M^2
EST. GFR  (AFRICAN AMERICAN): 34.4 ML/MIN/1.73 M^2
EST. GFR  (AFRICAN AMERICAN): 37.4 ML/MIN/1.73 M^2
EST. GFR  (AFRICAN AMERICAN): 37.4 ML/MIN/1.73 M^2
EST. GFR  (AFRICAN AMERICAN): 37.6 ML/MIN/1.73 M^2
EST. GFR  (AFRICAN AMERICAN): 40.9 ML/MIN/1.73 M^2
EST. GFR  (AFRICAN AMERICAN): 45 ML/MIN/1.73 M^2
EST. GFR  (AFRICAN AMERICAN): 50 ML/MIN/1.73 M^2
EST. GFR  (AFRICAN AMERICAN): 50 ML/MIN/1.73 M^2
EST. GFR  (AFRICAN AMERICAN): 56.1 ML/MIN/1.73 M^2
EST. GFR  (AFRICAN AMERICAN): 56.5 ML/MIN/1.73 M^2
EST. GFR  (AFRICAN AMERICAN): >60 ML/MIN/1.73 M^2
EST. GFR  (NON AFRICAN AMERICAN): 27.6 ML/MIN/1.73 M^2
EST. GFR  (NON AFRICAN AMERICAN): 29.8 ML/MIN/1.73 M^2
EST. GFR  (NON AFRICAN AMERICAN): 32.4 ML/MIN/1.73 M^2
EST. GFR  (NON AFRICAN AMERICAN): 32.4 ML/MIN/1.73 M^2
EST. GFR  (NON AFRICAN AMERICAN): 32.6 ML/MIN/1.73 M^2
EST. GFR  (NON AFRICAN AMERICAN): 35.4 ML/MIN/1.73 M^2
EST. GFR  (NON AFRICAN AMERICAN): 39.1 ML/MIN/1.73 M^2
EST. GFR  (NON AFRICAN AMERICAN): 43.4 ML/MIN/1.73 M^2
EST. GFR  (NON AFRICAN AMERICAN): 43.4 ML/MIN/1.73 M^2
EST. GFR  (NON AFRICAN AMERICAN): 48.7 ML/MIN/1.73 M^2
EST. GFR  (NON AFRICAN AMERICAN): 49 ML/MIN/1.73 M^2
EST. GFR  (NON AFRICAN AMERICAN): >60 ML/MIN/1.73 M^2
ESTIMATED AVG GLUCOSE: 111 MG/DL (ref 68–131)
ESTIMATED AVG GLUCOSE: 97 MG/DL (ref 68–131)
FERRITIN SERPL-MCNC: 378 NG/ML (ref 20–300)
FERRITIN SERPL-MCNC: 510 NG/ML (ref 20–300)
FERRITIN SERPL-MCNC: 705 NG/ML (ref 20–300)
FERRITIN SERPL-MCNC: 776 NG/ML (ref 20–300)
FERRITIN SERPL-MCNC: 932 NG/ML (ref 20–300)
FOLATE SERPL-MCNC: 5.3 NG/ML (ref 4–24)
FRACTIONAL SHORTENING: 32 % (ref 28–44)
FRACTIONAL SHORTENING: 35 % (ref 28–44)
GLUCOSE SERPL-MCNC: 104 MG/DL (ref 70–110)
GLUCOSE SERPL-MCNC: 104 MG/DL (ref 70–110)
GLUCOSE SERPL-MCNC: 106 MG/DL (ref 70–110)
GLUCOSE SERPL-MCNC: 108 MG/DL (ref 70–110)
GLUCOSE SERPL-MCNC: 113 MG/DL (ref 70–110)
GLUCOSE SERPL-MCNC: 114 MG/DL (ref 70–110)
GLUCOSE SERPL-MCNC: 127 MG/DL (ref 70–110)
GLUCOSE SERPL-MCNC: 128 MG/DL (ref 70–110)
GLUCOSE SERPL-MCNC: 147 MG/DL (ref 70–110)
GLUCOSE SERPL-MCNC: 155 MG/DL (ref 70–110)
GLUCOSE SERPL-MCNC: 169 MG/DL (ref 70–110)
GLUCOSE SERPL-MCNC: 67 MG/DL (ref 70–110)
GLUCOSE SERPL-MCNC: 68 MG/DL (ref 70–110)
GLUCOSE SERPL-MCNC: 69 MG/DL (ref 70–110)
GLUCOSE SERPL-MCNC: 79 MG/DL (ref 70–110)
GLUCOSE SERPL-MCNC: 84 MG/DL (ref 70–110)
GLUCOSE SERPL-MCNC: 86 MG/DL (ref 70–110)
GLUCOSE SERPL-MCNC: 89 MG/DL (ref 70–110)
GLUCOSE SERPL-MCNC: 91 MG/DL (ref 70–110)
GLUCOSE SERPL-MCNC: 92 MG/DL (ref 70–110)
GLUCOSE SERPL-MCNC: 94 MG/DL (ref 70–110)
GLUCOSE UR QL STRIP: NEGATIVE
HBA1C MFR BLD: 5 % (ref 4–5.6)
HBA1C MFR BLD: 5.5 % (ref 4–5.6)
HCT VFR BLD AUTO: 31 % (ref 37–48.5)
HCT VFR BLD AUTO: 31.6 % (ref 37–48.5)
HCT VFR BLD AUTO: 31.9 % (ref 37–48.5)
HCT VFR BLD AUTO: 32.5 % (ref 37–48.5)
HCT VFR BLD AUTO: 33.6 % (ref 37–48.5)
HCT VFR BLD AUTO: 33.9 % (ref 37–48.5)
HCT VFR BLD AUTO: 34 % (ref 37–48.5)
HCT VFR BLD AUTO: 34.5 % (ref 37–48.5)
HCT VFR BLD AUTO: 34.5 % (ref 37–48.5)
HCT VFR BLD AUTO: 34.6 % (ref 37–48.5)
HCT VFR BLD AUTO: 34.8 % (ref 37–48.5)
HCT VFR BLD AUTO: 35.1 % (ref 37–48.5)
HCT VFR BLD AUTO: 35.2 % (ref 37–48.5)
HCT VFR BLD AUTO: 35.7 % (ref 37–48.5)
HCT VFR BLD AUTO: 35.9 % (ref 37–48.5)
HCT VFR BLD AUTO: 36.7 % (ref 37–48.5)
HCT VFR BLD AUTO: 36.7 % (ref 37–48.5)
HCT VFR BLD AUTO: 37.1 % (ref 37–48.5)
HCT VFR BLD AUTO: 37.2 % (ref 37–48.5)
HCT VFR BLD AUTO: 37.5 % (ref 37–48.5)
HCT VFR BLD CALC: 33 %PCV (ref 36–54)
HDLC SERPL-MCNC: 38 MG/DL (ref 40–75)
HDLC SERPL: 29 % (ref 20–50)
HGB BLD-MCNC: 10.6 G/DL (ref 12–16)
HGB BLD-MCNC: 10.6 G/DL (ref 12–16)
HGB BLD-MCNC: 11 G/DL (ref 12–16)
HGB BLD-MCNC: 11.1 G/DL (ref 12–16)
HGB BLD-MCNC: 11.3 G/DL (ref 12–16)
HGB BLD-MCNC: 11.5 G/DL (ref 12–16)
HGB BLD-MCNC: 11.6 G/DL (ref 12–16)
HGB BLD-MCNC: 11.6 G/DL (ref 12–16)
HGB BLD-MCNC: 11.7 G/DL (ref 12–16)
HGB BLD-MCNC: 11.8 G/DL (ref 12–16)
HGB BLD-MCNC: 11.8 G/DL (ref 12–16)
HGB BLD-MCNC: 11.9 G/DL (ref 12–16)
HGB BLD-MCNC: 12.2 G/DL (ref 12–16)
HGB BLD-MCNC: 12.3 G/DL (ref 12–16)
HGB BLD-MCNC: 12.4 G/DL (ref 12–16)
HGB BLD-MCNC: 12.5 G/DL (ref 12–16)
HGB BLD-MCNC: 9.9 G/DL (ref 12–16)
HGB UR QL STRIP: ABNORMAL
HGB UR QL STRIP: ABNORMAL
HGB UR QL STRIP: NEGATIVE
HYALINE CASTS UR QL AUTO: 0 /LPF
HYALINE CASTS UR QL AUTO: 1 /LPF
HYALINE CASTS UR QL AUTO: 3 /LPF
HYALINE CASTS UR QL AUTO: 6 /LPF
IMM GRANULOCYTES # BLD AUTO: 0 K/UL (ref 0–0.04)
IMM GRANULOCYTES # BLD AUTO: 0.01 K/UL (ref 0–0.04)
IMM GRANULOCYTES # BLD AUTO: 0.02 K/UL (ref 0–0.04)
IMM GRANULOCYTES # BLD AUTO: 0.03 K/UL (ref 0–0.04)
IMM GRANULOCYTES # BLD AUTO: 0.05 K/UL (ref 0–0.04)
IMM GRANULOCYTES # BLD AUTO: 0.05 K/UL (ref 0–0.04)
IMM GRANULOCYTES # BLD AUTO: 0.06 K/UL (ref 0–0.04)
IMM GRANULOCYTES # BLD AUTO: 0.06 K/UL (ref 0–0.04)
IMM GRANULOCYTES # BLD AUTO: 0.08 K/UL (ref 0–0.04)
IMM GRANULOCYTES # BLD AUTO: ABNORMAL K/UL (ref 0–0.04)
IMM GRANULOCYTES # BLD AUTO: ABNORMAL K/UL (ref 0–0.04)
IMM GRANULOCYTES NFR BLD AUTO: 0 % (ref 0–0.5)
IMM GRANULOCYTES NFR BLD AUTO: 0.1 % (ref 0–0.5)
IMM GRANULOCYTES NFR BLD AUTO: 0.2 % (ref 0–0.5)
IMM GRANULOCYTES NFR BLD AUTO: 0.3 % (ref 0–0.5)
IMM GRANULOCYTES NFR BLD AUTO: 0.4 % (ref 0–0.5)
IMM GRANULOCYTES NFR BLD AUTO: 0.5 % (ref 0–0.5)
IMM GRANULOCYTES NFR BLD AUTO: 0.5 % (ref 0–0.5)
IMM GRANULOCYTES NFR BLD AUTO: 0.6 % (ref 0–0.5)
IMM GRANULOCYTES NFR BLD AUTO: 0.8 % (ref 0–0.5)
IMM GRANULOCYTES NFR BLD AUTO: 0.8 % (ref 0–0.5)
IMM GRANULOCYTES NFR BLD AUTO: ABNORMAL % (ref 0–0.5)
IMM GRANULOCYTES NFR BLD AUTO: ABNORMAL % (ref 0–0.5)
INR PPP: 1 (ref 0.8–1.2)
INTERVENTRICULAR SEPTUM: 1 CM (ref 0.6–1.1)
INTERVENTRICULAR SEPTUM: 1.2 CM (ref 0.6–1.1)
IRON SERPL-MCNC: 16 UG/DL (ref 30–160)
IVRT: 128.45 MSEC
IVRT: 82.78 MSEC
KETONES UR QL STRIP: NEGATIVE
LA MAJOR: 5.56 CM
LA MAJOR: 6.23 CM
LA MINOR: 5.61 CM
LA MINOR: 6.19 CM
LA WIDTH: 3.66 CM
LA WIDTH: 3.85 CM
LACTATE SERPL-SCNC: 0.8 MMOL/L (ref 0.5–2.2)
LACTATE SERPL-SCNC: 1.5 MMOL/L (ref 0.5–2.2)
LDH SERPL L TO P-CCNC: 225 U/L (ref 110–260)
LDH SERPL L TO P-CCNC: 412 U/L (ref 110–260)
LDLC SERPL CALC-MCNC: 66.2 MG/DL (ref 63–159)
LEFT ATRIUM SIZE: 3.03 CM
LEFT ATRIUM SIZE: 3.41 CM
LEFT ATRIUM VOLUME INDEX MOD: 23 ML/M2
LEFT ATRIUM VOLUME INDEX MOD: 41.2 ML/M2
LEFT ATRIUM VOLUME INDEX: 40.2 ML/M2
LEFT ATRIUM VOLUME INDEX: 47.5 ML/M2
LEFT ATRIUM VOLUME MOD: 30.14 CM3
LEFT ATRIUM VOLUME MOD: 60.12 CM3
LEFT ATRIUM VOLUME: 52.64 CM3
LEFT ATRIUM VOLUME: 69.3 CM3
LEFT INTERNAL DIMENSION IN SYSTOLE: 2.9 CM (ref 2.1–4)
LEFT INTERNAL DIMENSION IN SYSTOLE: 2.97 CM (ref 2.1–4)
LEFT VENTRICLE DIASTOLIC VOLUME INDEX: 61.66 ML/M2
LEFT VENTRICLE DIASTOLIC VOLUME INDEX: 64.81 ML/M2
LEFT VENTRICLE DIASTOLIC VOLUME: 80.77 ML
LEFT VENTRICLE DIASTOLIC VOLUME: 94.62 ML
LEFT VENTRICLE MASS INDEX: 94 G/M2
LEFT VENTRICLE MASS INDEX: 96 G/M2
LEFT VENTRICLE SYSTOLIC VOLUME INDEX: 23.3 ML/M2
LEFT VENTRICLE SYSTOLIC VOLUME INDEX: 24.7 ML/M2
LEFT VENTRICLE SYSTOLIC VOLUME: 32.32 ML
LEFT VENTRICLE SYSTOLIC VOLUME: 34.02 ML
LEFT VENTRICULAR INTERNAL DIMENSION IN DIASTOLE: 4.25 CM (ref 3.5–6)
LEFT VENTRICULAR INTERNAL DIMENSION IN DIASTOLE: 4.54 CM (ref 3.5–6)
LEFT VENTRICULAR MASS: 123.73 G
LEFT VENTRICULAR MASS: 139.83 G
LEUKOCYTE ESTERASE UR QL STRIP: ABNORMAL
LEUKOCYTE ESTERASE UR QL STRIP: NEGATIVE
LV LATERAL E/E' RATIO: 18 M/S
LV LATERAL E/E' RATIO: 23.33 M/S
LV SEPTAL E/E' RATIO: 17.5 M/S
LV SEPTAL E/E' RATIO: 21.6 M/S
LYMPHOCYTES # BLD AUTO: 0.2 K/UL (ref 1–4.8)
LYMPHOCYTES # BLD AUTO: 0.3 K/UL (ref 1–4.8)
LYMPHOCYTES # BLD AUTO: 0.4 K/UL (ref 1–4.8)
LYMPHOCYTES # BLD AUTO: 0.4 K/UL (ref 1–4.8)
LYMPHOCYTES # BLD AUTO: 0.5 K/UL (ref 1–4.8)
LYMPHOCYTES # BLD AUTO: 0.7 K/UL (ref 1–4.8)
LYMPHOCYTES # BLD AUTO: 0.9 K/UL (ref 1–4.8)
LYMPHOCYTES # BLD AUTO: 1 K/UL (ref 1–4.8)
LYMPHOCYTES # BLD AUTO: 1 K/UL (ref 1–4.8)
LYMPHOCYTES # BLD AUTO: 1.1 K/UL (ref 1–4.8)
LYMPHOCYTES # BLD AUTO: 1.2 K/UL (ref 1–4.8)
LYMPHOCYTES # BLD AUTO: 1.2 K/UL (ref 1–4.8)
LYMPHOCYTES # BLD AUTO: 1.3 K/UL (ref 1–4.8)
LYMPHOCYTES # BLD AUTO: 1.3 K/UL (ref 1–4.8)
LYMPHOCYTES # BLD AUTO: 1.6 K/UL (ref 1–4.8)
LYMPHOCYTES # BLD AUTO: ABNORMAL K/UL (ref 1–4.8)
LYMPHOCYTES # BLD AUTO: ABNORMAL K/UL (ref 1–4.8)
LYMPHOCYTES NFR BLD: 11.9 % (ref 18–48)
LYMPHOCYTES NFR BLD: 14 % (ref 18–48)
LYMPHOCYTES NFR BLD: 14.5 % (ref 18–48)
LYMPHOCYTES NFR BLD: 14.9 % (ref 18–48)
LYMPHOCYTES NFR BLD: 14.9 % (ref 18–48)
LYMPHOCYTES NFR BLD: 16.7 % (ref 18–48)
LYMPHOCYTES NFR BLD: 17.6 % (ref 18–48)
LYMPHOCYTES NFR BLD: 19.1 % (ref 18–48)
LYMPHOCYTES NFR BLD: 2 % (ref 18–48)
LYMPHOCYTES NFR BLD: 20.1 % (ref 18–48)
LYMPHOCYTES NFR BLD: 21.6 % (ref 18–48)
LYMPHOCYTES NFR BLD: 21.7 % (ref 18–48)
LYMPHOCYTES NFR BLD: 23 % (ref 18–48)
LYMPHOCYTES NFR BLD: 3 % (ref 18–48)
LYMPHOCYTES NFR BLD: 3 % (ref 18–48)
LYMPHOCYTES NFR BLD: 3.1 % (ref 18–48)
LYMPHOCYTES NFR BLD: 3.6 % (ref 18–48)
LYMPHOCYTES NFR BLD: 5.1 % (ref 18–48)
LYMPHOCYTES NFR BLD: 6.3 % (ref 18–48)
LYMPHOCYTES NFR BLD: 8.5 % (ref 18–48)
MAGNESIUM SERPL-MCNC: 1.8 MG/DL (ref 1.6–2.6)
MAGNESIUM SERPL-MCNC: 2 MG/DL (ref 1.6–2.6)
MAGNESIUM SERPL-MCNC: 2.1 MG/DL (ref 1.6–2.6)
MAGNESIUM SERPL-MCNC: 2.2 MG/DL (ref 1.6–2.6)
MAGNESIUM SERPL-MCNC: 2.4 MG/DL (ref 1.6–2.6)
MAGNESIUM SERPL-MCNC: 2.4 MG/DL (ref 1.6–2.6)
MCH RBC QN AUTO: 30.9 PG (ref 27–31)
MCH RBC QN AUTO: 31.2 PG (ref 27–31)
MCH RBC QN AUTO: 31.3 PG (ref 27–31)
MCH RBC QN AUTO: 31.4 PG (ref 27–31)
MCH RBC QN AUTO: 31.4 PG (ref 27–31)
MCH RBC QN AUTO: 31.6 PG (ref 27–31)
MCH RBC QN AUTO: 31.7 PG (ref 27–31)
MCH RBC QN AUTO: 31.7 PG (ref 27–31)
MCH RBC QN AUTO: 31.8 PG (ref 27–31)
MCH RBC QN AUTO: 31.8 PG (ref 27–31)
MCH RBC QN AUTO: 31.9 PG (ref 27–31)
MCH RBC QN AUTO: 31.9 PG (ref 27–31)
MCH RBC QN AUTO: 32 PG (ref 27–31)
MCH RBC QN AUTO: 32 PG (ref 27–31)
MCH RBC QN AUTO: 32.1 PG (ref 27–31)
MCHC RBC AUTO-ENTMCNC: 31.2 G/DL (ref 32–36)
MCHC RBC AUTO-ENTMCNC: 31.9 G/DL (ref 32–36)
MCHC RBC AUTO-ENTMCNC: 32.2 G/DL (ref 32–36)
MCHC RBC AUTO-ENTMCNC: 32.6 G/DL (ref 32–36)
MCHC RBC AUTO-ENTMCNC: 32.7 G/DL (ref 32–36)
MCHC RBC AUTO-ENTMCNC: 33 G/DL (ref 32–36)
MCHC RBC AUTO-ENTMCNC: 33.1 G/DL (ref 32–36)
MCHC RBC AUTO-ENTMCNC: 33.2 G/DL (ref 32–36)
MCHC RBC AUTO-ENTMCNC: 33.3 G/DL (ref 32–36)
MCHC RBC AUTO-ENTMCNC: 33.4 G/DL (ref 32–36)
MCHC RBC AUTO-ENTMCNC: 33.5 G/DL (ref 32–36)
MCHC RBC AUTO-ENTMCNC: 33.6 G/DL (ref 32–36)
MCHC RBC AUTO-ENTMCNC: 33.9 G/DL (ref 32–36)
MCHC RBC AUTO-ENTMCNC: 33.9 G/DL (ref 32–36)
MCHC RBC AUTO-ENTMCNC: 35.1 G/DL (ref 32–36)
MCV RBC AUTO: 100 FL (ref 82–98)
MCV RBC AUTO: 101 FL (ref 82–98)
MCV RBC AUTO: 91 FL (ref 82–98)
MCV RBC AUTO: 93 FL (ref 82–98)
MCV RBC AUTO: 93 FL (ref 82–98)
MCV RBC AUTO: 94 FL (ref 82–98)
MCV RBC AUTO: 95 FL (ref 82–98)
MCV RBC AUTO: 95 FL (ref 82–98)
MCV RBC AUTO: 96 FL (ref 82–98)
MCV RBC AUTO: 97 FL (ref 82–98)
MCV RBC AUTO: 99 FL (ref 82–98)
MICROSCOPIC COMMENT: ABNORMAL
MONOCYTES # BLD AUTO: 0.1 K/UL (ref 0.3–1)
MONOCYTES # BLD AUTO: 0.2 K/UL (ref 0.3–1)
MONOCYTES # BLD AUTO: 0.3 K/UL (ref 0.3–1)
MONOCYTES # BLD AUTO: 0.4 K/UL (ref 0.3–1)
MONOCYTES # BLD AUTO: 0.5 K/UL (ref 0.3–1)
MONOCYTES # BLD AUTO: 0.6 K/UL (ref 0.3–1)
MONOCYTES # BLD AUTO: 0.7 K/UL (ref 0.3–1)
MONOCYTES # BLD AUTO: ABNORMAL K/UL (ref 0.3–1)
MONOCYTES # BLD AUTO: ABNORMAL K/UL (ref 0.3–1)
MONOCYTES NFR BLD: 0 % (ref 4–15)
MONOCYTES NFR BLD: 1.4 % (ref 4–15)
MONOCYTES NFR BLD: 11.7 % (ref 4–15)
MONOCYTES NFR BLD: 2.6 % (ref 4–15)
MONOCYTES NFR BLD: 2.7 % (ref 4–15)
MONOCYTES NFR BLD: 3 % (ref 4–15)
MONOCYTES NFR BLD: 3.1 % (ref 4–15)
MONOCYTES NFR BLD: 3.1 % (ref 4–15)
MONOCYTES NFR BLD: 3.2 % (ref 4–15)
MONOCYTES NFR BLD: 4.8 % (ref 4–15)
MONOCYTES NFR BLD: 4.8 % (ref 4–15)
MONOCYTES NFR BLD: 5 % (ref 4–15)
MONOCYTES NFR BLD: 6 % (ref 4–15)
MONOCYTES NFR BLD: 6.2 % (ref 4–15)
MONOCYTES NFR BLD: 6.3 % (ref 4–15)
MONOCYTES NFR BLD: 6.4 % (ref 4–15)
MONOCYTES NFR BLD: 6.5 % (ref 4–15)
MONOCYTES NFR BLD: 6.6 % (ref 4–15)
MONOCYTES NFR BLD: 7.2 % (ref 4–15)
MONOCYTES NFR BLD: 7.7 % (ref 4–15)
MV A" WAVE DURATION": 11.99 MSEC
MV A" WAVE DURATION": 9.99 MSEC
MV PEAK A VEL: 1.51 M/S
MV PEAK A VEL: 1.64 M/S
MV PEAK E VEL: 0.7 M/S
MV PEAK E VEL: 1.08 M/S
MV STENOSIS PRESSURE HALF TIME: 91.83 MS
MV VALVE AREA P 1/2 METHOD: 2.4 CM2
NEUTROPHILS # BLD AUTO: 10.9 K/UL (ref 1.8–7.7)
NEUTROPHILS # BLD AUTO: 2.1 K/UL (ref 1.8–7.7)
NEUTROPHILS # BLD AUTO: 2.2 K/UL (ref 1.8–7.7)
NEUTROPHILS # BLD AUTO: 3.5 K/UL (ref 1.8–7.7)
NEUTROPHILS # BLD AUTO: 3.5 K/UL (ref 1.8–7.7)
NEUTROPHILS # BLD AUTO: 3.9 K/UL (ref 1.8–7.7)
NEUTROPHILS # BLD AUTO: 4.4 K/UL (ref 1.8–7.7)
NEUTROPHILS # BLD AUTO: 4.9 K/UL (ref 1.8–7.7)
NEUTROPHILS # BLD AUTO: 4.9 K/UL (ref 1.8–7.7)
NEUTROPHILS # BLD AUTO: 5.1 K/UL (ref 1.8–7.7)
NEUTROPHILS # BLD AUTO: 5.7 K/UL (ref 1.8–7.7)
NEUTROPHILS # BLD AUTO: 6.2 K/UL (ref 1.8–7.7)
NEUTROPHILS # BLD AUTO: 6.3 K/UL (ref 1.8–7.7)
NEUTROPHILS # BLD AUTO: 6.5 K/UL (ref 1.8–7.7)
NEUTROPHILS # BLD AUTO: 7.3 K/UL (ref 1.8–7.7)
NEUTROPHILS # BLD AUTO: 8.9 K/UL (ref 1.8–7.7)
NEUTROPHILS # BLD AUTO: 9 K/UL (ref 1.8–7.7)
NEUTROPHILS # BLD AUTO: 9.7 K/UL (ref 1.8–7.7)
NEUTROPHILS # BLD AUTO: ABNORMAL K/UL (ref 1.8–7.7)
NEUTROPHILS NFR BLD: 65.8 % (ref 38–73)
NEUTROPHILS NFR BLD: 69 % (ref 38–73)
NEUTROPHILS NFR BLD: 69 % (ref 38–73)
NEUTROPHILS NFR BLD: 69.7 % (ref 38–73)
NEUTROPHILS NFR BLD: 70.3 % (ref 38–73)
NEUTROPHILS NFR BLD: 72.1 % (ref 38–73)
NEUTROPHILS NFR BLD: 74.5 % (ref 38–73)
NEUTROPHILS NFR BLD: 74.7 % (ref 38–73)
NEUTROPHILS NFR BLD: 75.8 % (ref 38–73)
NEUTROPHILS NFR BLD: 77.9 % (ref 38–73)
NEUTROPHILS NFR BLD: 78.4 % (ref 38–73)
NEUTROPHILS NFR BLD: 84.2 % (ref 38–73)
NEUTROPHILS NFR BLD: 85.1 % (ref 38–73)
NEUTROPHILS NFR BLD: 90.4 % (ref 38–73)
NEUTROPHILS NFR BLD: 91.4 % (ref 38–73)
NEUTROPHILS NFR BLD: 91.9 % (ref 38–73)
NEUTROPHILS NFR BLD: 92 % (ref 38–73)
NEUTROPHILS NFR BLD: 93.5 % (ref 38–73)
NEUTROPHILS NFR BLD: 94.2 % (ref 38–73)
NEUTROPHILS NFR BLD: 96 % (ref 38–73)
NEUTS BAND NFR BLD MANUAL: 1 %
NEUTS BAND NFR BLD MANUAL: 4 %
NITRITE UR QL STRIP: NEGATIVE
NONHDLC SERPL-MCNC: 93 MG/DL
NRBC BLD-RTO: 0 /100 WBC
OB PNL STL: NEGATIVE
OHS CV EVENT MONITOR DAY: 0
OHS CV HOLTER LENGTH DECIMAL HOURS: 18
OHS CV HOLTER LENGTH HOURS: 18
OHS CV HOLTER LENGTH MINUTES: 0
OVALOCYTES BLD QL SMEAR: ABNORMAL
OVALOCYTES BLD QL SMEAR: ABNORMAL
PH UR STRIP: 5 [PH] (ref 5–8)
PH UR STRIP: 6 [PH] (ref 5–8)
PHOSPHATE SERPL-MCNC: 2.6 MG/DL (ref 2.7–4.5)
PHOSPHATE SERPL-MCNC: 2.7 MG/DL (ref 2.7–4.5)
PHOSPHATE SERPL-MCNC: 2.8 MG/DL (ref 2.7–4.5)
PHOSPHATE SERPL-MCNC: 3 MG/DL (ref 2.7–4.5)
PHOSPHATE SERPL-MCNC: 3.1 MG/DL (ref 2.7–4.5)
PHOSPHATE SERPL-MCNC: 3.2 MG/DL (ref 2.7–4.5)
PHOSPHATE SERPL-MCNC: 3.2 MG/DL (ref 2.7–4.5)
PHOSPHATE SERPL-MCNC: 3.3 MG/DL (ref 2.7–4.5)
PHOSPHATE SERPL-MCNC: 3.4 MG/DL (ref 2.7–4.5)
PHOSPHATE SERPL-MCNC: 3.5 MG/DL (ref 2.7–4.5)
PHOSPHATE SERPL-MCNC: 3.6 MG/DL (ref 2.7–4.5)
PHOSPHATE SERPL-MCNC: 3.7 MG/DL (ref 2.7–4.5)
PHOSPHATE SERPL-MCNC: 3.7 MG/DL (ref 2.7–4.5)
PHOSPHATE SERPL-MCNC: 4 MG/DL (ref 2.7–4.5)
PHOSPHATE SERPL-MCNC: 4.3 MG/DL (ref 2.7–4.5)
PHOSPHATE SERPL-MCNC: 4.6 MG/DL (ref 2.7–4.5)
PISA TR MAX VEL: 2.69 M/S
PISA TR MAX VEL: 3.21 M/S
PLATELET # BLD AUTO: 109 K/UL (ref 150–450)
PLATELET # BLD AUTO: 111 K/UL (ref 150–450)
PLATELET # BLD AUTO: 120 K/UL (ref 150–450)
PLATELET # BLD AUTO: 131 K/UL (ref 150–450)
PLATELET # BLD AUTO: 132 K/UL (ref 150–450)
PLATELET # BLD AUTO: 132 K/UL (ref 150–450)
PLATELET # BLD AUTO: 136 K/UL (ref 150–450)
PLATELET # BLD AUTO: 139 K/UL (ref 150–450)
PLATELET # BLD AUTO: 141 K/UL (ref 150–450)
PLATELET # BLD AUTO: 144 K/UL (ref 150–450)
PLATELET # BLD AUTO: 145 K/UL (ref 150–450)
PLATELET # BLD AUTO: 147 K/UL (ref 150–450)
PLATELET # BLD AUTO: 151 K/UL (ref 150–450)
PLATELET # BLD AUTO: 154 K/UL (ref 150–350)
PLATELET # BLD AUTO: 161 K/UL (ref 150–350)
PLATELET # BLD AUTO: 162 K/UL (ref 150–450)
PLATELET # BLD AUTO: 163 K/UL (ref 150–450)
PLATELET # BLD AUTO: 164 K/UL (ref 150–450)
PLATELET # BLD AUTO: 168 K/UL (ref 150–450)
PLATELET # BLD AUTO: 188 K/UL (ref 150–350)
PLATELET BLD QL SMEAR: ABNORMAL
PMV BLD AUTO: 10.2 FL (ref 9.2–12.9)
PMV BLD AUTO: 10.5 FL (ref 9.2–12.9)
PMV BLD AUTO: 10.7 FL (ref 9.2–12.9)
PMV BLD AUTO: 10.8 FL (ref 9.2–12.9)
PMV BLD AUTO: 10.9 FL (ref 9.2–12.9)
PMV BLD AUTO: 11 FL (ref 9.2–12.9)
PMV BLD AUTO: 11 FL (ref 9.2–12.9)
PMV BLD AUTO: 11.1 FL (ref 9.2–12.9)
PMV BLD AUTO: 11.2 FL (ref 9.2–12.9)
PMV BLD AUTO: 11.2 FL (ref 9.2–12.9)
PMV BLD AUTO: 11.4 FL (ref 9.2–12.9)
PMV BLD AUTO: 11.6 FL (ref 9.2–12.9)
PMV BLD AUTO: 11.8 FL (ref 9.2–12.9)
PMV BLD AUTO: 12.3 FL (ref 9.2–12.9)
PMV BLD AUTO: 12.4 FL (ref 9.2–12.9)
POC IONIZED CALCIUM: 1.11 MMOL/L (ref 1.06–1.42)
POC TCO2 (MEASURED): 24 MMOL/L (ref 23–29)
POCT GLUCOSE: 156 MG/DL (ref 70–110)
POIKILOCYTOSIS BLD QL SMEAR: SLIGHT
POIKILOCYTOSIS BLD QL SMEAR: SLIGHT
POTASSIUM BLD-SCNC: 3.7 MMOL/L (ref 3.5–5.1)
POTASSIUM SERPL-SCNC: 3.3 MMOL/L (ref 3.5–5.1)
POTASSIUM SERPL-SCNC: 3.4 MMOL/L (ref 3.5–5.1)
POTASSIUM SERPL-SCNC: 3.5 MMOL/L (ref 3.5–5.1)
POTASSIUM SERPL-SCNC: 3.6 MMOL/L (ref 3.5–5.1)
POTASSIUM SERPL-SCNC: 3.7 MMOL/L (ref 3.5–5.1)
POTASSIUM SERPL-SCNC: 3.8 MMOL/L (ref 3.5–5.1)
POTASSIUM SERPL-SCNC: 3.9 MMOL/L (ref 3.5–5.1)
POTASSIUM SERPL-SCNC: 4 MMOL/L (ref 3.5–5.1)
POTASSIUM SERPL-SCNC: 4 MMOL/L (ref 3.5–5.1)
POTASSIUM SERPL-SCNC: 4.1 MMOL/L (ref 3.5–5.1)
POTASSIUM SERPL-SCNC: 4.4 MMOL/L (ref 3.5–5.1)
POTASSIUM SERPL-SCNC: 4.9 MMOL/L (ref 3.5–5.1)
PROCALCITONIN SERPL IA-MCNC: 0.03 NG/ML
PROT SERPL-MCNC: 5 G/DL (ref 6–8.4)
PROT SERPL-MCNC: 5.1 G/DL (ref 6–8.4)
PROT SERPL-MCNC: 5.2 G/DL (ref 6–8.4)
PROT SERPL-MCNC: 5.3 G/DL (ref 6–8.4)
PROT SERPL-MCNC: 5.5 G/DL (ref 6–8.4)
PROT SERPL-MCNC: 5.6 G/DL (ref 6–8.4)
PROT SERPL-MCNC: 5.9 G/DL (ref 6–8.4)
PROT SERPL-MCNC: 5.9 G/DL (ref 6–8.4)
PROT SERPL-MCNC: 6.1 G/DL (ref 6–8.4)
PROT SERPL-MCNC: 6.2 G/DL (ref 6–8.4)
PROT SERPL-MCNC: 6.3 G/DL (ref 6–8.4)
PROT SERPL-MCNC: 6.4 G/DL (ref 6–8.4)
PROT SERPL-MCNC: 6.5 G/DL (ref 6–8.4)
PROT SERPL-MCNC: 6.6 G/DL (ref 6–8.4)
PROT SERPL-MCNC: 6.8 G/DL (ref 6–8.4)
PROT UR QL STRIP: ABNORMAL
PROT UR QL STRIP: NEGATIVE
PROT UR QL STRIP: NEGATIVE
PROTHROMBIN TIME: 10.7 SEC (ref 9–12.5)
PULM VEIN S/D RATIO: 1.69
PULM VEIN S/D RATIO: 2.41
PV PEAK D VEL: 0.27 M/S
PV PEAK D VEL: 0.29 M/S
PV PEAK S VEL: 0.49 M/S
PV PEAK S VEL: 0.65 M/S
RA MAJOR: 4.25 CM
RA MAJOR: 4.6 CM
RA PRESSURE: 3 MMHG
RA PRESSURE: 3 MMHG
RA WIDTH: 2.25 CM
RA WIDTH: 2.96 CM
RBC # BLD AUTO: 3.08 M/UL (ref 4–5.4)
RBC # BLD AUTO: 3.35 M/UL (ref 4–5.4)
RBC # BLD AUTO: 3.38 M/UL (ref 4–5.4)
RBC # BLD AUTO: 3.48 M/UL (ref 4–5.4)
RBC # BLD AUTO: 3.53 M/UL (ref 4–5.4)
RBC # BLD AUTO: 3.57 M/UL (ref 4–5.4)
RBC # BLD AUTO: 3.59 M/UL (ref 4–5.4)
RBC # BLD AUTO: 3.6 M/UL (ref 4–5.4)
RBC # BLD AUTO: 3.62 M/UL (ref 4–5.4)
RBC # BLD AUTO: 3.63 M/UL (ref 4–5.4)
RBC # BLD AUTO: 3.67 M/UL (ref 4–5.4)
RBC # BLD AUTO: 3.69 M/UL (ref 4–5.4)
RBC # BLD AUTO: 3.69 M/UL (ref 4–5.4)
RBC # BLD AUTO: 3.73 M/UL (ref 4–5.4)
RBC # BLD AUTO: 3.74 M/UL (ref 4–5.4)
RBC # BLD AUTO: 3.76 M/UL (ref 4–5.4)
RBC # BLD AUTO: 3.84 M/UL (ref 4–5.4)
RBC # BLD AUTO: 3.89 M/UL (ref 4–5.4)
RBC # BLD AUTO: 3.96 M/UL (ref 4–5.4)
RBC # BLD AUTO: 4.01 M/UL (ref 4–5.4)
RBC #/AREA URNS AUTO: 0 /HPF (ref 0–4)
RBC #/AREA URNS AUTO: 1 /HPF (ref 0–4)
RBC #/AREA URNS AUTO: 1 /HPF (ref 0–4)
RBC #/AREA URNS AUTO: 21 /HPF (ref 0–4)
RBC #/AREA URNS AUTO: 7 /HPF (ref 0–4)
RIGHT VENTRICULAR END-DIASTOLIC DIMENSION: 2.67 CM
RIGHT VENTRICULAR END-DIASTOLIC DIMENSION: 3.01 CM
RV TISSUE DOPPLER FREE WALL SYSTOLIC VELOCITY 1 (APICAL 4 CHAMBER VIEW): 12.83 CM/S
RV TISSUE DOPPLER FREE WALL SYSTOLIC VELOCITY 1 (APICAL 4 CHAMBER VIEW): 15.64 CM/S
SAMPLE: ABNORMAL
SARS-COV-2 RDRP RESP QL NAA+PROBE: NEGATIVE
SARS-COV-2 RDRP RESP QL NAA+PROBE: POSITIVE
SATURATED IRON: 7 % (ref 20–50)
SINUS: 2.79 CM
SINUS: 3.1 CM
SODIUM BLD-SCNC: 143 MMOL/L (ref 136–145)
SODIUM SERPL-SCNC: 134 MMOL/L (ref 136–145)
SODIUM SERPL-SCNC: 135 MMOL/L (ref 136–145)
SODIUM SERPL-SCNC: 136 MMOL/L (ref 136–145)
SODIUM SERPL-SCNC: 137 MMOL/L (ref 136–145)
SODIUM SERPL-SCNC: 137 MMOL/L (ref 136–145)
SODIUM SERPL-SCNC: 138 MMOL/L (ref 136–145)
SODIUM SERPL-SCNC: 139 MMOL/L (ref 136–145)
SODIUM SERPL-SCNC: 140 MMOL/L (ref 136–145)
SODIUM SERPL-SCNC: 141 MMOL/L (ref 136–145)
SODIUM SERPL-SCNC: 142 MMOL/L (ref 136–145)
SODIUM SERPL-SCNC: 142 MMOL/L (ref 136–145)
SODIUM SERPL-SCNC: 143 MMOL/L (ref 136–145)
SODIUM SERPL-SCNC: 144 MMOL/L (ref 136–145)
SP GR UR STRIP: 1.01 (ref 1–1.03)
SP GR UR STRIP: 1.02 (ref 1–1.03)
SQUAMOUS #/AREA URNS AUTO: 0 /HPF
STJ: 2.27 CM
STJ: 2.6 CM
TDI LATERAL: 0.03 M/S
TDI LATERAL: 0.06 M/S
TDI SEPTAL: 0.04 M/S
TDI SEPTAL: 0.05 M/S
TDI: 0.04 M/S
TDI: 0.06 M/S
TOTAL IRON BINDING CAPACITY: 234 UG/DL (ref 250–450)
TR MAX PG: 29 MMHG
TR MAX PG: 41 MMHG
TRANSFERRIN SERPL-MCNC: 158 MG/DL (ref 200–375)
TRICUSPID ANNULAR PLANE SYSTOLIC EXCURSION: 1.78 CM
TRICUSPID ANNULAR PLANE SYSTOLIC EXCURSION: 2.78 CM
TRIGL SERPL-MCNC: 134 MG/DL (ref 30–150)
TROPONIN I SERPL DL<=0.01 NG/ML-MCNC: 0.01 NG/ML (ref 0–0.03)
TROPONIN I SERPL DL<=0.01 NG/ML-MCNC: 0.38 NG/ML (ref 0–0.03)
TROPONIN I SERPL DL<=0.01 NG/ML-MCNC: <0.006 NG/ML (ref 0–0.03)
TSH SERPL DL<=0.005 MIU/L-ACNC: 1.85 UIU/ML (ref 0.4–4)
TSH SERPL DL<=0.005 MIU/L-ACNC: 2.57 UIU/ML (ref 0.4–4)
TSH SERPL DL<=0.005 MIU/L-ACNC: 3.08 UIU/ML (ref 0.4–4)
TV REST PULMONARY ARTERY PRESSURE: 32 MMHG
TV REST PULMONARY ARTERY PRESSURE: 44 MMHG
URN SPEC COLLECT METH UR: ABNORMAL
UUN UR-MCNC: 518 MG/DL (ref 140–1050)
VANCOMYCIN SERPL-MCNC: 6.4 UG/ML
VANCOMYCIN SERPL-MCNC: 9.9 UG/ML
VIT B12 SERPL-MCNC: 260 PG/ML (ref 210–950)
WBC # BLD AUTO: 1.64 K/UL (ref 3.9–12.7)
WBC # BLD AUTO: 10.46 K/UL (ref 3.9–12.7)
WBC # BLD AUTO: 10.49 K/UL (ref 3.9–12.7)
WBC # BLD AUTO: 11.52 K/UL (ref 3.9–12.7)
WBC # BLD AUTO: 11.99 K/UL (ref 3.9–12.7)
WBC # BLD AUTO: 2.68 K/UL (ref 3.9–12.7)
WBC # BLD AUTO: 3.33 K/UL (ref 3.9–12.7)
WBC # BLD AUTO: 4.12 K/UL (ref 3.9–12.7)
WBC # BLD AUTO: 4.89 K/UL (ref 3.9–12.7)
WBC # BLD AUTO: 4.98 K/UL (ref 3.9–12.7)
WBC # BLD AUTO: 5.45 K/UL (ref 3.9–12.7)
WBC # BLD AUTO: 6.24 K/UL (ref 3.9–12.7)
WBC # BLD AUTO: 6.63 K/UL (ref 3.9–12.7)
WBC # BLD AUTO: 6.96 K/UL (ref 3.9–12.7)
WBC # BLD AUTO: 7.39 K/UL (ref 3.9–12.7)
WBC # BLD AUTO: 7.94 K/UL (ref 3.9–12.7)
WBC # BLD AUTO: 7.95 K/UL (ref 3.9–12.7)
WBC # BLD AUTO: 8.45 K/UL (ref 3.9–12.7)
WBC # BLD AUTO: 8.51 K/UL (ref 3.9–12.7)
WBC # BLD AUTO: 9.66 K/UL (ref 3.9–12.7)
WBC #/AREA URNS AUTO: 0 /HPF (ref 0–5)
WBC #/AREA URNS AUTO: 25 /HPF (ref 0–5)
WBC #/AREA URNS AUTO: 3 /HPF (ref 0–5)
WBC #/AREA URNS AUTO: 53 /HPF (ref 0–5)
WBC #/AREA URNS AUTO: 9 /HPF (ref 0–5)
WBC TOXIC VACUOLES BLD QL SMEAR: PRESENT
WBC TOXIC VACUOLES BLD QL SMEAR: PRESENT

## 2021-01-01 PROCEDURE — 84100 ASSAY OF PHOSPHORUS: CPT | Performed by: PHYSICIAN ASSISTANT

## 2021-01-01 PROCEDURE — 94640 AIRWAY INHALATION TREATMENT: CPT

## 2021-01-01 PROCEDURE — 27100171 HC OXYGEN HIGH FLOW UP TO 24 HOURS

## 2021-01-01 PROCEDURE — 99999 PR PBB SHADOW E&M-EST. PATIENT-LVL V: CPT | Mod: PBBFAC,,, | Performed by: FAMILY MEDICINE

## 2021-01-01 PROCEDURE — 25000242 PHARM REV CODE 250 ALT 637 W/ HCPCS: Performed by: PHYSICIAN ASSISTANT

## 2021-01-01 PROCEDURE — 82728 ASSAY OF FERRITIN: CPT | Performed by: STUDENT IN AN ORGANIZED HEALTH CARE EDUCATION/TRAINING PROGRAM

## 2021-01-01 PROCEDURE — 93005 ELECTROCARDIOGRAM TRACING: CPT | Mod: PBBFAC | Performed by: INTERNAL MEDICINE

## 2021-01-01 PROCEDURE — 94761 N-INVAS EAR/PLS OXIMETRY MLT: CPT

## 2021-01-01 PROCEDURE — 27000207 HC ISOLATION

## 2021-01-01 PROCEDURE — 99223 PR INITIAL HOSPITAL CARE,LEVL III: ICD-10-PCS | Mod: ,,, | Performed by: CLINICAL NURSE SPECIALIST

## 2021-01-01 PROCEDURE — 99999 PR PBB SHADOW E&M-EST. PATIENT-LVL IV: ICD-10-PCS | Mod: PBBFAC,,, | Performed by: FAMILY MEDICINE

## 2021-01-01 PROCEDURE — 25000003 PHARM REV CODE 250: Performed by: STUDENT IN AN ORGANIZED HEALTH CARE EDUCATION/TRAINING PROGRAM

## 2021-01-01 PROCEDURE — G0378 HOSPITAL OBSERVATION PER HR: HCPCS

## 2021-01-01 PROCEDURE — 99214 OFFICE O/P EST MOD 30 MIN: CPT | Mod: S$PBB,,, | Performed by: FAMILY MEDICINE

## 2021-01-01 PROCEDURE — 25000003 PHARM REV CODE 250: Performed by: PHYSICIAN ASSISTANT

## 2021-01-01 PROCEDURE — 25000242 PHARM REV CODE 250 ALT 637 W/ HCPCS: Performed by: STUDENT IN AN ORGANIZED HEALTH CARE EDUCATION/TRAINING PROGRAM

## 2021-01-01 PROCEDURE — 91300 PHARM REV CODE 636 W HCPCS: CPT | Performed by: STUDENT IN AN ORGANIZED HEALTH CARE EDUCATION/TRAINING PROGRAM

## 2021-01-01 PROCEDURE — 82728 ASSAY OF FERRITIN: CPT | Performed by: HOSPITALIST

## 2021-01-01 PROCEDURE — 80053 COMPREHEN METABOLIC PANEL: CPT | Performed by: STUDENT IN AN ORGANIZED HEALTH CARE EDUCATION/TRAINING PROGRAM

## 2021-01-01 PROCEDURE — 87040 BLOOD CULTURE FOR BACTERIA: CPT | Mod: 59 | Performed by: STUDENT IN AN ORGANIZED HEALTH CARE EDUCATION/TRAINING PROGRAM

## 2021-01-01 PROCEDURE — 99397 PER PM REEVAL EST PAT 65+ YR: CPT | Mod: S$PBB,,, | Performed by: FAMILY MEDICINE

## 2021-01-01 PROCEDURE — 93010 ELECTROCARDIOGRAM REPORT: CPT | Mod: ,,, | Performed by: INTERNAL MEDICINE

## 2021-01-01 PROCEDURE — 63600175 PHARM REV CODE 636 W HCPCS: Performed by: STUDENT IN AN ORGANIZED HEALTH CARE EDUCATION/TRAINING PROGRAM

## 2021-01-01 PROCEDURE — 25000003 PHARM REV CODE 250: Performed by: INTERNAL MEDICINE

## 2021-01-01 PROCEDURE — 99217 PR OBSERVATION CARE DISCHARGE: ICD-10-PCS | Mod: ,,, | Performed by: PHYSICIAN ASSISTANT

## 2021-01-01 PROCEDURE — 82728 ASSAY OF FERRITIN: CPT | Performed by: PHYSICIAN ASSISTANT

## 2021-01-01 PROCEDURE — 83615 LACTATE (LD) (LDH) ENZYME: CPT | Performed by: PHYSICIAN ASSISTANT

## 2021-01-01 PROCEDURE — 83540 ASSAY OF IRON: CPT | Performed by: PHYSICIAN ASSISTANT

## 2021-01-01 PROCEDURE — 99239 PR HOSPITAL DISCHARGE DAY,>30 MIN: ICD-10-PCS | Mod: ,,, | Performed by: PHYSICIAN ASSISTANT

## 2021-01-01 PROCEDURE — 82306 VITAMIN D 25 HYDROXY: CPT | Performed by: INTERNAL MEDICINE

## 2021-01-01 PROCEDURE — 83735 ASSAY OF MAGNESIUM: CPT

## 2021-01-01 PROCEDURE — 85025 COMPLETE CBC W/AUTO DIFF WBC: CPT | Performed by: PHYSICIAN ASSISTANT

## 2021-01-01 PROCEDURE — 99214 PR OFFICE/OUTPT VISIT, EST, LEVL IV, 30-39 MIN: ICD-10-PCS | Mod: S$PBB,,, | Performed by: FAMILY MEDICINE

## 2021-01-01 PROCEDURE — 99439 CHRNC CARE MGMT STAF EA ADDL: CPT | Mod: S$PBB,,, | Performed by: FAMILY MEDICINE

## 2021-01-01 PROCEDURE — 96361 HYDRATE IV INFUSION ADD-ON: CPT

## 2021-01-01 PROCEDURE — 99220 PR INITIAL OBSERVATION CARE,LEVL III: CPT | Mod: ,,, | Performed by: PHYSICIAN ASSISTANT

## 2021-01-01 PROCEDURE — 25000003 PHARM REV CODE 250

## 2021-01-01 PROCEDURE — 99999 PR PBB SHADOW E&M-EST. PATIENT-LVL V: ICD-10-PCS | Mod: PBBFAC,,, | Performed by: FAMILY MEDICINE

## 2021-01-01 PROCEDURE — 85007 BL SMEAR W/DIFF WBC COUNT: CPT | Performed by: INTERNAL MEDICINE

## 2021-01-01 PROCEDURE — 63600175 PHARM REV CODE 636 W HCPCS: Performed by: INTERNAL MEDICINE

## 2021-01-01 PROCEDURE — 80048 BASIC METABOLIC PNL TOTAL CA: CPT | Performed by: PHYSICIAN ASSISTANT

## 2021-01-01 PROCEDURE — 97530 THERAPEUTIC ACTIVITIES: CPT

## 2021-01-01 PROCEDURE — 93005 ELECTROCARDIOGRAM TRACING: CPT

## 2021-01-01 PROCEDURE — 80053 COMPREHEN METABOLIC PANEL: CPT | Performed by: INTERNAL MEDICINE

## 2021-01-01 PROCEDURE — 99490 PR CHRONIC CARE MGMT, 1ST 20 MIN: ICD-10-PCS | Mod: S$PBB,,, | Performed by: FAMILY MEDICINE

## 2021-01-01 PROCEDURE — 82607 VITAMIN B-12: CPT | Performed by: PHYSICIAN ASSISTANT

## 2021-01-01 PROCEDURE — 85379 FIBRIN DEGRADATION QUANT: CPT | Performed by: HOSPITALIST

## 2021-01-01 PROCEDURE — 99233 SBSQ HOSP IP/OBS HIGH 50: CPT | Mod: CR,GC,, | Performed by: INTERNAL MEDICINE

## 2021-01-01 PROCEDURE — 97161 PT EVAL LOW COMPLEX 20 MIN: CPT

## 2021-01-01 PROCEDURE — 93227 XTRNL ECG REC<48 HR R&I: CPT | Mod: ,,, | Performed by: INTERNAL MEDICINE

## 2021-01-01 PROCEDURE — U0002 COVID-19 LAB TEST NON-CDC: HCPCS | Performed by: EMERGENCY MEDICINE

## 2021-01-01 PROCEDURE — 85610 PROTHROMBIN TIME: CPT | Performed by: STUDENT IN AN ORGANIZED HEALTH CARE EDUCATION/TRAINING PROGRAM

## 2021-01-01 PROCEDURE — 30200315 PPD INTRADERMAL TEST REV CODE 302: Performed by: STUDENT IN AN ORGANIZED HEALTH CARE EDUCATION/TRAINING PROGRAM

## 2021-01-01 PROCEDURE — 85025 COMPLETE CBC W/AUTO DIFF WBC: CPT | Performed by: STUDENT IN AN ORGANIZED HEALTH CARE EDUCATION/TRAINING PROGRAM

## 2021-01-01 PROCEDURE — 83735 ASSAY OF MAGNESIUM: CPT | Performed by: STUDENT IN AN ORGANIZED HEALTH CARE EDUCATION/TRAINING PROGRAM

## 2021-01-01 PROCEDURE — 83735 ASSAY OF MAGNESIUM: CPT | Performed by: INTERNAL MEDICINE

## 2021-01-01 PROCEDURE — 36415 COLL VENOUS BLD VENIPUNCTURE: CPT | Performed by: FAMILY MEDICINE

## 2021-01-01 PROCEDURE — 86140 C-REACTIVE PROTEIN: CPT | Performed by: STUDENT IN AN ORGANIZED HEALTH CARE EDUCATION/TRAINING PROGRAM

## 2021-01-01 PROCEDURE — 85025 COMPLETE CBC W/AUTO DIFF WBC: CPT

## 2021-01-01 PROCEDURE — 99214 OFFICE O/P EST MOD 30 MIN: CPT | Mod: PBBFAC | Performed by: FAMILY MEDICINE

## 2021-01-01 PROCEDURE — 96361 HYDRATE IV INFUSION ADD-ON: CPT | Performed by: EMERGENCY MEDICINE

## 2021-01-01 PROCEDURE — 99215 OFFICE O/P EST HI 40 MIN: CPT | Mod: PBBFAC | Performed by: FAMILY MEDICINE

## 2021-01-01 PROCEDURE — 80047 BASIC METABLC PNL IONIZED CA: CPT

## 2021-01-01 PROCEDURE — 85025 COMPLETE CBC W/AUTO DIFF WBC: CPT | Performed by: EMERGENCY MEDICINE

## 2021-01-01 PROCEDURE — 99291 PR CRITICAL CARE, E/M 30-74 MINUTES: ICD-10-PCS | Mod: CR,,, | Performed by: EMERGENCY MEDICINE

## 2021-01-01 PROCEDURE — 25000242 PHARM REV CODE 250 ALT 637 W/ HCPCS: Performed by: INTERNAL MEDICINE

## 2021-01-01 PROCEDURE — 99900035 HC TECH TIME PER 15 MIN (STAT)

## 2021-01-01 PROCEDURE — 63600175 PHARM REV CODE 636 W HCPCS: Performed by: HOSPITALIST

## 2021-01-01 PROCEDURE — 99285 PR EMERGENCY DEPT VISIT,LEVEL V: ICD-10-PCS | Mod: CS,,, | Performed by: EMERGENCY MEDICINE

## 2021-01-01 PROCEDURE — 99233 PR SUBSEQUENT HOSPITAL CARE,LEVL III: ICD-10-PCS | Mod: CR,GC,, | Performed by: INTERNAL MEDICINE

## 2021-01-01 PROCEDURE — 80048 BASIC METABOLIC PNL TOTAL CA: CPT | Mod: 59 | Performed by: EMERGENCY MEDICINE

## 2021-01-01 PROCEDURE — 99439 CHRNC CARE MGMT STAF EA ADDL: CPT | Mod: PBBFAC | Performed by: FAMILY MEDICINE

## 2021-01-01 PROCEDURE — 93010 ELECTROCARDIOGRAM REPORT: CPT | Mod: S$PBB,,, | Performed by: INTERNAL MEDICINE

## 2021-01-01 PROCEDURE — 83605 ASSAY OF LACTIC ACID: CPT | Performed by: EMERGENCY MEDICINE

## 2021-01-01 PROCEDURE — 36415 COLL VENOUS BLD VENIPUNCTURE: CPT

## 2021-01-01 PROCEDURE — 93010 EKG 12-LEAD: ICD-10-PCS | Mod: ,,, | Performed by: INTERNAL MEDICINE

## 2021-01-01 PROCEDURE — 20600001 HC STEP DOWN PRIVATE ROOM

## 2021-01-01 PROCEDURE — 99226 PR SUBSEQUENT OBSERVATION CARE,LEVEL III: CPT | Mod: ,,, | Performed by: PHYSICIAN ASSISTANT

## 2021-01-01 PROCEDURE — 99232 SBSQ HOSP IP/OBS MODERATE 35: CPT | Mod: CR,GC,, | Performed by: NEUROLOGICAL SURGERY

## 2021-01-01 PROCEDURE — 25000003 PHARM REV CODE 250: Performed by: HOSPITALIST

## 2021-01-01 PROCEDURE — 81001 URINALYSIS AUTO W/SCOPE: CPT | Performed by: PHYSICIAN ASSISTANT

## 2021-01-01 PROCEDURE — 84484 ASSAY OF TROPONIN QUANT: CPT | Performed by: EMERGENCY MEDICINE

## 2021-01-01 PROCEDURE — 99238 PR HOSPITAL DISCHARGE DAY,<30 MIN: ICD-10-PCS | Mod: CR,GC,, | Performed by: NEUROLOGICAL SURGERY

## 2021-01-01 PROCEDURE — 93227 HOLTER MONITOR - 48 HOUR (CUPID ONLY): ICD-10-PCS | Mod: ,,, | Performed by: INTERNAL MEDICINE

## 2021-01-01 PROCEDURE — 99490 CHRNC CARE MGMT STAFF 1ST 20: CPT | Mod: PBBFAC | Performed by: FAMILY MEDICINE

## 2021-01-01 PROCEDURE — 99439 PR CHRONIC CARE MGMT, EA ADDTL 20 MIN: ICD-10-PCS | Mod: S$PBB,,, | Performed by: FAMILY MEDICINE

## 2021-01-01 PROCEDURE — 84100 ASSAY OF PHOSPHORUS: CPT | Performed by: INTERNAL MEDICINE

## 2021-01-01 PROCEDURE — 63600175 PHARM REV CODE 636 W HCPCS: Performed by: PHYSICIAN ASSISTANT

## 2021-01-01 PROCEDURE — 99285 EMERGENCY DEPT VISIT HI MDM: CPT | Mod: 25

## 2021-01-01 PROCEDURE — 83615 LACTATE (LD) (LDH) ENZYME: CPT | Performed by: STUDENT IN AN ORGANIZED HEALTH CARE EDUCATION/TRAINING PROGRAM

## 2021-01-01 PROCEDURE — 80202 ASSAY OF VANCOMYCIN: CPT | Performed by: INTERNAL MEDICINE

## 2021-01-01 PROCEDURE — 80053 COMPREHEN METABOLIC PANEL: CPT

## 2021-01-01 PROCEDURE — 99232 PR SUBSEQUENT HOSPITAL CARE,LEVL II: ICD-10-PCS | Mod: CR,GC,, | Performed by: NEUROLOGICAL SURGERY

## 2021-01-01 PROCEDURE — 85027 COMPLETE CBC AUTOMATED: CPT | Performed by: STUDENT IN AN ORGANIZED HEALTH CARE EDUCATION/TRAINING PROGRAM

## 2021-01-01 PROCEDURE — 99490 CHRNC CARE MGMT STAFF 1ST 20: CPT | Mod: S$PBB,,, | Performed by: FAMILY MEDICINE

## 2021-01-01 PROCEDURE — 87086 URINE CULTURE/COLONY COUNT: CPT

## 2021-01-01 PROCEDURE — 92610 EVALUATE SWALLOWING FUNCTION: CPT

## 2021-01-01 PROCEDURE — 93226 XTRNL ECG REC<48 HR SCAN A/R: CPT

## 2021-01-01 PROCEDURE — 25500020 PHARM REV CODE 255: Performed by: EMERGENCY MEDICINE

## 2021-01-01 PROCEDURE — 82272 OCCULT BLD FECES 1-3 TESTS: CPT | Performed by: STUDENT IN AN ORGANIZED HEALTH CARE EDUCATION/TRAINING PROGRAM

## 2021-01-01 PROCEDURE — 84443 ASSAY THYROID STIM HORMONE: CPT | Performed by: FAMILY MEDICINE

## 2021-01-01 PROCEDURE — 99217 PR OBSERVATION CARE DISCHARGE: CPT | Mod: ,,, | Performed by: PHYSICIAN ASSISTANT

## 2021-01-01 PROCEDURE — 99999 PR PBB SHADOW E&M-EST. PATIENT-LVL V: ICD-10-PCS | Mod: PBBFAC,,, | Performed by: INTERNAL MEDICINE

## 2021-01-01 PROCEDURE — 99285 EMERGENCY DEPT VISIT HI MDM: CPT | Mod: CS,,, | Performed by: EMERGENCY MEDICINE

## 2021-01-01 PROCEDURE — 99238 HOSP IP/OBS DSCHRG MGMT 30/<: CPT | Mod: CR,GC,, | Performed by: NEUROLOGICAL SURGERY

## 2021-01-01 PROCEDURE — 87040 BLOOD CULTURE FOR BACTERIA: CPT | Mod: 59 | Performed by: PHYSICIAN ASSISTANT

## 2021-01-01 PROCEDURE — 92526 ORAL FUNCTION THERAPY: CPT

## 2021-01-01 PROCEDURE — 85027 COMPLETE CBC AUTOMATED: CPT | Performed by: INTERNAL MEDICINE

## 2021-01-01 PROCEDURE — 36415 COLL VENOUS BLD VENIPUNCTURE: CPT | Performed by: PHYSICIAN ASSISTANT

## 2021-01-01 PROCEDURE — 36415 COLL VENOUS BLD VENIPUNCTURE: CPT | Performed by: STUDENT IN AN ORGANIZED HEALTH CARE EDUCATION/TRAINING PROGRAM

## 2021-01-01 PROCEDURE — 82746 ASSAY OF FOLIC ACID SERUM: CPT | Performed by: PHYSICIAN ASSISTANT

## 2021-01-01 PROCEDURE — 96374 THER/PROPH/DIAG INJ IV PUSH: CPT

## 2021-01-01 PROCEDURE — 99233 SBSQ HOSP IP/OBS HIGH 50: CPT | Mod: ,,, | Performed by: PHYSICIAN ASSISTANT

## 2021-01-01 PROCEDURE — U0002 COVID-19 LAB TEST NON-CDC: HCPCS | Performed by: PHYSICIAN ASSISTANT

## 2021-01-01 PROCEDURE — 85730 THROMBOPLASTIN TIME PARTIAL: CPT | Performed by: STUDENT IN AN ORGANIZED HEALTH CARE EDUCATION/TRAINING PROGRAM

## 2021-01-01 PROCEDURE — 99233 SBSQ HOSP IP/OBS HIGH 50: CPT | Mod: CR,,, | Performed by: HOSPITALIST

## 2021-01-01 PROCEDURE — 81001 URINALYSIS AUTO W/SCOPE: CPT | Performed by: INTERNAL MEDICINE

## 2021-01-01 PROCEDURE — 83735 ASSAY OF MAGNESIUM: CPT | Performed by: PHYSICIAN ASSISTANT

## 2021-01-01 PROCEDURE — 83605 ASSAY OF LACTIC ACID: CPT | Performed by: PHYSICIAN ASSISTANT

## 2021-01-01 PROCEDURE — 81001 URINALYSIS AUTO W/SCOPE: CPT

## 2021-01-01 PROCEDURE — 99285 PR EMERGENCY DEPT VISIT,LEVEL V: ICD-10-PCS | Mod: ,,, | Performed by: EMERGENCY MEDICINE

## 2021-01-01 PROCEDURE — 81001 URINALYSIS AUTO W/SCOPE: CPT | Performed by: EMERGENCY MEDICINE

## 2021-01-01 PROCEDURE — 99233 PR SUBSEQUENT HOSPITAL CARE,LEVL III: ICD-10-PCS | Mod: CR,,, | Performed by: HOSPITALIST

## 2021-01-01 PROCEDURE — 36415 COLL VENOUS BLD VENIPUNCTURE: CPT | Performed by: INTERNAL MEDICINE

## 2021-01-01 PROCEDURE — 85025 COMPLETE CBC W/AUTO DIFF WBC: CPT | Performed by: INTERNAL MEDICINE

## 2021-01-01 PROCEDURE — 36415 COLL VENOUS BLD VENIPUNCTURE: CPT | Performed by: HOSPITALIST

## 2021-01-01 PROCEDURE — 96374 THER/PROPH/DIAG INJ IV PUSH: CPT | Mod: 59 | Performed by: EMERGENCY MEDICINE

## 2021-01-01 PROCEDURE — 80053 COMPREHEN METABOLIC PANEL: CPT | Performed by: EMERGENCY MEDICINE

## 2021-01-01 PROCEDURE — 84145 PROCALCITONIN (PCT): CPT | Performed by: STUDENT IN AN ORGANIZED HEALTH CARE EDUCATION/TRAINING PROGRAM

## 2021-01-01 PROCEDURE — 83735 ASSAY OF MAGNESIUM: CPT | Performed by: EMERGENCY MEDICINE

## 2021-01-01 PROCEDURE — 25000003 PHARM REV CODE 250: Performed by: EMERGENCY MEDICINE

## 2021-01-01 PROCEDURE — 80061 LIPID PANEL: CPT

## 2021-01-01 PROCEDURE — 84484 ASSAY OF TROPONIN QUANT: CPT | Performed by: PHYSICIAN ASSISTANT

## 2021-01-01 PROCEDURE — 27100098 HC SPACER

## 2021-01-01 PROCEDURE — 86140 C-REACTIVE PROTEIN: CPT | Performed by: HOSPITALIST

## 2021-01-01 PROCEDURE — 85379 FIBRIN DEGRADATION QUANT: CPT | Performed by: STUDENT IN AN ORGANIZED HEALTH CARE EDUCATION/TRAINING PROGRAM

## 2021-01-01 PROCEDURE — 99215 OFFICE O/P EST HI 40 MIN: CPT | Mod: PBBFAC,25 | Performed by: INTERNAL MEDICINE

## 2021-01-01 PROCEDURE — 27000221 HC OXYGEN, UP TO 24 HOURS

## 2021-01-01 PROCEDURE — 84443 ASSAY THYROID STIM HORMONE: CPT

## 2021-01-01 PROCEDURE — 84484 ASSAY OF TROPONIN QUANT: CPT | Performed by: STUDENT IN AN ORGANIZED HEALTH CARE EDUCATION/TRAINING PROGRAM

## 2021-01-01 PROCEDURE — 85007 BL SMEAR W/DIFF WBC COUNT: CPT | Performed by: STUDENT IN AN ORGANIZED HEALTH CARE EDUCATION/TRAINING PROGRAM

## 2021-01-01 PROCEDURE — 97166 OT EVAL MOD COMPLEX 45 MIN: CPT

## 2021-01-01 PROCEDURE — 97116 GAIT TRAINING THERAPY: CPT

## 2021-01-01 PROCEDURE — 86580 TB INTRADERMAL TEST: CPT | Performed by: STUDENT IN AN ORGANIZED HEALTH CARE EDUCATION/TRAINING PROGRAM

## 2021-01-01 PROCEDURE — 84100 ASSAY OF PHOSPHORUS: CPT | Performed by: STUDENT IN AN ORGANIZED HEALTH CARE EDUCATION/TRAINING PROGRAM

## 2021-01-01 PROCEDURE — 87086 URINE CULTURE/COLONY COUNT: CPT | Performed by: EMERGENCY MEDICINE

## 2021-01-01 PROCEDURE — 99223 1ST HOSP IP/OBS HIGH 75: CPT | Mod: AI,CR,, | Performed by: INTERNAL MEDICINE

## 2021-01-01 PROCEDURE — 82306 VITAMIN D 25 HYDROXY: CPT | Performed by: STUDENT IN AN ORGANIZED HEALTH CARE EDUCATION/TRAINING PROGRAM

## 2021-01-01 PROCEDURE — 83880 ASSAY OF NATRIURETIC PEPTIDE: CPT | Performed by: INTERNAL MEDICINE

## 2021-01-01 PROCEDURE — 99239 HOSP IP/OBS DSCHRG MGMT >30: CPT | Mod: ,,, | Performed by: PHYSICIAN ASSISTANT

## 2021-01-01 PROCEDURE — 99233 PR SUBSEQUENT HOSPITAL CARE,LEVL III: ICD-10-PCS | Mod: ,,, | Performed by: PHYSICIAN ASSISTANT

## 2021-01-01 PROCEDURE — 99285 PR EMERGENCY DEPT VISIT,LEVEL V: ICD-10-PCS | Mod: CR,CS,, | Performed by: PHYSICIAN ASSISTANT

## 2021-01-01 PROCEDURE — 99291 CRITICAL CARE FIRST HOUR: CPT | Mod: 25

## 2021-01-01 PROCEDURE — 11000001 HC ACUTE MED/SURG PRIVATE ROOM

## 2021-01-01 PROCEDURE — 80069 RENAL FUNCTION PANEL: CPT

## 2021-01-01 PROCEDURE — 99223 1ST HOSP IP/OBS HIGH 75: CPT | Mod: ,,, | Performed by: CLINICAL NURSE SPECIALIST

## 2021-01-01 PROCEDURE — 82550 ASSAY OF CK (CPK): CPT | Performed by: STUDENT IN AN ORGANIZED HEALTH CARE EDUCATION/TRAINING PROGRAM

## 2021-01-01 PROCEDURE — 96375 TX/PRO/DX INJ NEW DRUG ADDON: CPT | Performed by: EMERGENCY MEDICINE

## 2021-01-01 PROCEDURE — 80053 COMPREHEN METABOLIC PANEL: CPT | Mod: 91 | Performed by: STUDENT IN AN ORGANIZED HEALTH CARE EDUCATION/TRAINING PROGRAM

## 2021-01-01 PROCEDURE — 99223 PR INITIAL HOSPITAL CARE,LEVL III: ICD-10-PCS | Mod: AI,CR,, | Performed by: INTERNAL MEDICINE

## 2021-01-01 PROCEDURE — 80053 COMPREHEN METABOLIC PANEL: CPT | Performed by: PHYSICIAN ASSISTANT

## 2021-01-01 PROCEDURE — 97162 PT EVAL MOD COMPLEX 30 MIN: CPT

## 2021-01-01 PROCEDURE — 93010 EKG 12-LEAD: ICD-10-PCS | Mod: S$PBB,,, | Performed by: INTERNAL MEDICINE

## 2021-01-01 PROCEDURE — 83880 ASSAY OF NATRIURETIC PEPTIDE: CPT | Performed by: STUDENT IN AN ORGANIZED HEALTH CARE EDUCATION/TRAINING PROGRAM

## 2021-01-01 PROCEDURE — 83036 HEMOGLOBIN GLYCOSYLATED A1C: CPT | Performed by: FAMILY MEDICINE

## 2021-01-01 PROCEDURE — 99285 EMERGENCY DEPT VISIT HI MDM: CPT | Mod: CR,CS,, | Performed by: PHYSICIAN ASSISTANT

## 2021-01-01 PROCEDURE — 99291 CRITICAL CARE FIRST HOUR: CPT | Mod: CR,,, | Performed by: EMERGENCY MEDICINE

## 2021-01-01 PROCEDURE — 83036 HEMOGLOBIN GLYCOSYLATED A1C: CPT | Performed by: PHYSICIAN ASSISTANT

## 2021-01-01 PROCEDURE — 99220 PR INITIAL OBSERVATION CARE,LEVL III: ICD-10-PCS | Mod: ,,, | Performed by: PHYSICIAN ASSISTANT

## 2021-01-01 PROCEDURE — 85652 RBC SED RATE AUTOMATED: CPT | Performed by: STUDENT IN AN ORGANIZED HEALTH CARE EDUCATION/TRAINING PROGRAM

## 2021-01-01 PROCEDURE — 87040 BLOOD CULTURE FOR BACTERIA: CPT | Performed by: PHYSICIAN ASSISTANT

## 2021-01-01 PROCEDURE — 96375 TX/PRO/DX INJ NEW DRUG ADDON: CPT

## 2021-01-01 PROCEDURE — 99285 EMERGENCY DEPT VISIT HI MDM: CPT | Mod: ,,, | Performed by: EMERGENCY MEDICINE

## 2021-01-01 PROCEDURE — 99226 PR SUBSEQUENT OBSERVATION CARE,LEVEL III: ICD-10-PCS | Mod: ,,, | Performed by: PHYSICIAN ASSISTANT

## 2021-01-01 PROCEDURE — 99239 PR HOSPITAL DISCHARGE DAY,>30 MIN: ICD-10-PCS | Mod: CR,,, | Performed by: HOSPITALIST

## 2021-01-01 PROCEDURE — 96365 THER/PROPH/DIAG IV INF INIT: CPT

## 2021-01-01 PROCEDURE — 99999 PR PBB SHADOW E&M-EST. PATIENT-LVL IV: CPT | Mod: PBBFAC,,, | Performed by: FAMILY MEDICINE

## 2021-01-01 PROCEDURE — 99215 PR OFFICE/OUTPT VISIT, EST, LEVL V, 40-54 MIN: ICD-10-PCS | Mod: S$PBB,,, | Performed by: INTERNAL MEDICINE

## 2021-01-01 PROCEDURE — 99999 PR PBB SHADOW E&M-EST. PATIENT-LVL V: CPT | Mod: PBBFAC,,, | Performed by: INTERNAL MEDICINE

## 2021-01-01 PROCEDURE — 86140 C-REACTIVE PROTEIN: CPT | Performed by: PHYSICIAN ASSISTANT

## 2021-01-01 PROCEDURE — 84100 ASSAY OF PHOSPHORUS: CPT

## 2021-01-01 PROCEDURE — 82570 ASSAY OF URINE CREATININE: CPT | Performed by: STUDENT IN AN ORGANIZED HEALTH CARE EDUCATION/TRAINING PROGRAM

## 2021-01-01 PROCEDURE — 96374 THER/PROPH/DIAG INJ IV PUSH: CPT | Performed by: EMERGENCY MEDICINE

## 2021-01-01 PROCEDURE — 97165 OT EVAL LOW COMPLEX 30 MIN: CPT

## 2021-01-01 PROCEDURE — 97535 SELF CARE MNGMENT TRAINING: CPT

## 2021-01-01 PROCEDURE — 25000242 PHARM REV CODE 250 ALT 637 W/ HCPCS: Performed by: HOSPITALIST

## 2021-01-01 PROCEDURE — 99215 OFFICE O/P EST HI 40 MIN: CPT | Mod: S$PBB,,, | Performed by: INTERNAL MEDICINE

## 2021-01-01 PROCEDURE — 84540 ASSAY OF URINE/UREA-N: CPT | Performed by: STUDENT IN AN ORGANIZED HEALTH CARE EDUCATION/TRAINING PROGRAM

## 2021-01-01 PROCEDURE — 96360 HYDRATION IV INFUSION INIT: CPT

## 2021-01-01 PROCEDURE — 99239 HOSP IP/OBS DSCHRG MGMT >30: CPT | Mod: CR,,, | Performed by: HOSPITALIST

## 2021-01-01 PROCEDURE — 0001A HC IMMUNIZ ADMIN, SARS-COV-2 COVID-19 VACC, 30MCG/0.3ML, 1ST DOSE: CPT | Performed by: STUDENT IN AN ORGANIZED HEALTH CARE EDUCATION/TRAINING PROGRAM

## 2021-01-01 PROCEDURE — 99397 PR PREVENTIVE VISIT,EST,65 & OVER: ICD-10-PCS | Mod: S$PBB,,, | Performed by: FAMILY MEDICINE

## 2021-01-01 PROCEDURE — 82550 ASSAY OF CK (CPK): CPT | Performed by: PHYSICIAN ASSISTANT

## 2021-01-01 RX ORDER — CARVEDILOL 12.5 MG/1
12.5 TABLET ORAL 2 TIMES DAILY
Status: DISCONTINUED | OUTPATIENT
Start: 2021-01-01 | End: 2021-01-01 | Stop reason: HOSPADM

## 2021-01-01 RX ORDER — LEVOTHYROXINE SODIUM 25 UG/1
25 TABLET ORAL
Status: DISCONTINUED | OUTPATIENT
Start: 2021-01-01 | End: 2021-01-01

## 2021-01-01 RX ORDER — FERROUS SULFATE 325(65) MG
325 TABLET, DELAYED RELEASE (ENTERIC COATED) ORAL DAILY
Status: DISCONTINUED | OUTPATIENT
Start: 2021-01-01 | End: 2021-01-01 | Stop reason: HOSPADM

## 2021-01-01 RX ORDER — HYDRALAZINE HYDROCHLORIDE 25 MG/1
25 TABLET, FILM COATED ORAL EVERY 8 HOURS PRN
Status: DISCONTINUED | OUTPATIENT
Start: 2021-01-01 | End: 2021-01-01 | Stop reason: HOSPADM

## 2021-01-01 RX ORDER — METHYLPREDNISOLONE SOD SUCC 125 MG
125 VIAL (EA) INJECTION
Status: COMPLETED | OUTPATIENT
Start: 2021-01-01 | End: 2021-01-01

## 2021-01-01 RX ORDER — ENOXAPARIN SODIUM 100 MG/ML
1 INJECTION SUBCUTANEOUS 2 TIMES DAILY
Status: DISCONTINUED | OUTPATIENT
Start: 2021-01-01 | End: 2021-01-01

## 2021-01-01 RX ORDER — ISOSORBIDE MONONITRATE 60 MG/1
60 TABLET, EXTENDED RELEASE ORAL DAILY
Status: DISCONTINUED | OUTPATIENT
Start: 2021-01-01 | End: 2021-01-01

## 2021-01-01 RX ORDER — AMLODIPINE BESYLATE 10 MG/1
10 TABLET ORAL DAILY
Qty: 30 TABLET | Refills: 5 | OUTPATIENT
Start: 2021-01-01 | End: 2022-02-10

## 2021-01-01 RX ORDER — ACETAMINOPHEN 500 MG
1000 TABLET ORAL 3 TIMES DAILY PRN
Status: DISCONTINUED | OUTPATIENT
Start: 2021-01-01 | End: 2021-01-01 | Stop reason: HOSPADM

## 2021-01-01 RX ORDER — CEFTRIAXONE 1 G/1
1 INJECTION, POWDER, FOR SOLUTION INTRAMUSCULAR; INTRAVENOUS
Status: COMPLETED | OUTPATIENT
Start: 2021-01-01 | End: 2021-01-01

## 2021-01-01 RX ORDER — SODIUM CHLORIDE 0.9 % (FLUSH) 0.9 %
10 SYRINGE (ML) INJECTION
Status: DISCONTINUED | OUTPATIENT
Start: 2021-01-01 | End: 2021-01-01 | Stop reason: HOSPADM

## 2021-01-01 RX ORDER — MUPIROCIN 20 MG/G
OINTMENT TOPICAL 2 TIMES DAILY
Status: DISPENSED | OUTPATIENT
Start: 2021-01-01 | End: 2021-01-01

## 2021-01-01 RX ORDER — PROCHLORPERAZINE EDISYLATE 5 MG/ML
5 INJECTION INTRAMUSCULAR; INTRAVENOUS EVERY 6 HOURS PRN
Status: DISCONTINUED | OUTPATIENT
Start: 2021-01-01 | End: 2021-01-01 | Stop reason: HOSPADM

## 2021-01-01 RX ORDER — HYDRALAZINE HYDROCHLORIDE 25 MG/1
25 TABLET, FILM COATED ORAL ONCE
Status: COMPLETED | OUTPATIENT
Start: 2021-01-01 | End: 2021-01-01

## 2021-01-01 RX ORDER — ACETAMINOPHEN 325 MG/1
650 TABLET ORAL EVERY 8 HOURS PRN
Status: DISCONTINUED | OUTPATIENT
Start: 2021-01-01 | End: 2021-01-01

## 2021-01-01 RX ORDER — ATORVASTATIN CALCIUM 20 MG/1
20 TABLET, FILM COATED ORAL NIGHTLY
Status: DISCONTINUED | OUTPATIENT
Start: 2021-01-01 | End: 2021-01-01 | Stop reason: HOSPADM

## 2021-01-01 RX ORDER — QUETIAPINE FUMARATE 25 MG/1
25 TABLET, FILM COATED ORAL NIGHTLY
Status: DISCONTINUED | OUTPATIENT
Start: 2021-01-01 | End: 2021-01-01

## 2021-01-01 RX ORDER — GLUCAGON 1 MG
1 KIT INJECTION
Status: DISCONTINUED | OUTPATIENT
Start: 2021-01-01 | End: 2021-01-01 | Stop reason: HOSPADM

## 2021-01-01 RX ORDER — ASCORBIC ACID 500 MG
500 TABLET ORAL 2 TIMES DAILY
Status: DISCONTINUED | OUTPATIENT
Start: 2021-01-01 | End: 2021-01-01 | Stop reason: HOSPADM

## 2021-01-01 RX ORDER — SODIUM CHLORIDE, SODIUM LACTATE, POTASSIUM CHLORIDE, CALCIUM CHLORIDE 600; 310; 30; 20 MG/100ML; MG/100ML; MG/100ML; MG/100ML
INJECTION, SOLUTION INTRAVENOUS CONTINUOUS
Status: ACTIVE | OUTPATIENT
Start: 2021-01-01 | End: 2021-01-01

## 2021-01-01 RX ORDER — ASCORBIC ACID 500 MG
500 TABLET ORAL 2 TIMES DAILY
Qty: 30 TABLET | Refills: 0
Start: 2021-01-01

## 2021-01-01 RX ORDER — ISOSORBIDE MONONITRATE 60 MG/1
120 TABLET, EXTENDED RELEASE ORAL DAILY
Status: DISCONTINUED | OUTPATIENT
Start: 2021-01-01 | End: 2021-01-01 | Stop reason: HOSPADM

## 2021-01-01 RX ORDER — AMOXICILLIN 250 MG
1 CAPSULE ORAL 2 TIMES DAILY
Status: DISCONTINUED | OUTPATIENT
Start: 2021-01-01 | End: 2021-01-01 | Stop reason: HOSPADM

## 2021-01-01 RX ORDER — IBUPROFEN 200 MG
16 TABLET ORAL
Status: DISCONTINUED | OUTPATIENT
Start: 2021-01-01 | End: 2021-01-01 | Stop reason: HOSPADM

## 2021-01-01 RX ORDER — CHOLECALCIFEROL (VITAMIN D3) 25 MCG
1000 TABLET ORAL DAILY
Status: DISCONTINUED | OUTPATIENT
Start: 2021-01-01 | End: 2021-01-01 | Stop reason: HOSPADM

## 2021-01-01 RX ORDER — ALBUTEROL SULFATE 90 UG/1
2 AEROSOL, METERED RESPIRATORY (INHALATION) EVERY 6 HOURS PRN
Qty: 8 G | Refills: 0
Start: 2021-01-01 | End: 2021-09-07

## 2021-01-01 RX ORDER — METOPROLOL TARTRATE 1 MG/ML
2.5 INJECTION, SOLUTION INTRAVENOUS EVERY 12 HOURS
Status: DISCONTINUED | OUTPATIENT
Start: 2021-01-01 | End: 2021-01-01

## 2021-01-01 RX ORDER — FUROSEMIDE 20 MG/1
20 TABLET ORAL DAILY
Status: DISCONTINUED | OUTPATIENT
Start: 2021-01-01 | End: 2021-01-01 | Stop reason: HOSPADM

## 2021-01-01 RX ORDER — SODIUM CHLORIDE, SODIUM LACTATE, POTASSIUM CHLORIDE, CALCIUM CHLORIDE 600; 310; 30; 20 MG/100ML; MG/100ML; MG/100ML; MG/100ML
INJECTION, SOLUTION INTRAVENOUS CONTINUOUS
Status: DISCONTINUED | OUTPATIENT
Start: 2021-01-01 | End: 2021-01-01

## 2021-01-01 RX ORDER — QUETIAPINE FUMARATE 25 MG/1
25 TABLET, FILM COATED ORAL NIGHTLY PRN
Status: DISCONTINUED | OUTPATIENT
Start: 2021-01-01 | End: 2021-01-01 | Stop reason: HOSPADM

## 2021-01-01 RX ORDER — ASPIRIN 81 MG/1
81 TABLET ORAL DAILY
Status: DISCONTINUED | OUTPATIENT
Start: 2021-01-01 | End: 2021-01-01

## 2021-01-01 RX ORDER — LEVOTHYROXINE SODIUM 25 UG/1
25 TABLET ORAL
Status: DISCONTINUED | OUTPATIENT
Start: 2021-01-01 | End: 2021-01-01 | Stop reason: HOSPADM

## 2021-01-01 RX ORDER — ACETAMINOPHEN 325 MG/1
650 TABLET ORAL EVERY 8 HOURS PRN
Status: DISCONTINUED | OUTPATIENT
Start: 2021-01-01 | End: 2021-01-01 | Stop reason: HOSPADM

## 2021-01-01 RX ORDER — MORPHINE SULFATE 2 MG/ML
2 INJECTION, SOLUTION INTRAMUSCULAR; INTRAVENOUS EVERY 4 HOURS PRN
Status: DISCONTINUED | OUTPATIENT
Start: 2021-01-01 | End: 2021-01-01 | Stop reason: HOSPADM

## 2021-01-01 RX ORDER — POLYETHYLENE GLYCOL 3350 17 G/17G
17 POWDER, FOR SOLUTION ORAL DAILY
Status: DISCONTINUED | OUTPATIENT
Start: 2021-01-01 | End: 2021-01-01 | Stop reason: HOSPADM

## 2021-01-01 RX ORDER — FAMOTIDINE 20 MG/1
20 TABLET, FILM COATED ORAL NIGHTLY
Status: DISCONTINUED | OUTPATIENT
Start: 2021-01-01 | End: 2021-01-01 | Stop reason: HOSPADM

## 2021-01-01 RX ORDER — IBUPROFEN 200 MG
24 TABLET ORAL
Status: DISCONTINUED | OUTPATIENT
Start: 2021-01-01 | End: 2021-01-01 | Stop reason: HOSPADM

## 2021-01-01 RX ORDER — ALBUTEROL SULFATE 90 UG/1
2 AEROSOL, METERED RESPIRATORY (INHALATION) EVERY 6 HOURS
Status: DISCONTINUED | OUTPATIENT
Start: 2021-01-01 | End: 2021-01-01

## 2021-01-01 RX ORDER — LORAZEPAM 2 MG/ML
0.5 INJECTION INTRAMUSCULAR
Status: DISCONTINUED | OUTPATIENT
Start: 2021-01-01 | End: 2021-01-01 | Stop reason: HOSPADM

## 2021-01-01 RX ORDER — SODIUM,POTASSIUM PHOSPHATES 280-250MG
2 POWDER IN PACKET (EA) ORAL
Status: DISCONTINUED | OUTPATIENT
Start: 2021-01-01 | End: 2021-01-01 | Stop reason: HOSPADM

## 2021-01-01 RX ORDER — ACETAMINOPHEN 325 MG/1
650 TABLET ORAL EVERY 4 HOURS PRN
Status: DISCONTINUED | OUTPATIENT
Start: 2021-01-01 | End: 2021-01-01 | Stop reason: HOSPADM

## 2021-01-01 RX ORDER — GABAPENTIN 300 MG/1
300 CAPSULE ORAL NIGHTLY
Status: DISCONTINUED | OUTPATIENT
Start: 2021-01-01 | End: 2021-01-01 | Stop reason: HOSPADM

## 2021-01-01 RX ORDER — NAPROXEN SODIUM 220 MG/1
81 TABLET, FILM COATED ORAL DAILY
Status: DISCONTINUED | OUTPATIENT
Start: 2021-01-01 | End: 2021-01-01

## 2021-01-01 RX ORDER — VALSARTAN 80 MG/1
160 TABLET ORAL DAILY
Status: DISCONTINUED | OUTPATIENT
Start: 2021-01-01 | End: 2021-01-01 | Stop reason: HOSPADM

## 2021-01-01 RX ORDER — NAPROXEN SODIUM 220 MG/1
81 TABLET, FILM COATED ORAL DAILY
Status: DISCONTINUED | OUTPATIENT
Start: 2021-01-01 | End: 2021-01-01 | Stop reason: HOSPADM

## 2021-01-01 RX ORDER — BENZONATATE 100 MG/1
100 CAPSULE ORAL 3 TIMES DAILY PRN
Qty: 30 CAPSULE | Refills: 0
Start: 2021-01-01 | End: 2021-01-01

## 2021-01-01 RX ORDER — CONJUGATED ESTROGENS 0.62 MG/G
CREAM VAGINAL
Qty: 30 G | Refills: 12 | Status: SHIPPED | OUTPATIENT
Start: 2021-01-01 | End: 2022-08-16

## 2021-01-01 RX ORDER — SERTRALINE HYDROCHLORIDE 100 MG/1
100 TABLET, FILM COATED ORAL DAILY
Status: DISCONTINUED | OUTPATIENT
Start: 2021-01-01 | End: 2021-01-01 | Stop reason: HOSPADM

## 2021-01-01 RX ORDER — IRBESARTAN 300 MG/1
TABLET ORAL
Qty: 90 TABLET | Refills: 3 | Status: SHIPPED | OUTPATIENT
Start: 2021-01-01

## 2021-01-01 RX ORDER — SERTRALINE HYDROCHLORIDE 100 MG/1
100 TABLET, FILM COATED ORAL DAILY
Status: DISCONTINUED | OUTPATIENT
Start: 2021-01-01 | End: 2021-01-01

## 2021-01-01 RX ORDER — RISPERIDONE 1 MG/1
1 TABLET, ORALLY DISINTEGRATING ORAL 4 TIMES DAILY PRN
Status: DISCONTINUED | OUTPATIENT
Start: 2021-01-01 | End: 2021-01-01 | Stop reason: HOSPADM

## 2021-01-01 RX ORDER — IPRATROPIUM BROMIDE AND ALBUTEROL SULFATE 2.5; .5 MG/3ML; MG/3ML
3 SOLUTION RESPIRATORY (INHALATION) EVERY 4 HOURS PRN
Status: DISCONTINUED | OUTPATIENT
Start: 2021-01-01 | End: 2021-01-01 | Stop reason: HOSPADM

## 2021-01-01 RX ORDER — MORPHINE SULFATE 4 MG/ML
4 INJECTION, SOLUTION INTRAMUSCULAR; INTRAVENOUS EVERY 4 HOURS PRN
Status: DISCONTINUED | OUTPATIENT
Start: 2021-01-01 | End: 2021-01-01 | Stop reason: HOSPADM

## 2021-01-01 RX ORDER — TALC
6 POWDER (GRAM) TOPICAL NIGHTLY PRN
Status: DISCONTINUED | OUTPATIENT
Start: 2021-01-01 | End: 2021-01-01 | Stop reason: HOSPADM

## 2021-01-01 RX ORDER — GABAPENTIN 300 MG/1
300 CAPSULE ORAL NIGHTLY
Status: DISCONTINUED | OUTPATIENT
Start: 2021-01-01 | End: 2021-01-01

## 2021-01-01 RX ORDER — ONDANSETRON 8 MG/1
8 TABLET, ORALLY DISINTEGRATING ORAL EVERY 8 HOURS PRN
Status: DISCONTINUED | OUTPATIENT
Start: 2021-01-01 | End: 2021-01-01 | Stop reason: HOSPADM

## 2021-01-01 RX ORDER — SODIUM CHLORIDE 0.9 % (FLUSH) 0.9 %
5 SYRINGE (ML) INJECTION
Status: DISCONTINUED | OUTPATIENT
Start: 2021-01-01 | End: 2021-01-01 | Stop reason: HOSPADM

## 2021-01-01 RX ORDER — ASCORBIC ACID 250 MG
250 TABLET ORAL DAILY
Status: DISCONTINUED | OUTPATIENT
Start: 2021-01-01 | End: 2021-01-01

## 2021-01-01 RX ORDER — QUETIAPINE FUMARATE 25 MG/1
25 TABLET, FILM COATED ORAL 2 TIMES DAILY PRN
Status: DISCONTINUED | OUTPATIENT
Start: 2021-01-01 | End: 2021-01-01 | Stop reason: HOSPADM

## 2021-01-01 RX ORDER — FERROUS SULFATE 325(65) MG
325 TABLET, DELAYED RELEASE (ENTERIC COATED) ORAL 2 TIMES DAILY
Status: DISCONTINUED | OUTPATIENT
Start: 2021-01-01 | End: 2021-01-01 | Stop reason: HOSPADM

## 2021-01-01 RX ORDER — ENOXAPARIN SODIUM 100 MG/ML
1 INJECTION SUBCUTANEOUS
Status: DISCONTINUED | OUTPATIENT
Start: 2021-01-01 | End: 2021-01-01

## 2021-01-01 RX ORDER — ENOXAPARIN SODIUM 100 MG/ML
1 INJECTION SUBCUTANEOUS
Status: DISCONTINUED | OUTPATIENT
Start: 2021-01-01 | End: 2021-01-01 | Stop reason: HOSPADM

## 2021-01-01 RX ORDER — NIFEDIPINE 30 MG/1
60 TABLET, EXTENDED RELEASE ORAL DAILY
Status: DISCONTINUED | OUTPATIENT
Start: 2021-01-01 | End: 2021-01-01

## 2021-01-01 RX ORDER — LOPERAMIDE HYDROCHLORIDE 2 MG/1
2 CAPSULE ORAL EVERY 6 HOURS PRN
Status: DISCONTINUED | OUTPATIENT
Start: 2021-01-01 | End: 2021-01-01 | Stop reason: HOSPADM

## 2021-01-01 RX ORDER — AMLODIPINE BESYLATE 10 MG/1
10 TABLET ORAL DAILY
Status: DISCONTINUED | OUTPATIENT
Start: 2021-01-01 | End: 2021-01-01 | Stop reason: HOSPADM

## 2021-01-01 RX ORDER — METOPROLOL TARTRATE 1 MG/ML
INJECTION, SOLUTION INTRAVENOUS
Status: COMPLETED
Start: 2021-01-01 | End: 2021-01-01

## 2021-01-01 RX ORDER — ACETAMINOPHEN 325 MG/1
650 TABLET ORAL EVERY 4 HOURS PRN
Status: DISCONTINUED | OUTPATIENT
Start: 2021-01-01 | End: 2021-01-01

## 2021-01-01 RX ORDER — ENOXAPARIN SODIUM 100 MG/ML
1 INJECTION SUBCUTANEOUS DAILY
Status: DISCONTINUED | OUTPATIENT
Start: 2021-01-01 | End: 2021-01-01

## 2021-01-01 RX ORDER — ISOSORBIDE MONONITRATE 120 MG/1
120 TABLET, EXTENDED RELEASE ORAL DAILY
Qty: 90 TABLET | Refills: 3 | Status: SHIPPED | OUTPATIENT
Start: 2021-01-01

## 2021-01-01 RX ORDER — PANTOPRAZOLE SODIUM 40 MG/1
40 TABLET, DELAYED RELEASE ORAL DAILY
Status: DISCONTINUED | OUTPATIENT
Start: 2021-01-01 | End: 2021-01-01 | Stop reason: HOSPADM

## 2021-01-01 RX ORDER — AMLODIPINE BESYLATE 10 MG/1
10 TABLET ORAL DAILY
Status: DISCONTINUED | OUTPATIENT
Start: 2021-01-01 | End: 2021-01-01

## 2021-01-01 RX ORDER — PANTOPRAZOLE SODIUM 40 MG/1
40 TABLET, DELAYED RELEASE ORAL DAILY
Status: DISCONTINUED | OUTPATIENT
Start: 2021-01-01 | End: 2021-01-01

## 2021-01-01 RX ORDER — ATORVASTATIN CALCIUM 20 MG/1
40 TABLET, FILM COATED ORAL NIGHTLY
Status: DISCONTINUED | OUTPATIENT
Start: 2021-01-01 | End: 2021-01-01

## 2021-01-01 RX ORDER — METOPROLOL SUCCINATE 25 MG/1
25 TABLET, EXTENDED RELEASE ORAL DAILY
Status: DISCONTINUED | OUTPATIENT
Start: 2021-01-01 | End: 2021-01-01

## 2021-01-01 RX ORDER — IPRATROPIUM BROMIDE AND ALBUTEROL SULFATE 2.5; .5 MG/3ML; MG/3ML
3 SOLUTION RESPIRATORY (INHALATION)
Status: DISCONTINUED | OUTPATIENT
Start: 2021-01-01 | End: 2021-01-01

## 2021-01-01 RX ORDER — ISOSORBIDE MONONITRATE 30 MG/1
120 TABLET, EXTENDED RELEASE ORAL DAILY
Status: DISCONTINUED | OUTPATIENT
Start: 2021-01-01 | End: 2021-01-01

## 2021-01-01 RX ORDER — PANTOPRAZOLE SODIUM 40 MG/1
TABLET, DELAYED RELEASE ORAL
Qty: 30 TABLET | Refills: 11 | OUTPATIENT
Start: 2021-01-01

## 2021-01-01 RX ORDER — CEFTRIAXONE 1 G/1
1 INJECTION, POWDER, FOR SOLUTION INTRAMUSCULAR; INTRAVENOUS
Status: DISCONTINUED | OUTPATIENT
Start: 2021-01-01 | End: 2021-01-01

## 2021-01-01 RX ORDER — ALBUTEROL SULFATE 90 UG/1
2 AEROSOL, METERED RESPIRATORY (INHALATION) EVERY 6 HOURS PRN
Status: DISCONTINUED | OUTPATIENT
Start: 2021-01-01 | End: 2021-01-01 | Stop reason: HOSPADM

## 2021-01-01 RX ORDER — LANOLIN ALCOHOL/MO/W.PET/CERES
800 CREAM (GRAM) TOPICAL
Status: DISCONTINUED | OUTPATIENT
Start: 2021-01-01 | End: 2021-01-01 | Stop reason: HOSPADM

## 2021-01-01 RX ORDER — PANTOPRAZOLE SODIUM 40 MG/1
40 TABLET, DELAYED RELEASE ORAL DAILY
Qty: 90 TABLET | Refills: 3 | Status: SHIPPED | OUTPATIENT
Start: 2021-01-01

## 2021-01-01 RX ORDER — RISPERIDONE 1 MG/1
1 TABLET, ORALLY DISINTEGRATING ORAL 2 TIMES DAILY PRN
Status: DISCONTINUED | OUTPATIENT
Start: 2021-01-01 | End: 2021-01-01

## 2021-01-01 RX ORDER — RISPERIDONE 1 MG/1
2 TABLET, ORALLY DISINTEGRATING ORAL ONCE
Status: COMPLETED | OUTPATIENT
Start: 2021-01-01 | End: 2021-01-01

## 2021-01-01 RX ORDER — ASPIRIN 325 MG
TABLET ORAL
Status: DISCONTINUED
Start: 2021-01-01 | End: 2021-01-01 | Stop reason: WASHOUT

## 2021-01-01 RX ORDER — TALC
6 POWDER (GRAM) TOPICAL NIGHTLY PRN
Status: CANCELLED | OUTPATIENT
Start: 2021-01-01

## 2021-01-01 RX ORDER — TALC
9 POWDER (GRAM) TOPICAL NIGHTLY PRN
Status: DISCONTINUED | OUTPATIENT
Start: 2021-01-01 | End: 2021-01-01 | Stop reason: HOSPADM

## 2021-01-01 RX ORDER — FERROUS SULFATE, DRIED 160(50) MG
1 TABLET, EXTENDED RELEASE ORAL 2 TIMES DAILY
Status: DISCONTINUED | OUTPATIENT
Start: 2021-01-01 | End: 2021-01-01

## 2021-01-01 RX ORDER — GUAIFENESIN/DEXTROMETHORPHAN 100-10MG/5
10 SYRUP ORAL EVERY 4 HOURS PRN
Status: DISCONTINUED | OUTPATIENT
Start: 2021-01-01 | End: 2021-01-01 | Stop reason: HOSPADM

## 2021-01-01 RX ORDER — LABETALOL HCL 20 MG/4 ML
10 SYRINGE (ML) INTRAVENOUS EVERY 4 HOURS PRN
Status: DISCONTINUED | OUTPATIENT
Start: 2021-01-01 | End: 2021-01-01 | Stop reason: HOSPADM

## 2021-01-01 RX ORDER — HYDROCODONE BITARTRATE AND ACETAMINOPHEN 5; 325 MG/1; MG/1
1 TABLET ORAL EVERY 6 HOURS PRN
Status: DISCONTINUED | OUTPATIENT
Start: 2021-01-01 | End: 2021-01-01 | Stop reason: HOSPADM

## 2021-01-01 RX ORDER — NIFEDIPINE 30 MG/1
30 TABLET, EXTENDED RELEASE ORAL DAILY
Status: DISCONTINUED | OUTPATIENT
Start: 2021-01-01 | End: 2021-01-01

## 2021-01-01 RX ORDER — ASCORBIC ACID 500 MG
500 TABLET ORAL 2 TIMES DAILY
Status: DISCONTINUED | OUTPATIENT
Start: 2021-01-01 | End: 2021-01-01

## 2021-01-01 RX ORDER — CARVEDILOL 12.5 MG/1
12.5 TABLET ORAL 2 TIMES DAILY
Status: DISCONTINUED | OUTPATIENT
Start: 2021-01-01 | End: 2021-01-01

## 2021-01-01 RX ORDER — VANCOMYCIN HCL IN 5 % DEXTROSE 1G/250ML
20 PLASTIC BAG, INJECTION (ML) INTRAVENOUS ONCE
Status: COMPLETED | OUTPATIENT
Start: 2021-01-01 | End: 2021-01-01

## 2021-01-01 RX ORDER — NITROGLYCERIN 0.4 MG/1
0.4 TABLET SUBLINGUAL EVERY 5 MIN PRN
Status: DISCONTINUED | OUTPATIENT
Start: 2021-01-01 | End: 2021-01-01 | Stop reason: HOSPADM

## 2021-01-01 RX ORDER — FUROSEMIDE 20 MG/1
20 TABLET ORAL DAILY
Status: DISCONTINUED | OUTPATIENT
Start: 2021-01-01 | End: 2021-01-01

## 2021-01-01 RX ORDER — NITROGLYCERIN 0.4 MG/1
TABLET SUBLINGUAL
Status: DISCONTINUED
Start: 2021-01-01 | End: 2021-01-01 | Stop reason: WASHOUT

## 2021-01-01 RX ORDER — AMLODIPINE BESYLATE 10 MG/1
10 TABLET ORAL DAILY
Qty: 30 TABLET | Refills: 11 | Status: ON HOLD | OUTPATIENT
Start: 2021-01-01 | End: 2021-01-01 | Stop reason: HOSPADM

## 2021-01-01 RX ORDER — BISACODYL 10 MG
10 SUPPOSITORY, RECTAL RECTAL DAILY PRN
Status: DISCONTINUED | OUTPATIENT
Start: 2021-01-01 | End: 2021-01-01 | Stop reason: HOSPADM

## 2021-01-01 RX ORDER — BISACODYL 5 MG
5 TABLET, DELAYED RELEASE (ENTERIC COATED) ORAL DAILY PRN
Status: DISCONTINUED | OUTPATIENT
Start: 2021-01-01 | End: 2021-01-01 | Stop reason: HOSPADM

## 2021-01-01 RX ORDER — HYDRALAZINE HYDROCHLORIDE 20 MG/ML
2 INJECTION INTRAMUSCULAR; INTRAVENOUS EVERY 4 HOURS PRN
Status: DISCONTINUED | OUTPATIENT
Start: 2021-01-01 | End: 2021-01-01 | Stop reason: HOSPADM

## 2021-01-01 RX ORDER — GABAPENTIN 300 MG/1
CAPSULE ORAL
Qty: 30 CAPSULE | Refills: 11 | Status: SHIPPED | OUTPATIENT
Start: 2021-01-01

## 2021-01-01 RX ORDER — NIFEDIPINE 60 MG/1
60 TABLET, EXTENDED RELEASE ORAL DAILY
Status: DISCONTINUED | OUTPATIENT
Start: 2021-01-01 | End: 2021-01-01 | Stop reason: HOSPADM

## 2021-01-01 RX ORDER — ACETAMINOPHEN 500 MG
1000 TABLET ORAL
Status: COMPLETED | OUTPATIENT
Start: 2021-01-01 | End: 2021-01-01

## 2021-01-01 RX ORDER — BENZONATATE 100 MG/1
100 CAPSULE ORAL 3 TIMES DAILY PRN
Status: DISCONTINUED | OUTPATIENT
Start: 2021-01-01 | End: 2021-01-01 | Stop reason: HOSPADM

## 2021-01-01 RX ORDER — POTASSIUM CHLORIDE 20 MEQ/1
40 TABLET, EXTENDED RELEASE ORAL
Status: COMPLETED | OUTPATIENT
Start: 2021-01-01 | End: 2021-01-01

## 2021-01-01 RX ORDER — AMOXICILLIN 250 MG
1 CAPSULE ORAL 2 TIMES DAILY PRN
Status: DISCONTINUED | OUTPATIENT
Start: 2021-01-01 | End: 2021-01-01 | Stop reason: HOSPADM

## 2021-01-01 RX ORDER — SODIUM CHLORIDE 0.9 % (FLUSH) 0.9 %
10 SYRINGE (ML) INJECTION
Status: CANCELLED | OUTPATIENT
Start: 2021-01-01

## 2021-01-01 RX ORDER — ACETAMINOPHEN 500 MG
1000 TABLET ORAL EVERY 8 HOURS PRN
Status: DISCONTINUED | OUTPATIENT
Start: 2021-01-01 | End: 2021-01-01 | Stop reason: HOSPADM

## 2021-01-01 RX ORDER — FUROSEMIDE 20 MG/1
20 TABLET ORAL DAILY
Qty: 60 TABLET | Refills: 11 | Status: SHIPPED | OUTPATIENT
Start: 2021-01-01

## 2021-01-01 RX ORDER — OXYCODONE HYDROCHLORIDE 5 MG/1
5 TABLET ORAL EVERY 6 HOURS PRN
Status: DISCONTINUED | OUTPATIENT
Start: 2021-01-01 | End: 2021-01-01

## 2021-01-01 RX ORDER — DIPHENHYDRAMINE HYDROCHLORIDE 50 MG/ML
25 INJECTION INTRAMUSCULAR; INTRAVENOUS
Status: COMPLETED | OUTPATIENT
Start: 2021-01-01 | End: 2021-01-01

## 2021-01-01 RX ORDER — LEVOTHYROXINE SODIUM 40 UG/ML
25 INJECTION, SOLUTION INTRAVENOUS EVERY OTHER DAY
Status: DISCONTINUED | OUTPATIENT
Start: 2021-01-01 | End: 2021-01-01

## 2021-01-01 RX ORDER — LOSARTAN POTASSIUM 50 MG/1
100 TABLET ORAL DAILY
Refills: 3 | Status: DISCONTINUED | OUTPATIENT
Start: 2021-01-01 | End: 2021-01-01 | Stop reason: HOSPADM

## 2021-01-01 RX ORDER — CHOLECALCIFEROL (VITAMIN D3) 25 MCG
1000 TABLET ORAL DAILY
Status: DISCONTINUED | OUTPATIENT
Start: 2021-01-01 | End: 2021-01-01

## 2021-01-01 RX ORDER — ATORVASTATIN CALCIUM 40 MG/1
40 TABLET, FILM COATED ORAL NIGHTLY
Status: DISCONTINUED | OUTPATIENT
Start: 2021-01-01 | End: 2021-01-01 | Stop reason: HOSPADM

## 2021-01-01 RX ORDER — NIFEDIPINE 60 MG/1
60 TABLET, EXTENDED RELEASE ORAL DAILY
Qty: 30 TABLET | Refills: 11 | Status: SHIPPED | OUTPATIENT
Start: 2021-01-01 | End: 2022-08-25

## 2021-01-01 RX ORDER — CLOPIDOGREL 300 MG/1
TABLET, FILM COATED ORAL
Status: DISPENSED
Start: 2021-01-01 | End: 2021-01-01

## 2021-01-01 RX ORDER — CARVEDILOL 12.5 MG/1
12.5 TABLET ORAL 2 TIMES DAILY
Qty: 60 TABLET | Refills: 11 | Status: SHIPPED | OUTPATIENT
Start: 2021-01-01 | End: 2022-06-23

## 2021-01-01 RX ORDER — CARVEDILOL 25 MG/1
25 TABLET ORAL 2 TIMES DAILY
Status: DISCONTINUED | OUTPATIENT
Start: 2021-01-01 | End: 2021-01-01

## 2021-01-01 RX ORDER — ONDANSETRON 4 MG/1
4 TABLET, ORALLY DISINTEGRATING ORAL EVERY 8 HOURS PRN
Status: DISCONTINUED | OUTPATIENT
Start: 2021-01-01 | End: 2021-01-01 | Stop reason: HOSPADM

## 2021-01-01 RX ORDER — LOSARTAN POTASSIUM 50 MG/1
100 TABLET ORAL DAILY
Status: DISCONTINUED | OUTPATIENT
Start: 2021-01-01 | End: 2021-01-01 | Stop reason: HOSPADM

## 2021-01-01 RX ORDER — CEFTRIAXONE 1 G/1
1 INJECTION, POWDER, FOR SOLUTION INTRAMUSCULAR; INTRAVENOUS
Status: DISCONTINUED | OUTPATIENT
Start: 2021-01-01 | End: 2021-01-01 | Stop reason: HOSPADM

## 2021-01-01 RX ORDER — VANCOMYCIN HCL IN 5 % DEXTROSE 1G/250ML
1000 PLASTIC BAG, INJECTION (ML) INTRAVENOUS ONCE
Status: DISCONTINUED | OUTPATIENT
Start: 2021-01-01 | End: 2021-01-01

## 2021-01-01 RX ORDER — NIFEDIPINE 30 MG/1
90 TABLET, EXTENDED RELEASE ORAL DAILY
Status: DISCONTINUED | OUTPATIENT
Start: 2021-01-01 | End: 2021-01-01 | Stop reason: HOSPADM

## 2021-01-01 RX ORDER — VALSARTAN 80 MG/1
160 TABLET ORAL DAILY
Refills: 3 | Status: DISCONTINUED | OUTPATIENT
Start: 2021-01-01 | End: 2021-01-01

## 2021-01-01 RX ORDER — AMLODIPINE BESYLATE 10 MG/1
10 TABLET ORAL ONCE
Status: COMPLETED | OUTPATIENT
Start: 2021-01-01 | End: 2021-01-01

## 2021-01-01 RX ADMIN — DIPHENHYDRAMINE HYDROCHLORIDE 25 MG: 50 INJECTION, SOLUTION INTRAMUSCULAR; INTRAVENOUS at 03:08

## 2021-01-01 RX ADMIN — SERTRALINE HYDROCHLORIDE 100 MG: 100 TABLET ORAL at 08:08

## 2021-01-01 RX ADMIN — SODIUM CHLORIDE, SODIUM LACTATE, POTASSIUM CHLORIDE, AND CALCIUM CHLORIDE: .6; .31; .03; .02 INJECTION, SOLUTION INTRAVENOUS at 09:08

## 2021-01-01 RX ADMIN — VANCOMYCIN HYDROCHLORIDE 750 MG: 750 INJECTION, POWDER, LYOPHILIZED, FOR SOLUTION INTRAVENOUS at 11:09

## 2021-01-01 RX ADMIN — ASPIRIN 81 MG CHEWABLE TABLET 81 MG: 81 TABLET CHEWABLE at 08:08

## 2021-01-01 RX ADMIN — PIPERACILLIN SODIUM AND TAZOBACTAM SODIUM 4.5 G: 4; .5 INJECTION, POWDER, FOR SOLUTION INTRAVENOUS at 01:08

## 2021-01-01 RX ADMIN — REMDESIVIR 100 MG: 100 INJECTION, POWDER, LYOPHILIZED, FOR SOLUTION INTRAVENOUS at 09:08

## 2021-01-01 RX ADMIN — OXYCODONE HYDROCHLORIDE AND ACETAMINOPHEN 500 MG: 500 TABLET ORAL at 08:08

## 2021-01-01 RX ADMIN — ATORVASTATIN CALCIUM 20 MG: 20 TABLET, FILM COATED ORAL at 09:08

## 2021-01-01 RX ADMIN — LOPERAMIDE HYDROCHLORIDE 2 MG: 2 CAPSULE ORAL at 03:08

## 2021-01-01 RX ADMIN — MORPHINE SULFATE 4 MG: 4 INJECTION INTRAVENOUS at 03:09

## 2021-01-01 RX ADMIN — VALSARTAN 160 MG: 80 TABLET, FILM COATED ORAL at 12:06

## 2021-01-01 RX ADMIN — ATORVASTATIN CALCIUM 40 MG: 40 TABLET, FILM COATED ORAL at 03:07

## 2021-01-01 RX ADMIN — CARVEDILOL 12.5 MG: 12.5 TABLET, FILM COATED ORAL at 09:07

## 2021-01-01 RX ADMIN — MORPHINE SULFATE 4 MG: 4 INJECTION INTRAVENOUS at 09:09

## 2021-01-01 RX ADMIN — ISOSORBIDE MONONITRATE 120 MG: 30 TABLET, EXTENDED RELEASE ORAL at 01:08

## 2021-01-01 RX ADMIN — NIFEDIPINE 60 MG: 30 TABLET, FILM COATED, EXTENDED RELEASE ORAL at 09:08

## 2021-01-01 RX ADMIN — CEFTRIAXONE 1 G: 1 INJECTION, POWDER, FOR SOLUTION INTRAMUSCULAR; INTRAVENOUS at 03:08

## 2021-01-01 RX ADMIN — ALBUTEROL SULFATE 2 PUFF: 108 AEROSOL, METERED RESPIRATORY (INHALATION) at 02:08

## 2021-01-01 RX ADMIN — RISPERIDONE 1 MG: 1 TABLET, ORALLY DISINTEGRATING ORAL at 11:09

## 2021-01-01 RX ADMIN — OXYCODONE 5 MG: 5 TABLET ORAL at 01:08

## 2021-01-01 RX ADMIN — FUROSEMIDE 20 MG: 20 TABLET ORAL at 08:08

## 2021-01-01 RX ADMIN — GUAIFENESIN SYRUP AND DEXTROMETHORPHAN 10 ML: 100; 10 SYRUP ORAL at 11:08

## 2021-01-01 RX ADMIN — AZITHROMYCIN MONOHYDRATE 500 MG: 500 INJECTION, POWDER, LYOPHILIZED, FOR SOLUTION INTRAVENOUS at 03:08

## 2021-01-01 RX ADMIN — ALBUTEROL SULFATE 2 PUFF: 108 AEROSOL, METERED RESPIRATORY (INHALATION) at 12:08

## 2021-01-01 RX ADMIN — SERTRALINE HYDROCHLORIDE 100 MG: 100 TABLET ORAL at 09:07

## 2021-01-01 RX ADMIN — ALBUTEROL SULFATE 2 PUFF: 108 INHALANT RESPIRATORY (INHALATION) at 02:08

## 2021-01-01 RX ADMIN — NIFEDIPINE 60 MG: 30 TABLET, FILM COATED, EXTENDED RELEASE ORAL at 08:09

## 2021-01-01 RX ADMIN — LEVOTHYROXINE SODIUM 25 MCG: 25 TABLET ORAL at 06:06

## 2021-01-01 RX ADMIN — FUROSEMIDE 20 MG: 20 TABLET ORAL at 01:08

## 2021-01-01 RX ADMIN — LEVOTHYROXINE SODIUM 25 MCG: 25 TABLET ORAL at 05:07

## 2021-01-01 RX ADMIN — ALBUTEROL SULFATE 2 PUFF: 108 AEROSOL, METERED RESPIRATORY (INHALATION) at 08:08

## 2021-01-01 RX ADMIN — NIFEDIPINE 30 MG: 30 TABLET, FILM COATED, EXTENDED RELEASE ORAL at 08:08

## 2021-01-01 RX ADMIN — LEVOTHYROXINE SODIUM 25 MCG: 25 TABLET ORAL at 05:08

## 2021-01-01 RX ADMIN — MUPIROCIN: 20 OINTMENT TOPICAL at 11:08

## 2021-01-01 RX ADMIN — POLYETHYLENE GLYCOL 3350 17 G: 17 POWDER, FOR SOLUTION ORAL at 08:08

## 2021-01-01 RX ADMIN — LOSARTAN POTASSIUM 100 MG: 50 TABLET, FILM COATED ORAL at 08:08

## 2021-01-01 RX ADMIN — GABAPENTIN 300 MG: 300 CAPSULE ORAL at 09:08

## 2021-01-01 RX ADMIN — CARVEDILOL 12.5 MG: 12.5 TABLET, FILM COATED ORAL at 08:08

## 2021-01-01 RX ADMIN — ENOXAPARIN SODIUM 50 MG: 60 INJECTION SUBCUTANEOUS at 09:08

## 2021-01-01 RX ADMIN — ACETAMINOPHEN 650 MG: 325 TABLET ORAL at 08:08

## 2021-01-01 RX ADMIN — IOHEXOL 75 ML: 350 INJECTION, SOLUTION INTRAVENOUS at 04:08

## 2021-01-01 RX ADMIN — OXYCODONE HYDROCHLORIDE AND ACETAMINOPHEN 500 MG: 500 TABLET ORAL at 01:08

## 2021-01-01 RX ADMIN — PIPERACILLIN SODIUM AND TAZOBACTAM SODIUM 4.5 G: 4; .5 INJECTION, POWDER, FOR SOLUTION INTRAVENOUS at 03:08

## 2021-01-01 RX ADMIN — QUETIAPINE FUMARATE 25 MG: 25 TABLET ORAL at 10:02

## 2021-01-01 RX ADMIN — ACETAMINOPHEN 1000 MG: 500 TABLET ORAL at 08:06

## 2021-01-01 RX ADMIN — ALBUTEROL SULFATE 2 PUFF: 90 AEROSOL, METERED RESPIRATORY (INHALATION) at 08:08

## 2021-01-01 RX ADMIN — Medication 1 CAPSULE: at 11:06

## 2021-01-01 RX ADMIN — MUPIROCIN: 20 OINTMENT TOPICAL at 08:09

## 2021-01-01 RX ADMIN — ENOXAPARIN SODIUM 50 MG: 60 INJECTION SUBCUTANEOUS at 08:08

## 2021-01-01 RX ADMIN — ALBUTEROL SULFATE 2 PUFF: 108 INHALANT RESPIRATORY (INHALATION) at 02:09

## 2021-01-01 RX ADMIN — HYDRALAZINE HYDROCHLORIDE 2 MG: 20 INJECTION INTRAMUSCULAR; INTRAVENOUS at 05:09

## 2021-01-01 RX ADMIN — HYDROCODONE BITARTRATE AND ACETAMINOPHEN 1 TABLET: 5; 325 TABLET ORAL at 06:06

## 2021-01-01 RX ADMIN — FERROUS SULFATE TAB EC 325 MG (65 MG FE EQUIVALENT) 325 MG: 325 (65 FE) TABLET DELAYED RESPONSE at 10:06

## 2021-01-01 RX ADMIN — Medication 9 MG: at 08:06

## 2021-01-01 RX ADMIN — RISPERIDONE 2 MG: 1 TABLET, ORALLY DISINTEGRATING ORAL at 09:09

## 2021-01-01 RX ADMIN — QUETIAPINE FUMARATE 25 MG: 25 TABLET ORAL at 09:08

## 2021-01-01 RX ADMIN — MELATONIN TAB 3 MG 6 MG: 3 TAB at 10:08

## 2021-01-01 RX ADMIN — SERTRALINE HYDROCHLORIDE 100 MG: 100 TABLET ORAL at 09:02

## 2021-01-01 RX ADMIN — AMLODIPINE BESYLATE 10 MG: 10 TABLET ORAL at 08:08

## 2021-01-01 RX ADMIN — CONJUGATED ESTROGENS 0.5 G: 0.62 CREAM VAGINAL at 10:07

## 2021-01-01 RX ADMIN — OXYCODONE HYDROCHLORIDE AND ACETAMINOPHEN 500 MG: 500 TABLET ORAL at 09:08

## 2021-01-01 RX ADMIN — ALBUTEROL SULFATE 2 PUFF: 108 INHALANT RESPIRATORY (INHALATION) at 05:08

## 2021-01-01 RX ADMIN — MUPIROCIN: 20 OINTMENT TOPICAL at 09:08

## 2021-01-01 RX ADMIN — ENOXAPARIN SODIUM 50 MG: 60 INJECTION SUBCUTANEOUS at 11:09

## 2021-01-01 RX ADMIN — CARVEDILOL 25 MG: 12.5 TABLET, FILM COATED ORAL at 09:08

## 2021-01-01 RX ADMIN — ALBUTEROL SULFATE 2 PUFF: 90 AEROSOL, METERED RESPIRATORY (INHALATION) at 02:08

## 2021-01-01 RX ADMIN — Medication 1 TABLET: at 08:08

## 2021-01-01 RX ADMIN — OXYCODONE 5 MG: 5 TABLET ORAL at 06:08

## 2021-01-01 RX ADMIN — ISOSORBIDE MONONITRATE 120 MG: 60 TABLET, EXTENDED RELEASE ORAL at 08:06

## 2021-01-01 RX ADMIN — FERROUS SULFATE TAB EC 325 MG (65 MG FE EQUIVALENT) 325 MG: 325 (65 FE) TABLET DELAYED RESPONSE at 08:06

## 2021-01-01 RX ADMIN — CARVEDILOL 12.5 MG: 12.5 TABLET, FILM COATED ORAL at 09:08

## 2021-01-01 RX ADMIN — VALSARTAN 160 MG: 80 TABLET, FILM COATED ORAL at 08:06

## 2021-01-01 RX ADMIN — ASPIRIN 81 MG: 81 TABLET, COATED ORAL at 08:09

## 2021-01-01 RX ADMIN — AMLODIPINE BESYLATE 10 MG: 10 TABLET ORAL at 12:06

## 2021-01-01 RX ADMIN — ASPIRIN 81 MG: 81 TABLET, COATED ORAL at 01:08

## 2021-01-01 RX ADMIN — CHOLECALCIFEROL (VITAMIN D3) 25 MCG (1,000 UNIT) TABLET 1000 UNITS: TABLET at 08:06

## 2021-01-01 RX ADMIN — OXYCODONE 5 MG: 5 TABLET ORAL at 04:08

## 2021-01-01 RX ADMIN — HYDRALAZINE HYDROCHLORIDE 2 MG: 20 INJECTION INTRAMUSCULAR; INTRAVENOUS at 01:08

## 2021-01-01 RX ADMIN — PIPERACILLIN SODIUM AND TAZOBACTAM SODIUM 4.5 G: 4; .5 INJECTION, POWDER, FOR SOLUTION INTRAVENOUS at 09:09

## 2021-01-01 RX ADMIN — ALBUTEROL SULFATE 2 PUFF: 108 INHALANT RESPIRATORY (INHALATION) at 08:09

## 2021-01-01 RX ADMIN — Medication 250 MG: at 09:08

## 2021-01-01 RX ADMIN — CHOLECALCIFEROL (VITAMIN D3) 25 MCG (1,000 UNIT) TABLET 1000 UNITS: TABLET at 10:06

## 2021-01-01 RX ADMIN — ISOSORBIDE MONONITRATE 120 MG: 60 TABLET, EXTENDED RELEASE ORAL at 10:07

## 2021-01-01 RX ADMIN — ATORVASTATIN CALCIUM 40 MG: 40 TABLET, FILM COATED ORAL at 09:02

## 2021-01-01 RX ADMIN — ENOXAPARIN SODIUM 50 MG: 60 INJECTION SUBCUTANEOUS at 09:09

## 2021-01-01 RX ADMIN — DEXTROSE 60 MG: 50 INJECTION, SOLUTION INTRAVENOUS at 12:08

## 2021-01-01 RX ADMIN — PANTOPRAZOLE SODIUM 40 MG: 40 TABLET, DELAYED RELEASE ORAL at 09:07

## 2021-01-01 RX ADMIN — GUAIFENESIN SYRUP AND DEXTROMETHORPHAN 10 ML: 100; 10 SYRUP ORAL at 09:08

## 2021-01-01 RX ADMIN — DOCUSATE SODIUM 50 MG AND SENNOSIDES 8.6 MG 1 TABLET: 8.6; 5 TABLET, FILM COATED ORAL at 09:08

## 2021-01-01 RX ADMIN — RISPERIDONE 1 MG: 1 TABLET, ORALLY DISINTEGRATING ORAL at 04:09

## 2021-01-01 RX ADMIN — FUROSEMIDE 20 MG: 20 TABLET ORAL at 10:07

## 2021-01-01 RX ADMIN — ASPIRIN 81 MG CHEWABLE TABLET 81 MG: 81 TABLET CHEWABLE at 09:08

## 2021-01-01 RX ADMIN — REMDESIVIR 100 MG: 100 INJECTION, POWDER, LYOPHILIZED, FOR SOLUTION INTRAVENOUS at 11:08

## 2021-01-01 RX ADMIN — MELATONIN TAB 3 MG 6 MG: 3 TAB at 09:08

## 2021-01-01 RX ADMIN — ISOSORBIDE MONONITRATE 120 MG: 60 TABLET, EXTENDED RELEASE ORAL at 09:07

## 2021-01-01 RX ADMIN — SODIUM CHLORIDE 1000 ML: 0.9 INJECTION, SOLUTION INTRAVENOUS at 11:06

## 2021-01-01 RX ADMIN — ALBUTEROL SULFATE 2 PUFF: 108 INHALANT RESPIRATORY (INHALATION) at 08:08

## 2021-01-01 RX ADMIN — DEXAMETHASONE 6 MG: 4 TABLET ORAL at 09:08

## 2021-01-01 RX ADMIN — AMLODIPINE BESYLATE 10 MG: 10 TABLET ORAL at 08:06

## 2021-01-01 RX ADMIN — ISOSORBIDE MONONITRATE 120 MG: 60 TABLET, EXTENDED RELEASE ORAL at 09:08

## 2021-01-01 RX ADMIN — DEXAMETHASONE 6 MG: 4 TABLET ORAL at 01:08

## 2021-01-01 RX ADMIN — Medication 1 CAPSULE: at 08:06

## 2021-01-01 RX ADMIN — PIPERACILLIN SODIUM AND TAZOBACTAM SODIUM 4.5 G: 4; .5 INJECTION, POWDER, FOR SOLUTION INTRAVENOUS at 02:08

## 2021-01-01 RX ADMIN — LOSARTAN POTASSIUM 100 MG: 50 TABLET, FILM COATED ORAL at 11:08

## 2021-01-01 RX ADMIN — GABAPENTIN 300 MG: 300 CAPSULE ORAL at 10:02

## 2021-01-01 RX ADMIN — MELATONIN TAB 3 MG 6 MG: 3 TAB at 02:08

## 2021-01-01 RX ADMIN — PIPERACILLIN SODIUM AND TAZOBACTAM SODIUM 4.5 G: 4; .5 INJECTION, POWDER, FOR SOLUTION INTRAVENOUS at 12:09

## 2021-01-01 RX ADMIN — METOROPROLOL TARTRATE 2.5 MG: 5 INJECTION, SOLUTION INTRAVENOUS at 09:08

## 2021-01-01 RX ADMIN — Medication 250 MG: at 08:09

## 2021-01-01 RX ADMIN — PANTOPRAZOLE SODIUM 40 MG: 40 TABLET, DELAYED RELEASE ORAL at 10:06

## 2021-01-01 RX ADMIN — RISPERIDONE 1 MG: 1 TABLET, ORALLY DISINTEGRATING ORAL at 09:09

## 2021-01-01 RX ADMIN — LOPERAMIDE HYDROCHLORIDE 2 MG: 2 CAPSULE ORAL at 08:08

## 2021-01-01 RX ADMIN — PSYLLIUM HUSK 1 PACKET: 3.4 POWDER ORAL at 08:06

## 2021-01-01 RX ADMIN — SODIUM CHLORIDE, SODIUM LACTATE, POTASSIUM CHLORIDE, AND CALCIUM CHLORIDE 125 ML: .6; .31; .03; .02 INJECTION, SOLUTION INTRAVENOUS at 07:08

## 2021-01-01 RX ADMIN — ISOSORBIDE MONONITRATE 120 MG: 60 TABLET, EXTENDED RELEASE ORAL at 08:08

## 2021-01-01 RX ADMIN — ATORVASTATIN CALCIUM 40 MG: 40 TABLET, FILM COATED ORAL at 08:06

## 2021-01-01 RX ADMIN — ATORVASTATIN CALCIUM 40 MG: 40 TABLET, FILM COATED ORAL at 10:07

## 2021-01-01 RX ADMIN — POTASSIUM CHLORIDE 40 MEQ: 1500 TABLET, EXTENDED RELEASE ORAL at 02:07

## 2021-01-01 RX ADMIN — FERROUS SULFATE TAB EC 325 MG (65 MG FE EQUIVALENT) 325 MG: 325 (65 FE) TABLET DELAYED RESPONSE at 09:02

## 2021-01-01 RX ADMIN — HYDRALAZINE HYDROCHLORIDE 25 MG: 25 TABLET, FILM COATED ORAL at 08:06

## 2021-01-01 RX ADMIN — CARVEDILOL 12.5 MG: 12.5 TABLET, FILM COATED ORAL at 10:07

## 2021-01-01 RX ADMIN — FAMOTIDINE 20 MG: 20 TABLET ORAL at 09:08

## 2021-01-01 RX ADMIN — CEFTRIAXONE 1 G: 1 INJECTION, POWDER, FOR SOLUTION INTRAMUSCULAR; INTRAVENOUS at 09:06

## 2021-01-01 RX ADMIN — METOROPROLOL TARTRATE 2.5 MG: 5 INJECTION, SOLUTION INTRAVENOUS at 09:09

## 2021-01-01 RX ADMIN — PIPERACILLIN SODIUM AND TAZOBACTAM SODIUM 4.5 G: 4; .5 INJECTION, POWDER, FOR SOLUTION INTRAVENOUS at 03:09

## 2021-01-01 RX ADMIN — ACETAMINOPHEN 650 MG: 325 TABLET ORAL at 02:08

## 2021-01-01 RX ADMIN — DOCUSATE SODIUM 50 MG AND SENNOSIDES 8.6 MG 1 TABLET: 8.6; 5 TABLET, FILM COATED ORAL at 08:08

## 2021-01-01 RX ADMIN — LEVOTHYROXINE SODIUM 25 MCG: 25 TABLET ORAL at 06:08

## 2021-01-01 RX ADMIN — PANTOPRAZOLE SODIUM 40 MG: 40 TABLET, DELAYED RELEASE ORAL at 08:06

## 2021-01-01 RX ADMIN — LEVOTHYROXINE SODIUM 25 MCG: 25 TABLET ORAL at 05:06

## 2021-01-01 RX ADMIN — FERROUS SULFATE TAB EC 325 MG (65 MG FE EQUIVALENT) 325 MG: 325 (65 FE) TABLET DELAYED RESPONSE at 09:08

## 2021-01-01 RX ADMIN — LEVOTHYROXINE SODIUM 25 MCG: 25 TABLET ORAL at 08:08

## 2021-01-01 RX ADMIN — GABAPENTIN 300 MG: 300 CAPSULE ORAL at 08:06

## 2021-01-01 RX ADMIN — FERROUS SULFATE TAB EC 325 MG (65 MG FE EQUIVALENT) 325 MG: 325 (65 FE) TABLET DELAYED RESPONSE at 02:08

## 2021-01-01 RX ADMIN — LOSARTAN POTASSIUM 100 MG: 50 TABLET, FILM COATED ORAL at 10:07

## 2021-01-01 RX ADMIN — MUPIROCIN: 20 OINTMENT TOPICAL at 01:08

## 2021-01-01 RX ADMIN — HYDRALAZINE HYDROCHLORIDE 25 MG: 25 TABLET, FILM COATED ORAL at 02:06

## 2021-01-01 RX ADMIN — SERTRALINE HYDROCHLORIDE 100 MG: 100 TABLET ORAL at 02:02

## 2021-01-01 RX ADMIN — ACETAMINOPHEN 1000 MG: 500 TABLET ORAL at 03:07

## 2021-01-01 RX ADMIN — ISOSORBIDE MONONITRATE 120 MG: 60 TABLET, EXTENDED RELEASE ORAL at 10:06

## 2021-01-01 RX ADMIN — ACETAMINOPHEN 650 MG: 325 TABLET ORAL at 12:02

## 2021-01-01 RX ADMIN — AMLODIPINE BESYLATE 10 MG: 10 TABLET ORAL at 10:06

## 2021-01-01 RX ADMIN — Medication 1 TABLET: at 09:08

## 2021-01-01 RX ADMIN — ACETAMINOPHEN 650 MG: 325 TABLET ORAL at 09:02

## 2021-01-01 RX ADMIN — THERA TABS 1 TABLET: TAB at 01:08

## 2021-01-01 RX ADMIN — REMDESIVIR 200 MG: 100 INJECTION, POWDER, LYOPHILIZED, FOR SOLUTION INTRAVENOUS at 06:08

## 2021-01-01 RX ADMIN — PANTOPRAZOLE SODIUM 40 MG: 40 TABLET, DELAYED RELEASE ORAL at 01:08

## 2021-01-01 RX ADMIN — BENZONATATE 100 MG: 100 CAPSULE ORAL at 08:08

## 2021-01-01 RX ADMIN — ALBUTEROL SULFATE 2 PUFF: 108 AEROSOL, METERED RESPIRATORY (INHALATION) at 09:08

## 2021-01-01 RX ADMIN — PANTOPRAZOLE SODIUM 40 MG: 40 TABLET, DELAYED RELEASE ORAL at 10:07

## 2021-01-01 RX ADMIN — SODIUM CHLORIDE, SODIUM LACTATE, POTASSIUM CHLORIDE, AND CALCIUM CHLORIDE: .6; .31; .03; .02 INJECTION, SOLUTION INTRAVENOUS at 04:02

## 2021-01-01 RX ADMIN — GABAPENTIN 300 MG: 300 CAPSULE ORAL at 10:07

## 2021-01-01 RX ADMIN — LORAZEPAM 0.5 MG: 2 INJECTION INTRAMUSCULAR; INTRAVENOUS at 07:09

## 2021-01-01 RX ADMIN — ENOXAPARIN SODIUM 50 MG: 60 INJECTION SUBCUTANEOUS at 01:08

## 2021-01-01 RX ADMIN — AMLODIPINE BESYLATE 10 MG: 10 TABLET ORAL at 09:08

## 2021-01-01 RX ADMIN — SODIUM CHLORIDE, SODIUM LACTATE, POTASSIUM CHLORIDE, AND CALCIUM CHLORIDE 500 ML: .6; .31; .03; .02 INJECTION, SOLUTION INTRAVENOUS at 05:06

## 2021-01-01 RX ADMIN — VANCOMYCIN HYDROCHLORIDE 1000 MG: 1 INJECTION, POWDER, LYOPHILIZED, FOR SOLUTION INTRAVENOUS at 01:08

## 2021-01-01 RX ADMIN — METHYLPREDNISOLONE SODIUM SUCCINATE 125 MG: 125 INJECTION, POWDER, FOR SOLUTION INTRAMUSCULAR; INTRAVENOUS at 03:08

## 2021-01-01 RX ADMIN — FUROSEMIDE 20 MG: 20 TABLET ORAL at 11:08

## 2021-01-01 RX ADMIN — ASPIRIN 81 MG CHEWABLE TABLET 81 MG: 81 TABLET CHEWABLE at 08:06

## 2021-01-01 RX ADMIN — MORPHINE SULFATE 2 MG: 2 INJECTION, SOLUTION INTRAMUSCULAR; INTRAVENOUS at 12:09

## 2021-01-01 RX ADMIN — OXYCODONE 5 MG: 5 TABLET ORAL at 09:08

## 2021-01-01 RX ADMIN — FERROUS SULFATE TAB EC 325 MG (65 MG FE EQUIVALENT) 325 MG: 325 (65 FE) TABLET DELAYED RESPONSE at 08:08

## 2021-01-01 RX ADMIN — VALSARTAN 160 MG: 80 TABLET, FILM COATED ORAL at 10:06

## 2021-01-01 RX ADMIN — REMDESIVIR 100 MG: 100 INJECTION, POWDER, LYOPHILIZED, FOR SOLUTION INTRAVENOUS at 08:08

## 2021-01-01 RX ADMIN — MORPHINE SULFATE 2 MG: 2 INJECTION, SOLUTION INTRAMUSCULAR; INTRAVENOUS at 08:09

## 2021-01-01 RX ADMIN — Medication 250 MG: at 01:08

## 2021-01-01 RX ADMIN — ALBUTEROL SULFATE 2 PUFF: 108 INHALANT RESPIRATORY (INHALATION) at 01:08

## 2021-01-01 RX ADMIN — METOPROLOL SUCCINATE 25 MG: 25 TABLET, EXTENDED RELEASE ORAL at 10:06

## 2021-01-01 RX ADMIN — ISOSORBIDE MONONITRATE 120 MG: 60 TABLET, EXTENDED RELEASE ORAL at 09:02

## 2021-01-01 RX ADMIN — PIPERACILLIN SODIUM AND TAZOBACTAM SODIUM 4.5 G: 4; .5 INJECTION, POWDER, FOR SOLUTION INTRAVENOUS at 11:08

## 2021-01-01 RX ADMIN — CEFTRIAXONE 1 G: 1 INJECTION, POWDER, FOR SOLUTION INTRAMUSCULAR; INTRAVENOUS at 10:06

## 2021-01-01 RX ADMIN — ALBUTEROL SULFATE 2 PUFF: 108 INHALANT RESPIRATORY (INHALATION) at 07:08

## 2021-01-01 RX ADMIN — SERTRALINE HYDROCHLORIDE 100 MG: 100 TABLET ORAL at 09:08

## 2021-01-01 RX ADMIN — REMDESIVIR 200 MG: 100 INJECTION, POWDER, LYOPHILIZED, FOR SOLUTION INTRAVENOUS at 09:08

## 2021-01-01 RX ADMIN — VANCOMYCIN HYDROCHLORIDE 750 MG: 750 INJECTION, POWDER, LYOPHILIZED, FOR SOLUTION INTRAVENOUS at 10:09

## 2021-01-01 RX ADMIN — LORAZEPAM 0.5 MG: 2 INJECTION INTRAMUSCULAR; INTRAVENOUS at 10:09

## 2021-01-01 RX ADMIN — MUPIROCIN: 20 OINTMENT TOPICAL at 09:09

## 2021-01-01 RX ADMIN — FUROSEMIDE 20 MG: 20 TABLET ORAL at 09:08

## 2021-01-01 RX ADMIN — ALBUTEROL SULFATE 2 PUFF: 108 INHALANT RESPIRATORY (INHALATION) at 09:08

## 2021-01-01 RX ADMIN — ACETAMINOPHEN 650 MG: 325 TABLET ORAL at 09:08

## 2021-01-01 RX ADMIN — CARVEDILOL 12.5 MG: 12.5 TABLET, FILM COATED ORAL at 01:08

## 2021-01-01 RX ADMIN — VANCOMYCIN HYDROCHLORIDE 750 MG: 750 INJECTION, POWDER, LYOPHILIZED, FOR SOLUTION INTRAVENOUS at 11:08

## 2021-01-01 RX ADMIN — RNA INGREDIENT BNT-162B2 0.3 ML: 0.23 INJECTION, SUSPENSION INTRAMUSCULAR at 02:08

## 2021-01-01 RX ADMIN — AMLODIPINE BESYLATE 10 MG: 10 TABLET ORAL at 09:07

## 2021-01-01 RX ADMIN — CEFTRIAXONE 1 G: 1 INJECTION, POWDER, FOR SOLUTION INTRAMUSCULAR; INTRAVENOUS at 12:06

## 2021-01-01 RX ADMIN — MORPHINE SULFATE 4 MG: 4 INJECTION INTRAVENOUS at 12:08

## 2021-01-01 RX ADMIN — FERROUS SULFATE TAB EC 325 MG (65 MG FE EQUIVALENT) 325 MG: 325 (65 FE) TABLET DELAYED RESPONSE at 10:07

## 2021-01-01 RX ADMIN — GUAIFENESIN SYRUP AND DEXTROMETHORPHAN 10 ML: 100; 10 SYRUP ORAL at 02:08

## 2021-01-01 RX ADMIN — FUROSEMIDE 20 MG: 20 TABLET ORAL at 09:07

## 2021-01-01 RX ADMIN — FAMOTIDINE 20 MG: 20 TABLET ORAL at 08:08

## 2021-01-01 RX ADMIN — SERTRALINE HYDROCHLORIDE 100 MG: 100 TABLET ORAL at 10:06

## 2021-01-01 RX ADMIN — SERTRALINE HYDROCHLORIDE 100 MG: 100 TABLET ORAL at 08:06

## 2021-01-01 RX ADMIN — Medication 1000 UNITS: at 01:08

## 2021-01-01 RX ADMIN — TUBERCULIN PURIFIED PROTEIN DERIVATIVE 5 UNITS: 5 INJECTION, SOLUTION INTRADERMAL at 03:08

## 2021-01-01 RX ADMIN — ATORVASTATIN CALCIUM 20 MG: 20 TABLET, FILM COATED ORAL at 08:08

## 2021-01-01 RX ADMIN — MORPHINE SULFATE 4 MG: 4 INJECTION INTRAVENOUS at 08:09

## 2021-01-01 RX ADMIN — METOROPROLOL TARTRATE 2.5 MG: 5 INJECTION, SOLUTION INTRAVENOUS at 08:08

## 2021-01-01 RX ADMIN — MORPHINE SULFATE 4 MG: 4 INJECTION INTRAVENOUS at 12:09

## 2021-01-01 RX ADMIN — GABAPENTIN 300 MG: 300 CAPSULE ORAL at 08:08

## 2021-01-01 RX ADMIN — QUETIAPINE FUMARATE 25 MG: 25 TABLET ORAL at 08:08

## 2021-01-01 RX ADMIN — NIFEDIPINE 30 MG: 30 TABLET, FILM COATED, EXTENDED RELEASE ORAL at 12:08

## 2021-01-01 RX ADMIN — AMLODIPINE BESYLATE 10 MG: 10 TABLET ORAL at 09:02

## 2021-01-01 RX ADMIN — ASPIRIN 81 MG: 81 TABLET, CHEWABLE ORAL at 01:08

## 2021-01-01 RX ADMIN — ATORVASTATIN CALCIUM 40 MG: 40 TABLET, FILM COATED ORAL at 09:06

## 2021-01-01 RX ADMIN — POLYETHYLENE GLYCOL 3350 17 G: 17 POWDER, FOR SOLUTION ORAL at 09:08

## 2021-01-01 RX ADMIN — DEXAMETHASONE 6 MG: 4 TABLET ORAL at 08:08

## 2021-01-01 RX ADMIN — GABAPENTIN 300 MG: 300 CAPSULE ORAL at 09:06

## 2021-01-01 RX ADMIN — IPRATROPIUM BROMIDE AND ALBUTEROL SULFATE 3 ML: .5; 2.5 SOLUTION RESPIRATORY (INHALATION) at 11:09

## 2021-01-01 RX ADMIN — CEFTRIAXONE 1 G: 1 INJECTION, POWDER, FOR SOLUTION INTRAMUSCULAR; INTRAVENOUS at 05:02

## 2021-01-01 RX ADMIN — MORPHINE SULFATE 4 MG: 4 INJECTION INTRAVENOUS at 05:09

## 2021-01-01 RX ADMIN — PSYLLIUM HUSK 1 PACKET: 3.4 POWDER ORAL at 10:06

## 2021-01-01 RX ADMIN — THERA TABS 1 TABLET: TAB at 09:02

## 2021-01-01 RX ADMIN — PSYLLIUM HUSK 1 PACKET: 3.4 POWDER ORAL at 01:08

## 2021-01-01 RX ADMIN — NIFEDIPINE 60 MG: 30 TABLET, FILM COATED, EXTENDED RELEASE ORAL at 01:08

## 2021-01-01 RX ADMIN — DEXTROSE 60 MG: 50 INJECTION, SOLUTION INTRAVENOUS at 09:08

## 2021-01-01 RX ADMIN — ASPIRIN 81 MG: 81 TABLET, COATED ORAL at 09:08

## 2021-01-01 RX ADMIN — GABAPENTIN 300 MG: 300 CAPSULE ORAL at 09:02

## 2021-01-01 RX ADMIN — MORPHINE SULFATE 4 MG: 4 INJECTION INTRAVENOUS at 09:08

## 2021-01-01 RX ADMIN — ALBUTEROL SULFATE 2 PUFF: 108 INHALANT RESPIRATORY (INHALATION) at 12:08

## 2021-01-01 RX ADMIN — PIPERACILLIN SODIUM AND TAZOBACTAM SODIUM 4.5 G: 4; .5 INJECTION, POWDER, FOR SOLUTION INTRAVENOUS at 08:08

## 2021-01-01 RX ADMIN — VANCOMYCIN HYDROCHLORIDE 500 MG: 500 INJECTION, POWDER, LYOPHILIZED, FOR SOLUTION INTRAVENOUS at 11:08

## 2021-01-01 RX ADMIN — METOPROLOL SUCCINATE 12.5 MG: 25 TABLET, EXTENDED RELEASE ORAL at 11:02

## 2021-01-01 RX ADMIN — LORAZEPAM 0.5 MG: 2 INJECTION INTRAMUSCULAR; INTRAVENOUS at 01:09

## 2021-01-01 RX ADMIN — SERTRALINE HYDROCHLORIDE 100 MG: 100 TABLET ORAL at 10:07

## 2021-01-01 RX ADMIN — POTASSIUM BICARBONATE 50 MEQ: 25 TABLET, EFFERVESCENT ORAL at 09:02

## 2021-01-01 RX ADMIN — HYDRALAZINE HYDROCHLORIDE 2 MG: 20 INJECTION INTRAMUSCULAR; INTRAVENOUS at 12:09

## 2021-01-01 RX ADMIN — LORAZEPAM 0.5 MG: 2 INJECTION INTRAMUSCULAR; INTRAVENOUS at 03:09

## 2021-01-01 RX ADMIN — SODIUM CHLORIDE, SODIUM LACTATE, POTASSIUM CHLORIDE, AND CALCIUM CHLORIDE: .6; .31; .03; .02 INJECTION, SOLUTION INTRAVENOUS at 08:08

## 2021-01-01 RX ADMIN — LOSARTAN POTASSIUM 100 MG: 50 TABLET, FILM COATED ORAL at 09:07

## 2021-01-01 RX ADMIN — LOPERAMIDE HYDROCHLORIDE 2 MG: 2 CAPSULE ORAL at 09:08

## 2021-01-01 RX ADMIN — METOROPROLOL TARTRATE 2.5 MG: 5 INJECTION, SOLUTION INTRAVENOUS at 08:09

## 2021-01-01 RX ADMIN — Medication 1000 UNITS: at 09:02

## 2021-01-01 RX ADMIN — PIPERACILLIN SODIUM AND TAZOBACTAM SODIUM 4.5 G: 4; .5 INJECTION, POWDER, FOR SOLUTION INTRAVENOUS at 10:09

## 2021-01-01 RX ADMIN — LOSARTAN POTASSIUM 100 MG: 50 TABLET, FILM COATED ORAL at 09:08

## 2021-01-01 RX ADMIN — ATORVASTATIN CALCIUM 40 MG: 40 TABLET, FILM COATED ORAL at 10:02

## 2021-01-01 RX ADMIN — HYDRALAZINE HYDROCHLORIDE 25 MG: 25 TABLET, FILM COATED ORAL at 04:06

## 2021-01-01 RX ADMIN — FERROUS SULFATE TAB EC 325 MG (65 MG FE EQUIVALENT) 325 MG: 325 (65 FE) TABLET DELAYED RESPONSE at 10:02

## 2021-01-01 RX ADMIN — LEVOTHYROXINE SODIUM 25 MCG: 25 TABLET ORAL at 06:02

## 2021-01-01 RX ADMIN — METOPROLOL SUCCINATE 25 MG: 25 TABLET, EXTENDED RELEASE ORAL at 08:06

## 2021-01-01 RX ADMIN — CARVEDILOL 12.5 MG: 12.5 TABLET, FILM COATED ORAL at 10:06

## 2021-01-01 RX ADMIN — MUPIROCIN: 20 OINTMENT TOPICAL at 08:08

## 2021-01-01 RX ADMIN — DEXAMETHASONE 6 MG: 4 TABLET ORAL at 04:08

## 2021-01-01 RX ADMIN — ENOXAPARIN SODIUM 50 MG: 60 INJECTION SUBCUTANEOUS at 08:09

## 2021-01-01 RX ADMIN — HYDRALAZINE HYDROCHLORIDE 25 MG: 25 TABLET, FILM COATED ORAL at 04:08

## 2021-01-01 RX ADMIN — LABETALOL HYDROCHLORIDE 10 MG: 5 INJECTION, SOLUTION INTRAVENOUS at 04:07

## 2021-01-01 RX ADMIN — AMLODIPINE BESYLATE 10 MG: 10 TABLET ORAL at 10:07

## 2021-01-01 RX ADMIN — SERTRALINE HYDROCHLORIDE 100 MG: 100 TABLET ORAL at 01:08

## 2021-01-01 RX ADMIN — ACETAMINOPHEN 650 MG: 325 TABLET ORAL at 05:02

## 2021-01-01 RX ADMIN — PIPERACILLIN SODIUM AND TAZOBACTAM SODIUM 4.5 G: 4; .5 INJECTION, POWDER, FOR SOLUTION INTRAVENOUS at 07:08

## 2021-01-01 RX ADMIN — ASPIRIN 81 MG CHEWABLE TABLET 81 MG: 81 TABLET CHEWABLE at 10:06

## 2021-01-01 RX ADMIN — POTASSIUM CHLORIDE 40 MEQ: 1500 TABLET, EXTENDED RELEASE ORAL at 03:07

## 2021-01-01 RX ADMIN — PIPERACILLIN SODIUM AND TAZOBACTAM SODIUM 4.5 G: 4; .5 INJECTION, POWDER, FOR SOLUTION INTRAVENOUS at 09:08

## 2021-01-01 RX ADMIN — Medication 1 TABLET: at 09:09

## 2021-01-01 RX ADMIN — FERROUS SULFATE TAB EC 325 MG (65 MG FE EQUIVALENT) 325 MG: 325 (65 FE) TABLET DELAYED RESPONSE at 09:07

## 2021-01-01 SDOH — SOCIAL DETERMINANTS OF HEALTH (SDOH): PROBLEM RELATED TO HOUSING AND ECONOMIC CIRCUMSTANCES, UNSPECIFIED: Z59.9

## 2021-02-27 PROBLEM — R54 AGE-RELATED PHYSICAL DEBILITY: Status: ACTIVE | Noted: 2021-01-01

## 2021-02-27 PROBLEM — R19.7 DIARRHEA: Status: ACTIVE | Noted: 2021-01-01

## 2021-02-27 PROBLEM — N39.0 ACUTE LOWER UTI (URINARY TRACT INFECTION): Status: ACTIVE | Noted: 2021-01-01

## 2021-02-27 PROBLEM — N95.0 POSTMENOPAUSAL VAGINAL BLEEDING: Status: ACTIVE | Noted: 2021-01-01

## 2021-02-27 PROBLEM — N18.31 ACUTE RENAL FAILURE SUPERIMPOSED ON STAGE 3A CHRONIC KIDNEY DISEASE: Status: ACTIVE | Noted: 2021-01-01

## 2021-02-27 PROBLEM — K52.9 GASTROENTERITIS: Status: ACTIVE | Noted: 2021-01-01

## 2021-02-27 PROBLEM — N17.9 ACUTE RENAL FAILURE SUPERIMPOSED ON STAGE 3A CHRONIC KIDNEY DISEASE: Status: ACTIVE | Noted: 2021-01-01

## 2021-02-27 PROBLEM — K52.9 GASTROENTERITIS: Status: RESOLVED | Noted: 2021-01-01 | Resolved: 2021-01-01

## 2021-02-27 PROBLEM — R55 SYNCOPE: Status: ACTIVE | Noted: 2021-01-01

## 2021-02-28 PROBLEM — A08.4 VIRAL GASTROENTERITIS: Status: ACTIVE | Noted: 2021-01-01

## 2021-06-20 PROBLEM — R55 SYNCOPE AND COLLAPSE: Status: ACTIVE | Noted: 2021-01-01

## 2021-06-20 PROBLEM — N17.9 AKI (ACUTE KIDNEY INJURY): Status: ACTIVE | Noted: 2021-01-01

## 2021-06-20 PROBLEM — M26.629 TEMPOROMANDIBULAR JOINT (TMJ) PAIN: Status: ACTIVE | Noted: 2021-01-01

## 2021-06-21 PROBLEM — N30.00 ACUTE CYSTITIS: Status: ACTIVE | Noted: 2021-01-01

## 2021-06-22 PROBLEM — R78.81 POSITIVE BLOOD CULTURE: Status: ACTIVE | Noted: 2021-01-01

## 2021-06-30 PROBLEM — S06.5X0A SUBDURAL HEMORRHAGE FOLLOWING INJURY, NO LOSS OF CONSCIOUSNESS: Status: RESOLVED | Noted: 2020-01-13 | Resolved: 2021-01-01

## 2021-07-24 PROBLEM — N39.0 ACUTE LOWER UTI (URINARY TRACT INFECTION): Status: RESOLVED | Noted: 2021-01-01 | Resolved: 2021-01-01

## 2021-07-24 PROBLEM — R55 SYNCOPE AND COLLAPSE: Status: RESOLVED | Noted: 2021-01-01 | Resolved: 2021-01-01

## 2021-07-24 PROBLEM — E87.5 HYPERKALEMIA: Status: RESOLVED | Noted: 2019-02-26 | Resolved: 2021-01-01

## 2021-07-24 PROBLEM — R55 VASOVAGAL SYNCOPE: Status: RESOLVED | Noted: 2021-01-01 | Resolved: 2021-01-01

## 2021-07-24 PROBLEM — I16.0 HYPERTENSIVE URGENCY: Status: RESOLVED | Noted: 2020-01-23 | Resolved: 2021-01-01

## 2021-07-24 PROBLEM — A08.4 VIRAL GASTROENTERITIS: Status: RESOLVED | Noted: 2021-01-01 | Resolved: 2021-01-01

## 2021-07-24 PROBLEM — N17.9 ACUTE RENAL FAILURE SUPERIMPOSED ON STAGE 3A CHRONIC KIDNEY DISEASE: Status: RESOLVED | Noted: 2021-01-01 | Resolved: 2021-01-01

## 2021-07-24 PROBLEM — A04.72 CLOSTRIDIUM DIFFICILE DIARRHEA: Status: RESOLVED | Noted: 2020-01-23 | Resolved: 2021-01-01

## 2021-07-24 PROBLEM — N18.31 ACUTE RENAL FAILURE SUPERIMPOSED ON STAGE 3A CHRONIC KIDNEY DISEASE: Status: RESOLVED | Noted: 2021-01-01 | Resolved: 2021-01-01

## 2021-07-24 PROBLEM — R78.81 POSITIVE BLOOD CULTURE: Status: RESOLVED | Noted: 2021-01-01 | Resolved: 2021-01-01

## 2021-07-24 PROBLEM — N30.00 ACUTE CYSTITIS: Status: RESOLVED | Noted: 2021-01-01 | Resolved: 2021-01-01

## 2021-07-24 PROBLEM — N17.9 AKI (ACUTE KIDNEY INJURY): Status: RESOLVED | Noted: 2021-01-01 | Resolved: 2021-01-01

## 2021-07-24 PROBLEM — E87.6 HYPOKALEMIA: Status: RESOLVED | Noted: 2020-01-24 | Resolved: 2021-01-01

## 2021-07-24 PROBLEM — I20.0 UNSTABLE ANGINA: Status: RESOLVED | Noted: 2019-02-26 | Resolved: 2021-01-01

## 2021-08-19 PROBLEM — U07.1 COVID-19: Status: ACTIVE | Noted: 2021-01-01

## 2021-08-20 PROBLEM — Z51.5 PALLIATIVE CARE ENCOUNTER: Status: ACTIVE | Noted: 2021-01-01

## 2021-08-29 PROBLEM — J96.01 ACUTE HYPOXEMIC RESPIRATORY FAILURE DUE TO COVID-19: Status: ACTIVE | Noted: 2021-01-01

## 2021-08-29 PROBLEM — R40.0 SOMNOLENCE: Status: ACTIVE | Noted: 2021-01-01

## 2021-08-31 PROBLEM — J18.9 PNEUMONIA DUE TO INFECTIOUS ORGANISM: Status: ACTIVE | Noted: 2021-01-01

## 2021-09-02 PROBLEM — Z51.5 COMFORT MEASURES ONLY STATUS: Status: ACTIVE | Noted: 2021-01-01
